# Patient Record
Sex: MALE | Race: BLACK OR AFRICAN AMERICAN | NOT HISPANIC OR LATINO
[De-identification: names, ages, dates, MRNs, and addresses within clinical notes are randomized per-mention and may not be internally consistent; named-entity substitution may affect disease eponyms.]

---

## 2017-06-09 ENCOUNTER — MEDICATION RENEWAL (OUTPATIENT)
Age: 52
End: 2017-06-09

## 2017-06-13 ENCOUNTER — APPOINTMENT (OUTPATIENT)
Dept: CARDIOLOGY | Facility: CLINIC | Age: 52
End: 2017-06-13

## 2017-06-13 ENCOUNTER — NON-APPOINTMENT (OUTPATIENT)
Age: 52
End: 2017-06-13

## 2017-06-13 VITALS
BODY MASS INDEX: 33.67 KG/M2 | OXYGEN SATURATION: 96 % | WEIGHT: 228 LBS | DIASTOLIC BLOOD PRESSURE: 98 MMHG | SYSTOLIC BLOOD PRESSURE: 182 MMHG | HEART RATE: 77 BPM

## 2017-09-11 ENCOUNTER — MEDICATION RENEWAL (OUTPATIENT)
Age: 52
End: 2017-09-11

## 2017-09-29 ENCOUNTER — MEDICATION RENEWAL (OUTPATIENT)
Age: 52
End: 2017-09-29

## 2017-10-26 ENCOUNTER — RX RENEWAL (OUTPATIENT)
Age: 52
End: 2017-10-26

## 2017-10-27 ENCOUNTER — RX RENEWAL (OUTPATIENT)
Age: 52
End: 2017-10-27

## 2017-10-30 ENCOUNTER — RX RENEWAL (OUTPATIENT)
Age: 52
End: 2017-10-30

## 2017-11-13 ENCOUNTER — RX RENEWAL (OUTPATIENT)
Age: 52
End: 2017-11-13

## 2018-01-16 ENCOUNTER — APPOINTMENT (OUTPATIENT)
Dept: CARDIOLOGY | Facility: CLINIC | Age: 53
End: 2018-01-16
Payer: MEDICAID

## 2018-01-16 ENCOUNTER — NON-APPOINTMENT (OUTPATIENT)
Age: 53
End: 2018-01-16

## 2018-01-16 VITALS
DIASTOLIC BLOOD PRESSURE: 88 MMHG | OXYGEN SATURATION: 95 % | BODY MASS INDEX: 32.44 KG/M2 | SYSTOLIC BLOOD PRESSURE: 167 MMHG | HEIGHT: 69 IN | WEIGHT: 219 LBS | HEART RATE: 65 BPM

## 2018-01-16 DIAGNOSIS — I63.9 CEREBRAL INFARCTION, UNSPECIFIED: ICD-10-CM

## 2018-01-16 DIAGNOSIS — E11.8 TYPE 2 DIABETES MELLITUS WITH UNSPECIFIED COMPLICATIONS: ICD-10-CM

## 2018-01-16 PROCEDURE — 93000 ELECTROCARDIOGRAM COMPLETE: CPT

## 2018-01-16 PROCEDURE — 99215 OFFICE O/P EST HI 40 MIN: CPT

## 2018-04-26 ENCOUNTER — RX RENEWAL (OUTPATIENT)
Age: 53
End: 2018-04-26

## 2018-04-26 RX ORDER — METFORMIN HYDROCHLORIDE 1000 MG/1
1000 TABLET, COATED ORAL TWICE DAILY
Qty: 180 | Refills: 1 | Status: ACTIVE | COMMUNITY
Start: 2017-09-22 | End: 1900-01-01

## 2019-10-11 ENCOUNTER — EMERGENCY (EMERGENCY)
Facility: HOSPITAL | Age: 54
LOS: 1 days | Discharge: HOME | End: 2019-10-11
Attending: STUDENT IN AN ORGANIZED HEALTH CARE EDUCATION/TRAINING PROGRAM
Payer: MEDICARE

## 2019-10-11 VITALS
RESPIRATION RATE: 20 BRPM | HEIGHT: 70 IN | SYSTOLIC BLOOD PRESSURE: 158 MMHG | DIASTOLIC BLOOD PRESSURE: 88 MMHG | TEMPERATURE: 101 F | WEIGHT: 205.91 LBS | HEART RATE: 86 BPM | OXYGEN SATURATION: 95 %

## 2019-10-11 VITALS
SYSTOLIC BLOOD PRESSURE: 151 MMHG | DIASTOLIC BLOOD PRESSURE: 92 MMHG | HEART RATE: 85 BPM | TEMPERATURE: 98 F | OXYGEN SATURATION: 96 % | RESPIRATION RATE: 17 BRPM

## 2019-10-11 LAB
ALBUMIN SERPL ELPH-MCNC: 3.2 G/DL — LOW (ref 3.3–5)
ALP SERPL-CCNC: 137 U/L — HIGH (ref 40–120)
ALT FLD-CCNC: 31 U/L — SIGNIFICANT CHANGE UP (ref 10–45)
ANION GAP SERPL CALC-SCNC: 12 MMOL/L — SIGNIFICANT CHANGE UP (ref 5–17)
APPEARANCE UR: CLEAR — SIGNIFICANT CHANGE UP
APTT BLD: 32.4 SEC — SIGNIFICANT CHANGE UP (ref 27.5–36.3)
AST SERPL-CCNC: 23 U/L — SIGNIFICANT CHANGE UP (ref 10–40)
BACTERIA # UR AUTO: NEGATIVE — SIGNIFICANT CHANGE UP
BASOPHILS # BLD AUTO: 0 K/UL — SIGNIFICANT CHANGE UP (ref 0–0.2)
BASOPHILS NFR BLD AUTO: 0 % — SIGNIFICANT CHANGE UP (ref 0–2)
BILIRUB SERPL-MCNC: 0.3 MG/DL — SIGNIFICANT CHANGE UP (ref 0.2–1.2)
BILIRUB UR-MCNC: NEGATIVE — SIGNIFICANT CHANGE UP
BUN SERPL-MCNC: 10 MG/DL — SIGNIFICANT CHANGE UP (ref 7–23)
CALCIUM SERPL-MCNC: 8.5 MG/DL — SIGNIFICANT CHANGE UP (ref 8.4–10.5)
CHLORIDE SERPL-SCNC: 99 MMOL/L — SIGNIFICANT CHANGE UP (ref 96–108)
CO2 SERPL-SCNC: 28 MMOL/L — SIGNIFICANT CHANGE UP (ref 22–31)
COLOR SPEC: SIGNIFICANT CHANGE UP
CREAT SERPL-MCNC: 0.76 MG/DL — SIGNIFICANT CHANGE UP (ref 0.5–1.3)
DIFF PNL FLD: NEGATIVE — SIGNIFICANT CHANGE UP
EOSINOPHIL # BLD AUTO: 0.12 K/UL — SIGNIFICANT CHANGE UP (ref 0–0.5)
EOSINOPHIL NFR BLD AUTO: 0.9 % — SIGNIFICANT CHANGE UP (ref 0–6)
EPI CELLS # UR: 0 /HPF — SIGNIFICANT CHANGE UP
GAS PNL BLDV: SIGNIFICANT CHANGE UP
GLUCOSE SERPL-MCNC: 158 MG/DL — HIGH (ref 70–99)
GLUCOSE UR QL: ABNORMAL
HCT VFR BLD CALC: 32.8 % — LOW (ref 39–50)
HGB BLD-MCNC: 10.8 G/DL — LOW (ref 13–17)
HYALINE CASTS # UR AUTO: 0 /LPF — SIGNIFICANT CHANGE UP (ref 0–2)
INR BLD: 1.39 RATIO — HIGH (ref 0.88–1.16)
KETONES UR-MCNC: NEGATIVE — SIGNIFICANT CHANGE UP
LEUKOCYTE ESTERASE UR-ACNC: NEGATIVE — SIGNIFICANT CHANGE UP
LYMPHOCYTES # BLD AUTO: 0.69 K/UL — LOW (ref 1–3.3)
LYMPHOCYTES # BLD AUTO: 5.2 % — LOW (ref 13–44)
MCHC RBC-ENTMCNC: 25.9 PG — LOW (ref 27–34)
MCHC RBC-ENTMCNC: 32.9 GM/DL — SIGNIFICANT CHANGE UP (ref 32–36)
MCV RBC AUTO: 78.7 FL — LOW (ref 80–100)
MONOCYTES # BLD AUTO: 0.81 K/UL — SIGNIFICANT CHANGE UP (ref 0–0.9)
MONOCYTES NFR BLD AUTO: 6.1 % — SIGNIFICANT CHANGE UP (ref 2–14)
NEUTROPHILS # BLD AUTO: 10.9 K/UL — HIGH (ref 1.8–7.4)
NEUTROPHILS NFR BLD AUTO: 80.8 % — HIGH (ref 43–77)
NITRITE UR-MCNC: NEGATIVE — SIGNIFICANT CHANGE UP
PH UR: 6.5 — SIGNIFICANT CHANGE UP (ref 5–8)
PLATELET # BLD AUTO: 383 K/UL — SIGNIFICANT CHANGE UP (ref 150–400)
POTASSIUM SERPL-MCNC: 4 MMOL/L — SIGNIFICANT CHANGE UP (ref 3.5–5.3)
POTASSIUM SERPL-SCNC: 4 MMOL/L — SIGNIFICANT CHANGE UP (ref 3.5–5.3)
PROT SERPL-MCNC: 7.8 G/DL — SIGNIFICANT CHANGE UP (ref 6–8.3)
PROT UR-MCNC: ABNORMAL
PROTHROM AB SERPL-ACNC: 16 SEC — HIGH (ref 10–12.9)
RBC # BLD: 4.17 M/UL — LOW (ref 4.2–5.8)
RBC # FLD: 13.7 % — SIGNIFICANT CHANGE UP (ref 10.3–14.5)
RBC CASTS # UR COMP ASSIST: 2 /HPF — SIGNIFICANT CHANGE UP (ref 0–4)
SODIUM SERPL-SCNC: 139 MMOL/L — SIGNIFICANT CHANGE UP (ref 135–145)
SP GR SPEC: 1.02 — SIGNIFICANT CHANGE UP (ref 1.01–1.02)
TROPONIN T, HIGH SENSITIVITY RESULT: 27 NG/L — SIGNIFICANT CHANGE UP (ref 0–51)
TROPONIN T, HIGH SENSITIVITY RESULT: 28 NG/L — SIGNIFICANT CHANGE UP (ref 0–51)
UROBILINOGEN FLD QL: NEGATIVE — SIGNIFICANT CHANGE UP
WBC # BLD: 13.34 K/UL — HIGH (ref 3.8–10.5)
WBC # FLD AUTO: 13.34 K/UL — HIGH (ref 3.8–10.5)
WBC UR QL: 1 /HPF — SIGNIFICANT CHANGE UP (ref 0–5)

## 2019-10-11 PROCEDURE — 87040 BLOOD CULTURE FOR BACTERIA: CPT

## 2019-10-11 PROCEDURE — 93005 ELECTROCARDIOGRAM TRACING: CPT

## 2019-10-11 PROCEDURE — 85610 PROTHROMBIN TIME: CPT

## 2019-10-11 PROCEDURE — 93010 ELECTROCARDIOGRAM REPORT: CPT

## 2019-10-11 PROCEDURE — 83605 ASSAY OF LACTIC ACID: CPT

## 2019-10-11 PROCEDURE — 82803 BLOOD GASES ANY COMBINATION: CPT

## 2019-10-11 PROCEDURE — 85730 THROMBOPLASTIN TIME PARTIAL: CPT

## 2019-10-11 PROCEDURE — 82947 ASSAY GLUCOSE BLOOD QUANT: CPT

## 2019-10-11 PROCEDURE — 84484 ASSAY OF TROPONIN QUANT: CPT

## 2019-10-11 PROCEDURE — 85014 HEMATOCRIT: CPT

## 2019-10-11 PROCEDURE — 74177 CT ABD & PELVIS W/CONTRAST: CPT | Mod: 26

## 2019-10-11 PROCEDURE — 99284 EMERGENCY DEPT VISIT MOD MDM: CPT

## 2019-10-11 PROCEDURE — 74177 CT ABD & PELVIS W/CONTRAST: CPT

## 2019-10-11 PROCEDURE — 80053 COMPREHEN METABOLIC PANEL: CPT

## 2019-10-11 PROCEDURE — 82330 ASSAY OF CALCIUM: CPT

## 2019-10-11 PROCEDURE — 81001 URINALYSIS AUTO W/SCOPE: CPT

## 2019-10-11 PROCEDURE — 71260 CT THORAX DX C+: CPT | Mod: 26

## 2019-10-11 PROCEDURE — 82435 ASSAY OF BLOOD CHLORIDE: CPT

## 2019-10-11 PROCEDURE — 99284 EMERGENCY DEPT VISIT MOD MDM: CPT | Mod: 25

## 2019-10-11 PROCEDURE — 71260 CT THORAX DX C+: CPT

## 2019-10-11 PROCEDURE — 87086 URINE CULTURE/COLONY COUNT: CPT

## 2019-10-11 PROCEDURE — 84295 ASSAY OF SERUM SODIUM: CPT

## 2019-10-11 PROCEDURE — 85027 COMPLETE CBC AUTOMATED: CPT

## 2019-10-11 PROCEDURE — 84132 ASSAY OF SERUM POTASSIUM: CPT

## 2019-10-11 RX ORDER — ACETAMINOPHEN 500 MG
975 TABLET ORAL ONCE
Refills: 0 | Status: COMPLETED | OUTPATIENT
Start: 2019-10-11 | End: 2019-10-11

## 2019-10-11 RX ADMIN — Medication 975 MILLIGRAM(S): at 17:55

## 2019-10-11 NOTE — ED PROVIDER NOTE - NSFOLLOWUPCLINICS_GEN_ALL_ED_FT
[facility]  Hematology/Oncology  [address] [place] [address]  Phone: [phone]  Fax:   Follow Up Time: 1-3 Days    [facility]  Gastroenterology  [address] [place] [address]  Phone: [phone]  Fax:   Follow Up Time: 1-3 Days

## 2019-10-11 NOTE — ED PROVIDER NOTE - PHYSICAL EXAMINATION
A&Ox3, NAD. NCAT. PERRL, EOMI. Neck supple, no LAD. Lungs CTAB. +S1S2, RRR, No m/r/g. Abd soft, NT/ND, +BS, no rebound or guarding. Extremities: cap refill <2, pulses in distal extremities 4+, no edema. Skin without rash. CN II-XII intact. Strength 5/5 UE/LE. Sensations intact throughout. Gait steady.

## 2019-10-11 NOTE — ED PROVIDER NOTE - ATTENDING CONTRIBUTION TO CARE
VINNIE KellyD: 54M hx htn, hld, dm, p/w 3 days of intermittent hiccups, with associated sob and fevers. notes he cant catch his breath when the hiccups get frequent. notes hiccups have been ongoing for 3 weeks acutely wrosened 3 days ago went to [facility] had some kind o workup there that was negative and was d/c'd with meds fro hiccups and antibiotics for ?UTI. discharged this AM and noted symptoms returned and so he came here for second eval. denies cp, notes sob only when hiccups occur, notes some abd pain intermittent for the last 3 weeks as well mainly right sdied. no n/v no diarrhea. on exam pt noted to be well appearing, intermiitently hypoxic to 90% on RA while talking, intermittently tachypnic to 20s while talking. lungs ctab, heart rrr, abd soft ntnd no ashford sign. no cvat. febrile rectally to 102. pt meeting SIRS criteria at this time and as such will pan-culture. however without any clear infectious source does not meet sepsis criteria and thus will not empirically antibiose. unclear source of sob and hiccups. already had acs ro at outside hospital but will repeat here. EKG without ischemic changes. will check labs including troponin. alternatively symptoms may be due to diaphragmatic irritation whether above with na or pleural effusion or below with ascites, or other intraabd pathology (mert, gerd, enteritis etc). plan for ct cehst, abd, pelvis, and likely admission for further care.

## 2019-10-11 NOTE — ED PROVIDER NOTE - NSFOLLOWUPINSTRUCTIONS_ED_ALL_ED_FT
- stay hydrated.  - Take home medications as prescribed  - follow up with your pcp in 1-2 days.  - follow up with oncology in 1-2 days, call number attached to make appointment.   - return if symptoms worsen, have worsening fevers, chills, nausea, vomiting, weight loss and all other concerns. - stay hydrated.  - Take home medications as prescribed, continue with antibiotics as prescribed.  - follow up with your pcp in 1-2 days.  - follow up with oncology/GI in 1-2 days, call number attached to make appointment.   - return if symptoms worsen, have worsening fevers, chills, nausea, vomiting, weight loss and all other concerns.

## 2019-10-11 NOTE — ED PROVIDER NOTE - PROGRESS NOTE DETAILS
Discussed results with pt concerning for metastatic liver cancer and recommending admission for oncology/GI consult and biopsy. Discussed concern and would prefer not delaying care and that he needs to follow up with oncology on monday. he notes understanding. -Carmen Garvin PA-C

## 2019-10-11 NOTE — ED ADULT NURSE NOTE - OBJECTIVE STATEMENT
Patient   is  alert  and  oriented x3.  Color is  good and  skin warm to touch.  He  is  c/o  fever, Patient   is  alert  and  oriented x3.  Color is  good and  skin warm to touch.  He  is  c/o  fever, SOB  and  hiccups.  Patient  was  being  treated  for  UTI  at  an  other  hospital.  Patient  is  febrile  in the  ER.

## 2019-10-11 NOTE — ED ADULT NURSE NOTE - NSIMPLEMENTINTERV_GEN_ALL_ED
Implemented All Universal Safety Interventions:  [place] to call system. Call bell, personal items and telephone within reach. Instruct patient to call for assistance. Room bathroom lighting operational. Non-slip footwear when patient is off stretcher. Physically safe environment: no spills, clutter or unnecessary equipment. Stretcher in lowest position, wheels locked, appropriate side rails in place.

## 2019-10-11 NOTE — ED PROVIDER NOTE - OBJECTIVE STATEMENT
55 yo male with pmh of HTN, DM, HLD, CVA 4 years ago presenting for concern of hiccups, fevers and shortness of breath x 3 days. Pt states he has been having hiccups intermittently for the past "few weeks" states that 3 days ago hiccups became volatile enough that he started to have associated shortness of breath, has had subjective fevers/chills as well. Has not taken medications for this. He states episodes last 20-30 seconds each time, can occur multiple times. day. 55 yo male with pmh of HTN, DM, HLD, CVA 4 years ago presenting for concern of hiccups, fevers and shortness of breath x 3 days. Pt states he has been having hiccups intermittently for the past "few weeks" states that 3 days ago hiccups became volatile enough that he started to have associated shortness of breath, has had subjective fevers/chills as well. Has not taken medications for this. He states episodes last 20-30 seconds each time, can occur multiple times. day. Pt was recently hospitalized at [facility] for ACS rule out and d/cd today, however concerned as symptoms have not resolved, pt was d/cd on Augmentin for fevers and ? UTI/balanitis, given PPI for GERD. Pt states he still has RUQ abd pain, denies chest pain, cough, congestion, diarrhea, constipation, back pain, previous similar symptoms.

## 2019-10-11 NOTE — ED PROVIDER NOTE - PATIENT PORTAL LINK FT
You can access the FollowMyHealth Patient Portal offered by [facility] by registering at the following website: [email or web address]. By joining "[facility]"’s FollowMyHealth portal, you will also be able to view your health information using other applications (apps) compatible with our system.

## 2019-10-11 NOTE — ED PROVIDER NOTE - CARE PLAN
Principal Discharge DX:	Liver lesion Principal Discharge DX:	Liver lesion  Secondary Diagnosis:	Hiccups

## 2019-10-12 LAB
CULTURE RESULTS: NO GROWTH — SIGNIFICANT CHANGE UP
SPECIMEN SOURCE: SIGNIFICANT CHANGE UP

## 2019-10-16 ENCOUNTER — INPATIENT (INPATIENT)
Facility: HOSPITAL | Age: 54
LOS: 12 days | Discharge: HOME | DRG: 435 | End: 2019-10-29
Attending: INTERNAL MEDICINE | Admitting: INTERNAL MEDICINE
Payer: MEDICARE

## 2019-10-16 VITALS
HEART RATE: 87 BPM | DIASTOLIC BLOOD PRESSURE: 92 MMHG | SYSTOLIC BLOOD PRESSURE: 155 MMHG | TEMPERATURE: 100 F | RESPIRATION RATE: 18 BRPM | WEIGHT: 203.93 LBS | OXYGEN SATURATION: 96 % | HEIGHT: 66 IN

## 2019-10-16 DIAGNOSIS — E11.9 TYPE 2 DIABETES MELLITUS WITHOUT COMPLICATIONS: ICD-10-CM

## 2019-10-16 DIAGNOSIS — C25.9 MALIGNANT NEOPLASM OF PANCREAS, UNSPECIFIED: ICD-10-CM

## 2019-10-16 DIAGNOSIS — I10 ESSENTIAL (PRIMARY) HYPERTENSION: ICD-10-CM

## 2019-10-16 DIAGNOSIS — R10.9 UNSPECIFIED ABDOMINAL PAIN: ICD-10-CM

## 2019-10-16 DIAGNOSIS — Z29.9 ENCOUNTER FOR PROPHYLACTIC MEASURES, UNSPECIFIED: ICD-10-CM

## 2019-10-16 DIAGNOSIS — R06.02 SHORTNESS OF BREATH: ICD-10-CM

## 2019-10-16 DIAGNOSIS — I63.9 CEREBRAL INFARCTION, UNSPECIFIED: ICD-10-CM

## 2019-10-16 LAB
ALBUMIN SERPL ELPH-MCNC: 2.8 G/DL — LOW (ref 3.3–5)
ALP SERPL-CCNC: 169 U/L — HIGH (ref 40–120)
ALT FLD-CCNC: 39 U/L — SIGNIFICANT CHANGE UP (ref 10–45)
ANION GAP SERPL CALC-SCNC: 13 MMOL/L — SIGNIFICANT CHANGE UP (ref 5–17)
APTT BLD: 30.6 SEC — SIGNIFICANT CHANGE UP (ref 27.5–36.3)
AST SERPL-CCNC: 34 U/L — SIGNIFICANT CHANGE UP (ref 10–40)
BASOPHILS # BLD AUTO: 0 K/UL — SIGNIFICANT CHANGE UP (ref 0–0.2)
BASOPHILS NFR BLD AUTO: 0 % — SIGNIFICANT CHANGE UP (ref 0–2)
BILIRUB SERPL-MCNC: 0.5 MG/DL — SIGNIFICANT CHANGE UP (ref 0.2–1.2)
BUN SERPL-MCNC: 14 MG/DL — SIGNIFICANT CHANGE UP (ref 7–23)
CALCIUM SERPL-MCNC: 9.3 MG/DL — SIGNIFICANT CHANGE UP (ref 8.4–10.5)
CHLORIDE SERPL-SCNC: 94 MMOL/L — LOW (ref 96–108)
CO2 SERPL-SCNC: 26 MMOL/L — SIGNIFICANT CHANGE UP (ref 22–31)
CREAT SERPL-MCNC: 0.79 MG/DL — SIGNIFICANT CHANGE UP (ref 0.5–1.3)
EOSINOPHIL # BLD AUTO: 0.31 K/UL — SIGNIFICANT CHANGE UP (ref 0–0.5)
EOSINOPHIL NFR BLD AUTO: 1.7 % — SIGNIFICANT CHANGE UP (ref 0–6)
GLUCOSE SERPL-MCNC: 48 MG/DL — LOW (ref 70–99)
HCT VFR BLD CALC: 31.6 % — LOW (ref 39–50)
HGB BLD-MCNC: 10.2 G/DL — LOW (ref 13–17)
INR BLD: 1.44 RATIO — HIGH (ref 0.88–1.16)
LYMPHOCYTES # BLD AUTO: 0.63 K/UL — LOW (ref 1–3.3)
LYMPHOCYTES # BLD AUTO: 3.5 % — LOW (ref 13–44)
MCHC RBC-ENTMCNC: 25.2 PG — LOW (ref 27–34)
MCHC RBC-ENTMCNC: 32.3 GM/DL — SIGNIFICANT CHANGE UP (ref 32–36)
MCV RBC AUTO: 78.2 FL — LOW (ref 80–100)
MONOCYTES # BLD AUTO: 2.36 K/UL — HIGH (ref 0–0.9)
MONOCYTES NFR BLD AUTO: 13.1 % — SIGNIFICANT CHANGE UP (ref 2–14)
NEUTROPHILS # BLD AUTO: 14.75 K/UL — HIGH (ref 1.8–7.4)
NEUTROPHILS NFR BLD AUTO: 81.7 % — HIGH (ref 43–77)
PLATELET # BLD AUTO: 386 K/UL — SIGNIFICANT CHANGE UP (ref 150–400)
POTASSIUM SERPL-MCNC: 4 MMOL/L — SIGNIFICANT CHANGE UP (ref 3.5–5.3)
POTASSIUM SERPL-SCNC: 4 MMOL/L — SIGNIFICANT CHANGE UP (ref 3.5–5.3)
PROT SERPL-MCNC: 7.6 G/DL — SIGNIFICANT CHANGE UP (ref 6–8.3)
PROTHROM AB SERPL-ACNC: 16.8 SEC — HIGH (ref 10–12.9)
RBC # BLD: 4.04 M/UL — LOW (ref 4.2–5.8)
RBC # FLD: 13.7 % — SIGNIFICANT CHANGE UP (ref 10.3–14.5)
SODIUM SERPL-SCNC: 133 MMOL/L — LOW (ref 135–145)
WBC # BLD: 18.05 K/UL — HIGH (ref 3.8–10.5)
WBC # FLD AUTO: 18.05 K/UL — HIGH (ref 3.8–10.5)

## 2019-10-16 PROCEDURE — 99285 EMERGENCY DEPT VISIT HI MDM: CPT

## 2019-10-16 RX ORDER — DEXTROSE 50 % IN WATER 50 %
25 SYRINGE (ML) INTRAVENOUS ONCE
Refills: 0 | Status: DISCONTINUED | OUTPATIENT
Start: 2019-10-16 | End: 2019-10-29

## 2019-10-16 RX ORDER — DEXTROSE 50 % IN WATER 50 %
15 SYRINGE (ML) INTRAVENOUS ONCE
Refills: 0 | Status: DISCONTINUED | OUTPATIENT
Start: 2019-10-16 | End: 2019-10-29

## 2019-10-16 RX ORDER — CHLORPROMAZINE HCL 10 MG
25 TABLET ORAL ONCE
Refills: 0 | Status: COMPLETED | OUTPATIENT
Start: 2019-10-16 | End: 2019-10-16

## 2019-10-16 RX ORDER — AMLODIPINE BESYLATE 2.5 MG/1
5 TABLET ORAL DAILY
Refills: 0 | Status: DISCONTINUED | OUTPATIENT
Start: 2019-10-16 | End: 2019-10-18

## 2019-10-16 RX ORDER — KETOROLAC TROMETHAMINE 30 MG/ML
30 SYRINGE (ML) INJECTION ONCE
Refills: 0 | Status: DISCONTINUED | OUTPATIENT
Start: 2019-10-16 | End: 2019-10-16

## 2019-10-16 RX ORDER — DEXTROSE 50 % IN WATER 50 %
12.5 SYRINGE (ML) INTRAVENOUS ONCE
Refills: 0 | Status: DISCONTINUED | OUTPATIENT
Start: 2019-10-16 | End: 2019-10-29

## 2019-10-16 RX ORDER — HEPARIN SODIUM 5000 [USP'U]/ML
5000 INJECTION INTRAVENOUS; SUBCUTANEOUS EVERY 8 HOURS
Refills: 0 | Status: DISCONTINUED | OUTPATIENT
Start: 2019-10-16 | End: 2019-10-18

## 2019-10-16 RX ORDER — SODIUM CHLORIDE 9 MG/ML
1000 INJECTION, SOLUTION INTRAVENOUS
Refills: 0 | Status: DISCONTINUED | OUTPATIENT
Start: 2019-10-16 | End: 2019-10-29

## 2019-10-16 RX ORDER — PANTOPRAZOLE SODIUM 20 MG/1
40 TABLET, DELAYED RELEASE ORAL
Refills: 0 | Status: DISCONTINUED | OUTPATIENT
Start: 2019-10-16 | End: 2019-10-29

## 2019-10-16 RX ORDER — GLUCAGON INJECTION, SOLUTION 0.5 MG/.1ML
1 INJECTION, SOLUTION SUBCUTANEOUS ONCE
Refills: 0 | Status: DISCONTINUED | OUTPATIENT
Start: 2019-10-16 | End: 2019-10-29

## 2019-10-16 RX ORDER — INSULIN LISPRO 100/ML
VIAL (ML) SUBCUTANEOUS
Refills: 0 | Status: DISCONTINUED | OUTPATIENT
Start: 2019-10-16 | End: 2019-10-29

## 2019-10-16 RX ORDER — DOCUSATE SODIUM 100 MG
100 CAPSULE ORAL
Refills: 0 | Status: DISCONTINUED | OUTPATIENT
Start: 2019-10-16 | End: 2019-10-29

## 2019-10-16 RX ADMIN — Medication 30 MILLIGRAM(S): at 13:00

## 2019-10-16 RX ADMIN — HEPARIN SODIUM 5000 UNIT(S): 5000 INJECTION INTRAVENOUS; SUBCUTANEOUS at 21:21

## 2019-10-16 RX ADMIN — Medication 30 MILLIGRAM(S): at 13:30

## 2019-10-16 RX ADMIN — Medication 25 MILLIGRAM(S): at 23:24

## 2019-10-16 NOTE — H&P ADULT - NSHPREVIEWOFSYSTEMS_GEN_ALL_CORE
REVIEW OF SYSTEMS:    CONSTITUTIONAL: + weakness   EYES/ENT: No visual changes;  No vertigo or throat pain   NECK: No pain or stiffness  RESPIRATORY: + SOB   CARDIOVASCULAR: No chest pain or palpitations  GASTROINTESTINAL: hiccups, abdominal discomfort    GENITOURINARY: No dysuria, frequency or hematuria  NEUROLOGICAL: No numbness or weakness  SKIN: No itching, burning, rashes, or lesions   All other review of systems is negative unless indicated above.

## 2019-10-16 NOTE — ED ADULT NURSE NOTE - OBJECTIVE STATEMENT
55 y/o M pt, present to ED for abd pain, hiccup and difficult breathing, pt report he has had hiccup, epigastric burning times 2 weeks, hiccups gets so bad that it affects his breathing, seen in two different, this ED 5 days ago, CT scan was done, was found to have Liver lesion and pulmonary nodules, symptoms worsen at home, was up all night due to hiccup and difficult breathing, on exam abd soft, mild tenderness to RUQ, obese, + hypoactive BS in all 4 quadrant, neg CVA tenderness, denies N/V/D, fever, chills, dysuria, hematuria, melena, seen and eval by justin CHOWDHURY placed, labs drawn and sent

## 2019-10-16 NOTE — H&P ADULT - NSHPPHYSICALEXAM_GEN_ALL_CORE
Vital Signs Last 24 Hrs  T(C): 37.5 (16 Oct 2019 11:40), Max: 37.6 (16 Oct 2019 10:32)  T(F): 99.5 (16 Oct 2019 11:40), Max: 99.6 (16 Oct 2019 10:32)  HR: 89 (16 Oct 2019 12:51) (87 - 90)  BP: 148/87 (16 Oct 2019 12:51) (148/87 - 159/80)  BP(mean): --  RR: 16 (16 Oct 2019 12:51) (16 - 18)  SpO2: 95% (16 Oct 2019 12:51) (95% - 99%) Vital Signs Last 24 Hrs  T(C): 37.5 (16 Oct 2019 11:40), Max: 37.6 (16 Oct 2019 10:32)  T(F): 99.5 (16 Oct 2019 11:40), Max: 99.6 (16 Oct 2019 10:32)  HR: 89 (16 Oct 2019 12:51) (87 - 90)  BP: 148/87 (16 Oct 2019 12:51) (148/87 - 159/80)  BP(mean): --  RR: 16 (16 Oct 2019 12:51) (16 - 18)  SpO2: 95% (16 Oct 2019 12:51) (95% - 99%)    Appearance: awake, following all commands, mild confused 	  HEENT:   Normal oral mucosa, PERRL, EOMI	  Lymphatic: No lymphadenopathy , no edema  Cardiovascular: Normal S1 S2  Respiratory: Lungs clear to auscultation, normal effort 	  Gastrointestinal:  Soft, Non-tender, + BS, distended 	  Skin: No rashes, No ecchymoses, No cyanosis, warm to touch  Musculoskeletal: Normal range of motion, normal strength  Psychiatry:  Mood & affect appropriate  Ext: No edema

## 2019-10-16 NOTE — H&P ADULT - ASSESSMENT
Pt is a 55 y/o male with pmh of HTN, DM, HLD, CVA 4 years ago presenting for concern of hiccups, abdominal pain and distention and shortness of breath, worsening since Friday. Patient reports that 2 weeks ago he started to have episodes of intractable hiccups. found to have pancreatic tail lesion with mets.

## 2019-10-16 NOTE — ED PROVIDER NOTE - NS ED ROS FT
Constitutional: No fever or chills  Eyes: No visual changes, eye pain or redness  HEENT: No throat pain, ear pain, nasal pain. No nose bleeding.  CV: No chest pain or lower extremity edema  Resp: +intermittent shortness of breath.  no cough  GI: +abdominal pain and distention. No nausea or vomiting. No diarrhea. No constipation.   : No dysuria, hematuria.   MSK: No musculoskeletal pain  Skin: No rash  Neuro: No headache. No numbness or tingling. No weakness.

## 2019-10-16 NOTE — H&P ADULT - HISTORY OF PRESENT ILLNESS
Pt is a 53 y/o male with pmh of HTN, DM, HLD, CVA 4 years ago presenting for concern of hiccups, abdominal pain and distention and shortness of breath, worsening since Friday. Patient reports that 2 weeks ago he started to have episodes of intractable hiccups. Last week on Tuesday went to [facility] and was admitted for an ACS workup after hiccups were intractable. States that they discharged him with thorazine for the hiccups and discharged him home. Friday he started to experience shortness of breath and abdominal pain so came to the ER here for evaluation, was found to have pancreatic tail mass and questionable liver mets and discharged with oncology follow-up recommended MRI. Patient states he has no PMD to follow-up with and has not made an appt with oncology, reports did not clearly understand results. Abdominal pain and hiccups have been worsening, abdomen distended. States that when hiccups are very bad he feels very short of breath. Denies chest pain. No fevers or chills. Patient took thorazine prior to arrival and hiccups are controlled at this time.

## 2019-10-16 NOTE — H&P ADULT - NSHPLABSRESULTS_GEN_ALL_CORE
10.2   18.05 )-----------( 386      ( 16 Oct 2019 13:24 )             31.6               10-16    133<L>  |  94<L>  |  14  ----------------------------<  48<L>  4.0   |  26  |  0.79    Ca    9.3      16 Oct 2019 13:24    TPro  7.6  /  Alb  2.8<L>  /  TBili  0.5  /  DBili  x   /  AST  34  /  ALT  39  /  AlkPhos  169<H>  10-16    PT/INR - ( 16 Oct 2019 13:24 )   PT: 16.8 sec;   INR: 1.44 ratio         PTT - ( 16 Oct 2019 13:24 )  PTT:30.6 sec                 < from: CT Abdomen and Pelvis w/ IV Cont (10.11.19 @ 19:40) >    IMPRESSION:     Multiple focal liver lesions suggestive of metastases.  Questionof pancreatic tail mass with distal atrophy pancreatic tail.   Recommend follow-up MRI of the liver and pancreas.  Several small pulmonary nodules

## 2019-10-16 NOTE — ED PROVIDER NOTE - ATTENDING CONTRIBUTION TO CARE
55 yo male p/w hiccups and abdominal pain.  has had same on and off for at least 1-2 weeks, seen here for similar 4d ago, found to have multiple liver lesions and a ?pancreatic lesion, discharged, but has no PCP and states no one to follow-up with.  hiccups temporarily relieved with thorazine, took his last dose today.  will check labs, medicate, consider admission for hiccup and pain control, MRI abdomen, GI consult.

## 2019-10-16 NOTE — ED PROVIDER NOTE - OBJECTIVE STATEMENT
53 yo male with pmh of HTN, DM, HLD, CVA 4 years ago presenting for concern of hiccups, abdominal pain and distention and shortness of breath, worsening since Friday. Patient reports that 2 weeks ago he started to have episodes of intractable hiccups. Last week on Tuesday went to [facility] and was admitted for an ACS workup after hiccups were intractable. States that they discharged him with thorazine for the hiccups and discharged him home. Friday he started to experience shortness of breath and abdominal pain so came to the ER here for evaluation, was found to have pancreatic tail mass and questionable liver mets and discharged with oncology follow-up recommended MRI. Patient states he has no PMD to follow-up with and has not made an appt with oncology, reports did not clearly understand results. Abdominal pain and hiccups have been worsening, abdomen distended. States that when hiccups are very bad he feels very short of breath. Denies chest pain. No fevers or chills. Patient took thorazine prior to arrival and hiccups are controlled at this time.

## 2019-10-16 NOTE — ED PROVIDER NOTE - PHYSICAL EXAMINATION
GEN: Well Appearing, Nontoxic, NAD  HEENT: NC/AT, Symm Facies. PERRL, EOMI, MMM, posterior pharynx clear  CV: No JVD/Bruits or stridor;  +S1S2, RRR w/o m/g/r  RESP: CTAB w/o w/r/r  ABD: Soft, nontender, mild distention. +BS. No guarding/rebound. No RUQ tender, no CVAT  EXT/MSK: No lower extremity edema or calf tenderness. WWP, palpable pulses. FROMx4  SKIN: No erythema, lesions or rash  Neuro: Grossly intact, AOX3 with normal speech, CN II-XII intact; Sensation intact, motor 5/5 throughout. Gait normal

## 2019-10-16 NOTE — ED ADULT TRIAGE NOTE - CHIEF COMPLAINT QUOTE
pt c/o hiccups associated with RLQ pain and constipation that has worsened over the past 2 weeks with the worst yesterday.  per pt, he completed the tx for the hiccups with no resolution.

## 2019-10-16 NOTE — H&P ADULT - PROBLEM SELECTOR PLAN 1
with leukocytosis   no fever, monitor vitals   CT A/P noted  GI eval Called, Dr Rangel for further recs   cx negative from previous hospitalization last week

## 2019-10-17 ENCOUNTER — TRANSCRIPTION ENCOUNTER (OUTPATIENT)
Age: 54
End: 2019-10-17

## 2019-10-17 LAB
ALBUMIN SERPL ELPH-MCNC: 2.9 G/DL — LOW (ref 3.3–5)
ALP SERPL-CCNC: 204 U/L — HIGH (ref 40–120)
ALT FLD-CCNC: 40 U/L — SIGNIFICANT CHANGE UP (ref 10–45)
ANION GAP SERPL CALC-SCNC: 15 MMOL/L — SIGNIFICANT CHANGE UP (ref 5–17)
AST SERPL-CCNC: 35 U/L — SIGNIFICANT CHANGE UP (ref 10–40)
BILIRUB SERPL-MCNC: 0.4 MG/DL — SIGNIFICANT CHANGE UP (ref 0.2–1.2)
BUN SERPL-MCNC: 21 MG/DL — SIGNIFICANT CHANGE UP (ref 7–23)
CALCIUM SERPL-MCNC: 9.1 MG/DL — SIGNIFICANT CHANGE UP (ref 8.4–10.5)
CANCER AG19-9 SERPL-ACNC: 12 U/ML — SIGNIFICANT CHANGE UP
CANCER AG19-9 SERPL-ACNC: 13 U/ML — SIGNIFICANT CHANGE UP
CEA SERPL-MCNC: 20.5 NG/ML — HIGH (ref 0–3.8)
CEA SERPL-MCNC: 20.6 NG/ML — HIGH (ref 0–3.8)
CHLORIDE SERPL-SCNC: 92 MMOL/L — LOW (ref 96–108)
CHOLEST SERPL-MCNC: 64 MG/DL — SIGNIFICANT CHANGE UP (ref 10–199)
CO2 SERPL-SCNC: 27 MMOL/L — SIGNIFICANT CHANGE UP (ref 22–31)
CREAT SERPL-MCNC: 0.94 MG/DL — SIGNIFICANT CHANGE UP (ref 0.5–1.3)
CULTURE RESULTS: SIGNIFICANT CHANGE UP
CULTURE RESULTS: SIGNIFICANT CHANGE UP
GLUCOSE SERPL-MCNC: 93 MG/DL — SIGNIFICANT CHANGE UP (ref 70–99)
HBA1C BLD-MCNC: 7.2 % — HIGH (ref 4–5.6)
HCT VFR BLD CALC: 31.5 % — LOW (ref 39–50)
HCV AB S/CO SERPL IA: 0.12 S/CO — SIGNIFICANT CHANGE UP (ref 0–0.99)
HCV AB SERPL-IMP: SIGNIFICANT CHANGE UP
HDLC SERPL-MCNC: 23 MG/DL — LOW
HGB BLD-MCNC: 10.3 G/DL — LOW (ref 13–17)
LIPID PNL WITH DIRECT LDL SERPL: 28 MG/DL — SIGNIFICANT CHANGE UP
MCHC RBC-ENTMCNC: 25.7 PG — LOW (ref 27–34)
MCHC RBC-ENTMCNC: 32.7 GM/DL — SIGNIFICANT CHANGE UP (ref 32–36)
MCV RBC AUTO: 78.6 FL — LOW (ref 80–100)
PLATELET # BLD AUTO: 369 K/UL — SIGNIFICANT CHANGE UP (ref 150–400)
POTASSIUM SERPL-MCNC: 3.9 MMOL/L — SIGNIFICANT CHANGE UP (ref 3.5–5.3)
POTASSIUM SERPL-SCNC: 3.9 MMOL/L — SIGNIFICANT CHANGE UP (ref 3.5–5.3)
PROT SERPL-MCNC: 7.4 G/DL — SIGNIFICANT CHANGE UP (ref 6–8.3)
RBC # BLD: 4.01 M/UL — LOW (ref 4.2–5.8)
RBC # FLD: 13.7 % — SIGNIFICANT CHANGE UP (ref 10.3–14.5)
SODIUM SERPL-SCNC: 134 MMOL/L — LOW (ref 135–145)
SPECIMEN SOURCE: SIGNIFICANT CHANGE UP
SPECIMEN SOURCE: SIGNIFICANT CHANGE UP
TOTAL CHOLESTEROL/HDL RATIO MEASUREMENT: 2.8 RATIO — LOW (ref 3.4–9.6)
TRIGL SERPL-MCNC: 66 MG/DL — SIGNIFICANT CHANGE UP (ref 10–149)
TSH SERPL-MCNC: 0.94 UIU/ML — SIGNIFICANT CHANGE UP (ref 0.27–4.2)
WBC # BLD: 16.39 K/UL — HIGH (ref 3.8–10.5)
WBC # FLD AUTO: 16.39 K/UL — HIGH (ref 3.8–10.5)

## 2019-10-17 PROCEDURE — 74183 MRI ABD W/O CNTR FLWD CNTR: CPT | Mod: 26

## 2019-10-17 PROCEDURE — 93306 TTE W/DOPPLER COMPLETE: CPT | Mod: 26

## 2019-10-17 RX ORDER — LOSARTAN POTASSIUM 100 MG/1
100 TABLET, FILM COATED ORAL DAILY
Refills: 0 | Status: DISCONTINUED | OUTPATIENT
Start: 2019-10-17 | End: 2019-10-29

## 2019-10-17 RX ORDER — METOPROLOL TARTRATE 50 MG
1 TABLET ORAL
Qty: 0 | Refills: 0 | DISCHARGE

## 2019-10-17 RX ORDER — CHLORPROMAZINE HCL 10 MG
25 TABLET ORAL THREE TIMES A DAY
Refills: 0 | Status: DISCONTINUED | OUTPATIENT
Start: 2019-10-17 | End: 2019-10-22

## 2019-10-17 RX ORDER — SODIUM CHLORIDE 9 MG/ML
500 INJECTION INTRAMUSCULAR; INTRAVENOUS; SUBCUTANEOUS ONCE
Refills: 0 | Status: COMPLETED | OUTPATIENT
Start: 2019-10-17 | End: 2019-10-17

## 2019-10-17 RX ORDER — ACETAMINOPHEN 500 MG
650 TABLET ORAL EVERY 6 HOURS
Refills: 0 | Status: COMPLETED | OUTPATIENT
Start: 2019-10-17 | End: 2019-10-20

## 2019-10-17 RX ORDER — CHLORPROMAZINE HCL 10 MG
25 TABLET ORAL ONCE
Refills: 0 | Status: COMPLETED | OUTPATIENT
Start: 2019-10-17 | End: 2019-10-17

## 2019-10-17 RX ORDER — METOPROLOL TARTRATE 50 MG
200 TABLET ORAL DAILY
Refills: 0 | Status: DISCONTINUED | OUTPATIENT
Start: 2019-10-17 | End: 2019-10-29

## 2019-10-17 RX ORDER — CEFTRIAXONE 500 MG/1
1000 INJECTION, POWDER, FOR SOLUTION INTRAMUSCULAR; INTRAVENOUS EVERY 24 HOURS
Refills: 0 | Status: COMPLETED | OUTPATIENT
Start: 2019-10-17 | End: 2019-10-17

## 2019-10-17 RX ORDER — KETOROLAC TROMETHAMINE 30 MG/ML
15 SYRINGE (ML) INJECTION ONCE
Refills: 0 | Status: DISCONTINUED | OUTPATIENT
Start: 2019-10-17 | End: 2019-10-17

## 2019-10-17 RX ORDER — KETOROLAC TROMETHAMINE 30 MG/ML
15 SYRINGE (ML) INJECTION EVERY 6 HOURS
Refills: 0 | Status: DISCONTINUED | OUTPATIENT
Start: 2019-10-17 | End: 2019-10-19

## 2019-10-17 RX ADMIN — Medication 650 MILLIGRAM(S): at 20:09

## 2019-10-17 RX ADMIN — PANTOPRAZOLE SODIUM 40 MILLIGRAM(S): 20 TABLET, DELAYED RELEASE ORAL at 06:21

## 2019-10-17 RX ADMIN — Medication 650 MILLIGRAM(S): at 22:00

## 2019-10-17 RX ADMIN — AMLODIPINE BESYLATE 5 MILLIGRAM(S): 2.5 TABLET ORAL at 05:41

## 2019-10-17 RX ADMIN — Medication 100 MILLIGRAM(S): at 05:41

## 2019-10-17 RX ADMIN — Medication 15 MILLIGRAM(S): at 20:10

## 2019-10-17 RX ADMIN — Medication 100 MILLIGRAM(S): at 00:02

## 2019-10-17 RX ADMIN — Medication 25 MILLIGRAM(S): at 13:46

## 2019-10-17 RX ADMIN — LOSARTAN POTASSIUM 100 MILLIGRAM(S): 100 TABLET, FILM COATED ORAL at 16:17

## 2019-10-17 RX ADMIN — HEPARIN SODIUM 5000 UNIT(S): 5000 INJECTION INTRAVENOUS; SUBCUTANEOUS at 05:41

## 2019-10-17 RX ADMIN — CEFTRIAXONE 100 MILLIGRAM(S): 500 INJECTION, POWDER, FOR SOLUTION INTRAMUSCULAR; INTRAVENOUS at 23:52

## 2019-10-17 RX ADMIN — Medication 200 MILLIGRAM(S): at 16:17

## 2019-10-17 RX ADMIN — HEPARIN SODIUM 5000 UNIT(S): 5000 INJECTION INTRAVENOUS; SUBCUTANEOUS at 22:56

## 2019-10-17 RX ADMIN — Medication 15 MILLIGRAM(S): at 03:58

## 2019-10-17 RX ADMIN — SODIUM CHLORIDE 1000 MILLILITER(S): 9 INJECTION INTRAMUSCULAR; INTRAVENOUS; SUBCUTANEOUS at 22:56

## 2019-10-17 RX ADMIN — Medication 15 MILLIGRAM(S): at 20:35

## 2019-10-17 RX ADMIN — Medication 100 MILLIGRAM(S): at 18:01

## 2019-10-17 RX ADMIN — HEPARIN SODIUM 5000 UNIT(S): 5000 INJECTION INTRAVENOUS; SUBCUTANEOUS at 16:05

## 2019-10-17 NOTE — DISCHARGE NOTE NURSING/CASE MANAGEMENT/SOCIAL WORK - PATIENT PORTAL LINK FT
You can access the FollowMyHealth Patient Portal offered by [facility] by registering at the following website: [email or web address]. By joining [facility]’s FollowMyHealth portal, you will also be able to view your health information using other applications (apps) compatible with our system.

## 2019-10-17 NOTE — PHARMACOTHERAPY INTERVENTION NOTE - COMMENTS
Medication reconciliation completed per patient and pharmacy. Please refer to outpatient medication review for the updated list. Recommendation made to resume home metoprolol  succinate 200mg by mouth daily and add losartan 100 mg by mouth daily (equivalent to home telmisartan 80mg).     Yobani Anderson, PharmD  [phone] Medication reconciliation completed per patient and pharmacy. Please refer to outpatient medication review for the updated list. Recommendation made to resume home metoprolol  succinate 200mg by mouth daily and add losartan 100 mg by mouth daily (equivalent to home telmisartan 80mg).     Eran Lo, PharmD Candidate  Yobani Anderson, PharmD  [phone]

## 2019-10-17 NOTE — DISCHARGE NOTE NURSING/CASE MANAGEMENT/SOCIAL WORK - NSDCPEWEB_GEN_ALL_CORE
[facility] for Tobacco Control website --- [email or web address] website --- [email or web address]

## 2019-10-17 NOTE — PROVIDER CONTACT NOTE (CHANGE IN STATUS NOTIFICATION) - SITUATION
Pt /109, , temp 102.9 F, O2 Sat 94% on room air, RR 18, NP Damon aware, Tylenol given per order. Pt complains of abdominal pain, NP Damon aware, tramadol 15 mg IVP given per order. Pt /109, , temp 102.9 F, O2 Sat 94% on room air, RR 18, NP Marisol aware, Tylenol given per order. Pt complains of abdominal pain, NP Marisol aware,

## 2019-10-17 NOTE — PROGRESS NOTE ADULT - ASSESSMENT
concern for metatistic disease. pt had aspirin yesterdy, will need liver  bx, can be done poss monday, hold ac.  will do tumor markes. await call from pa/ np to discuss plan

## 2019-10-17 NOTE — PROVIDER CONTACT NOTE (CHANGE IN STATUS NOTIFICATION) - ACTION/TREATMENT ORDERED:
Tylenol given per order, Tramadol IVP given per order. Tylenol given per order, BC x2, UA, bolus NS 500cc

## 2019-10-17 NOTE — PROGRESS NOTE ADULT - PROBLEM SELECTOR PLAN 1
with leukocytosis   no fever, monitor vitals   CT A/P noted  GI eval appreciated  plan for US guided biopsy on monday   cx negative from previous hospitalization last week

## 2019-10-17 NOTE — PROVIDER CONTACT NOTE (CHANGE IN STATUS NOTIFICATION) - RECOMMENDATIONS
Tylenol given per order, Tramadol IVP given per order, continue to monitor. Tylenol given per order, BC x2, UA, bolus NS 500cc

## 2019-10-17 NOTE — DISCHARGE NOTE NURSING/CASE MANAGEMENT/SOCIAL WORK - NSDCPEPTSTRK_GEN_ALL_CORE
Stroke warning signs and symptoms/Call 911 for stroke/Stroke support groups for patients, families, and friends/Stroke education booklet/Signs and symptoms of stroke/Need for follow up after discharge/Prescribed medications/Risk factors for stroke

## 2019-10-17 NOTE — CONSULT NOTE ADULT - ASSESSMENT
Assessment and Plan:   Assessment:  · Assessment		  Pt is a 55 y/o male with pmh of HTN, DM, HLD, CVA 4 years ago presenting for concern of hiccups, abdominal pain and distention and shortness of breath, worsening since Friday. Patient reports that 2 weeks ago he started to have episodes of intractable hiccups. On imaging he has been found to have pancreatic tail lesion with mets.       1) Pancreatic tail mass w/ multiple liver lesions  -will need tissue bx, scheduled for monday as per GI notes (would confirm with IR)  -check MRI abd with IV contrast to further eval liver and pancreatic lesions  -Ca19-9 normal, possible neuroendocrine etiology? Definitely need tissue bx      2) Hiccups  -Persistent   -F/u cards/GI work up  -Consider baclofen    3) CVA  -on asa, will need to hold for bx    Thank you for the courtesy of this consultation and we will continue to follow.    Brigido Finnegan MD  [facility]  Cell: [phone]

## 2019-10-17 NOTE — PROGRESS NOTE ADULT - ASSESSMENT
Pt is a 53 y/o male with pmh of HTN, DM, HLD, CVA 4 years ago presenting for concern of hiccups, abdominal pain and distention and shortness of breath, worsening since Friday. Patient reports that 2 weeks ago he started to have episodes of intractable hiccups. found to have pancreatic tail lesion with mets.

## 2019-10-18 LAB
ANION GAP SERPL CALC-SCNC: 13 MMOL/L — SIGNIFICANT CHANGE UP (ref 5–17)
APPEARANCE UR: CLEAR — SIGNIFICANT CHANGE UP
BILIRUB UR-MCNC: NEGATIVE — SIGNIFICANT CHANGE UP
BUN SERPL-MCNC: 20 MG/DL — SIGNIFICANT CHANGE UP (ref 7–23)
CALCIUM SERPL-MCNC: 9.1 MG/DL — SIGNIFICANT CHANGE UP (ref 8.4–10.5)
CGA FLD-MCNC: 98 NG/ML — HIGH
CHLORIDE SERPL-SCNC: 96 MMOL/L — SIGNIFICANT CHANGE UP (ref 96–108)
CO2 SERPL-SCNC: 30 MMOL/L — SIGNIFICANT CHANGE UP (ref 22–31)
COLOR SPEC: YELLOW — SIGNIFICANT CHANGE UP
CREAT SERPL-MCNC: 0.95 MG/DL — SIGNIFICANT CHANGE UP (ref 0.5–1.3)
DIFF PNL FLD: NEGATIVE — SIGNIFICANT CHANGE UP
GLUCOSE SERPL-MCNC: 132 MG/DL — HIGH (ref 70–99)
GLUCOSE UR QL: SIGNIFICANT CHANGE UP
HCT VFR BLD CALC: 33 % — LOW (ref 39–50)
HGB BLD-MCNC: 10.6 G/DL — LOW (ref 13–17)
KETONES UR-MCNC: NEGATIVE — SIGNIFICANT CHANGE UP
LEUKOCYTE ESTERASE UR-ACNC: NEGATIVE — SIGNIFICANT CHANGE UP
MCHC RBC-ENTMCNC: 25.5 PG — LOW (ref 27–34)
MCHC RBC-ENTMCNC: 32.1 GM/DL — SIGNIFICANT CHANGE UP (ref 32–36)
MCV RBC AUTO: 79.3 FL — LOW (ref 80–100)
NITRITE UR-MCNC: NEGATIVE — SIGNIFICANT CHANGE UP
PH UR: 6 — SIGNIFICANT CHANGE UP (ref 5–8)
PLATELET # BLD AUTO: 355 K/UL — SIGNIFICANT CHANGE UP (ref 150–400)
POTASSIUM SERPL-MCNC: 4.1 MMOL/L — SIGNIFICANT CHANGE UP (ref 3.5–5.3)
POTASSIUM SERPL-SCNC: 4.1 MMOL/L — SIGNIFICANT CHANGE UP (ref 3.5–5.3)
PROT UR-MCNC: ABNORMAL
RAPID RVP RESULT: SIGNIFICANT CHANGE UP
RBC # BLD: 4.16 M/UL — LOW (ref 4.2–5.8)
RBC # FLD: 14.1 % — SIGNIFICANT CHANGE UP (ref 10.3–14.5)
SODIUM SERPL-SCNC: 139 MMOL/L — SIGNIFICANT CHANGE UP (ref 135–145)
SP GR SPEC: 1.02 — SIGNIFICANT CHANGE UP (ref 1.01–1.02)
UROBILINOGEN FLD QL: SIGNIFICANT CHANGE UP
WBC # BLD: 17.56 K/UL — HIGH (ref 3.8–10.5)
WBC # FLD AUTO: 17.56 K/UL — HIGH (ref 3.8–10.5)

## 2019-10-18 PROCEDURE — 71045 X-RAY EXAM CHEST 1 VIEW: CPT | Mod: 26

## 2019-10-18 RX ORDER — PIPERACILLIN AND TAZOBACTAM 4; .5 G/20ML; G/20ML
3.38 INJECTION, POWDER, LYOPHILIZED, FOR SOLUTION INTRAVENOUS EVERY 8 HOURS
Refills: 0 | Status: DISCONTINUED | OUTPATIENT
Start: 2019-10-18 | End: 2019-10-21

## 2019-10-18 RX ORDER — HEPARIN SODIUM 5000 [USP'U]/ML
3500 INJECTION INTRAVENOUS; SUBCUTANEOUS EVERY 6 HOURS
Refills: 0 | Status: DISCONTINUED | OUTPATIENT
Start: 2019-10-18 | End: 2019-10-22

## 2019-10-18 RX ORDER — POLYETHYLENE GLYCOL 3350 17 G/17G
17 POWDER, FOR SOLUTION ORAL
Refills: 0 | Status: DISCONTINUED | OUTPATIENT
Start: 2019-10-18 | End: 2019-10-29

## 2019-10-18 RX ORDER — AMLODIPINE BESYLATE 2.5 MG/1
10 TABLET ORAL AT BEDTIME
Refills: 0 | Status: DISCONTINUED | OUTPATIENT
Start: 2019-10-18 | End: 2019-10-29

## 2019-10-18 RX ORDER — PIPERACILLIN AND TAZOBACTAM 4; .5 G/20ML; G/20ML
3.38 INJECTION, POWDER, LYOPHILIZED, FOR SOLUTION INTRAVENOUS ONCE
Refills: 0 | Status: COMPLETED | OUTPATIENT
Start: 2019-10-18 | End: 2019-10-18

## 2019-10-18 RX ORDER — HEPARIN SODIUM 5000 [USP'U]/ML
INJECTION INTRAVENOUS; SUBCUTANEOUS
Qty: 25000 | Refills: 0 | Status: DISCONTINUED | OUTPATIENT
Start: 2019-10-18 | End: 2019-10-21

## 2019-10-18 RX ORDER — HEPARIN SODIUM 5000 [USP'U]/ML
7500 INJECTION INTRAVENOUS; SUBCUTANEOUS EVERY 6 HOURS
Refills: 0 | Status: DISCONTINUED | OUTPATIENT
Start: 2019-10-18 | End: 2019-10-22

## 2019-10-18 RX ADMIN — HEPARIN SODIUM 5000 UNIT(S): 5000 INJECTION INTRAVENOUS; SUBCUTANEOUS at 13:30

## 2019-10-18 RX ADMIN — Medication 100 MILLIGRAM(S): at 17:48

## 2019-10-18 RX ADMIN — Medication 10 MILLIGRAM(S): at 20:24

## 2019-10-18 RX ADMIN — Medication 650 MILLIGRAM(S): at 20:10

## 2019-10-18 RX ADMIN — Medication 100 MILLIGRAM(S): at 05:52

## 2019-10-18 RX ADMIN — Medication 25 MILLIGRAM(S): at 13:29

## 2019-10-18 RX ADMIN — Medication 0: at 13:29

## 2019-10-18 RX ADMIN — LOSARTAN POTASSIUM 100 MILLIGRAM(S): 100 TABLET, FILM COATED ORAL at 05:53

## 2019-10-18 RX ADMIN — PANTOPRAZOLE SODIUM 40 MILLIGRAM(S): 20 TABLET, DELAYED RELEASE ORAL at 05:54

## 2019-10-18 RX ADMIN — Medication 25 MILLIGRAM(S): at 19:31

## 2019-10-18 RX ADMIN — HEPARIN SODIUM 1700 UNIT(S)/HR: 5000 INJECTION INTRAVENOUS; SUBCUTANEOUS at 18:47

## 2019-10-18 RX ADMIN — Medication 15 MILLIGRAM(S): at 04:20

## 2019-10-18 RX ADMIN — Medication 0: at 17:46

## 2019-10-18 RX ADMIN — PIPERACILLIN AND TAZOBACTAM 200 GRAM(S): 4; .5 INJECTION, POWDER, LYOPHILIZED, FOR SOLUTION INTRAVENOUS at 20:19

## 2019-10-18 RX ADMIN — HEPARIN SODIUM 5000 UNIT(S): 5000 INJECTION INTRAVENOUS; SUBCUTANEOUS at 05:53

## 2019-10-18 RX ADMIN — Medication 15 MILLIGRAM(S): at 03:57

## 2019-10-18 RX ADMIN — POLYETHYLENE GLYCOL 3350 17 GRAM(S): 17 POWDER, FOR SOLUTION ORAL at 08:54

## 2019-10-18 RX ADMIN — Medication 0: at 08:53

## 2019-10-18 RX ADMIN — POLYETHYLENE GLYCOL 3350 17 GRAM(S): 17 POWDER, FOR SOLUTION ORAL at 17:48

## 2019-10-18 RX ADMIN — Medication 200 MILLIGRAM(S): at 05:52

## 2019-10-18 RX ADMIN — AMLODIPINE BESYLATE 5 MILLIGRAM(S): 2.5 TABLET ORAL at 05:53

## 2019-10-18 RX ADMIN — AMLODIPINE BESYLATE 10 MILLIGRAM(S): 2.5 TABLET ORAL at 22:23

## 2019-10-18 NOTE — PROGRESS NOTE ADULT - PROBLEM SELECTOR PLAN 1
with leukocytosis   febrile overnight  sepsis W/U  CT A/P noted  GI eval appreciated  plan for US guided biopsy on monday   cx negative from previous hospitalization last week  MRI noted  ID eval called with leukocytosis   febrile overnight  sepsis W/U  CT A/P noted  GI eval appreciated  plan for US guided biopsy on monday   cx negative from previous hospitalization last week  MRI noted  POrtal vein thrombosis  start heparin drip   vascular eval   ID eval called

## 2019-10-18 NOTE — PROGRESS NOTE ADULT - ASSESSMENT
Assessment and Plan:   Assessment:  · Assessment		  Pt is a 53 y/o male with pmh of HTN, DM, HLD, CVA 4 years ago presenting for concern of hiccups, abdominal pain and distention and shortness of breath, worsening since Friday. Patient reports that 2 weeks ago he started to have episodes of intractable hiccups. On imaging he has been found to have pancreatic tail lesion with mets.     1) Pancreatic tail mass w/ multiple liver lesions  -will need tissue bx, scheduled for bx next week  -MRI a/p as above  -Ca19-9 normal, CEA slightly elevated, chromogranin A slightly elevated      2) Hiccups  -Persistent but improving  -F/u cards and GI  -cont thorazine prn    3) CVA  -on asa, will need to hold for bx    4) partial R portal vein thrombus -- likely exacerbated by GI mass  -- with abd discomfort  -- start heparin gtt, will need to hold for bx and resume after bx per IR  -- once stable and no further intervention indicated, would transition to lovenox 1mg/kg BID. If financial / coverage issue, can consider NOAC    Plan and impression d/w pt and primary team, will follow, [phone]

## 2019-10-18 NOTE — CHART NOTE - NSCHARTNOTEFT_GEN_A_CORE
MEDICINE NP    Notified by RN patient with temperature 102.9. Seen and examined at bedside. Patient is alert, NAD. Denies HA, CP, SOB, cough.    VITAL SIGNS:  T(C): 36.9 (10-18-19 @ 04:02), Max: 39.4 (10-17-19 @ 20:01)  HR: 70 (10-18-19 @ 04:02) (70 - 102)  BP: 145/90 (10-18-19 @ 04:23) (145/90 - 202/105)  RR: 17 (10-18-19 @ 04:02) (17 - 18)  SpO2: 96% (10-18-19 @ 04:02) (94% - 96%)  Wt(kg): --      LABORATORY:                          10.3   16.39 )-----------( 369      ( 17 Oct 2019 09:09 )             31.5       10-18    139  |  96  |  20  ----------------------------<  132<H>  4.1   |  30  |  0.95    Ca    9.1      18 Oct 2019 05:19    TPro  7.4  /  Alb  2.9<L>  /  TBili  0.4  /  DBili  x   /  AST  35  /  ALT  40  /  AlkPhos  204<H>  10-17          MICROBIOLOGY:     MEDICATIONS  (STANDING):  amLODIPine   Tablet 5 milliGRAM(s) Oral daily  dextrose 5%. 1000 milliLiter(s) (50 mL/Hr) IV Continuous <Continuous>  dextrose 50% Injectable 12.5 Gram(s) IV Push once  dextrose 50% Injectable 25 Gram(s) IV Push once  dextrose 50% Injectable 25 Gram(s) IV Push once  docusate sodium 100 milliGRAM(s) Oral two times a day  heparin  Injectable 5000 Unit(s) SubCutaneous every 8 hours  insulin lispro (HumaLOG) corrective regimen sliding scale   SubCutaneous three times a day before meals  losartan 100 milliGRAM(s) Oral daily  metoprolol succinate  milliGRAM(s) Oral daily  pantoprazole    Tablet 40 milliGRAM(s) Oral before breakfast        RADIOLOGY:          PHYSICAL EXAM:    Constitutional: AOx3. NAD.    Respiratory: clear lungs bilaterally. No wheezing, rhonchi, or crackles.    Cardiovascular: S1 S2+    Gastrointestinal: BS X4 active. soft. nontender.        ASSESSMENT/PLAN:   HPI:  Pt is a 53 y/o male with pmh of HTN, DM, HLD, CVA 4 years ago presenting for concern of hiccups, abdominal pain and distention and shortness of breath, worsening since Friday. Patient reports that 2 weeks ago he started to have episodes of intractable hiccups. Last week on Tuesday went to [facility] and was admitted for an ACS workup after hiccups were intractable. States that they discharged him with thorazine for the hiccups and discharged him home. Friday he started to experience shortness of breath and abdominal pain so came to the ER here for evaluation, was found to have pancreatic tail mass and questionable liver mets and discharged with oncology follow-up recommended MRI. Patient states he has no PMD to follow-up with and has not made an appt with oncology, reports did not clearly understand results. Abdominal pain and hiccups have been worsening, abdomen distended. States that when hiccups are very bad he feels very short of breath. Denies chest pain. No fevers or chills. Patient took thorazine prior to arrival and hiccups are controlled at this time. (16 Oct 2019 14:40)        1) Fever of unknown etiology:  -tylenol and cooling measures prn for pyrexia  -BC x2, UA/UC  -CXR  -Rocephine iv x1   -NS 500cc iv x1 for hydration  -ID consult  -cont assess and monitor  -F/U primary team in AM

## 2019-10-18 NOTE — PROGRESS NOTE ADULT - ASSESSMENT
Pt febrile, c/o constipation  states had MRI done last night but listed as cancelled  await MRI results and bx next week  fever w/u per medical team  miralax for constipation

## 2019-10-19 LAB
ANION GAP SERPL CALC-SCNC: 12 MMOL/L — SIGNIFICANT CHANGE UP (ref 5–17)
APTT BLD: 52.9 SEC — HIGH (ref 27.5–36.3)
APTT BLD: 60.4 SEC — HIGH (ref 27.5–36.3)
APTT BLD: 70.3 SEC — HIGH (ref 27.5–36.3)
BUN SERPL-MCNC: 14 MG/DL — SIGNIFICANT CHANGE UP (ref 7–23)
CALCIUM SERPL-MCNC: 8.4 MG/DL — SIGNIFICANT CHANGE UP (ref 8.4–10.5)
CHLORIDE SERPL-SCNC: 98 MMOL/L — SIGNIFICANT CHANGE UP (ref 96–108)
CO2 SERPL-SCNC: 29 MMOL/L — SIGNIFICANT CHANGE UP (ref 22–31)
CREAT SERPL-MCNC: 0.81 MG/DL — SIGNIFICANT CHANGE UP (ref 0.5–1.3)
GLUCOSE SERPL-MCNC: 153 MG/DL — HIGH (ref 70–99)
HCT VFR BLD CALC: 29.7 % — LOW (ref 39–50)
HCT VFR BLD CALC: 30.3 % — LOW (ref 39–50)
HGB BLD-MCNC: 9.7 G/DL — LOW (ref 13–17)
HGB BLD-MCNC: 9.8 G/DL — LOW (ref 13–17)
INR BLD: 1.53 RATIO — HIGH (ref 0.88–1.16)
MCHC RBC-ENTMCNC: 25.3 PG — LOW (ref 27–34)
MCHC RBC-ENTMCNC: 25.4 PG — LOW (ref 27–34)
MCHC RBC-ENTMCNC: 32 GM/DL — SIGNIFICANT CHANGE UP (ref 32–36)
MCHC RBC-ENTMCNC: 33 GM/DL — SIGNIFICANT CHANGE UP (ref 32–36)
MCV RBC AUTO: 76.7 FL — LOW (ref 80–100)
MCV RBC AUTO: 79.3 FL — LOW (ref 80–100)
NRBC # BLD: 0 /100 WBCS — SIGNIFICANT CHANGE UP (ref 0–0)
PLATELET # BLD AUTO: 367 K/UL — SIGNIFICANT CHANGE UP (ref 150–400)
PLATELET # BLD AUTO: 376 K/UL — SIGNIFICANT CHANGE UP (ref 150–400)
POTASSIUM SERPL-MCNC: 3.9 MMOL/L — SIGNIFICANT CHANGE UP (ref 3.5–5.3)
POTASSIUM SERPL-SCNC: 3.9 MMOL/L — SIGNIFICANT CHANGE UP (ref 3.5–5.3)
PROTHROM AB SERPL-ACNC: 17.7 SEC — HIGH (ref 10–13.1)
RBC # BLD: 3.82 M/UL — LOW (ref 4.2–5.8)
RBC # BLD: 3.87 M/UL — LOW (ref 4.2–5.8)
RBC # FLD: 13.8 % — SIGNIFICANT CHANGE UP (ref 10.3–14.5)
RBC # FLD: 14 % — SIGNIFICANT CHANGE UP (ref 10.3–14.5)
SODIUM SERPL-SCNC: 139 MMOL/L — SIGNIFICANT CHANGE UP (ref 135–145)
WBC # BLD: 17.95 K/UL — HIGH (ref 3.8–10.5)
WBC # BLD: 18.41 K/UL — HIGH (ref 3.8–10.5)
WBC # FLD AUTO: 17.95 K/UL — HIGH (ref 3.8–10.5)
WBC # FLD AUTO: 18.41 K/UL — HIGH (ref 3.8–10.5)

## 2019-10-19 RX ADMIN — PIPERACILLIN AND TAZOBACTAM 25 GRAM(S): 4; .5 INJECTION, POWDER, LYOPHILIZED, FOR SOLUTION INTRAVENOUS at 22:22

## 2019-10-19 RX ADMIN — Medication 200 MILLIGRAM(S): at 05:11

## 2019-10-19 RX ADMIN — HEPARIN SODIUM 1700 UNIT(S)/HR: 5000 INJECTION INTRAVENOUS; SUBCUTANEOUS at 08:19

## 2019-10-19 RX ADMIN — Medication 15 MILLIGRAM(S): at 22:22

## 2019-10-19 RX ADMIN — PIPERACILLIN AND TAZOBACTAM 25 GRAM(S): 4; .5 INJECTION, POWDER, LYOPHILIZED, FOR SOLUTION INTRAVENOUS at 14:58

## 2019-10-19 RX ADMIN — LOSARTAN POTASSIUM 100 MILLIGRAM(S): 100 TABLET, FILM COATED ORAL at 05:11

## 2019-10-19 RX ADMIN — Medication 25 MILLIGRAM(S): at 13:22

## 2019-10-19 RX ADMIN — Medication 650 MILLIGRAM(S): at 05:10

## 2019-10-19 RX ADMIN — HEPARIN SODIUM 1700 UNIT(S)/HR: 5000 INJECTION INTRAVENOUS; SUBCUTANEOUS at 01:21

## 2019-10-19 RX ADMIN — AMLODIPINE BESYLATE 10 MILLIGRAM(S): 2.5 TABLET ORAL at 22:22

## 2019-10-19 RX ADMIN — Medication 15 MILLIGRAM(S): at 22:37

## 2019-10-19 RX ADMIN — Medication 100 MILLIGRAM(S): at 05:10

## 2019-10-19 RX ADMIN — PANTOPRAZOLE SODIUM 40 MILLIGRAM(S): 20 TABLET, DELAYED RELEASE ORAL at 05:11

## 2019-10-19 RX ADMIN — Medication 2: at 17:34

## 2019-10-19 RX ADMIN — Medication 100 MILLIGRAM(S): at 17:35

## 2019-10-19 RX ADMIN — POLYETHYLENE GLYCOL 3350 17 GRAM(S): 17 POWDER, FOR SOLUTION ORAL at 17:37

## 2019-10-19 RX ADMIN — PIPERACILLIN AND TAZOBACTAM 25 GRAM(S): 4; .5 INJECTION, POWDER, LYOPHILIZED, FOR SOLUTION INTRAVENOUS at 05:10

## 2019-10-19 RX ADMIN — Medication 25 MILLIGRAM(S): at 22:22

## 2019-10-19 RX ADMIN — Medication 650 MILLIGRAM(S): at 14:29

## 2019-10-19 NOTE — CHART NOTE - NSCHARTNOTEFT_GEN_A_CORE
MEDICINE NP    Notified by RN patient with temperature 103 last night and 101.4 this morning. Seen and examined at bedside. Patient is alert, NAD. Denies HA, CP, SOB, cough, N/V, or abd pain.    VITAL SIGNS:  T(C): 38.6 (10-19-19 @ 04:50), Max: 39.4 (10-18-19 @ 20:10)  HR: 93 (10-19-19 @ 04:50) (78 - 105)  BP: 178/84 (10-19-19 @ 04:50) (134/88 - 179/109)  RR: 17 (10-19-19 @ 04:50) (17 - 18)  SpO2: 95% (10-19-19 @ 04:50) (93% - 98%)  Wt(kg): --      LABORATORY:                          9.8    18.41 )-----------( 367      ( 19 Oct 2019 00:55 )             29.7       10-18    139  |  96  |  20  ----------------------------<  132<H>  4.1   |  30  |  0.95    Ca    9.1      18 Oct 2019 05:19    TPro  7.4  /  Alb  2.9<L>  /  TBili  0.4  /  DBili  x   /  AST  35  /  ALT  40  /  AlkPhos  204<H>  10-17          MICROBIOLOGY:     MEDICATIONS  (STANDING):  amLODIPine   Tablet 10 milliGRAM(s) Oral at bedtime  dextrose 5%. 1000 milliLiter(s) (50 mL/Hr) IV Continuous <Continuous>  dextrose 50% Injectable 12.5 Gram(s) IV Push once  dextrose 50% Injectable 25 Gram(s) IV Push once  dextrose 50% Injectable 25 Gram(s) IV Push once  docusate sodium 100 milliGRAM(s) Oral two times a day  heparin  Infusion.  Unit(s)/Hr (17 mL/Hr) IV Continuous <Continuous>  insulin lispro (HumaLOG) corrective regimen sliding scale   SubCutaneous three times a day before meals  losartan 100 milliGRAM(s) Oral daily  metoprolol succinate  milliGRAM(s) Oral daily  pantoprazole    Tablet 40 milliGRAM(s) Oral before breakfast  piperacillin/tazobactam IVPB.. 3.375 Gram(s) IV Intermittent every 8 hours  polyethylene glycol 3350 17 Gram(s) Oral two times a day        RADIOLOGY:          PHYSICAL EXAM:    Constitutional: AOx3. NAD.    Respiratory: clear lungs bilaterally. No wheezing, rhonchi, or crackles.    Cardiovascular: S1 S2. No murmurs.    Gastrointestinal: BS X4 active. soft. nontender.    Extremities/Vascular: +2 pulses bilaterally. No BLE edema.      ASSESSMENT/PLAN:   HPI:  Pt is a 53 y/o male with pmh of HTN, DM, HLD, CVA 4 years ago presenting for concern of hiccups, abdominal pain and distention and shortness of breath, worsening since Friday. Patient reports that 2 weeks ago he started to have episodes of intractable hiccups. Last week on Tuesday went to [facility] and was admitted for an ACS workup after hiccups were intractable. States that they discharged him with thorazine for the hiccups and discharged him home. Friday he started to experience shortness of breath and abdominal pain so came to the ER here for evaluation, was found to have pancreatic tail mass and questionable liver mets and discharged with oncology follow-up recommended MRI. Patient states he has no PMD to follow-up with and has not made an appt with oncology, reports did not clearly understand results. Abdominal pain and hiccups have been worsening, abdomen distended. States that when hiccups are very bad he feels very short of breath. Denies chest pain. No fevers or chills. Patient took thorazine prior to arrival and hiccups are controlled at this time. (16 Oct 2019 14:40)        1) Fever of unknown etiology:  -tylenol and cooling measures prn for pyrexia  -BC x2, UA/UC pending results  -c/w current IV Zosyn  -f/u with ID  -F/U primary team in AM

## 2019-10-19 NOTE — PROGRESS NOTE ADULT - ASSESSMENT
MR findings suggest metastatic pancreatic cancer with portal vein thrombosis, tumor markers mildly elevated  agree with current treatment, await biopsy of liver lesion Monday via IR

## 2019-10-19 NOTE — PROGRESS NOTE ADULT - PROBLEM SELECTOR PLAN 1
with leukocytosis   febrile overnight  sepsis W/U  CT A/P noted  GI eval appreciated  plan for US guided biopsy on monday   cx negative from previous hospitalization last week  MRI noted  POrtal vein thrombosis  COnt heparin drip, F/U PTT level   ID eval appreciated  ZOsyn for now till better sensitivity

## 2019-10-19 NOTE — PROGRESS NOTE ADULT - ASSESSMENT
53 yo M, with recent onset hiccups and abdo pain, found to have pancreas mass, liver lesions and PV thrombosis  Suspected met pancreatic ca  Fever and leukocytosis likely related to liver mets and thrombosis directly  Alert, no distress or findings on exam, Cxs negative, CXR clear  Still febrile on Day 2 empiric Zosyn    Plan:  Continue Zosyn for now, but if fever continues, anticipate limited course  Follow temps and CBC/diff   Await Bx of liver lesion

## 2019-10-19 NOTE — PROGRESS NOTE ADULT - ASSESSMENT
Assessment and Plan:   Assessment:  · Assessment		  Pt is a 55 y/o male with pmh of HTN, DM, HLD, CVA 4 years ago presenting for concern of hiccups, abdominal pain and distention and shortness of breath, worsening since Friday. Patient reports that 2 weeks ago he started to have episodes of intractable hiccups. On imaging he has been found to have pancreatic tail lesion with mets.     1) Pancreatic tail mass w/ multiple liver lesions  -will need tissue bx, scheduled for bx next week  -MRI a/p as above  -Ca19-9 normal, CEA slightly elevated, chromogranin A slightly elevated      2) Hiccups  -Persistent but improving  -F/u cards and GI  -cont thorazine prn    3) CVA  -on asa, hold for bx    4) partial R portal vein thrombus -- likely exacerbated by GI mass  -- with abd discomfort, improved though  -- cont heparin gtt, will need to hold for bx and resume after bx per IR  -- once stable and no further intervention indicated, would transition to lovenox 1mg/kg BID. If financial / coverage issue, can consider NOAC    5) anemia -- MCV low, check Fe, B12, folate, hapto  -- adequate, monitor    Plan and impression d/w pt and family, total time spent 35min, >50% spent in discussion, will follow, [phone]

## 2019-10-20 LAB
APTT BLD: 51.3 SEC — HIGH (ref 27.5–36.3)
APTT BLD: 55.3 SEC — HIGH (ref 27.5–36.3)
APTT BLD: 65.1 SEC — HIGH (ref 27.5–36.3)
FERRITIN SERPL-MCNC: 1471 NG/ML — HIGH (ref 30–400)
FOLATE SERPL-MCNC: 6.2 NG/ML — SIGNIFICANT CHANGE UP
HAPTOGLOB SERPL-MCNC: 623 MG/DL — HIGH (ref 34–200)
HCT VFR BLD CALC: 33.9 % — LOW (ref 39–50)
HGB BLD-MCNC: 10.6 G/DL — LOW (ref 13–17)
MCHC RBC-ENTMCNC: 25.1 PG — LOW (ref 27–34)
MCHC RBC-ENTMCNC: 31.3 GM/DL — LOW (ref 32–36)
MCV RBC AUTO: 80.1 FL — SIGNIFICANT CHANGE UP (ref 80–100)
PLATELET # BLD AUTO: 379 K/UL — SIGNIFICANT CHANGE UP (ref 150–400)
RBC # BLD: 4.23 M/UL — SIGNIFICANT CHANGE UP (ref 4.2–5.8)
RBC # FLD: 14 % — SIGNIFICANT CHANGE UP (ref 10.3–14.5)
VIT B12 SERPL-MCNC: 588 PG/ML — SIGNIFICANT CHANGE UP (ref 232–1245)
WBC # BLD: 19.16 K/UL — HIGH (ref 3.8–10.5)
WBC # FLD AUTO: 19.16 K/UL — HIGH (ref 3.8–10.5)

## 2019-10-20 RX ORDER — VANCOMYCIN HCL 1 G
1000 VIAL (EA) INTRAVENOUS EVERY 24 HOURS
Refills: 0 | Status: DISCONTINUED | OUTPATIENT
Start: 2019-10-21 | End: 2019-10-21

## 2019-10-20 RX ORDER — VANCOMYCIN HCL 1 G
1000 VIAL (EA) INTRAVENOUS ONCE
Refills: 0 | Status: COMPLETED | OUTPATIENT
Start: 2019-10-20 | End: 2019-10-20

## 2019-10-20 RX ORDER — VANCOMYCIN HCL 1 G
VIAL (EA) INTRAVENOUS
Refills: 0 | Status: DISCONTINUED | OUTPATIENT
Start: 2019-10-20 | End: 2019-10-21

## 2019-10-20 RX ADMIN — HEPARIN SODIUM 1900 UNIT(S)/HR: 5000 INJECTION INTRAVENOUS; SUBCUTANEOUS at 09:26

## 2019-10-20 RX ADMIN — Medication 2: at 12:48

## 2019-10-20 RX ADMIN — LOSARTAN POTASSIUM 100 MILLIGRAM(S): 100 TABLET, FILM COATED ORAL at 06:24

## 2019-10-20 RX ADMIN — POLYETHYLENE GLYCOL 3350 17 GRAM(S): 17 POWDER, FOR SOLUTION ORAL at 17:39

## 2019-10-20 RX ADMIN — PIPERACILLIN AND TAZOBACTAM 25 GRAM(S): 4; .5 INJECTION, POWDER, LYOPHILIZED, FOR SOLUTION INTRAVENOUS at 14:28

## 2019-10-20 RX ADMIN — HEPARIN SODIUM 3500 UNIT(S): 5000 INJECTION INTRAVENOUS; SUBCUTANEOUS at 09:26

## 2019-10-20 RX ADMIN — Medication 650 MILLIGRAM(S): at 18:24

## 2019-10-20 RX ADMIN — HEPARIN SODIUM 3500 UNIT(S): 5000 INJECTION INTRAVENOUS; SUBCUTANEOUS at 23:17

## 2019-10-20 RX ADMIN — Medication 200 MILLIGRAM(S): at 06:24

## 2019-10-20 RX ADMIN — Medication 650 MILLIGRAM(S): at 14:17

## 2019-10-20 RX ADMIN — HEPARIN SODIUM 2100 UNIT(S)/HR: 5000 INJECTION INTRAVENOUS; SUBCUTANEOUS at 23:16

## 2019-10-20 RX ADMIN — AMLODIPINE BESYLATE 10 MILLIGRAM(S): 2.5 TABLET ORAL at 21:49

## 2019-10-20 RX ADMIN — PIPERACILLIN AND TAZOBACTAM 25 GRAM(S): 4; .5 INJECTION, POWDER, LYOPHILIZED, FOR SOLUTION INTRAVENOUS at 21:47

## 2019-10-20 RX ADMIN — Medication 25 MILLIGRAM(S): at 19:58

## 2019-10-20 RX ADMIN — PIPERACILLIN AND TAZOBACTAM 25 GRAM(S): 4; .5 INJECTION, POWDER, LYOPHILIZED, FOR SOLUTION INTRAVENOUS at 06:23

## 2019-10-20 RX ADMIN — Medication 100 MILLIGRAM(S): at 06:24

## 2019-10-20 RX ADMIN — Medication 250 MILLIGRAM(S): at 12:48

## 2019-10-20 RX ADMIN — HEPARIN SODIUM 1900 UNIT(S)/HR: 5000 INJECTION INTRAVENOUS; SUBCUTANEOUS at 16:41

## 2019-10-20 RX ADMIN — PANTOPRAZOLE SODIUM 40 MILLIGRAM(S): 20 TABLET, DELAYED RELEASE ORAL at 06:23

## 2019-10-20 RX ADMIN — Medication 100 MILLIGRAM(S): at 17:39

## 2019-10-20 RX ADMIN — Medication 2: at 09:03

## 2019-10-20 NOTE — PROGRESS NOTE ADULT - PROBLEM SELECTOR PLAN 1
with leukocytosis   febrile overnight  sepsis W/U  CT A/P noted  GI eval appreciated  plan for US guided biopsy on monday   cx negative from previous hospitalization last week  MRI noted  POrtal vein thrombosis  COnt heparin drip, F/U PTT level   ID eval appreciated  add vanco for now, doubt any improvement, malignancy fever  ZOsyn for now till better sensitivity

## 2019-10-20 NOTE — CHART NOTE - NSCHARTNOTEFT_GEN_A_CORE
LATE NOTE ENTRY: initial  encounter  around HR 20:45   Notified by RN patient with temperature of 102 F. Patient seen and examined  by me at bedside.   Patient is alert, NAD.  Patient denied headache, dizziness, chest pain, shortness of breath, cough, rhinorrhea, N/V/D, urinary symptoms, or abdominal pain.      ROS:  CONSTITUTIONAL:  No fever, chills, rigors  CARDIOVASCULAR:  No chest pain or palpitations  RESPIRATORY:   No SOB, cough, dyspnea on exertion.  No wheezing  GASTROINTESTINAL:  No abd pain, N/V, diarrhea/constipation  EXTREMITIES:  No swelling or joint pain  GENITOURINARY:  No burning on urination, increased frequency or urgency.  No flank pain  NEUROLOGIC:  No HA, visual disturbances  SKIN: No rashes          VITAL SIGNS:  T(C): 39.2 (10-20-19 @ 20:37), Max: 39.3 (10-20-19 @ 14:03)  HR: 81 (10-20-19 @ 20:37) (81 - 88)  BP: 180/90 (10-20-19 @ 20:37) (140/78 - 191/63)  RR: 19 (10-20-19 @ 20:37) (18 - 19)  SpO2: 95% (10-20-19 @ 20:37) (94% - 95%)  Wt(kg): --      Physical Exam:  General: WN/WD NAD, AOx3, nontoxic appearing  Head:  NC/AT  CV: RRR, S1S2   Respiratory: CTA B/L, nonlabored. No rales/rhonchi/wheezes.  Abdominal: (+) bowel sounds x4. Soft, NT, ND, no guarding, or rebound tenderness. + Ascites   Genitourinary: ? [place]   MSK: No BLLE edema, + peripheral pulses, FROM all 4 extremity  Skin: (+) warm, dry   Psych: Appropriate affect       LABORATORY:                        10.6   19.16 )-----------( 379      ( 20 Oct 2019 08:33 )             33.9       10-19    139  |  98  |  14  ----------------------------<  153<H>  3.9   |  29  |  0.81    Ca    8.4      19 Oct 2019 07:10        ASSESSMENT/PLAN:   Pt is a 55 y/o male with pmh of HTN, DM, HLD, CVA 4 years ago presenting for concern of hiccups, abdominal pain and distention and shortness of breath, worsening since Friday. Patient reports that 2 weeks ago he started to have episodes of intractable hiccups. found to have pancreatic tail lesion with mets.     Patient now with temperature of 102. Last BC on 10/18, BC drawn, U/A ordered. Chard review: last fever during the day pt on zosyn. As per notes pt scheduled for liver BX tomorrow lois will obtain form tylenol/ espinoza over night, will monitor fevers with cooling compress, cooling blanket if needed.      -c/w zosyn   - cooling measures as pt scheduled for liver BX tomorrow.   -Will continue to closely monitor patient/vitals   -Will endorse to primary team in AM LATE NOTE ENTRY: initial  encounter  around HR 20:45   Notified by RN patient with temperature of 102 F. Patient seen and examined  by me at bedside.   Patient is alert, NAD.  Patient denied headache, dizziness, chest pain, shortness of breath, cough, rhinorrhea, N/V/D, urinary symptoms, or abdominal pain.      ROS:  CONSTITUTIONAL:  No fever, chills, rigors  CARDIOVASCULAR:  No chest pain or palpitations  RESPIRATORY:   No SOB, cough, dyspnea on exertion.  No wheezing  GASTROINTESTINAL:  No abd pain, N/V, diarrhea/constipation  EXTREMITIES:  No swelling or joint pain  GENITOURINARY:  No burning on urination, increased frequency or urgency.  No flank pain  NEUROLOGIC:  No HA, visual disturbances  SKIN: No rashes          VITAL SIGNS:  T(C): 39.2 (10-20-19 @ 20:37), Max: 39.3 (10-20-19 @ 14:03)  HR: 81 (10-20-19 @ 20:37) (81 - 88)  BP: 180/90 (10-20-19 @ 20:37) (140/78 - 191/63)  RR: 19 (10-20-19 @ 20:37) (18 - 19)  SpO2: 95% (10-20-19 @ 20:37) (94% - 95%)  Wt(kg): --      Physical Exam:  General: WN/WD NAD, AOx3, nontoxic appearing  Head:  NC/AT  CV: RRR, S1S2   Respiratory: CTA B/L, nonlabored. No rales/rhonchi/wheezes.  Abdominal: (+) bowel sounds x4. Soft, NT, ND, no guarding, or rebound tenderness. + Ascites   MSK: No BLLE edema, + peripheral pulses, FROM all 4 extremity  Skin: (+) warm, dry   Psych: Appropriate affect       LABORATORY:                        10.6   19.16 )-----------( 379      ( 20 Oct 2019 08:33 )             33.9       10-19    139  |  98  |  14  ----------------------------<  153<H>  3.9   |  29  |  0.81    Ca    8.4      19 Oct 2019 07:10        ASSESSMENT/PLAN:   Pt is a 53 y/o male with pmh of HTN, DM, HLD, CVA 4 years ago presenting for concern of hiccups, abdominal pain and distention and shortness of breath, worsening since Friday. Patient reports that 2 weeks ago he started to have episodes of intractable hiccups. found to have pancreatic tail lesion with mets.     Patient now with temperature of 102. Last BC on 10/18, BC drawn, U/A ordered. Chard review: last fever during the day pt on zosyn. As per notes pt scheduled for liver BX tomorrow lois will obtain form tylenol/ espinoza over night, will monitor fevers with cooling compress, cooling blanket if needed.      -c/w zosyn   - cooling measures as pt scheduled for liver BX tomorrow.   -Will continue to closely monitor patient/vitals   -Will endorse to primary team in AM

## 2019-10-20 NOTE — CHART NOTE - NSCHARTNOTEFT_GEN_A_CORE
Notified by RN of fever of 102.F; Pt with no specific complaints currently on empiric Vanco and Zosyn. Pt with newly diagnosed pancreatic tail lesion and portal martha thrombosis. Discussed with ID. No need for repeat blood clx. will cont currentAbx and monitor fever curves.

## 2019-10-20 NOTE — PROGRESS NOTE ADULT - ASSESSMENT
Assessment and Plan:   Assessment:  · Assessment		  Pt is a 53 y/o male with pmh of HTN, DM, HLD, CVA 4 years ago presenting for concern of hiccups, abdominal pain and distention and shortness of breath, worsening since Friday. Patient reports that 2 weeks ago he started to have episodes of intractable hiccups. On imaging he has been found to have pancreatic tail lesion with mets.     1) Pancreatic tail mass w/ multiple liver lesions  -will need tissue bx, scheduled for bx this week  -Ca19-9 normal, CEA slightly elevated, chromogranin A slightly elevated    2) Hiccups  -resolved  -F/u cards and GI  -cont thorazine prn    3) CVA  -on asa, hold for bx    4) partial R portal vein thrombus -- likely exacerbated by GI mass  -- abd pain resolved  -- cont heparin gtt, will need to hold for bx and resume after bx per IR  -- once stable and no further intervention indicated, would transition to lovenox 1mg/kg BID. If financial / coverage issue, can consider NOAC    5) anemia -- MCV low, neg Fe, B12, folate, hapto  -- adequate, monitor    Plan and impression d/w pt, will follow, [phone]

## 2019-10-20 NOTE — PROGRESS NOTE ADULT - ASSESSMENT
53 yo M, with recent onset hiccups and abdo pain, found to have pancreas mass, liver lesions and PV thrombosis  Suspected met pancreatic ca  Fever and leukocytosis likely related to liver mets and thrombosis directly  Alert, no distress or findings on exam, Cxs negative, CXR clear  Still febrile, WBC higher, on Day 3 empiric Zosyn; doubt Vanco will add much- likely to remain febrile despite empiric abx use    Plan:  Follow temps and CBC/diff   Await Bx of liver lesion  D/w Dr Zapata  D/w NP

## 2019-10-21 ENCOUNTER — RESULT REVIEW (OUTPATIENT)
Age: 54
End: 2019-10-21

## 2019-10-21 LAB
ANION GAP SERPL CALC-SCNC: 10 MMOL/L — SIGNIFICANT CHANGE UP (ref 5–17)
APTT BLD: 87.9 SEC — HIGH (ref 27.5–36.3)
BUN SERPL-MCNC: 11 MG/DL — SIGNIFICANT CHANGE UP (ref 7–23)
CALCIUM SERPL-MCNC: 8.5 MG/DL — SIGNIFICANT CHANGE UP (ref 8.4–10.5)
CHLORIDE SERPL-SCNC: 97 MMOL/L — SIGNIFICANT CHANGE UP (ref 96–108)
CO2 SERPL-SCNC: 30 MMOL/L — SIGNIFICANT CHANGE UP (ref 22–31)
CREAT SERPL-MCNC: 0.83 MG/DL — SIGNIFICANT CHANGE UP (ref 0.5–1.3)
GLUCOSE SERPL-MCNC: 188 MG/DL — HIGH (ref 70–99)
HCT VFR BLD CALC: 31.1 % — LOW (ref 39–50)
HGB BLD-MCNC: 9.9 G/DL — LOW (ref 13–17)
LACTATE SERPL-SCNC: 1.4 MMOL/L — SIGNIFICANT CHANGE UP (ref 0.7–2)
MCHC RBC-ENTMCNC: 25 PG — LOW (ref 27–34)
MCHC RBC-ENTMCNC: 31.8 GM/DL — LOW (ref 32–36)
MCV RBC AUTO: 78.5 FL — LOW (ref 80–100)
PLATELET # BLD AUTO: 378 K/UL — SIGNIFICANT CHANGE UP (ref 150–400)
POTASSIUM SERPL-MCNC: 3.9 MMOL/L — SIGNIFICANT CHANGE UP (ref 3.5–5.3)
POTASSIUM SERPL-SCNC: 3.9 MMOL/L — SIGNIFICANT CHANGE UP (ref 3.5–5.3)
RBC # BLD: 3.96 M/UL — LOW (ref 4.2–5.8)
RBC # FLD: 13.8 % — SIGNIFICANT CHANGE UP (ref 10.3–14.5)
SODIUM SERPL-SCNC: 137 MMOL/L — SIGNIFICANT CHANGE UP (ref 135–145)
WBC # BLD: 17.97 K/UL — HIGH (ref 3.8–10.5)
WBC # FLD AUTO: 17.97 K/UL — HIGH (ref 3.8–10.5)

## 2019-10-21 PROCEDURE — 88173 CYTOPATH EVAL FNA REPORT: CPT | Mod: 26

## 2019-10-21 PROCEDURE — 88305 TISSUE EXAM BY PATHOLOGIST: CPT | Mod: 26

## 2019-10-21 PROCEDURE — 76942 ECHO GUIDE FOR BIOPSY: CPT | Mod: 26

## 2019-10-21 PROCEDURE — 47000 NEEDLE BIOPSY OF LIVER PERQ: CPT

## 2019-10-21 PROCEDURE — 88342 IMHCHEM/IMCYTCHM 1ST ANTB: CPT | Mod: 26

## 2019-10-21 PROCEDURE — 88341 IMHCHEM/IMCYTCHM EA ADD ANTB: CPT | Mod: 26

## 2019-10-21 PROCEDURE — 88307 TISSUE EXAM BY PATHOLOGIST: CPT | Mod: 26

## 2019-10-21 RX ORDER — ACETAMINOPHEN 500 MG
750 TABLET ORAL ONCE
Refills: 0 | Status: COMPLETED | OUTPATIENT
Start: 2019-10-21 | End: 2019-10-21

## 2019-10-21 RX ORDER — VANCOMYCIN HCL 1 G
1000 VIAL (EA) INTRAVENOUS EVERY 24 HOURS
Refills: 0 | Status: DISCONTINUED | OUTPATIENT
Start: 2019-10-21 | End: 2019-10-22

## 2019-10-21 RX ORDER — PIPERACILLIN AND TAZOBACTAM 4; .5 G/20ML; G/20ML
3.38 INJECTION, POWDER, LYOPHILIZED, FOR SOLUTION INTRAVENOUS EVERY 8 HOURS
Refills: 0 | Status: DISCONTINUED | OUTPATIENT
Start: 2019-10-21 | End: 2019-10-22

## 2019-10-21 RX ADMIN — AMLODIPINE BESYLATE 10 MILLIGRAM(S): 2.5 TABLET ORAL at 21:11

## 2019-10-21 RX ADMIN — Medication 2: at 12:13

## 2019-10-21 RX ADMIN — HEPARIN SODIUM 2100 UNIT(S)/HR: 5000 INJECTION INTRAVENOUS; SUBCUTANEOUS at 06:58

## 2019-10-21 RX ADMIN — PANTOPRAZOLE SODIUM 40 MILLIGRAM(S): 20 TABLET, DELAYED RELEASE ORAL at 06:07

## 2019-10-21 RX ADMIN — Medication 2: at 08:48

## 2019-10-21 RX ADMIN — LOSARTAN POTASSIUM 100 MILLIGRAM(S): 100 TABLET, FILM COATED ORAL at 06:07

## 2019-10-21 RX ADMIN — Medication 25 MILLIGRAM(S): at 16:07

## 2019-10-21 RX ADMIN — Medication 200 MILLIGRAM(S): at 06:07

## 2019-10-21 RX ADMIN — Medication 750 MILLIGRAM(S): at 21:30

## 2019-10-21 RX ADMIN — PIPERACILLIN AND TAZOBACTAM 25 GRAM(S): 4; .5 INJECTION, POWDER, LYOPHILIZED, FOR SOLUTION INTRAVENOUS at 06:06

## 2019-10-21 RX ADMIN — Medication 250 MILLIGRAM(S): at 11:30

## 2019-10-21 RX ADMIN — Medication 300 MILLIGRAM(S): at 21:12

## 2019-10-21 RX ADMIN — PIPERACILLIN AND TAZOBACTAM 25 GRAM(S): 4; .5 INJECTION, POWDER, LYOPHILIZED, FOR SOLUTION INTRAVENOUS at 21:12

## 2019-10-21 RX ADMIN — Medication 100 MILLIGRAM(S): at 18:26

## 2019-10-21 RX ADMIN — POLYETHYLENE GLYCOL 3350 17 GRAM(S): 17 POWDER, FOR SOLUTION ORAL at 06:06

## 2019-10-21 RX ADMIN — POLYETHYLENE GLYCOL 3350 17 GRAM(S): 17 POWDER, FOR SOLUTION ORAL at 18:26

## 2019-10-21 RX ADMIN — Medication 100 MILLIGRAM(S): at 06:07

## 2019-10-21 NOTE — PROGRESS NOTE ADULT - PROBLEM SELECTOR PLAN 1
with leukocytosis   febrile overnight  sepsis W/U  CT A/P noted  GI eval appreciated  plan for US guided biopsy today   cx negative from previous hospitalization last week  MRI noted  POrtal vein thrombosis  COnt heparin drip, F/U PTT level   ID eval appreciated  doubt any improvement in fever, malignancy fever  vanc and ZOsyn for now

## 2019-10-21 NOTE — PROGRESS NOTE ADULT - ASSESSMENT
Assessment and Plan:   Assessment:  · Assessment		  Pt is a 53 y/o male with pmh of HTN, DM, HLD, CVA 4 years ago presenting for concern of hiccups, abdominal pain and distention and shortness of breath, worsening since Friday. Patient reports that 2 weeks ago he started to have episodes of intractable hiccups. On imaging he has been found to have pancreatic tail lesion with mets.     1) Pancreatic tail mass w/ multiple liver lesions  -will need tissue bx, scheduled for bx today  -Ca19-9 normal, CEA slightly elevated, chromogranin A slightly elevated    2) Hiccups  -resolved  -F/u cards and GI  -cont thorazine prn    3) CVA  -on asa, hold for bx    4) partial R portal vein thrombus -- likely exacerbated by GI mass  -- abd pain resolved  -- cont heparin gtt, will need to hold for bx and resume after bx per IR  -- once stable and no further intervention indicated, would transition to lovenox 1mg/kg BID. If financial / coverage issue, can consider NOAC    5) anemia -- MCV low, neg Fe, B12, folate, hapto  -- adequate, monitor    Plan and impression d/w pt, will follow, [phone] Assessment and Plan:   Assessment:  · Assessment		  Pt is a 53 y/o male with pmh of HTN, DM, HLD, CVA 4 years ago presenting for concern of hiccups, abdominal pain and distention and shortness of breath, worsening since Friday. Patient reports that 2 weeks ago he started to have episodes of intractable hiccups. On imaging he has been found to have pancreatic tail lesion with mets.     1) Pancreatic tail mass w/ multiple liver lesions  -will need tissue bx, scheduled for bx today  -Ca19-9 normal, CEA slightly elevated, chromogranin A slightly elevated    2) Hiccups  -resolved  -F/u cards and GI  -cont thorazine prn    3) CVA  -on asa, hold for bx    4) partial R portal vein thrombus -- likely exacerbated by GI mass  -- abd pain resolved  -- cont heparin gtt, will need to hold for bx and resume after bx per IR  -- once stable and no further intervention indicated, would transition to lovenox 1mg/kg BID. If financial / coverage issue, can consider NOAC    5) anemia -- MCV low, neg Fe, B12, folate, hapto  -- adequate, monitor    6) fever -- ? tumor fever  -- f/u ID    Plan and impression d/w pt, will follow, [phone]

## 2019-10-22 LAB
ALBUMIN SERPL ELPH-MCNC: 2.9 G/DL — LOW (ref 3.3–5)
ALP SERPL-CCNC: 333 U/L — HIGH (ref 40–120)
ALT FLD-CCNC: 44 U/L — SIGNIFICANT CHANGE UP (ref 10–45)
ANION GAP SERPL CALC-SCNC: 16 MMOL/L — SIGNIFICANT CHANGE UP (ref 5–17)
APTT BLD: 31.1 SEC — SIGNIFICANT CHANGE UP (ref 27.5–36.3)
AST SERPL-CCNC: 36 U/L — SIGNIFICANT CHANGE UP (ref 10–40)
BILIRUB SERPL-MCNC: 0.5 MG/DL — SIGNIFICANT CHANGE UP (ref 0.2–1.2)
BUN SERPL-MCNC: 10 MG/DL — SIGNIFICANT CHANGE UP (ref 7–23)
CALCIUM SERPL-MCNC: 8.3 MG/DL — LOW (ref 8.4–10.5)
CHLORIDE SERPL-SCNC: 92 MMOL/L — LOW (ref 96–108)
CO2 SERPL-SCNC: 29 MMOL/L — SIGNIFICANT CHANGE UP (ref 22–31)
CREAT SERPL-MCNC: 0.71 MG/DL — SIGNIFICANT CHANGE UP (ref 0.5–1.3)
GLUCOSE SERPL-MCNC: 194 MG/DL — HIGH (ref 70–99)
HCT VFR BLD CALC: 33.6 % — LOW (ref 39–50)
HGB BLD-MCNC: 10.5 G/DL — LOW (ref 13–17)
INR BLD: 1.49 RATIO — HIGH (ref 0.88–1.16)
MCHC RBC-ENTMCNC: 24.4 PG — LOW (ref 27–34)
MCHC RBC-ENTMCNC: 31.3 GM/DL — LOW (ref 32–36)
MCV RBC AUTO: 78 FL — LOW (ref 80–100)
PLATELET # BLD AUTO: 371 K/UL — SIGNIFICANT CHANGE UP (ref 150–400)
POTASSIUM SERPL-MCNC: 3.8 MMOL/L — SIGNIFICANT CHANGE UP (ref 3.5–5.3)
POTASSIUM SERPL-SCNC: 3.8 MMOL/L — SIGNIFICANT CHANGE UP (ref 3.5–5.3)
PROT SERPL-MCNC: 7.6 G/DL — SIGNIFICANT CHANGE UP (ref 6–8.3)
PROTHROM AB SERPL-ACNC: 17.4 SEC — HIGH (ref 10–13.1)
RBC # BLD: 4.31 M/UL — SIGNIFICANT CHANGE UP (ref 4.2–5.8)
RBC # FLD: 13.9 % — SIGNIFICANT CHANGE UP (ref 10.3–14.5)
SODIUM SERPL-SCNC: 137 MMOL/L — SIGNIFICANT CHANGE UP (ref 135–145)
WBC # BLD: 20.29 K/UL — HIGH (ref 3.8–10.5)
WBC # FLD AUTO: 20.29 K/UL — HIGH (ref 3.8–10.5)

## 2019-10-22 RX ORDER — CHLORPROMAZINE HCL 10 MG
25 TABLET ORAL ONCE
Refills: 0 | Status: COMPLETED | OUTPATIENT
Start: 2019-10-22 | End: 2019-10-23

## 2019-10-22 RX ORDER — ACETAMINOPHEN 500 MG
1000 TABLET ORAL ONCE
Refills: 0 | Status: COMPLETED | OUTPATIENT
Start: 2019-10-22 | End: 2019-10-23

## 2019-10-22 RX ORDER — ENOXAPARIN SODIUM 100 MG/ML
90 INJECTION SUBCUTANEOUS DAILY
Refills: 0 | Status: DISCONTINUED | OUTPATIENT
Start: 2019-10-22 | End: 2019-10-22

## 2019-10-22 RX ORDER — ENOXAPARIN SODIUM 100 MG/ML
90 INJECTION SUBCUTANEOUS EVERY 12 HOURS
Refills: 0 | Status: DISCONTINUED | OUTPATIENT
Start: 2019-10-22 | End: 2019-10-23

## 2019-10-22 RX ORDER — APIXABAN 2.5 MG/1
2 TABLET, FILM COATED ORAL
Qty: 120 | Refills: 0
Start: 2019-10-22 | End: 2019-11-20

## 2019-10-22 RX ORDER — METOCLOPRAMIDE HCL 10 MG
10 TABLET ORAL EVERY 8 HOURS
Refills: 0 | Status: DISCONTINUED | OUTPATIENT
Start: 2019-10-22 | End: 2019-10-26

## 2019-10-22 RX ORDER — HYDRALAZINE HCL 50 MG
25 TABLET ORAL
Refills: 0 | Status: DISCONTINUED | OUTPATIENT
Start: 2019-10-22 | End: 2019-10-25

## 2019-10-22 RX ORDER — METOCLOPRAMIDE HCL 10 MG
5 TABLET ORAL EVERY 6 HOURS
Refills: 0 | Status: DISCONTINUED | OUTPATIENT
Start: 2019-10-22 | End: 2019-10-22

## 2019-10-22 RX ADMIN — PIPERACILLIN AND TAZOBACTAM 25 GRAM(S): 4; .5 INJECTION, POWDER, LYOPHILIZED, FOR SOLUTION INTRAVENOUS at 05:07

## 2019-10-22 RX ADMIN — LOSARTAN POTASSIUM 100 MILLIGRAM(S): 100 TABLET, FILM COATED ORAL at 05:07

## 2019-10-22 RX ADMIN — PANTOPRAZOLE SODIUM 40 MILLIGRAM(S): 20 TABLET, DELAYED RELEASE ORAL at 05:07

## 2019-10-22 RX ADMIN — Medication 2: at 17:39

## 2019-10-22 RX ADMIN — Medication 10 MILLIGRAM(S): at 13:32

## 2019-10-22 RX ADMIN — ENOXAPARIN SODIUM 90 MILLIGRAM(S): 100 INJECTION SUBCUTANEOUS at 22:03

## 2019-10-22 RX ADMIN — AMLODIPINE BESYLATE 10 MILLIGRAM(S): 2.5 TABLET ORAL at 22:03

## 2019-10-22 RX ADMIN — Medication 10 MILLIGRAM(S): at 22:04

## 2019-10-22 RX ADMIN — Medication 25 MILLIGRAM(S): at 17:39

## 2019-10-22 RX ADMIN — Medication 2: at 08:40

## 2019-10-22 RX ADMIN — Medication 4: at 13:01

## 2019-10-22 RX ADMIN — Medication 200 MILLIGRAM(S): at 05:07

## 2019-10-22 NOTE — PROGRESS NOTE ADULT - PROBLEM SELECTOR PLAN 1
with leukocytosis   sepsis W/U negative to date   CT A/P noted  GI eval appreciated  S/P liver biopsy  awaiting cytology   cx negative from previous hospitalization last week  MRI noted  POrtal vein thrombosis  AC with lovenox   ID eval appreciated  hold all ABx and monitor

## 2019-10-22 NOTE — PROGRESS NOTE ADULT - ASSESSMENT
Assessment and Plan:   Assessment:  · Assessment		  Pt is a 55 y/o male with pmh of HTN, DM, HLD, CVA 4 years ago presenting for concern of hiccups, abdominal pain and distention and shortness of breath, worsening since Friday. Patient reports that 2 weeks ago he started to have episodes of intractable hiccups. On imaging he has been found to have pancreatic tail lesion with mets.     1) Pancreatic tail mass w/ multiple liver lesions  -s/p liver lesion bx, f/u path  -Ca19-9 normal, CEA slightly elevated, chromogranin A slightly elevated    2) Hiccups  -improved but intermittent  -F/u cards and GI  -cont thorazine or reglan prn    3) CVA  -on asa, hold for bx, restart per primary team    4) partial R portal vein thrombus -- likely exacerbated by GI mass  -- abd pain resolved  -- on lovenox BID    5) anemia -- MCV low, neg Fe, B12, folate, hapto  -- adequate, monitor    6) fever -- ? tumor fever  -- f/u ID    Plan and impression d/w pt, d/c planning from heme/onc standpoint if ok with other specialists, though suspect will need to at least r/o liver abscess from path, will follow, [phone]

## 2019-10-23 DIAGNOSIS — R26.9 UNSPECIFIED ABNORMALITIES OF GAIT AND MOBILITY: ICD-10-CM

## 2019-10-23 LAB
ALBUMIN SERPL ELPH-MCNC: 2.5 G/DL — LOW (ref 3.3–5)
ALP SERPL-CCNC: 282 U/L — HIGH (ref 40–120)
ALT FLD-CCNC: 40 U/L — SIGNIFICANT CHANGE UP (ref 10–45)
ANION GAP SERPL CALC-SCNC: 19 MMOL/L — HIGH (ref 5–17)
APTT BLD: 30.6 SEC — SIGNIFICANT CHANGE UP (ref 27.5–36.3)
APTT BLD: 34.2 SEC — SIGNIFICANT CHANGE UP (ref 27.5–36.3)
AST SERPL-CCNC: 26 U/L — SIGNIFICANT CHANGE UP (ref 10–40)
BILIRUB SERPL-MCNC: 0.4 MG/DL — SIGNIFICANT CHANGE UP (ref 0.2–1.2)
BLD GP AB SCN SERPL QL: NEGATIVE — SIGNIFICANT CHANGE UP
BUN SERPL-MCNC: 13 MG/DL — SIGNIFICANT CHANGE UP (ref 7–23)
CALCIUM SERPL-MCNC: 8.7 MG/DL — SIGNIFICANT CHANGE UP (ref 8.4–10.5)
CHLORIDE SERPL-SCNC: 93 MMOL/L — LOW (ref 96–108)
CO2 SERPL-SCNC: 30 MMOL/L — SIGNIFICANT CHANGE UP (ref 22–31)
CREAT SERPL-MCNC: 0.76 MG/DL — SIGNIFICANT CHANGE UP (ref 0.5–1.3)
CULTURE RESULTS: SIGNIFICANT CHANGE UP
CULTURE RESULTS: SIGNIFICANT CHANGE UP
GLUCOSE SERPL-MCNC: 174 MG/DL — HIGH (ref 70–99)
HCT VFR BLD CALC: 27.5 % — LOW (ref 39–50)
HCT VFR BLD CALC: 29.1 % — LOW (ref 39–50)
HCT VFR BLD CALC: 29.5 % — LOW (ref 39–50)
HCT VFR BLD CALC: 30.7 % — LOW (ref 39–50)
HGB BLD-MCNC: 9.1 G/DL — LOW (ref 13–17)
HGB BLD-MCNC: 9.3 G/DL — LOW (ref 13–17)
HGB BLD-MCNC: 9.8 G/DL — LOW (ref 13–17)
HGB BLD-MCNC: 9.9 G/DL — LOW (ref 13–17)
INR BLD: 1.57 RATIO — HIGH (ref 0.88–1.16)
MCHC RBC-ENTMCNC: 24.4 PG — LOW (ref 27–34)
MCHC RBC-ENTMCNC: 25.1 PG — LOW (ref 27–34)
MCHC RBC-ENTMCNC: 25.1 PG — LOW (ref 27–34)
MCHC RBC-ENTMCNC: 25.4 PG — LOW (ref 27–34)
MCHC RBC-ENTMCNC: 31.9 GM/DL — LOW (ref 32–36)
MCHC RBC-ENTMCNC: 32 GM/DL — SIGNIFICANT CHANGE UP (ref 32–36)
MCHC RBC-ENTMCNC: 33.1 GM/DL — SIGNIFICANT CHANGE UP (ref 32–36)
MCHC RBC-ENTMCNC: 33.6 GM/DL — SIGNIFICANT CHANGE UP (ref 32–36)
MCV RBC AUTO: 75.8 FL — LOW (ref 80–100)
MCV RBC AUTO: 76 FL — LOW (ref 80–100)
MCV RBC AUTO: 76.4 FL — LOW (ref 80–100)
MCV RBC AUTO: 78.4 FL — LOW (ref 80–100)
NRBC # BLD: 0 /100 WBCS — SIGNIFICANT CHANGE UP (ref 0–0)
PLATELET # BLD AUTO: 343 K/UL — SIGNIFICANT CHANGE UP (ref 150–400)
PLATELET # BLD AUTO: 361 K/UL — SIGNIFICANT CHANGE UP (ref 150–400)
PLATELET # BLD AUTO: 364 K/UL — SIGNIFICANT CHANGE UP (ref 150–400)
PLATELET # BLD AUTO: 375 K/UL — SIGNIFICANT CHANGE UP (ref 150–400)
POTASSIUM SERPL-MCNC: 3.6 MMOL/L — SIGNIFICANT CHANGE UP (ref 3.5–5.3)
POTASSIUM SERPL-SCNC: 3.6 MMOL/L — SIGNIFICANT CHANGE UP (ref 3.5–5.3)
PROT SERPL-MCNC: 6.9 G/DL — SIGNIFICANT CHANGE UP (ref 6–8.3)
PROTHROM AB SERPL-ACNC: 18.1 SEC — HIGH (ref 10–13.1)
RBC # BLD: 3.62 M/UL — LOW (ref 4.2–5.8)
RBC # BLD: 3.71 M/UL — LOW (ref 4.2–5.8)
RBC # BLD: 3.89 M/UL — LOW (ref 4.2–5.8)
RBC # BLD: 4.02 M/UL — LOW (ref 4.2–5.8)
RBC # FLD: 13.9 % — SIGNIFICANT CHANGE UP (ref 10.3–14.5)
RBC # FLD: 13.9 % — SIGNIFICANT CHANGE UP (ref 10.3–14.5)
RBC # FLD: 14.1 % — SIGNIFICANT CHANGE UP (ref 10.3–14.5)
RBC # FLD: 14.1 % — SIGNIFICANT CHANGE UP (ref 10.3–14.5)
RH IG SCN BLD-IMP: POSITIVE — SIGNIFICANT CHANGE UP
SODIUM SERPL-SCNC: 142 MMOL/L — SIGNIFICANT CHANGE UP (ref 135–145)
SPECIMEN SOURCE: SIGNIFICANT CHANGE UP
SPECIMEN SOURCE: SIGNIFICANT CHANGE UP
WBC # BLD: 21.1 K/UL — HIGH (ref 3.8–10.5)
WBC # BLD: 21.84 K/UL — HIGH (ref 3.8–10.5)
WBC # BLD: 22.77 K/UL — HIGH (ref 3.8–10.5)
WBC # BLD: 24.74 K/UL — HIGH (ref 3.8–10.5)
WBC # FLD AUTO: 21.1 K/UL — HIGH (ref 3.8–10.5)
WBC # FLD AUTO: 21.84 K/UL — HIGH (ref 3.8–10.5)
WBC # FLD AUTO: 22.77 K/UL — HIGH (ref 3.8–10.5)
WBC # FLD AUTO: 24.74 K/UL — HIGH (ref 3.8–10.5)

## 2019-10-23 PROCEDURE — 70498 CT ANGIOGRAPHY NECK: CPT | Mod: 26

## 2019-10-23 PROCEDURE — 72148 MRI LUMBAR SPINE W/O DYE: CPT | Mod: 26

## 2019-10-23 PROCEDURE — 72146 MRI CHEST SPINE W/O DYE: CPT | Mod: 26

## 2019-10-23 PROCEDURE — 70496 CT ANGIOGRAPHY HEAD: CPT | Mod: 26

## 2019-10-23 PROCEDURE — 74176 CT ABD & PELVIS W/O CONTRAST: CPT | Mod: 26

## 2019-10-23 RX ORDER — HEPARIN SODIUM 5000 [USP'U]/ML
3500 INJECTION INTRAVENOUS; SUBCUTANEOUS EVERY 6 HOURS
Refills: 0 | Status: DISCONTINUED | OUTPATIENT
Start: 2019-10-23 | End: 2019-10-28

## 2019-10-23 RX ORDER — ASPIRIN/CALCIUM CARB/MAGNESIUM 324 MG
81 TABLET ORAL DAILY
Refills: 0 | Status: DISCONTINUED | OUTPATIENT
Start: 2019-10-23 | End: 2019-10-29

## 2019-10-23 RX ORDER — HEPARIN SODIUM 5000 [USP'U]/ML
INJECTION INTRAVENOUS; SUBCUTANEOUS
Qty: 25000 | Refills: 0 | Status: DISCONTINUED | OUTPATIENT
Start: 2019-10-23 | End: 2019-10-28

## 2019-10-23 RX ORDER — HEPARIN SODIUM 5000 [USP'U]/ML
7500 INJECTION INTRAVENOUS; SUBCUTANEOUS EVERY 6 HOURS
Refills: 0 | Status: DISCONTINUED | OUTPATIENT
Start: 2019-10-23 | End: 2019-10-28

## 2019-10-23 RX ORDER — ACETAMINOPHEN 500 MG
650 TABLET ORAL ONCE
Refills: 0 | Status: COMPLETED | OUTPATIENT
Start: 2019-10-23 | End: 2019-10-23

## 2019-10-23 RX ADMIN — Medication 650 MILLIGRAM(S): at 16:54

## 2019-10-23 RX ADMIN — HEPARIN SODIUM 7500 UNIT(S): 5000 INJECTION INTRAVENOUS; SUBCUTANEOUS at 21:31

## 2019-10-23 RX ADMIN — Medication 10 MILLIGRAM(S): at 21:51

## 2019-10-23 RX ADMIN — Medication 1000 MILLIGRAM(S): at 00:45

## 2019-10-23 RX ADMIN — Medication 25 MILLIGRAM(S): at 05:43

## 2019-10-23 RX ADMIN — Medication 2: at 08:53

## 2019-10-23 RX ADMIN — Medication 25 MILLIGRAM(S): at 17:25

## 2019-10-23 RX ADMIN — LOSARTAN POTASSIUM 100 MILLIGRAM(S): 100 TABLET, FILM COATED ORAL at 05:43

## 2019-10-23 RX ADMIN — AMLODIPINE BESYLATE 10 MILLIGRAM(S): 2.5 TABLET ORAL at 21:49

## 2019-10-23 RX ADMIN — Medication 25 MILLIGRAM(S): at 00:05

## 2019-10-23 RX ADMIN — HEPARIN SODIUM 1700 UNIT(S)/HR: 5000 INJECTION INTRAVENOUS; SUBCUTANEOUS at 14:35

## 2019-10-23 RX ADMIN — HEPARIN SODIUM 2100 UNIT(S)/HR: 5000 INJECTION INTRAVENOUS; SUBCUTANEOUS at 21:30

## 2019-10-23 RX ADMIN — Medication 4: at 12:27

## 2019-10-23 RX ADMIN — Medication 10 MILLIGRAM(S): at 13:05

## 2019-10-23 RX ADMIN — Medication 650 MILLIGRAM(S): at 18:30

## 2019-10-23 RX ADMIN — Medication 10 MILLIGRAM(S): at 05:43

## 2019-10-23 RX ADMIN — PANTOPRAZOLE SODIUM 40 MILLIGRAM(S): 20 TABLET, DELAYED RELEASE ORAL at 05:43

## 2019-10-23 RX ADMIN — Medication 100 MILLIGRAM(S): at 05:43

## 2019-10-23 RX ADMIN — Medication 400 MILLIGRAM(S): at 00:01

## 2019-10-23 RX ADMIN — Medication 200 MILLIGRAM(S): at 05:43

## 2019-10-23 RX ADMIN — Medication 4: at 17:40

## 2019-10-23 NOTE — PROGRESS NOTE ADULT - PROBLEM SELECTOR PLAN 4
CT abd/pelv noted   Onc eval appreciated  MRI abd   Tumor markers noted  Liver biopsy, awaiting results

## 2019-10-23 NOTE — PROGRESS NOTE ADULT - PROBLEM SELECTOR PLAN 1
R side weakness worsening since admission slowly   R side LE weakness   Stat CT and CTA head and neck noted, no acute stroke  Neuro eval appreciated  CT abd noted to R/O bleeding   MRI L and T spine ordered  resume AC with heparin in view of acute thrombosis   serial CBC Q12hrs   active type and screen R side weakness worsening since admission slowly   R side LE weakness   Stat CT and CTA head and neck noted, no acute stroke  Neuro eval appreciated  CT abd noted to R/O bleeding   MRI L and T spine ordered  resume AC with heparin in view of acute thrombosis   serial CBC Q6hrs for next 24hrs  active type and screen

## 2019-10-23 NOTE — PROGRESS NOTE ADULT - ASSESSMENT
Assessment and Plan:   Assessment:  · Assessment		  Pt is a 53 y/o male with pmh of HTN, DM, HLD, CVA 4 years ago presenting for concern of hiccups, abdominal pain and distention and shortness of breath, worsening since Friday. Patient reports that 2 weeks ago he started to have episodes of intractable hiccups. On imaging he has been found to have pancreatic tail lesion with mets.     1) Pancreatic tail mass w/ multiple liver lesions  -s/p liver lesion bx, f/u path  -Ca19-9 normal, CEA slightly elevated, chromogranin A slightly elevated    2) Hiccups  -improved but intermittent  -F/u cards and GI  -cont thorazine or reglan prn    3) CVA  -on asa, hold for bx, restart per primary team    4) partial R portal vein thrombus -- likely exacerbated by GI mass  -- abd pain resolved  -- on lovenox BID    5) anemia -- MCV low, neg Fe, B12, folate, hapto  -- adequate, monitor    6) fever -- ? tumor fever  -- f/u ID  -- would not try NSAIDS in this case given that pt is on lovenox, avoid risk of bleeding    Plan and impression d/w pt, d/c planning from heme/onc standpoint if ok with other specialists, though suspect will need to at least r/o liver abscess from path, will follow, [phone] Assessment and Plan:   Assessment:  · Assessment		  Pt is a 53 y/o male with pmh of HTN, DM, HLD, CVA 4 years ago presenting for concern of hiccups, abdominal pain and distention and shortness of breath, worsening since Friday. Patient reports that 2 weeks ago he started to have episodes of intractable hiccups. On imaging he has been found to have pancreatic tail lesion with mets.     1) Pancreatic tail mass w/ multiple liver lesions  -s/p liver lesion bx, f/u path  -Ca19-9 normal, CEA slightly elevated, chromogranin A slightly elevated    2) Hiccups  -improved but intermittent  -F/u cards and GI  -cont thorazine or reglan prn    3) CVA  -on asa, hold for bx, restart per primary team    4) partial R portal vein thrombus -- likely exacerbated by GI mass  -- abd pain resolved  -- on hep now    5) anemia -- MCV low, neg Fe, B12, folate, hapto  -- adequate, slightly lower today, monitor for bleeding    6) fever -- ? tumor fever  -- f/u ID  -- would not try NSAIDS in this case given that pt is on lovenox, avoid risk of bleeding    Plan and impression d/w pt, d/c planning from heme/onc standpoint if ok with other specialists, though suspect will need to at least r/o liver abscess from path, will follow, [phone]

## 2019-10-23 NOTE — DIETITIAN INITIAL EVALUATION ADULT. - REASON INDICATOR FOR ASSESSMENT
Pt seen for length of stay initial assessment.   Information obtained from: medical record and pt; family present at bedside including sister-in-law, brother, and mother. Pt seen for length of stay initial assessment.   Information obtained from: medical record and pt; multiple family members present at bedside including sister-in-law, brother, and mother.

## 2019-10-23 NOTE — DIETITIAN INITIAL EVALUATION ADULT. - PHYSICAL APPEARANCE
overweight/other (specify)/Performed nutrition focused physical exam with pt's consent and noted no signs of muscle/fat loss in any area. Ht: 70 inches Wt: 200 pounds BMI: 28.6 kg/m2 IBW: 166 (+/-10%) 120.5 %IBW  No noted edema as per flow sheets.   Skin: no noted pressure injuries as per documentation.

## 2019-10-23 NOTE — CHART NOTE - NSCHARTNOTEFT_GEN_A_CORE
HPI , Pt is a 53 y/o male with pmh of HTN, DM, HLD, CVA 4 years ago presenting for concern of hiccups, abdominal pain and distention and shortness of breath, worsening since Friday. Patient reports that 2 weeks ago he started to have episodes of intractable hiccups. Last week on Tuesday went to [facility] and was admitted for an ACS workup after hiccups were intractable. States that they discharged him with thorazine for the hiccups and discharged him home. Friday he started to experience shortness of breath and abdominal pain so came to the ER here for evaluation, was found to have pancreatic tail mass and questionable liver mets and discharged with oncology follow-up recommended MRI. Patient states he has no PMD to follow-up with and has not made an appt with oncology, reports did not clearly understand results. Abdominal pain and hiccups have been worsening, abdomen distended. States that when hiccups are very bad he feels very short of breath. Denies chest pain. No fevers or chills. Patient took thorazine prior to arrival and hiccups are controlled at this time.       Dr. Benny caceres Rn at bedside Joined team for bedside assessment  Patient report right sided weakness currently on Lovenox for PVT     Patient examined                 Pt woke up this am with worsening weakness of the Right side.   was noted to be not moving the right side as well as the left.  Pt denies any pain or discomfort of his back or groin. HPI  Pt is a 53 y/o male with pmh of HTN, DM, HLD, CVA 4 years ago presenting for concern of hiccups, abdominal pain and distention and shortness of breath, worsening since Friday. Patient reports that 2 weeks ago he started to have episodes of intractable hiccups. Last week on Tuesday went to [facility] and was admitted for an ACS workup after hiccups were intractable. States that they discharged him with thorazine for the hiccups and discharged him home. Friday he started to experience shortness of breath and abdominal pain so came to the ER here for evaluation, was found to have pancreatic tail mass and questionable liver mets and discharged with oncology follow-up recommended MRI. Patient states he has no PMD to follow-up with and has not made an appt with oncology, reports did not clearly understand results. Abdominal pain and hiccups have been worsening, abdomen distended. States that when hiccups are very bad he feels very short of breath. Denies chest pain. No fevers or chills. Patient took thorazine prior to arrival and hiccups are controlled at this time.       Dr. Zapata  and Rn at bedside Joined team for bedside assessment  Patient report right sided weakness currently on Lovenox for PVT     Patient examined A+O x 3 No nystagmus No Facial asymmetry No arm drop  Weakness noted on Right sided hand  and right leg  Pt denies any pain or discomfort of his back or groin.  No cardiac or respiratory distress noted.       Plan  CT Head and Neck r/o bleed/stroke 2/2 AC   CT Angio Head and Neck r/o bleed/stroke 2/2 AC   CT abdomen r/o peritoneal bleed   ARMINDA Throasic and Lumbar  r/o clot   Lovenox held     < from: CT Abdomen and Pelvis No Cont (10.23.19 @ 14:24) >    IMPRESSION:     No retroperitoneal hematoma.    Multiple liver lesions consistent with hepatic metastases. Comparison   with prior CT dated 10/11/2019 is difficult due to lack of intravenous   contrast on the current study. However, the lesions appear to have   increased in size. In addition, there is new moderate perihepatic   infiltration.  Differential diagnosis would include bleeding, metastatic   disease, and infection.    Ill-defined pancreatic tail mass with distal pancreatic tail atrophy   better evaluated on prior MR dated 10/17/2019.    New small volume ascites.        < end of copied text >    < from: CT Angio Neck w/ IV Cont (10.23.19 @ 10:47) >    IMPRESSION:     CT BRAIN: No CT evidence of acute intracranial hemorrhage, brain edema,   mass effect or acute territorial infarct. 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    CTA BRAIN and neck: Limited by injection.    Patent intracranial circulation. No flow-limiting stenosis or occlusion.      Nonvisualization of the proximal left vertebral artery.    < end of copied text >    Reviewed results with Dr. Zapata  CBC q 6 hours   awaiting MR  Restarted Heparin GTT 2/2 PVT     ICU Vital Signs Last 24 Hrs  T(C): 37.2 (23 Oct 2019 18:30), Max: 39.1 (23 Oct 2019 14:40)  T(F): 99 (23 Oct 2019 18:30), Max: 102.3 (23 Oct 2019 14:40)  HR: 82 (23 Oct 2019 18:30) (62 - 108)  BP: 148/83 (23 Oct 2019 18:30) (146/91 - 170/99)  BP(mean): --  ABP: --  ABP(mean): --  RR: 18 (23 Oct 2019 18:30) (18 - 18)  SpO2: 94% (23 Oct 2019 18:30) (93% - 95%)      Complete Blood Count (10.23.19 @ 18:35)    Nucleated RBC: 0 /100 WBCs    WBC Count: 22.77 K/uL    RBC Count: 3.89 M/uL    Hemoglobin: 9.9 g/dL    Hematocrit: 29.5 %    Mean Cell Volume: 75.8 fl    Mean Cell Hemoglobin: 25.4 pg    Mean Cell Hemoglobin Conc: 33.6 gm/dL    Red Cell Distrib Width: 14.1 %    Platelet Count - Automated: 361 K/uL      will continue to monitor  Endorsed to Night team    Korin Vazquez NP  Internal Medicine

## 2019-10-23 NOTE — DIETITIAN INITIAL EVALUATION ADULT. - CONTINUE CURRENT NUTRITION CARE PLAN
1. Recommend continue Consistent Carbohydrate with snack + low salt diet. Will continue to monitor and adjust as needed. 2. Recommend Glucerna Shake 240mls 1x daily (220kcals, 10g protein) to optimize kcal and protein intake. Spoke to NP./yes

## 2019-10-23 NOTE — DIETITIAN INITIAL EVALUATION ADULT. - ADD RECOMMEND
1. Will continue to monitor PO intake, weight, labs, skin, GI status, diet. 2. Encourage PO intake and obtain food preferences (obtained at this time). 3. Discussed balanced meals and adequate kcal and protein intake. 1. Will continue to monitor PO intake, weight, labs, skin, GI status, diet. 2. Encourage PO intake and obtain food preferences (obtained at this time). 3. Discussed balanced meals and adequate kcal and protein intake -made aware RD remains available for further recommendations as needed.

## 2019-10-23 NOTE — DIETITIAN INITIAL EVALUATION ADULT. - ENERGY NEEDS
Pertinent information as per chart: Pt 55 y/o M with PMH: HTN, DM, HLD, CVA 4 years ago presenting for concern of hiccups, abdominal pain and distention and shortness of breath, found to have pancreatic tail lesion with mets, possibly liver - S/P biopsy awaiting path results, fever.

## 2019-10-23 NOTE — DIETITIAN INITIAL EVALUATION ADULT. - NS FNS WEIGHT CHANGE REASON
Pt reports 4 pounds weight loss x 5 days PTA, unable to recall reason, from 204 to 200 pounds. Weight as per flow sheets (10/16) 200 pounds -> (10/17) 199.9 pounds - will continue to monitor.

## 2019-10-23 NOTE — DIETITIAN INITIAL EVALUATION ADULT. - OTHER INFO
Pt reports good appetite and PO intake PTA. Confirms NKFA. Reports taking minerals and a shake with Vegan protein PTA. Pt reports not following any type of diet or restriction at home. Reports monitoring BG 2xday with ranges between 88 - 97 mg/dl and states taking Metformin at home; HbA1c as per chart (10/17) 7.2% - indicates good BG control.     Pt reports good appetite and PO intake. Offered nutritional supplementation - pt agreed to try Glucerna, spoke to NP. Denies difficulty chewing/swallowing. Pt denies nausea, vomiting, diarrhea, or constipation, reports loose BM today (10/13) due to laxatives.     Recommended balanced meals, discussed groups of foods and portion sizes, recommended to start with protein and sips of supplement throughout the day, encouraged vegetables consumption. Pt and family made aware RD remains available for further recommendations as needed. Pt reports good appetite and PO intake PTA. Confirms NKFA. Reports taking minerals and a shake with Vegan protein PTA. Pt reports not following any type of diet or restriction at home. Reports monitoring BG 2xday with ranges between 88 - 97 mg/dl and states taking Metformin at home; HbA1c as per chart (10/17) 7.2% - indicates good BG control.     Pt reports good appetite and PO intake. Offered nutritional supplementation - pt agreed to try Glucerna, spoke to NP. Denies difficulty chewing/swallowing. Pt denies nausea, vomiting, diarrhea, or constipation, reports loose BM today (10/13) due to laxatives.     Discussed balanced meals and adequate kcal and protein intake, reviewed groups of foods and portion sizes, recommended to start with protein and sips of supplement throughout the day, encouraged vegetables consumption; reviewed menu order procedures in hospital. Pt and family made aware RD remains available for further recommendations as needed. Pt reports good appetite and PO intake PTA. Confirms NKFA. Reports taking minerals and a shake with Vegan protein PTA. Pt reports not following any type of diet or restriction at home. Reports monitoring BG 2xday with ranges between 88 - 97 mg/dl and states taking Metformin at home; HbA1c as per chart (10/17) 7.2% - indicates good BG control.     Pt reports good appetite and PO intake. Offered nutritional supplementation - pt agreed to try Glucerna, spoke to NP. Denies difficulty chewing/swallowing. Pt denies nausea, vomiting, diarrhea, or constipation, reports loose BM today (10/13) due to laxatives - RN aware.     Discussed balanced meals and adequate kcal and protein intake, reviewed groups of foods and portion sizes, recommended to start with protein and sips of supplement throughout the day, encouraged vegetables consumption; reviewed menu order procedures in hospital. Pt and family made aware RD remains available for further recommendations as needed.

## 2019-10-24 LAB
ALBUMIN SERPL ELPH-MCNC: 2.5 G/DL — LOW (ref 3.3–5)
ALP SERPL-CCNC: 326 U/L — HIGH (ref 40–120)
ALT FLD-CCNC: 45 U/L — SIGNIFICANT CHANGE UP (ref 10–45)
ANION GAP SERPL CALC-SCNC: 10 MMOL/L — SIGNIFICANT CHANGE UP (ref 5–17)
APTT BLD: 41.6 SEC — HIGH (ref 27.5–36.3)
APTT BLD: 54.6 SEC — HIGH (ref 27.5–36.3)
APTT BLD: 77.5 SEC — HIGH (ref 27.5–36.3)
AST SERPL-CCNC: 39 U/L — SIGNIFICANT CHANGE UP (ref 10–40)
BILIRUB SERPL-MCNC: 0.5 MG/DL — SIGNIFICANT CHANGE UP (ref 0.2–1.2)
BUN SERPL-MCNC: 12 MG/DL — SIGNIFICANT CHANGE UP (ref 7–23)
CALCIUM SERPL-MCNC: 8.6 MG/DL — SIGNIFICANT CHANGE UP (ref 8.4–10.5)
CHLORIDE SERPL-SCNC: 96 MMOL/L — SIGNIFICANT CHANGE UP (ref 96–108)
CO2 SERPL-SCNC: 31 MMOL/L — SIGNIFICANT CHANGE UP (ref 22–31)
CREAT SERPL-MCNC: 0.81 MG/DL — SIGNIFICANT CHANGE UP (ref 0.5–1.3)
GLUCOSE SERPL-MCNC: 181 MG/DL — HIGH (ref 70–99)
HCT VFR BLD CALC: 28.1 % — LOW (ref 39–50)
HCT VFR BLD CALC: 29 % — LOW (ref 39–50)
HCT VFR BLD CALC: 31.6 % — LOW (ref 39–50)
HGB BLD-MCNC: 10.4 G/DL — LOW (ref 13–17)
HGB BLD-MCNC: 9.4 G/DL — LOW (ref 13–17)
HGB BLD-MCNC: 9.7 G/DL — LOW (ref 13–17)
MCHC RBC-ENTMCNC: 24.9 PG — LOW (ref 27–34)
MCHC RBC-ENTMCNC: 25.3 PG — LOW (ref 27–34)
MCHC RBC-ENTMCNC: 25.3 PG — LOW (ref 27–34)
MCHC RBC-ENTMCNC: 32.9 GM/DL — SIGNIFICANT CHANGE UP (ref 32–36)
MCHC RBC-ENTMCNC: 33.4 GM/DL — SIGNIFICANT CHANGE UP (ref 32–36)
MCHC RBC-ENTMCNC: 33.5 GM/DL — SIGNIFICANT CHANGE UP (ref 32–36)
MCV RBC AUTO: 75.5 FL — LOW (ref 80–100)
MCV RBC AUTO: 75.7 FL — LOW (ref 80–100)
MCV RBC AUTO: 75.8 FL — LOW (ref 80–100)
NRBC # BLD: 0 /100 WBCS — SIGNIFICANT CHANGE UP (ref 0–0)
PLATELET # BLD AUTO: 354 K/UL — SIGNIFICANT CHANGE UP (ref 150–400)
PLATELET # BLD AUTO: 380 K/UL — SIGNIFICANT CHANGE UP (ref 150–400)
PLATELET # BLD AUTO: 409 K/UL — HIGH (ref 150–400)
POTASSIUM SERPL-MCNC: 3.6 MMOL/L — SIGNIFICANT CHANGE UP (ref 3.5–5.3)
POTASSIUM SERPL-SCNC: 3.6 MMOL/L — SIGNIFICANT CHANGE UP (ref 3.5–5.3)
PROT SERPL-MCNC: 6.7 G/DL — SIGNIFICANT CHANGE UP (ref 6–8.3)
RBC # BLD: 3.71 M/UL — LOW (ref 4.2–5.8)
RBC # BLD: 3.84 M/UL — LOW (ref 4.2–5.8)
RBC # BLD: 4.17 M/UL — LOW (ref 4.2–5.8)
RBC # FLD: 14.1 % — SIGNIFICANT CHANGE UP (ref 10.3–14.5)
RBC # FLD: 14.2 % — SIGNIFICANT CHANGE UP (ref 10.3–14.5)
RBC # FLD: 14.3 % — SIGNIFICANT CHANGE UP (ref 10.3–14.5)
SODIUM SERPL-SCNC: 137 MMOL/L — SIGNIFICANT CHANGE UP (ref 135–145)
WBC # BLD: 23.09 K/UL — HIGH (ref 3.8–10.5)
WBC # BLD: 23.26 K/UL — HIGH (ref 3.8–10.5)
WBC # BLD: 24.89 K/UL — HIGH (ref 3.8–10.5)
WBC # FLD AUTO: 23.09 K/UL — HIGH (ref 3.8–10.5)
WBC # FLD AUTO: 23.26 K/UL — HIGH (ref 3.8–10.5)
WBC # FLD AUTO: 24.89 K/UL — HIGH (ref 3.8–10.5)

## 2019-10-24 PROCEDURE — 99222 1ST HOSP IP/OBS MODERATE 55: CPT

## 2019-10-24 RX ORDER — ACETAMINOPHEN 500 MG
650 TABLET ORAL ONCE
Refills: 0 | Status: COMPLETED | OUTPATIENT
Start: 2019-10-24 | End: 2019-10-24

## 2019-10-24 RX ORDER — CHLORPROMAZINE HCL 10 MG
25 TABLET ORAL ONCE
Refills: 0 | Status: COMPLETED | OUTPATIENT
Start: 2019-10-24 | End: 2019-10-24

## 2019-10-24 RX ADMIN — POLYETHYLENE GLYCOL 3350 17 GRAM(S): 17 POWDER, FOR SOLUTION ORAL at 17:27

## 2019-10-24 RX ADMIN — Medication 25 MILLIGRAM(S): at 05:02

## 2019-10-24 RX ADMIN — Medication 650 MILLIGRAM(S): at 02:15

## 2019-10-24 RX ADMIN — POLYETHYLENE GLYCOL 3350 17 GRAM(S): 17 POWDER, FOR SOLUTION ORAL at 05:02

## 2019-10-24 RX ADMIN — AMLODIPINE BESYLATE 10 MILLIGRAM(S): 2.5 TABLET ORAL at 21:41

## 2019-10-24 RX ADMIN — Medication 6: at 13:30

## 2019-10-24 RX ADMIN — Medication 100 MILLIGRAM(S): at 17:27

## 2019-10-24 RX ADMIN — Medication 25 MILLIGRAM(S): at 17:27

## 2019-10-24 RX ADMIN — LOSARTAN POTASSIUM 100 MILLIGRAM(S): 100 TABLET, FILM COATED ORAL at 05:02

## 2019-10-24 RX ADMIN — Medication 100 MILLIGRAM(S): at 05:02

## 2019-10-24 RX ADMIN — HEPARIN SODIUM 2500 UNIT(S)/HR: 5000 INJECTION INTRAVENOUS; SUBCUTANEOUS at 18:27

## 2019-10-24 RX ADMIN — Medication 81 MILLIGRAM(S): at 12:19

## 2019-10-24 RX ADMIN — HEPARIN SODIUM 3500 UNIT(S): 5000 INJECTION INTRAVENOUS; SUBCUTANEOUS at 11:15

## 2019-10-24 RX ADMIN — Medication 25 MILLIGRAM(S): at 15:50

## 2019-10-24 RX ADMIN — PANTOPRAZOLE SODIUM 40 MILLIGRAM(S): 20 TABLET, DELAYED RELEASE ORAL at 05:02

## 2019-10-24 RX ADMIN — HEPARIN SODIUM 2500 UNIT(S)/HR: 5000 INJECTION INTRAVENOUS; SUBCUTANEOUS at 11:14

## 2019-10-24 RX ADMIN — Medication 2: at 17:57

## 2019-10-24 RX ADMIN — Medication 10 MILLIGRAM(S): at 05:03

## 2019-10-24 RX ADMIN — Medication 10 MILLIGRAM(S): at 13:31

## 2019-10-24 RX ADMIN — HEPARIN SODIUM 2300 UNIT(S)/HR: 5000 INJECTION INTRAVENOUS; SUBCUTANEOUS at 03:56

## 2019-10-24 RX ADMIN — HEPARIN SODIUM 3500 UNIT(S): 5000 INJECTION INTRAVENOUS; SUBCUTANEOUS at 03:59

## 2019-10-24 RX ADMIN — Medication 10 MILLIGRAM(S): at 21:41

## 2019-10-24 RX ADMIN — Medication 200 MILLIGRAM(S): at 05:02

## 2019-10-24 RX ADMIN — Medication 650 MILLIGRAM(S): at 01:11

## 2019-10-24 NOTE — PROGRESS NOTE ADULT - ASSESSMENT
pt had leukocytosis on admission. hgb stable. bx with adeno ca.  clot burden.  fever can be clot vs tumor.  poor prognosis.   called surgical oncolgy to evaluate fims and see if anything needs to be drained, however, conservative mangment likely most appropriate.  no abdominal pain.

## 2019-10-24 NOTE — CONSULT NOTE ADULT - ASSESSMENT
ASSESSMENT: Patient is a 54M who presented with intractable hiccups and abdominal discomfort, found to have a pancreatic tail mass and liver mets, most concerning for pancreatic adenocarcinoma.    PLAN:   - Given encasement of splenic vessels and bilobar liver mets, patient is not a candidate for surgical resection at this time  - No indication for drainage of any kind  - Differ to heme/onc for management of malignancy at this time    Discussed with Dr. Roxana Zhong, PGY-3  [facility] [phone]

## 2019-10-24 NOTE — PROGRESS NOTE ADULT - ASSESSMENT
Pt is a 53 y/o male with pmh of HTN, DM, HLD, CVA 4 years ago presenting for concern of hiccups, abdominal pain and distention and shortness of breath, worsening since Friday. Patient reports that 2 weeks ago he started to have episodes of intractable hiccups. On imaging he has been found to have pancreatic tail lesion with mets to liver    1) Pancreatic tail mass w/ multiple liver lesions  -s/p liver lesion bx, path showing adenocarcinoma, immunohistochemistry pending.  Likely pancreatic adenocarcinoma  -Ca19-9 normal, CEA slightly elevated, chromogranin A slightly elevated  -explained to pt in presence of sons, brother situation.  Status is treatable but not curable potentially w systemic chemo (gemcitabine/abraxane or FOLFIRINOX), however he would need o improve his performance status with physical rehab.    -c/w PT  -recommend dispo to Rehab with outpatient f/u in our office      3) CVA  -back on asa, previously held for bx    4) partial R portal vein thrombus -- likely exacerbated by GI mass and hypercoag state from malignancy  -- abd pain resolved  -- on hep now;  can switch to Xarelto at d/c    5) anemia -- MCV low, neg Fe, B12, folate, hapto  -- adequate, slightly lower today, monitor for bleeding    6) fever/leukocytosis-- ? tumor fever  -- off abx  -- would not try NSAIDS in this case given that pt is on AC, avoid risk of bleeding    Fer Morelos MD  Hematology/Oncology  Cell:  [phone]  Office Phone: [phone]  Office Fax:  [phone] [address] [id number]

## 2019-10-24 NOTE — PROGRESS NOTE ADULT - PROBLEM SELECTOR PLAN 1
R side weakness worsening since admission slowly   R side LE weakness   Stat CT and CTA head and neck noted, no acute stroke  Neuro eval appreciated  CT abd noted to R/O bleeding   MRI L and T spine ordered  resume AC with heparin in view of acute thrombosis   serial CBC Q6hrs for next 24hrs  active type and screen  MRI L/T noted  Surg eval appreciated

## 2019-10-25 DIAGNOSIS — Z71.89 OTHER SPECIFIED COUNSELING: ICD-10-CM

## 2019-10-25 DIAGNOSIS — Z51.5 ENCOUNTER FOR PALLIATIVE CARE: ICD-10-CM

## 2019-10-25 DIAGNOSIS — R06.6 HICCOUGH: ICD-10-CM

## 2019-10-25 DIAGNOSIS — R53.2 FUNCTIONAL QUADRIPLEGIA: ICD-10-CM

## 2019-10-25 LAB
ALBUMIN SERPL ELPH-MCNC: 2.4 G/DL — LOW (ref 3.3–5)
ALP SERPL-CCNC: 393 U/L — HIGH (ref 40–120)
ALT FLD-CCNC: 47 U/L — HIGH (ref 10–45)
ANION GAP SERPL CALC-SCNC: 18 MMOL/L — HIGH (ref 5–17)
APTT BLD: 72.2 SEC — HIGH (ref 27.5–36.3)
APTT BLD: 74.6 SEC — HIGH (ref 27.5–36.3)
APTT BLD: 77.1 SEC — HIGH (ref 27.5–36.3)
AST SERPL-CCNC: 29 U/L — SIGNIFICANT CHANGE UP (ref 10–40)
BILIRUB SERPL-MCNC: 0.4 MG/DL — SIGNIFICANT CHANGE UP (ref 0.2–1.2)
BUN SERPL-MCNC: 16 MG/DL — SIGNIFICANT CHANGE UP (ref 7–23)
CALCIUM SERPL-MCNC: 7.9 MG/DL — LOW (ref 8.4–10.5)
CHLORIDE SERPL-SCNC: 93 MMOL/L — LOW (ref 96–108)
CO2 SERPL-SCNC: 27 MMOL/L — SIGNIFICANT CHANGE UP (ref 22–31)
CREAT SERPL-MCNC: 0.74 MG/DL — SIGNIFICANT CHANGE UP (ref 0.5–1.3)
GLUCOSE SERPL-MCNC: 186 MG/DL — HIGH (ref 70–99)
HCT VFR BLD CALC: 28 % — LOW (ref 39–50)
HGB BLD-MCNC: 9.2 G/DL — LOW (ref 13–17)
INR BLD: 1.51 RATIO — HIGH (ref 0.88–1.16)
MCHC RBC-ENTMCNC: 24.7 PG — LOW (ref 27–34)
MCHC RBC-ENTMCNC: 32.9 GM/DL — SIGNIFICANT CHANGE UP (ref 32–36)
MCV RBC AUTO: 75.3 FL — LOW (ref 80–100)
NRBC # BLD: 0 /100 WBCS — SIGNIFICANT CHANGE UP (ref 0–0)
PLATELET # BLD AUTO: 384 K/UL — SIGNIFICANT CHANGE UP (ref 150–400)
POTASSIUM SERPL-MCNC: 3.5 MMOL/L — SIGNIFICANT CHANGE UP (ref 3.5–5.3)
POTASSIUM SERPL-SCNC: 3.5 MMOL/L — SIGNIFICANT CHANGE UP (ref 3.5–5.3)
PROT SERPL-MCNC: 6.8 G/DL — SIGNIFICANT CHANGE UP (ref 6–8.3)
PROTHROM AB SERPL-ACNC: 17.6 SEC — HIGH (ref 10–12.9)
RBC # BLD: 3.72 M/UL — LOW (ref 4.2–5.8)
RBC # FLD: 14.2 % — SIGNIFICANT CHANGE UP (ref 10.3–14.5)
SODIUM SERPL-SCNC: 138 MMOL/L — SIGNIFICANT CHANGE UP (ref 135–145)
WBC # BLD: 22.22 K/UL — HIGH (ref 3.8–10.5)
WBC # FLD AUTO: 22.22 K/UL — HIGH (ref 3.8–10.5)

## 2019-10-25 PROCEDURE — 99497 ADVNCD CARE PLAN 30 MIN: CPT | Mod: 25

## 2019-10-25 PROCEDURE — 99223 1ST HOSP IP/OBS HIGH 75: CPT

## 2019-10-25 RX ORDER — CHLORPROMAZINE HCL 10 MG
25 TABLET ORAL ONCE
Refills: 0 | Status: COMPLETED | OUTPATIENT
Start: 2019-10-25 | End: 2019-10-25

## 2019-10-25 RX ORDER — ACETAMINOPHEN 500 MG
650 TABLET ORAL ONCE
Refills: 0 | Status: COMPLETED | OUTPATIENT
Start: 2019-10-25 | End: 2019-10-25

## 2019-10-25 RX ORDER — HYDRALAZINE HCL 50 MG
50 TABLET ORAL
Refills: 0 | Status: DISCONTINUED | OUTPATIENT
Start: 2019-10-25 | End: 2019-10-29

## 2019-10-25 RX ORDER — BACLOFEN 100 %
10 POWDER (GRAM) MISCELLANEOUS ONCE
Refills: 0 | Status: COMPLETED | OUTPATIENT
Start: 2019-10-25 | End: 2019-10-25

## 2019-10-25 RX ADMIN — Medication 10 MILLIGRAM(S): at 22:14

## 2019-10-25 RX ADMIN — POLYETHYLENE GLYCOL 3350 17 GRAM(S): 17 POWDER, FOR SOLUTION ORAL at 05:01

## 2019-10-25 RX ADMIN — Medication 10 MILLIGRAM(S): at 14:30

## 2019-10-25 RX ADMIN — Medication 6: at 17:46

## 2019-10-25 RX ADMIN — HEPARIN SODIUM 2700 UNIT(S)/HR: 5000 INJECTION INTRAVENOUS; SUBCUTANEOUS at 23:48

## 2019-10-25 RX ADMIN — Medication 10 MILLIGRAM(S): at 04:58

## 2019-10-25 RX ADMIN — PANTOPRAZOLE SODIUM 40 MILLIGRAM(S): 20 TABLET, DELAYED RELEASE ORAL at 05:01

## 2019-10-25 RX ADMIN — Medication 200 MILLIGRAM(S): at 05:01

## 2019-10-25 RX ADMIN — LOSARTAN POTASSIUM 100 MILLIGRAM(S): 100 TABLET, FILM COATED ORAL at 05:01

## 2019-10-25 RX ADMIN — Medication 100 MILLIGRAM(S): at 17:46

## 2019-10-25 RX ADMIN — Medication 650 MILLIGRAM(S): at 05:00

## 2019-10-25 RX ADMIN — POLYETHYLENE GLYCOL 3350 17 GRAM(S): 17 POWDER, FOR SOLUTION ORAL at 17:45

## 2019-10-25 RX ADMIN — Medication 25 MILLIGRAM(S): at 16:47

## 2019-10-25 RX ADMIN — HEPARIN SODIUM 3500 UNIT(S): 5000 INJECTION INTRAVENOUS; SUBCUTANEOUS at 09:41

## 2019-10-25 RX ADMIN — HEPARIN SODIUM 2700 UNIT(S)/HR: 5000 INJECTION INTRAVENOUS; SUBCUTANEOUS at 16:24

## 2019-10-25 RX ADMIN — Medication 25 MILLIGRAM(S): at 05:01

## 2019-10-25 RX ADMIN — AMLODIPINE BESYLATE 10 MILLIGRAM(S): 2.5 TABLET ORAL at 22:14

## 2019-10-25 RX ADMIN — Medication 2: at 08:40

## 2019-10-25 RX ADMIN — Medication 100 MILLIGRAM(S): at 05:01

## 2019-10-25 RX ADMIN — Medication 2: at 11:38

## 2019-10-25 RX ADMIN — HEPARIN SODIUM 2500 UNIT(S)/HR: 5000 INJECTION INTRAVENOUS; SUBCUTANEOUS at 00:50

## 2019-10-25 RX ADMIN — HEPARIN SODIUM 2700 UNIT(S)/HR: 5000 INJECTION INTRAVENOUS; SUBCUTANEOUS at 09:40

## 2019-10-25 RX ADMIN — Medication 50 MILLIGRAM(S): at 17:46

## 2019-10-25 RX ADMIN — Medication 81 MILLIGRAM(S): at 11:38

## 2019-10-25 RX ADMIN — Medication 10 MILLIGRAM(S): at 05:01

## 2019-10-25 RX ADMIN — Medication 650 MILLIGRAM(S): at 04:34

## 2019-10-25 NOTE — CONSULT NOTE ADULT - SUBJECTIVE AND OBJECTIVE BOX
CHIEF COMPLAINT:Patient is a 54y old  Male who presents with a chief complaint of SOB, pancreatic lesion (16 Oct 2019 17:46)      HISTORY OF PRESENT ILLNESS:HPI:  Pt is a 55 y/o male with pmh of HTN, DM, HLD, CVA 4 years ago presenting for concern of hiccups, abdominal pain and distention and shortness of breath, worsening since Friday. Patient reports that 2 weeks ago he started to have episodes of intractable hiccups. Last week on Tuesday went to [facility] and was admitted for an ACS workup after hiccups were intractable. States that they discharged him with thorazine for the hiccups and discharged him home. Friday he started to experience shortness of breath and abdominal pain so came to the ER here for evaluation, was found to have pancreatic tail mass and questionable liver mets and discharged with oncology follow-up recommended MRI. Patient states he has no PMD to follow-up with and has not made an appt with oncology, reports did not clearly understand results. Abdominal pain and hiccups have been worsening, abdomen distended. States that when hiccups are very bad he feels very short of breath. Denies chest pain. No fevers or chills. Patient took thorazine prior to arrival and hiccups are controlled at this time. (16 Oct 2019 14:40)      PAST MEDICAL & SURGICAL HISTORY:          MEDICATIONS:  heparin  Injectable 5000 Unit(s) SubCutaneous every 8 hours        chlorproMAZINE    Tablet 25 milliGRAM(s) Oral once    pantoprazole    Tablet 40 milliGRAM(s) Oral before breakfast    dextrose 40% Gel 15 Gram(s) Oral once PRN  dextrose 50% Injectable 12.5 Gram(s) IV Push once  dextrose 50% Injectable 25 Gram(s) IV Push once  dextrose 50% Injectable 25 Gram(s) IV Push once  glucagon  Injectable 1 milliGRAM(s) IntraMuscular once PRN  insulin lispro (HumaLOG) corrective regimen sliding scale   SubCutaneous three times a day before meals    dextrose 5%. 1000 milliLiter(s) IV Continuous <Continuous>      FAMILY HISTORY:      Non-contributory    SOCIAL HISTORY:    not a smoker  Allergies    No Known Allergies    Intolerances    	    REVIEW OF SYSTEMS:  CONSTITUTIONAL: No fever  EYES: No eye pain, visual disturbances, or discharge  ENMT:  No difficulty hearing, tinnitus  NECK: No pain or stiffness  RESPIRATORY: No cough, wheezing, +SOB  CARDIOVASCULAR: No chest pain, palpitations, passing out, dizziness, or leg swelling  GASTROINTESTINAL:  No nausea, vomiting, diarrhea or constipation. No melena.  GENITOURINARY: No dysuria, hematuria  NEUROLOGICAL: No stroke like symptoms  SKIN: No burning or lesions   ENDOCRINE: No heat or cold intolerance  MUSCULOSKELETAL: No joint pain or swelling  PSYCHIATRIC: No  anxiety, mood swings  HEME/LYMPH: No bleeding gums  ALLERY AND IMMUNOLOGIC: No hives or eczema	    All other ROS negative    PHYSICAL EXAM:  T(C): 36.7 (10-16-19 @ 21:22), Max: 37.6 (10-16-19 @ 10:32)  HR: 76 (10-16-19 @ 21:22) (71 - 90)  BP: 170/89 (10-16-19 @ 21:22) (123/69 - 170/89)  RR: 18 (10-16-19 @ 21:22) (16 - 18)  SpO2: 97% (10-16-19 @ 21:22) (95% - 99%)  Wt(kg): --  I&O's Summary      Appearance: Normal	  HEENT:   Normal oral mucosa, EOMI	  Cardiovascular: Normal S1 S2, No JVD, No murmurs  Respiratory: Lungs clear to auscultation	  Psychiatry: Alert  Gastrointestinal:  Soft, Non-tender, + BS	  Skin: No rashes   Neurologic: Non-focal  Extremities:  No edema  Vascular: Peripheral pulses palpable    	    	  	  CARDIAC MARKERS:  Labs personally reviewed by me                                  10.2   18.05 )-----------( 386      ( 16 Oct 2019 13:24 )             31.6     10-16    133<L>  |  94<L>  |  14  ----------------------------<  48<L>  4.0   |  26  |  0.79    Ca    9.3      16 Oct 2019 13:24    TPro  7.6  /  Alb  2.8<L>  /  TBili  0.5  /  DBili  x   /  AST  34  /  ALT  39  /  AlkPhos  169<H>  10-16          EKG: Personally reviewed by me - nsr  Radiology: Personally reviewed by me -   < from: CT Chest w/ IV Cont (10.11.19 @ 19:38) >  IMPRESSION:     Multiple focal liver lesions suggestive of metastases.  Questionof pancreatic tail mass with distal atrophy pancreatic tail.   Recommend follow-up MRI of the liver and pancreas.  Several small pulmonary nodules    < end of copied text >      Assessment and Plan:   Assessment:  · Assessment		  Pt is a 55 y/o male with pmh of HTN, DM, HLD, CVA 4 years ago presenting for concern of hiccups, abdominal pain and distention and shortness of breath, worsening since Friday. Patient reports that 2 weeks ago he started to have episodes of intractable hiccups. found to have pancreatic tail lesion with mets.     Problem/Plan - 1:  ·  Problem: SOB (shortness of breath).  Plan: Echocardiogram  monitor vitals   SOB ? 2/2 pancreatic mass with lung mets    Problem/Plan - 3:  ·  Problem: Pancreatic cancer.  Plan: CT abd/pelv noted   Onc eval appreciated     Problem/Plan - 4:  ·  Problem: HTN (hypertension).  Plan: start Norvasc 5mg for now  - pt has been on multiple meds in past per EMR records    Problem/Plan - 5:  ·  Problem: Diabetes.  Plan: sliding scale   diab diet  check A1C.     Problem/Plan - 6:  Problem: CVA (cerebral vascular accident). Plan: chronic with LUE weakness.    Problem/Plan - 7:  ·  Problem: Prophylactic measure.  Plan: DVT and GI PPX.       Tony Yeung DO [facility]  Cardiovascular Medicine  [phone]
Brief Heme/onc note    Chart reviewed. Full consult to follow.    Pt with possible liver met, pancreatic tail lesion, and pulm lesions. Concerning for met pancreatic ca  -- pls check MRI abd with IV contrast to further eval liver and pancreatic lesions  -- will need tissue bx, likely bx met lesion in liver if feasible. Pls d/w IR in am  -- check Ca19-9  -- f/u GI eval  -- hiccup control per primary team    D/w NP, will d/w primary team, pls call with questions, [phone]
HPI:   Patient is a 54y male with dm, cva, htn well until 3 weeks ago when he developed intractable hiccups and abdomen pain. [facility] and then came here to ER, had ct showing pancreas and liver lesions and told to see oncologist. He never did and symptoms persist so he came here for further work up. He had a fever to 102 last night and we are called. He started anticoagulation for portal vein clot. He felt perfectly well up to 3 weeks ago. He has sob with the hiccups. He has no n,v,d,cp,ha,body ache. No major change of appetite. He has been Having intermittent night sweats at home and fevers. His urine has been dark but no dysuria.   No travel, on disability from [employer], retired . From [place], no travel  REVIEW OF SYSTEMS:  All other review of systems negative (Comprehensive ROS)    PAST MEDICAL & SURGICAL HISTORY:  cva  dm  htn    Allergies    No Known Allergies    Intolerances        Antimicrobials Day #  :1  piperacillin/tazobactam IVPB. 3.375 Gram(s) IV Intermittent once  piperacillin/tazobactam IVPB.. 3.375 Gram(s) IV Intermittent every 8 hours    Other Medications:  acetaminophen   Tablet .. 650 milliGRAM(s) Oral every 6 hours PRN  amLODIPine   Tablet 10 milliGRAM(s) Oral at bedtime  chlorproMAZINE    Tablet 25 milliGRAM(s) Oral three times a day PRN  dextrose 40% Gel 15 Gram(s) Oral once PRN  dextrose 5%. 1000 milliLiter(s) IV Continuous <Continuous>  dextrose 50% Injectable 12.5 Gram(s) IV Push once  dextrose 50% Injectable 25 Gram(s) IV Push once  dextrose 50% Injectable 25 Gram(s) IV Push once  docusate sodium 100 milliGRAM(s) Oral two times a day  glucagon  Injectable 1 milliGRAM(s) IntraMuscular once PRN  heparin  Infusion.  Unit(s)/Hr IV Continuous <Continuous>  heparin  Injectable 7500 Unit(s) IV Push every 6 hours PRN  heparin  Injectable 3500 Unit(s) IV Push every 6 hours PRN  insulin lispro (HumaLOG) corrective regimen sliding scale   SubCutaneous three times a day before meals  ketorolac   Injectable 15 milliGRAM(s) IV Push every 6 hours PRN  losartan 100 milliGRAM(s) Oral daily  metoprolol succinate  milliGRAM(s) Oral daily  pantoprazole    Tablet 40 milliGRAM(s) Oral before breakfast  polyethylene glycol 3350 17 Gram(s) Oral two times a day      FAMILY HISTORY:      SOCIAL HISTORY:  Smoking: [ ]Yes [x ]No  ETOH: [ ]Yes [ ]xNo  Drug Use: [ ]Yes x[ ]No   [x ] Single[ ]    T(F): 98.8 (10-18-19 @ 14:38), Max: 102.9 (10-17-19 @ 20:01)  HR: 78 (10-18-19 @ 14:38)  BP: 173/89 (10-18-19 @ 14:38)  RR: 17 (10-18-19 @ 14:38)  SpO2: 98% (10-18-19 @ 14:38)  Wt(kg): --    PHYSICAL EXAM:  General: alert, no acute distress  Eyes:  anicteric, no conjunctival injection, no discharge  Oropharynx: no lesions or injection 	  Neck: supple, without adenopathy  Lungs: clear to auscultation  Heart: regular rate and rhythm; no murmur, rubs or gallops  Abdomen: soft, nondistended, nontender, without mass or organomegaly  Skin: no lesions  Extremities: no clubbing, cyanosis, or edema  Neurologic: alert, oriented, moves all extremities    LAB RESULTS:                        10.6   17.56 )-----------( 355      ( 18 Oct 2019 07:55 )             33.0     10-18    139  |  96  |  20  ----------------------------<  132<H>  4.1   |  30  |  0.95    Ca    9.1      18 Oct 2019 05:19    TPro  7.4  /  Alb  2.9<L>  /  TBili  0.4  /  DBili  x   /  AST  35  /  ALT  40  /  AlkPhos  204<H>  10-17    LIVER FUNCTIONS - ( 17 Oct 2019 06:59 )  Alb: 2.9 g/dL / Pro: 7.4 g/dL / ALK PHOS: 204 U/L / ALT: 40 U/L / AST: 35 U/L / GGT: x           Urinalysis Basic - ( 18 Oct 2019 09:23 )    Color: Yellow / Appearance: Clear / S.025 / pH: x  Gluc: x / Ketone: Negative  / Bili: Negative / Urobili: <2 mg/dL   Blood: x / Protein: 30 mg/dL / Nitrite: Negative   Leuk Esterase: Negative / RBC: 2 /HPF / WBC 1 /HPF   Sq Epi: x / Non Sq Epi: 0 /HPF / Bacteria: Negative        MICROBIOLOGY:  RECENT CULTURES:        RADIOLOGY REVIEWED:    < from: MR Abdomen w/wo IV Cont (10.17.19 @ 21:26) >  EXAM:  MR ABDOMEN WAW IC                            PROCEDURE DATE:  10/17/2019            INTERPRETATION:  CLINICAL INFORMATION: Liver lesions concerning for   pancreatic cancer. Assess for mass.    COMPARISON: CT chest, abdomen and pelvis 10/11/2019.    PROCEDURE:   MRI of the abdomen was performed with and without intravenous contrast.  IV Contrast: Gadavist. 10 cc administered, 0 cc discarded.  MRCP was performed.    FINDINGS:    Motion limited study.    LOWER CHEST: Within normal limits.    LIVER: Normal morphology. No significant steatosis. As on CT, multiple   bilobar targetoid liver lesions. Lesions are T2 hyperintense with   peripheral edema, rim enhancement and several demonstrate delayed central   enhancement. A reference in segment IVb measures 3.9 cm (series 14, image   147), unchanged given differences in technique since recent CT. Several   lesions are subcapsular. Additionally, at least 3 lesions in the lateral   left lobe directly abut the stomach with a reference measuring 2.6 cm   (series 14, image 139). A 2.6 cm caudate lobe lesion abuts the   intrahepatic IVC (series 16, image 19).  BILE DUCTS: Normal caliber.  GALLBLADDER:  Contracted.  SPLEEN: Within normal limits.  PANCREAS: A 3.4 x 2.5 cm centrally hypoenhancing mass with peripheral   enhancement in the distal body and tail with abrupt cut off of the duct   and resultant upstream ductal dilatation with tail atrophy. Greater than   180 degree encasement of the adjacent splenic artery and the splenic vein   with narrowing of the splenic vein at its midportion where it contacts   the mass, possibly partial thrombosis or invasion (series 14, image 146).   Celiac axis, SMA and SMV are patent and uninvolved.  ADRENALS: Right adrenal gland nodular thickening with a more discrete 1.2   cm lateral limb nodule (series 5, image 21), with loss of signal on out   of phase imaging, compatible with adenoma. Normal left adrenal gland.  KIDNEYS/URETERS: Bilateral renal cysts measuring up to 5.9 cm at the   right interpole.    VISUALIZED PORTIONS:    BOWEL: Within normal limits.   PERITONEUM: Trace perihepatic ascites.  VESSELS: Greater than 180 degrees encasement of the splenic artery and   splenic vein with narrowing of the splenic vein at its midportion where   it abuts the mass, as above. Celiac axis, SMA and SMV are patent and   uninvolved. Partial nonocclusive thrombus of the anterior branch of the   right portal vein with occlusive thrombus of its distal branches (series   14, image 74 and 137-133).Patent main and intrahepatic left portal vein   and posterior branches of the right portal vein.  RETROPERITONEUM/LYMPH NODES: A prominent portacaval node measures 2.9 x   1.6 cm (series 16, image 32).  ABDOMINAL WALL: Within normal limits.  BONES: Within normal limits.    IMPRESSION:    Motion limited study.    A 3.4 cm distal body/tail pancreatic mass with targetoid bilobar liver   lesions, highly suspicious for metastatic pancreatic cancer. Greater than   180 degrees encasement of the splenic artery and splenic vein with   narrowing of the splenic vein. Several liver lesions abut the stomach.   Recommend tissue sampling.    Portal vein thrombus involving the anterior branch of the right portal   vein. Patent main, intrahepatic left and posterior right portal vein   branches.    < from: CT Abdomen and Pelvis w/ IV Cont (10.11.19 @ 19:40) >  EXAM:  CT ABDOMEN AND PELVIS IC                          EXAM:  CT CHEST IC                            PROCEDURE DATE:  10/11/2019            INTERPRETATION:  CLINICAL INFORMATION: 54-year-old man with hiccups,   fever, and abdominal pain    COMPARISON: None.    PROCEDURE:   CT of the Chest, Abdomen and Pelvis was performed with intravenous   contrast.   Intravenous contrast: 90 ml Omnipaque 350. 10 ml discarded.  Oral contrast: Positive contrast was administered.  Sagittal and coronal reformats were performed.    FINDINGS:    CHEST:     LUNGS AND LARGE AIRWAYS: Patent central airways. Several small pulmonary   nodules measuring 2-3 mm on image 25 in the left upper lobe, image 34 in   the right middle lobe, and image 39 in the right middle lobe of series 2.   PLEURA: No pleural effusion.  VESSELS: Within normal limits.  HEART: Heart size is normal. No pericardial effusion.  MEDIASTINUM AND FILI: No lymphadenopathy.  CHEST WALL AND LOWER NECK: Within normal limits.    ABDOMEN AND PELVIS:    LIVER: Multiple liver lesions suspicious for metastases, largest in   segment IVb measuring 3.7 cm.  BILE DUCTS: Normal caliber.  GALLBLADDER: Within normal limits.  SPLEEN: Within normal limits.  PANCREAS: Atrophy distal pancreatic tail with suggestion of pancreatic   mass proximal tail.  ADRENALS: Within normal limits.  KIDNEYS/URETERS: Within normal limits.    BLADDER: Within normal limits.  REPRODUCTIVE ORGANS: Normal size prostate    BOWEL: No bowel obstruction. Appendix normal.  PERITONEUM: No ascites.  VESSELS: Within normal limits.  RETROPERITONEUM/LYMPH NODES: No lymphadenopathy.    ABDOMINAL WALL: Within normal limits.  BONES: Degenerative change.    IMPRESSION:     Multiple focal liver lesions suggestive of metastases.  Questionof pancreatic tail mass with distal atrophy pancreatic tail.   Recommend follow-up MRI of the liver and pancreas.  Several small pulmonary nodules      < end of copied text >        < end of copied text >        Impression: Patient with recent onset hiccups and abdo pain, found to have pancreas mass with liver lesions and pv thrombosis, had fever last night, has ongoing leukocytosis. Suspect fever related to liver mets and pv thrombosis but could have some abscess in liver as well and septic phlebitis.   Recommendations:  follow up cultures  anticoagulation per medicine  await pancreas bx but favor liver lesion bx too since he could have abscess and cancer  start zosyn pending further data
HPI:  Pt is a 55 y/o male with pmh of HTN, DM, HLD, CVA 4 years ago presenting for concern of hiccups, abdominal pain and distention and shortness of breath, worsening since Friday. Patient reports that 2 weeks ago he started to have episodes of intractable hiccups. Last week on Tuesday went to [facility] and was admitted for an ACS workup after hiccups were intractable. States that they discharged him with thorazine for the hiccups and discharged him home. Friday he started to experience shortness of breath and abdominal pain so came to the ER here for evaluation, was found to have pancreatic tail mass and questionable liver mets and discharged with oncology follow-up recommended MRI. Patient states he has no PMD to follow-up with and has not made an appt with oncology, reports did not clearly understand results. Abdominal pain and hiccups have been worsening, abdomen distended. States that when hiccups are very bad he feels very short of breath. Denies chest pain. No fevers or chills. Patient took thorazine prior to arrival and hiccups are controlled at this time. (16 Oct 2019 14:40)    PERTINENT PM/SXH:       FAMILY HISTORY:    ITEMS NOT CHECKED ARE NOT PRESENT    SOCIAL HISTORY:   Significant other/partner:  [ x]  Children:  [x ]  Bahai/Spirituality:none  Substance hx:  [ ]   Tobacco hx:  [ ]   Alcohol hx: [ ]   Home Opioid hx:  [ ] I-Stop Reference No:  Living Situation: [ x]Home  [ ]Long term care  [ ]Rehab [ ]Other    ADVANCE DIRECTIVES:    DNR  MOLST  [ ]  Living Will  [ ]   DECISION MAKER(s):  [x ] Health Care Proxy(s)  [ ] Surrogate(s)  [ ] Guardian           Name(s): Phone Number(s):  brother , see HCP   BASELINE (I)ADL(s) (prior to admission):  [place]: [ x]Total  [ ] Moderate [ ]Dependent    Allergies    No Known Allergies    Intolerances    MEDICATIONS  (STANDING):  amLODIPine   Tablet 10 milliGRAM(s) Oral at bedtime  aspirin enteric coated 81 milliGRAM(s) Oral daily  dextrose 5%. 1000 milliLiter(s) (50 mL/Hr) IV Continuous <Continuous>  dextrose 50% Injectable 12.5 Gram(s) IV Push once  dextrose 50% Injectable 25 Gram(s) IV Push once  dextrose 50% Injectable 25 Gram(s) IV Push once  docusate sodium 100 milliGRAM(s) Oral two times a day  heparin  Infusion.  Unit(s)/Hr (17 mL/Hr) IV Continuous <Continuous>  hydrALAZINE 25 milliGRAM(s) Oral two times a day  insulin lispro (HumaLOG) corrective regimen sliding scale   SubCutaneous three times a day before meals  losartan 100 milliGRAM(s) Oral daily  metoclopramide Injectable 10 milliGRAM(s) IV Push every 8 hours  metoprolol succinate  milliGRAM(s) Oral daily  pantoprazole    Tablet 40 milliGRAM(s) Oral before breakfast  polyethylene glycol 3350 17 Gram(s) Oral two times a day    MEDICATIONS  (PRN):  bisacodyl Suppository 10 milliGRAM(s) Rectal daily PRN Constipation  dextrose 40% Gel 15 Gram(s) Oral once PRN Blood Glucose LESS THAN 70 milliGRAM(s)/deciliter  glucagon  Injectable 1 milliGRAM(s) IntraMuscular once PRN Glucose LESS THAN 70 milligrams/deciliter  heparin  Injectable 7500 Unit(s) IV Push every 6 hours PRN For aPTT less than 40  heparin  Injectable 3500 Unit(s) IV Push every 6 hours PRN For aPTT between 40 - 57    PRESENT SYMPTOMS: [ ]Unable to obtain due to poor mentation   Source if other than patient:  [ ]Family   [ ]Team     Pain: [ ] yes [x ] no  QOL impact -   Location -                    Aggravating factors -  Quality -  Radiation -  Timing-  Severity (0-10 scale):  Minimal acceptable level (0-10 scale):     PAIN AD Score:     [email or web address].[email or web address] (press ctrl +  left click to view)    Dyspnea:                           [ ]Mild [ ]Moderate [ ]Severe  Anxiety:                             [ x]Mild [ ]Moderate [ ]Severe  Fatigue:                             [ ]Mild [ ]Moderate [ ]Severe  Nausea:                             [ ]Mild [ ]Moderate [ ]Severe  Loss of appetite:              [ ]Mild [ ]Moderate [ ]Severe  Constipation:                    [ ]Mild [ ]Moderate [ ]Severe    Other Symptoms:  [ ]All other review of systems negative     Karnofsky Performance Score/Palliative Performance Status Version 2:         %    http://npcrc.org/files/news/palliative_performance_scale_ppsv2.pdf  PHYSICAL EXAM:  Vital Signs Last 24 Hrs  T(C): 37.6 (25 Oct 2019 14:24), Max: 38.5 (25 Oct 2019 04:05)  T(F): 99.7 (25 Oct 2019 14:24), Max: 101.3 (25 Oct 2019 04:05)  HR: 98 (25 Oct 2019 14:24) (81 - 98)  BP: 160/88 (25 Oct 2019 14:24) (134/87 - 161/94)  BP(mean): --  RR: 18 (25 Oct 2019 14:24) (18 - 18)  SpO2: 93% (25 Oct 2019 14:24) (89% - 93%) I&O's Summary    24 Oct 2019 07:01  -  25 Oct 2019 07:00  --------------------------------------------------------  IN: 1780 mL / OUT: 200 mL / NET: 1580 mL    25 Oct 2019 07:01  -  25 Oct 2019 14:54  --------------------------------------------------------  IN: 480 mL / OUT: 0 mL / NET: 480 mL    GENERAL:  [x ]Alert  [x]Oriented x 4  [ ]Lethargic  [ ]Cachexia  [ ]Unarousable  [x ]Verbal  [ ]Non-Verbal  Behavioral:   [x ] Anxiety  [ ] Delirium [ ] Agitation [ ] Other  HEENT:  [ ]Normal   [x ]Dry mouth   [ ]ET Tube/Trach  [ ]Oral lesions  PULMONARY:   [x ]Clear [ ]Tachypnea  [ ]Audible excessive secretions   [ ]Rhonchi        [ ]Right [ ]Left [ ]Bilateral  [ ]Crackles        [ ]Right [ ]Left [ ]Bilateral  [ ]Wheezing     [ ]Right [ ]Left [ ]Bilateral  CARDIOVASCULAR:    [x ]Regular [ ]Irregular [ ]Tachy  [ ]Deon [ ]Murmur [ ]Other  GASTROINTESTINAL:  [ ]Soft  [x ]Distended   [x ]+BS  [ ]Non tender [x]Tender  [ ]PEG [ ]OGT/ NGT  Last BM:   GENITOURINARY:  [ x]Normal [ ] Incontinent   [ ]Oliguria/Anuria   [ ][place]  MUSCULOSKELETAL:   [ ]Normal   [ x]Weakness  [ ]Bed/Wheelchair bound [ ]Edema  NEUROLOGIC:   [x ]No focal deficits  [ ] Cognitive impairment  [ ] Dysphagia [ ]Dysarthria [ ] Paresis [ ]Other   SKIN:   [x ]Normal   [ ]Pressure ulcer(s)  [ ]Rash    CRITICAL CARE:  [ ] Shock Present  [ ]Septic [ ]Cardiogenic [ ]Neurologic [ ]Hypovolemic  [ ]  Vasopressors [ ]  Inotropes   [ ] Respiratory failure present [ ] mechanical ventilation [ ] non-invasive ventilatory support [ ] High flow  [ ] Acute  [ ] Chronic [ ] Hypoxic  [ ] Hypercarbic [ ] Other  [ ] Other organ failure     LABS:                        9.2    22.22 )-----------( 384      ( 25 Oct 2019 07:15 )             28.0   10-25    138  |  93<L>  |  16  ----------------------------<  186<H>  3.5   |  27  |  0.74    Ca    7.9<L>      25 Oct 2019 07:15    TPro  6.8  /  Alb  2.4<L>  /  TBili  0.4  /  DBili  x   /  AST  29  /  ALT  47<H>  /  AlkPhos  393<H>  10-25  PT/INR - ( 25 Oct 2019 07:15 )   PT: 17.6 sec;   INR: 1.51 ratio         PTT - ( 25 Oct 2019 07:15 )  PTT:54.3 sec      RADIOLOGY & ADDITIONAL STUDIES:      < from: CT Abdomen and Pelvis No Cont (10.23.19 @ 14:24) >    INTERPRETATION:  CLINICAL INFORMATION: Patient with pancreatic mass   status post liver biopsy now with weakness. Evaluate for retroperitoneal   hematoma.    COMPARISON: CT abdomen and pelvis 10/11/2019 and MR abdomen 10/17/2019    PROCEDURE:   CT of the Abdomen and Pelvis was performed without intravenous contrast.   Intravenous contrast: None.  Oral contrast: None.  Sagittal and coronal reformats were performed.    Evaluation of the solid visceral organs is limited without intravenous   contrast.    FINDINGS:    LOWER CHEST: Bibasilar subsegmental linear atelectasis. Atherosclerotic   calcifications of coronary arteries.    LIVER: Hepatomegaly. Multiple hypodense lesions consistent with   metastatic disease. Comparison with prior CT dated 10/11/2019 is   difficult due to lack of IV contrast on the current study. However, there   is suspicion of increased size of liver lesions, with diffuse involvement   of the medial segment of the left lobe..  BILE DUCTS: Normal caliber.  GALLBLADDER: Contracted.  SPLEEN: Within normal limits.  PANCREAS: Ill-defined pancreatic tail mass with distal pancreatic duct   dilatation and pancreatic tail atrophy, better seen on prior MR   10/17/2019.   ADRENALS: Nodular thickening of the adrenal glands bilaterally.   KIDNEYS/URETERS: Right renal cysts, the largest a right interpolar cyst   measuring 5.8 cm. No hydronephrosis.  Excreted contrast in the collecting   systems and ureters.    BLADDER: Small left posterior bladder diverticulum. Trabeculated bladder   wall.  Excreted contrast layering in the bladder lumen.  REPRODUCTIVE ORGANS: Prostate gland is mildly enlarged.    BOWEL: No bowel obstruction.Appendix is within normal limits.  PERITONEUM: Small volume ascites, new. Moderate perihepatic infiltration   adjacent to the liver.  < from: CT Abdomen and Pelvis No Cont (10.23.19 @ 14:24) >  VESSELS: Atherosclerotic changes. Previously noted right portal vein   thrombosis as seen on MRI dated 10/17/2019, is not evaluated without   intravenous contrast.  RETROPERITONEUM/LYMPH NODES: Previously noted portocaval lymph node is   not well seen on this non-contrast study.    ABDOMINAL WALL: Within normal limits.  BONES: Degenerative changes.    IMPRESSION:     No retroperitoneal hematoma.    Multiple liver lesions consistent with hepatic metastases. Comparison   with prior CT dated 10/11/2019 is difficult due to lack of intravenous   contrast on the current study. However, the lesions appear to have   increased in size. In addition, there is new moderate perihepatic   infiltration.  Differential diagnosis would include bleeding, metastatic   disease, and infection.    Ill-defined pancreatic tail mass with distal pancreatic tail atrophy   better evaluated on prior MR dated 10/17/2019.    New small volume ascites.        < end of copied text >    PROTEIN CALORIE MALNUTRITION PRESENT: [ ] Yes [ ] No  [ ] PPSV2 < or = to 30% [ ] significant weight loss  [ x] poor nutritional intake [ ] catabolic state [ ] anasarca     Albumin, Serum: 2.4 g/dL (10-25-19 @ 07:15)  Artificial Nutrition [ ]     REFERRALS:   [ ]Chaplaincy  [ ] Hospice  [ ]Child Life  [ x]Social Work  [ ]Case management [ ]Holistic Therapy     Goals of Care Document:   Care Coordination Assessment [C. Provider] (10-17-19 @ 09:37)   Progress Notes - Care Coordination [C. Provider] (10-21-19 @ 10:10)
Reason for consult: Panc mass    HPI:  Pt is a 53 y/o male with pmh of HTN, DM, HLD, CVA 4 years ago who presented with severe hiccups, abdominal pain, and SOB which worsened last Friday. He states symptoms have been persistent for approximately 2-3 weeks, and he was hospitalized in [place] for this previously. He came to the ER due to worsening symptoms and on imaging was found to have a pancreatic tail mass with questionable liver mets. He has yet to follow up with an oncologist and re-presented with worsening hiccups.  Today on history he is unable to provide full hx due to worsening hiccups. Denies chest pain. No fevers or chills. Patient took thorazine prior to arrival and hiccups are controlled at this time.       PAST MEDICAL & SURGICAL HISTORY:  CVA    FAMILY HISTORY:  no family hx of cancer    Alochol: Denied  Smoking: Nonsmoker  Drug Use: Denied  Marital Status:         Allergies    No Known Allergies    Intolerances        MEDICATIONS  (STANDING):  amLODIPine   Tablet 5 milliGRAM(s) Oral daily  dextrose 5%. 1000 milliLiter(s) (50 mL/Hr) IV Continuous <Continuous>  dextrose 50% Injectable 12.5 Gram(s) IV Push once  dextrose 50% Injectable 25 Gram(s) IV Push once  dextrose 50% Injectable 25 Gram(s) IV Push once  docusate sodium 100 milliGRAM(s) Oral two times a day  heparin  Injectable 5000 Unit(s) SubCutaneous every 8 hours  insulin lispro (HumaLOG) corrective regimen sliding scale   SubCutaneous three times a day before meals  losartan 100 milliGRAM(s) Oral daily  metoprolol succinate  milliGRAM(s) Oral daily  pantoprazole    Tablet 40 milliGRAM(s) Oral before breakfast    MEDICATIONS  (PRN):  chlorproMAZINE    Tablet 25 milliGRAM(s) Oral three times a day PRN Hiccups  dextrose 40% Gel 15 Gram(s) Oral once PRN Blood Glucose LESS THAN 70 milliGRAM(s)/deciliter  glucagon  Injectable 1 milliGRAM(s) IntraMuscular once PRN Glucose LESS THAN 70 milligrams/deciliter      ROS  No fever, sweats, chills  No epistaxis, HA, sore throat  No CP, SOB, cough, sputum  No n/v/d, abd pain, melena, hematochezia  No edema  No rash  No anxiety  No back pain, joint pain  No bleeding, bruising  No dysuria, hematuria    T(C): 36.7 (10-17-19 @ 11:34), Max: 37.1 (10-17-19 @ 04:34)  HR: 73 (10-17-19 @ 11:34) (73 - 88)  BP: 168/89 (10-17-19 @ 11:34) (168/89 - 171/92)  RR: 17 (10-17-19 @ 11:34) (17 - 18)  SpO2: 95% (10-17-19 @ 11:34) (95% - 97%)  Wt(kg): --    PE  NAD  Awake, alert, uncomfortable due ot hiccups  Anicteric, MMM  RRR  CTAB  Abd soft, NT, ND  No c/c/e  No rash grossly  FROM                          10.3   16.39 )-----------( 369      ( 17 Oct 2019 09:09 )             31.5       10-17    134<L>  |  92<L>  |  21  ----------------------------<  93  3.9   |  27  |  0.94    Ca    9.1      17 Oct 2019 06:59    TPro  7.4  /  Alb  2.9<L>  /  TBili  0.4  /  DBili  x   /  AST  35  /  ALT  40  /  AlkPhos  204<H>  10-17
SURGICAL ONCOLOGY CONSULT NOTE  --------------------------------------------------------------------------------------------    HPI:  Pt is a 53 y/o male with pmh of HTN, DM, HLD, CVA 4 years ago presenting for concern of hiccups, abdominal pain and distention and shortness of breath, worsening since Friday. Patient reports that 2 weeks ago he started to have episodes of intractable hiccups. Last week on Tuesday went to [facility] and was admitted for an ACS workup after hiccups were intractable. States that they discharged him with thorazine for the hiccups and discharged him home. Friday he started to experience shortness of breath and abdominal pain so came to the ER here for evaluation, was found to have pancreatic tail mass and questionable liver mets and discharged with oncology follow-up recommended MRI. Patient states he has no PMD to follow-up with and has not made an appt with oncology, reports did not clearly understand results. Abdominal pain and hiccups have been worsening, abdomen distended. States that when hiccups are very bad he feels very short of breath. Denies chest pain. No fevers or chills. Patient took thorazine prior to arrival and hiccups are controlled at this time.    Since admission the patient has been diagnosed with a pancreatic tail mass and liver mets in both lobes, as well as a portal vein thrombus. He has been treated with a heparin gtt for the thrombus. CEA and chromogranin A were slightly elevated, with normal . Liver nodule biopsy revealed adenocarcinoma, with staining pending.      PAST MEDICAL & SURGICAL HISTORY:      FAMILY HISTORY:  Family history not pertinent as reviewed with the patient and family      ALLERGIES: No Known Allergies      CURRENT MEDICATIONS  MEDICATIONS (STANDING): amLODIPine   Tablet 10 milliGRAM(s) Oral at bedtime  aspirin enteric coated 81 milliGRAM(s) Oral daily  dextrose 5%. 1000 milliLiter(s) IV Continuous <Continuous>  dextrose 50% Injectable 12.5 Gram(s) IV Push once  dextrose 50% Injectable 25 Gram(s) IV Push once  dextrose 50% Injectable 25 Gram(s) IV Push once  docusate sodium 100 milliGRAM(s) Oral two times a day  heparin  Infusion.  Unit(s)/Hr IV Continuous <Continuous>  hydrALAZINE 25 milliGRAM(s) Oral two times a day  insulin lispro (HumaLOG) corrective regimen sliding scale   SubCutaneous three times a day before meals  losartan 100 milliGRAM(s) Oral daily  metoclopramide Injectable 10 milliGRAM(s) IV Push every 8 hours  metoprolol succinate  milliGRAM(s) Oral daily  pantoprazole    Tablet 40 milliGRAM(s) Oral before breakfast  polyethylene glycol 3350 17 Gram(s) Oral two times a day    MEDICATIONS (PRN):bisacodyl Suppository 10 milliGRAM(s) Rectal daily PRN Constipation  dextrose 40% Gel 15 Gram(s) Oral once PRN Blood Glucose LESS THAN 70 milliGRAM(s)/deciliter  glucagon  Injectable 1 milliGRAM(s) IntraMuscular once PRN Glucose LESS THAN 70 milligrams/deciliter  heparin  Injectable 7500 Unit(s) IV Push every 6 hours PRN For aPTT less than 40  heparin  Injectable 3500 Unit(s) IV Push every 6 hours PRN For aPTT between 40 - 57    --------------------------------------------------------------------------------------------    Vitals:   T(C): 37.1 (10-24-19 @ 08:11), Max: 38.3 (10-23-19 @ 16:15)  HR: 84 (10-24-19 @ 08:11) (81 - 99)  BP: 165/89 (10-24-19 @ 08:11) (148/83 - 171/98)  RR: 18 (10-24-19 @ 08:11) (18 - 19)  SpO2: 98% (10-24-19 @ 08:11) (93% - 98%)  CAPILLARY BLOOD GLUCOSE    POCT Blood Glucose.: 285 mg/dL (24 Oct 2019 13:28)  POCT Blood Glucose.: 269 mg/dL (24 Oct 2019 12:14)  POCT Blood Glucose.: 136 mg/dL (24 Oct 2019 08:21)  POCT Blood Glucose.: 154 mg/dL (24 Oct 2019 00:52)  POCT Blood Glucose.: 206 mg/dL (23 Oct 2019 17:34)      10-23 @ 07:01  -  10-24 @ 07:00  --------------------------------------------------------  IN:    heparin  Infusion.: 321 mL    Oral Fluid: 840 mL  Total IN: 1161 mL    OUT:    Voided: 750 mL  Total OUT: 750 mL    Total NET: 411 mL    Height (cm): 177.8 (10-17 @ 16:37)  Weight (kg): 90.7 (10-17 @ 16:37)  BMI (kg/m2): 28.7 (10-17 @ 16:37)  BSA (m2): 2.09 (10-17 @ 16:37)      PHYSICAL EXAM:   General: NAD, Lying in bed comfortably  Neuro: A+Ox3  HEENT: NC/AT, EOMI  Neck: Soft, supple  Cardio: RRR, nml S1/S2  Resp: Good effort, CTA b/l  Thorax: No chest wall tenderness  GI/Abd: Soft, mildly distended, non-tender, no rebound/guarding, no masses palpated  Vascular: All 4 extremities warm.  Skin: Intact, no breakdown  Lymphatic/Nodes: No palpable lymphadenopathy  Musculoskeletal: All 4 extremities moving spontaneously, no limitations  --------------------------------------------------------------------------------------------    LABS  CBC (10-24 @ 12:42)                              10.4<L>                         24.89<H>  )----------------(  409<H>     --    % Neutrophils, --    % Lymphocytes, ANC: --                                  31.6<L>  CBC (10-24 @ 03:28)                              9.7<L>                         23.26<H>  )----------------(  354        --    % Neutrophils, --    % Lymphocytes, ANC: --                                  29.0<L>    BMP (10-24 @ 06:53)             137     |  96      |  12    		Ca++ --      Ca 8.6                ---------------------------------( 181<H>		Mg --                 3.6     |  31      |  0.81  			Ph --      BMP (10-23 @ 06:54)             142     |  93<L>   |  13    		Ca++ --      Ca 8.7                ---------------------------------( 174<H>		Mg --                 3.6     |  30      |  0.76  			Ph --        LFTs (10-24 @ 06:53)      TPro 6.7 / Alb 2.5<L> / TBili 0.5 / DBili -- / AST 39 / ALT 45 / AlkPhos 326<H>  LFTs (10-23 @ 06:54)      TPro 6.9 / Alb 2.5<L> / TBili 0.4 / DBili -- / AST 26 / ALT 40 / AlkPhos 282<H>    Coags (10-24 @ 10:39)  aPTT 41.6<H> / INR -- / PT --  Coags (10-24 @ 03:28)  aPTT 54.6<H> / INR -- / PT --          --------------------------------------------------------------------------------------------    MICROBIOLOGY    -> .Blood Culture (10-21 @ 01:12)     NG    NG    No growth to date.    -> .Blood Culture (10-21 @ 01:10)     NG    NG    No growth to date.    -> .Blood Culture (10-18 @ 01:51)     NG    NG    No growth at 5 days.      --------------------------------------------------------------------------------------------    IMAGING  < from: MR Abdomen w/wo IV Cont (10.17.19 @ 21:26) >  IMPRESSION:    Motion limited study.    A 3.4 cm distal body/tail pancreatic mass with targetoid bilobar liver   lesions, highly suspicious for metastatic pancreatic cancer. Greater than   180 degrees encasement of the splenic artery and splenic vein with   narrowing of the splenic vein. Several liver lesions abut the stomach.   Recommend tissue sampling.    Portal vein thrombus involving the anterior branch of the right portal   vein. Patent main, intrahepatic left and posterior right portal vein   branches.    < end of copied text >
[facility]        Patient is a 54y old  Male who presents with a chief complaint of SOB, pancreatic lesion (23 Oct 2019 09:00)    Excerpt from H&P, Pt is a 55 y/o male with pmh of HTN, DM, HLD, CVA 4 years ago presenting for concern of hiccups, abdominal pain and distention and shortness of breath, worsening since Friday. Patient reports that 2 weeks ago he started to have episodes of intractable hiccups. Last week on Tuesday went to [facility] and was admitted for an ACS workup after hiccups were intractable. States that they discharged him with thorazine for the hiccups and discharged him home. Friday he started to experience shortness of breath and abdominal pain so came to the ER here for evaluation, was found to have pancreatic tail mass and questionable liver mets and discharged with oncology follow-up recommended MRI. Patient states he has no PMD to follow-up with and has not made an appt with oncology, reports did not clearly understand results. Abdominal pain and hiccups have been worsening, abdomen distended. States that when hiccups are very bad he feels very short of breath. Denies chest pain. No fevers or chills. Patient took thorazine prior to arrival and hiccups are controlled at this time.   Pt woke up this am with worsening weakness of the Right side.   was noted to be not moving the right side as well as the left.  Pt denies any pain or discomfort of his back or groin.             *****PAST MEDICAL / Surgical  HISTORY:  PAST MEDICAL & SURGICAL HISTORY:           *****FAMILY HISTORY:  FAMILY HISTORY:           *****SOCIAL HISTORY:  Alcohol: None  Smoking: None         *****ALLERGIES:   Allergies    No Known Allergies    Intolerances             *****MEDICATIONS: current medication reviewed and documented.   MEDICATIONS  (STANDING):  amLODIPine   Tablet 10 milliGRAM(s) Oral at bedtime  dextrose 5%. 1000 milliLiter(s) (50 mL/Hr) IV Continuous <Continuous>  dextrose 50% Injectable 12.5 Gram(s) IV Push once  dextrose 50% Injectable 25 Gram(s) IV Push once  dextrose 50% Injectable 25 Gram(s) IV Push once  docusate sodium 100 milliGRAM(s) Oral two times a day  enoxaparin Injectable 90 milliGRAM(s) SubCutaneous every 12 hours  hydrALAZINE 25 milliGRAM(s) Oral two times a day  insulin lispro (HumaLOG) corrective regimen sliding scale   SubCutaneous three times a day before meals  losartan 100 milliGRAM(s) Oral daily  metoclopramide Injectable 10 milliGRAM(s) IV Push every 8 hours  metoprolol succinate  milliGRAM(s) Oral daily  pantoprazole    Tablet 40 milliGRAM(s) Oral before breakfast  polyethylene glycol 3350 17 Gram(s) Oral two times a day    MEDICATIONS  (PRN):  bisacodyl Suppository 10 milliGRAM(s) Rectal daily PRN Constipation  dextrose 40% Gel 15 Gram(s) Oral once PRN Blood Glucose LESS THAN 70 milliGRAM(s)/deciliter  glucagon  Injectable 1 milliGRAM(s) IntraMuscular once PRN Glucose LESS THAN 70 milligrams/deciliter           *****REVIEW OF SYSTEM:  GEN: no fever, no chills, no pain  RESP: no SOB, no cough, no sputum  CVS: no chest pain, no palpitations, no edema  GI: no abdominal pain, no nausea, no vomiting, no constipation, no diarrhea  : no dysurea, no frequency, no hematurea  Neuro: no headache, no dizziness  PSYCH: no anxiety, no depression  Derm : no itching, no rash         *****VITAL SIGNS:  T(C): 36.8 (10-23-19 @ 05:36), Max: 38.4 (10-22-19 @ 23:43)  HR: 103 (10-23-19 @ 05:36) (62 - 108)  BP: 148/89 (10-23-19 @ 05:36) (146/91 - 170/99)  RR: 18 (10-23-19 @ 05:36) (18 - 18)  SpO2: 93% (10-23-19 @ 05:36) (93% - 97%)  Wt(kg): --    10-22 @ 07:01  -  10-23 @ 07:00  --------------------------------------------------------  IN: 1160 mL / OUT: 200 mL / NET: 960 mL             *****PHYSICAL EXAM:   alerts to repeated verbal stimuli oriented x to place and person    somewhat sluggish  poor attention, unable to spell world backwards.  comprehension are fair . Able to name, repeat, read without any difficulty.   Able to follow 1-2 step commands.     EOMI fundi not visualized blinks to threat.   No facial asymmetry   Tongue is midline   Palate elevates symmetrically   Moving all 4 ext symmetrically   with distraction the right sided drift goes away.   during the exam pt also started to "shake" his left upper extremity which stopped when asked.   rapid alternating mvts are symmetric  Has less control of the RLE.   able to give good strength momentarily ( giveway weakness)   Reflexes are 1+ throughout   sensation is grossly symmetric  Gait : not assessed.  B/L down going toes               *****LAB AND IMAGIN.3    21.84 )-----------( 343      ( 23 Oct 2019 09:13 )             29.1               10-    142  |  93<L>  |  13  ----------------------------<  174<H>  3.6   |  30  |  0.76    Ca    8.7      23 Oct 2019 06:54    TPro  6.9  /  Alb  2.5<L>  /  TBili  0.4  /  DBili  x   /  AST  26  /  ALT  40  /  AlkPhos  282<H>  10-23    PT/INR - ( 23 Oct 2019 09:00 )   PT: 18.1 sec;   INR: 1.57 ratio         PTT - ( 23 Oct 2019 09:00 )  PTT:34.2 sec                            [All pertinent recent Imaging reports reviewed]         *****A S S E S S M E N T   A N D   P L A N :     Excerpt from H&P, Pt is a 55 y/o male with pmh of HTN, DM, HLD, CVA 4 years ago presenting for concern of hiccups, abdominal pain and distention and shortness of breath, worsening since Friday. Patient reports that 2 weeks ago he started to have episodes of intractable hiccups. Last week on Tuesday went to [facility] and was admitted for an ACS workup after hiccups were intractable. States that they discharged him with thorazine for the hiccups and discharged him home. Friday he started to experience shortness of breath and abdominal pain so came to the ER here for evaluation, was found to have pancreatic tail mass and questionable liver mets and discharged with oncology follow-up recommended MRI. Patient states he has no PMD to follow-up with and has not made an appt with oncology, reports did not clearly understand results. Abdominal pain and hiccups have been worsening, abdomen distended. States that when hiccups are very bad he feels very short of breath. Denies chest pain. No fevers or chills. Patient took thorazine prior to arrival and hiccups are controlled at this time.   Pt woke up this am with worsening weakness of the Right side.   was noted to be not moving the right side as well as the left.  Pt denies any pain or discomfort of his back or groin.        somewhat sluggish on exam, does follow commands   moving all 4 ext   RLE without full control.   does give some effort, with giveway weakness.      Problem/Recommendations 1: Transient Right sided weakness,   given heparin stat head ct was arranged, no acute hemorrhage.  Cta without any large vessel occlusion ( confirmed with rads)   not a candidate for any acute therapies ( TPA/interventional)  as pt woke up with symptoms and there is no large vessel occlusion.   differential diagnosis also includes seizures will get eeg routine seizures.  also given lovenox would get ct abdomen to r/o any retroperitoneal hematoma  H/h repeat   monitor closely neuro checks.   prior hx of cva with mild residual left sided weakness with some embellishments of tremor on exam.            ___________________________  Will follow with you.  Thank you,  Korin Hendrickson MD  Diplomate of the American Board of Neurology and Psychiatry.  Diplomate of the American Board of Vascular Neurology.   [facility]   Ph: [phone]    Differential diagnosis and plan of care discussed with patient after the evaluation.   Advanced care planning options discussed.   Pain assessed and judicious use of narcotics when appropriate was discussed.  Importance of Fall prevention discussed.  Counseling on Smoking and Alcohol cessation was offered when appropriate.  Counseling on Diet, exercise, and medication compliance was done.     123 minutes spent on the total encounter;  more than 50 % of the visit was spent on counseling  and or coordinating care by the attending physician.    Thank you for allowing me to participate in the care of this luci patient. Please do not hesitate to call me if you have any questions.     This and subsequent notes were partially created using voice recognition software and will  inherently be subject to errors including those of syntax and sound alike substitutions which may escape proofreading. In such instances original meaning may be extrapolated by contextual derivation.

## 2019-10-25 NOTE — PROGRESS NOTE ADULT - ASSESSMENT
No new complaints, still having daily fevers  no source of infection found  path positive for adenoca  plan per medical team/Oncology

## 2019-10-25 NOTE — CONSULT NOTE ADULT - PROBLEM SELECTOR RECOMMENDATION 3
KPS 50% >  Needs assistance with ambulation but able to feed self.  LE weakness, abdominal distension

## 2019-10-25 NOTE — CONSULT NOTE ADULT - PROBLEM SELECTOR RECOMMENDATION 4
Spent 30 minutes with patient, patients brother , and patients two sons.  HCP completed naming his brother as primary decision maker, alternate is son.  Patient wishes to pursue DMT at this time.  Advance directives briefly discussed but does not wish to pursue this conversation at this time.    No further role for palliative care, please re consult with any acute needs.

## 2019-10-25 NOTE — CONSULT NOTE ADULT - ASSESSMENT
49 y/o newly diagnosed adenocarcinoma liver with mets, not a surgical candidate for resection c/b protal vein thrombosis, called for goals of care

## 2019-10-25 NOTE — CONSULT NOTE ADULT - CONSULT REQUESTED DATE/TIME
16-Oct-2019 15:47
17-Oct-2019 16:39
18-Oct-2019 17:47
23-Oct-2019 11:05
24-Oct-2019 14:45
25-Oct-2019 14:00
16-Oct-2019 00:00

## 2019-10-25 NOTE — CONSULT NOTE ADULT - PROBLEM SELECTOR RECOMMENDATION 9
Pt verbalizes understanding of recent diagnosis.  He wishes to get stronger via subacute rehab and pursue disease modifying therapies.

## 2019-10-25 NOTE — PROGRESS NOTE ADULT - PROBLEM SELECTOR PLAN 1
R side weakness worsening since admission slowly   R side LE weakness   Stat CT and CTA head and neck noted, no acute stroke  Neuro eval appreciated  CT abd noted to R/O bleeding   MRI L and T spine noted  AC with heparin in view of acute thrombosis, will switch to noacs  serial CBC Q6hrs for next 24hrs  active type and screen  Surg eval appreciated

## 2019-10-25 NOTE — PROGRESS NOTE ADULT - PROBLEM SELECTOR PLAN 4
CT abd/pelv noted   Onc eval appreciated  MRI abd   Tumor markers noted  Liver biopsy, noted  palliative eval appreciated   POrt placement by IR on monday

## 2019-10-25 NOTE — CONSULT NOTE ADULT - REASON FOR ADMISSION
SOB, pancreatic lesion

## 2019-10-26 LAB
ALBUMIN SERPL ELPH-MCNC: 2.5 G/DL — LOW (ref 3.3–5)
ALP SERPL-CCNC: 454 U/L — HIGH (ref 40–120)
ALT FLD-CCNC: 42 U/L — SIGNIFICANT CHANGE UP (ref 10–45)
ANION GAP SERPL CALC-SCNC: 12 MMOL/L — SIGNIFICANT CHANGE UP (ref 5–17)
AST SERPL-CCNC: 28 U/L — SIGNIFICANT CHANGE UP (ref 10–40)
BILIRUB SERPL-MCNC: 0.5 MG/DL — SIGNIFICANT CHANGE UP (ref 0.2–1.2)
BUN SERPL-MCNC: 19 MG/DL — SIGNIFICANT CHANGE UP (ref 7–23)
CALCIUM SERPL-MCNC: 8.7 MG/DL — SIGNIFICANT CHANGE UP (ref 8.4–10.5)
CHLORIDE SERPL-SCNC: 95 MMOL/L — LOW (ref 96–108)
CO2 SERPL-SCNC: 31 MMOL/L — SIGNIFICANT CHANGE UP (ref 22–31)
CREAT SERPL-MCNC: 0.86 MG/DL — SIGNIFICANT CHANGE UP (ref 0.5–1.3)
CULTURE RESULTS: SIGNIFICANT CHANGE UP
CULTURE RESULTS: SIGNIFICANT CHANGE UP
GLUCOSE SERPL-MCNC: 164 MG/DL — HIGH (ref 70–99)
HCT VFR BLD CALC: 29.8 % — LOW (ref 39–50)
HGB BLD-MCNC: 9.8 G/DL — LOW (ref 13–17)
INR BLD: 1.47 RATIO — HIGH (ref 0.88–1.16)
MCHC RBC-ENTMCNC: 24.9 PG — LOW (ref 27–34)
MCHC RBC-ENTMCNC: 32.9 GM/DL — SIGNIFICANT CHANGE UP (ref 32–36)
MCV RBC AUTO: 75.6 FL — LOW (ref 80–100)
NRBC # BLD: 0 /100 WBCS — SIGNIFICANT CHANGE UP (ref 0–0)
PLATELET # BLD AUTO: 441 K/UL — HIGH (ref 150–400)
POTASSIUM SERPL-MCNC: 4.2 MMOL/L — SIGNIFICANT CHANGE UP (ref 3.5–5.3)
POTASSIUM SERPL-SCNC: 4.2 MMOL/L — SIGNIFICANT CHANGE UP (ref 3.5–5.3)
PROT SERPL-MCNC: 7.2 G/DL — SIGNIFICANT CHANGE UP (ref 6–8.3)
PROTHROM AB SERPL-ACNC: 16.9 SEC — HIGH (ref 10–12.9)
RBC # BLD: 3.94 M/UL — LOW (ref 4.2–5.8)
RBC # FLD: 14.2 % — SIGNIFICANT CHANGE UP (ref 10.3–14.5)
SODIUM SERPL-SCNC: 138 MMOL/L — SIGNIFICANT CHANGE UP (ref 135–145)
SPECIMEN SOURCE: SIGNIFICANT CHANGE UP
SPECIMEN SOURCE: SIGNIFICANT CHANGE UP
WBC # BLD: 25.09 K/UL — HIGH (ref 3.8–10.5)
WBC # FLD AUTO: 25.09 K/UL — HIGH (ref 3.8–10.5)

## 2019-10-26 RX ORDER — METOCLOPRAMIDE HCL 10 MG
10 TABLET ORAL EVERY 8 HOURS
Refills: 0 | Status: DISCONTINUED | OUTPATIENT
Start: 2019-10-26 | End: 2019-10-29

## 2019-10-26 RX ORDER — ACETAMINOPHEN 500 MG
650 TABLET ORAL ONCE
Refills: 0 | Status: COMPLETED | OUTPATIENT
Start: 2019-10-26 | End: 2019-10-26

## 2019-10-26 RX ORDER — CHLORPROMAZINE HCL 10 MG
25 TABLET ORAL ONCE
Refills: 0 | Status: COMPLETED | OUTPATIENT
Start: 2019-10-26 | End: 2019-10-26

## 2019-10-26 RX ORDER — ACETAMINOPHEN 500 MG
325 TABLET ORAL ONCE
Refills: 0 | Status: DISCONTINUED | OUTPATIENT
Start: 2019-10-26 | End: 2019-10-26

## 2019-10-26 RX ADMIN — Medication 100 MILLIGRAM(S): at 17:06

## 2019-10-26 RX ADMIN — Medication 200 MILLIGRAM(S): at 05:32

## 2019-10-26 RX ADMIN — AMLODIPINE BESYLATE 10 MILLIGRAM(S): 2.5 TABLET ORAL at 21:32

## 2019-10-26 RX ADMIN — PANTOPRAZOLE SODIUM 40 MILLIGRAM(S): 20 TABLET, DELAYED RELEASE ORAL at 05:32

## 2019-10-26 RX ADMIN — Medication 650 MILLIGRAM(S): at 21:05

## 2019-10-26 RX ADMIN — Medication 25 MILLIGRAM(S): at 21:55

## 2019-10-26 RX ADMIN — Medication 100 MILLIGRAM(S): at 05:32

## 2019-10-26 RX ADMIN — LOSARTAN POTASSIUM 100 MILLIGRAM(S): 100 TABLET, FILM COATED ORAL at 05:32

## 2019-10-26 RX ADMIN — Medication 81 MILLIGRAM(S): at 11:25

## 2019-10-26 RX ADMIN — Medication 2: at 17:34

## 2019-10-26 RX ADMIN — HEPARIN SODIUM 2700 UNIT(S)/HR: 5000 INJECTION INTRAVENOUS; SUBCUTANEOUS at 07:55

## 2019-10-26 RX ADMIN — Medication 6: at 11:59

## 2019-10-26 RX ADMIN — Medication 650 MILLIGRAM(S): at 20:38

## 2019-10-26 RX ADMIN — Medication 50 MILLIGRAM(S): at 05:34

## 2019-10-26 RX ADMIN — POLYETHYLENE GLYCOL 3350 17 GRAM(S): 17 POWDER, FOR SOLUTION ORAL at 05:32

## 2019-10-26 RX ADMIN — Medication 10 MILLIGRAM(S): at 05:32

## 2019-10-26 RX ADMIN — Medication 50 MILLIGRAM(S): at 17:06

## 2019-10-26 NOTE — PROGRESS NOTE ADULT - ASSESSMENT
Assessment and Plan:   · Assessment		  Pt is a 55 y/o male with pmh of HTN, DM, HLD, CVA 4 years ago presenting for concern of hiccups, abdominal pain and distention and shortness of breath, worsening since Friday. Patient reports that 2 weeks ago he started to have episodes of intractable hiccups. On imaging he has been found to have pancreatic tail lesion with mets to liver    1) Pancreatic tail mass w/ multiple liver lesions  -s/p liver lesion bx, path showing adenocarcinoma, immunohistochemistry pending.  Likely pancreatic adenocarcinomaa and discussed with patient and family today  -Ca19-9 normal, CEA slightly elevated, chromogranin A slightly elevated  -explained to pt in presence of sons, brother situation.  Status is treatable but not curable potentially w systemic chemo (gemcitabine/abraxane or FOLFIRINOX), however he would need to improve his performance status with physical rehab.    -c/w PT  -recommend dispo to Rehab with outpatient f/u in our office- would have chemoport placed prior to discharge      3) CVA  -back on asa, previously held for bx    4) partial R portal vein thrombus -- likely exacerbated by GI mass and hypercoag state from malignancy  -- abd pain resolved  -- on hep now;  can switch to Xarelto at d/c after port is placed    5) anemia -- MCV low, neg Fe, B12, folate, hapto  -- adequate  --send thalassemia/sickle work up outpatient    6) fever/leukocytosis-- ? tumor fever  -- off abx  -- would not try NSAIDS in this case given that pt is on AC, avoid risk of bleeding    Thank you for the courtesy of this consultation and we will continue to follow.    Brigido Finnegan MD  [facility]  Cell: [phone]

## 2019-10-26 NOTE — PROGRESS NOTE ADULT - PROBLEM SELECTOR PLAN 1
R side weakness worsening since admission slowly   R side LE weakness   Stat CT and CTA head and neck noted, no acute stroke  Neuro eval appreciated  CT abd noted to R/O bleeding   MRI L and T spine noted  AC with heparin in view of acute thrombosis, will switch to noacs  CBC monitoring   active type and screen  Surg eval appreciated

## 2019-10-27 LAB
ALBUMIN SERPL ELPH-MCNC: 2.4 G/DL — LOW (ref 3.3–5)
ALP SERPL-CCNC: 485 U/L — HIGH (ref 40–120)
ALT FLD-CCNC: 42 U/L — SIGNIFICANT CHANGE UP (ref 10–45)
ANION GAP SERPL CALC-SCNC: 23 MMOL/L — HIGH (ref 5–17)
APTT BLD: 78.7 SEC — HIGH (ref 27.5–36.3)
AST SERPL-CCNC: 26 U/L — SIGNIFICANT CHANGE UP (ref 10–40)
BILIRUB SERPL-MCNC: 0.5 MG/DL — SIGNIFICANT CHANGE UP (ref 0.2–1.2)
BUN SERPL-MCNC: 20 MG/DL — SIGNIFICANT CHANGE UP (ref 7–23)
CALCIUM SERPL-MCNC: 7.9 MG/DL — LOW (ref 8.4–10.5)
CHLORIDE SERPL-SCNC: 92 MMOL/L — LOW (ref 96–108)
CO2 SERPL-SCNC: 28 MMOL/L — SIGNIFICANT CHANGE UP (ref 22–31)
CREAT SERPL-MCNC: 0.79 MG/DL — SIGNIFICANT CHANGE UP (ref 0.5–1.3)
GLUCOSE SERPL-MCNC: 198 MG/DL — HIGH (ref 70–99)
HCT VFR BLD CALC: 27.2 % — LOW (ref 39–50)
HGB BLD-MCNC: 8.8 G/DL — LOW (ref 13–17)
INR BLD: 1.54 RATIO — HIGH (ref 0.88–1.16)
MCHC RBC-ENTMCNC: 24.7 PG — LOW (ref 27–34)
MCHC RBC-ENTMCNC: 32.4 GM/DL — SIGNIFICANT CHANGE UP (ref 32–36)
MCV RBC AUTO: 76.4 FL — LOW (ref 80–100)
PLATELET # BLD AUTO: 422 K/UL — HIGH (ref 150–400)
POTASSIUM SERPL-MCNC: 4 MMOL/L — SIGNIFICANT CHANGE UP (ref 3.5–5.3)
POTASSIUM SERPL-SCNC: 4 MMOL/L — SIGNIFICANT CHANGE UP (ref 3.5–5.3)
PROT SERPL-MCNC: 6.6 G/DL — SIGNIFICANT CHANGE UP (ref 6–8.3)
PROTHROM AB SERPL-ACNC: 17.9 SEC — HIGH (ref 10–13.1)
RBC # BLD: 3.56 M/UL — LOW (ref 4.2–5.8)
RBC # FLD: 14.2 % — SIGNIFICANT CHANGE UP (ref 10.3–14.5)
SODIUM SERPL-SCNC: 143 MMOL/L — SIGNIFICANT CHANGE UP (ref 135–145)
WBC # BLD: 23.37 K/UL — HIGH (ref 3.8–10.5)
WBC # FLD AUTO: 23.37 K/UL — HIGH (ref 3.8–10.5)

## 2019-10-27 PROCEDURE — 72158 MRI LUMBAR SPINE W/O & W/DYE: CPT | Mod: 26

## 2019-10-27 PROCEDURE — 72157 MRI CHEST SPINE W/O & W/DYE: CPT | Mod: 26

## 2019-10-27 RX ORDER — ACETAMINOPHEN 500 MG
650 TABLET ORAL ONCE
Refills: 0 | Status: COMPLETED | OUTPATIENT
Start: 2019-10-27 | End: 2019-10-27

## 2019-10-27 RX ADMIN — Medication 81 MILLIGRAM(S): at 14:52

## 2019-10-27 RX ADMIN — LOSARTAN POTASSIUM 100 MILLIGRAM(S): 100 TABLET, FILM COATED ORAL at 14:52

## 2019-10-27 RX ADMIN — Medication 2: at 18:07

## 2019-10-27 RX ADMIN — Medication 50 MILLIGRAM(S): at 08:52

## 2019-10-27 RX ADMIN — Medication 100 MILLIGRAM(S): at 18:07

## 2019-10-27 RX ADMIN — Medication 4: at 14:55

## 2019-10-27 RX ADMIN — Medication 200 MILLIGRAM(S): at 05:54

## 2019-10-27 RX ADMIN — PANTOPRAZOLE SODIUM 40 MILLIGRAM(S): 20 TABLET, DELAYED RELEASE ORAL at 05:54

## 2019-10-27 RX ADMIN — AMLODIPINE BESYLATE 10 MILLIGRAM(S): 2.5 TABLET ORAL at 22:28

## 2019-10-27 RX ADMIN — Medication 2: at 08:52

## 2019-10-27 RX ADMIN — Medication 650 MILLIGRAM(S): at 14:55

## 2019-10-27 RX ADMIN — Medication 650 MILLIGRAM(S): at 14:59

## 2019-10-27 RX ADMIN — HEPARIN SODIUM 2700 UNIT(S)/HR: 5000 INJECTION INTRAVENOUS; SUBCUTANEOUS at 10:17

## 2019-10-27 RX ADMIN — Medication 50 MILLIGRAM(S): at 18:07

## 2019-10-27 NOTE — PHYSICAL THERAPY INITIAL EVALUATION ADULT - CRITERIA FOR SKILLED THERAPEUTIC INTERVENTIONS
impairments found/risk reduction/prevention/therapy frequency/anticipated discharge recommendation/functional limitations in following categories/anticipated equipment needs at discharge

## 2019-10-27 NOTE — PHYSICAL THERAPY INITIAL EVALUATION ADULT - PLANNED THERAPY INTERVENTIONS, PT EVAL
bed mobility training/balance training/transfer training/GOAL: Pt will be able to negotiate 4 stairs without handrail independently in 2 weeks./gait training

## 2019-10-27 NOTE — PHYSICAL THERAPY INITIAL EVALUATION ADULT - PERTINENT HX OF CURRENT PROBLEM, REHAB EVAL
Pt is a 53 y/o male admitted to [facility] on 10/16/19 with pmh of HTN, DM, HLD, CVA 4 years ago presenting for concern of hiccups, abdominal pain and distention and shortness of breath, worsening since Friday. Pt reports that 2 weeks ago he started to have episodes of intractable hiccups. Pt is a 55 y/o male admitted to [facility] on 10/16/19 with PMHx of HTN, DM, HLD, CVA 4 years ago presenting for concern of hiccups, abdominal pain and distention and shortness of breath, worsening since Friday. Pt reports that 2 weeks ago he started to have episodes of intractable hiccups.

## 2019-10-27 NOTE — PHYSICAL THERAPY INITIAL EVALUATION ADULT - ADDITIONAL COMMENTS
Pt lives with his sister-in-law in a home with 4 steps to enter (- HR).  Pt stated he will have assistance with the stairs upon return home. Once inside, living space on 1 floor. PTA pt was (I) with all ADLs and functional mobility. Pt stated he sometimes has to reach for furniture to maintain balance.

## 2019-10-27 NOTE — PHYSICAL THERAPY INITIAL EVALUATION ADULT - PRECAUTIONS/LIMITATIONS, REHAB EVAL
Last week on Tuesday went to [facility] and was admitted for an ACS workup after hiccups were intractable. States that they discharged him with thorazine for the hiccups and discharged him home. Friday he started to experience shortness of breath and abdominal pain so came to the ER here for evaluation, was found to have pancreatic tail mass and questionable liver mets and discharged with oncology follow-up recommended MRI. Patient states he has no PMD to follow-up with and has not made an appt with oncology, reports did not clearly understand results. Abdominal pain and hiccups have been worsening, abdomen distended. +pancreatic tail lesion with mets. fall precautions/Last week on Tuesday went to [facility] and was admitted for an ACS workup after hiccups were intractable. States that they discharged him with thorazine for the hiccups and discharged him home. Friday he started to experience shortness of breath and abdominal pain so came to the ER here for evaluation, was found to have pancreatic tail mass and questionable liver mets and discharged with oncology follow-up recommended MRI. Patient states he has no PMD to follow-up with and has not made an appt with oncology, reports did not clearly understand results. Abdominal pain and hiccups have been worsening, abdomen distended. +pancreatic tail lesion with mets.

## 2019-10-27 NOTE — PHYSICAL THERAPY INITIAL EVALUATION ADULT - MANUAL MUSCLE TESTING RESULTS, REHAB EVAL
grossly assessed due to/LUE 5/5, LLE 5/5, RUE 3+/5, R hip flexion 3/5, R knee extension 3+/5, R ankle DF 1/5, R ankle PF 3+/5

## 2019-10-27 NOTE — PROGRESS NOTE ADULT - PROBLEM SELECTOR PLAN 1
R side weakness worsening since admission slowly   R side LE weakness   Stat CT and CTA head and neck noted, no acute stroke  Neuro eval appreciated  CT abd noted to R/O bleeding   MRI L and T spine noted  AC with heparin in view of acute thrombosis, will switch to noacs  CBC monitoring   active type and screen  Surg eval appreciated  MRI L and T with contrast done, pending read

## 2019-10-27 NOTE — PHYSICAL THERAPY INITIAL EVALUATION ADULT - GAIT DEVIATIONS NOTED, PT EVAL
decreased stride length/decreased weight-shifting ability/inc R knee flexion, VCs to advance RLE as pt tends to let R foot lag behind/decreased brain/decreased step length/increased time in double stance/footdrop

## 2019-10-27 NOTE — PHYSICAL THERAPY INITIAL EVALUATION ADULT - ACTIVE RANGE OF MOTION EXAMINATION, REHAB EVAL
Left UE Active ROM was WFL (within functional limits)/R shoulder AROM to ~70 degrees, RLE hip flexion dec AROM, R ankle DF unable to perform/Left LE Active ROM was WFL (within functional limits)

## 2019-10-27 NOTE — PROGRESS NOTE ADULT - ASSESSMENT
Pt is a 55 y/o male with pmh of HTN, DM, HLD, CVA 4 years ago presenting for concern of hiccups, abdominal pain and distention and shortness of breath, worsening since Friday. Patient reports that 2 weeks ago he started to have episodes of intractable hiccups. On imaging he has been found to have pancreatic tail lesion with mets to liver    1) Pancreatic tail mass w/ multiple liver lesions  -s/p liver lesion bx, path showing adenocarcinoma, immunohistochemistry pending.  Likely pancreatic adenocarcinoma  -Ca19-9 normal, CEA slightly elevated, chromogranin A slightly elevated  -explained to pt in presence of sons, brother situation.  Status is treatable but not curable potentially w systemic chemo (gemcitabine/abraxane or FOLFIRINOX), however he would need to improve his performance status with physical rehab.    -c/w PT  -recommend dispo to Rehab with outpatient f/u in our office- would have chemoport placed prior to discharge      2) CVA  -back on asa, previously held for bx    3) partial R portal vein thrombus -- likely exacerbated by GI mass and hypercoag state from malignancy  -- abd pain resolved  -- on hep now;  can switch to Xarelto at d/c after port is placed    4) anemia -- MCV low, neg Fe, B12, folate, hapto  -- adequate  --send thalassemia/sickle work up outpatient    5) fever/leukocytosis-- ? tumor fever  -- off abx  -- would not try NSAIDS in this case given that pt is on AC, avoid risk of bleeding    Fer Morelos MD  Hematology/Oncology  Cell:  [phone]  Office Phone: [phone]  Office Fax:  [phone] [address] [id number]

## 2019-10-28 LAB
ALBUMIN SERPL ELPH-MCNC: 2.4 G/DL — LOW (ref 3.3–5)
ALP SERPL-CCNC: 476 U/L — HIGH (ref 40–120)
ALT FLD-CCNC: 36 U/L — SIGNIFICANT CHANGE UP (ref 10–45)
ANION GAP SERPL CALC-SCNC: 11 MMOL/L — SIGNIFICANT CHANGE UP (ref 5–17)
APTT BLD: 63.4 SEC — HIGH (ref 27.5–36.3)
AST SERPL-CCNC: 31 U/L — SIGNIFICANT CHANGE UP (ref 10–40)
BASOPHILS # BLD AUTO: 0.05 K/UL — SIGNIFICANT CHANGE UP (ref 0–0.2)
BASOPHILS NFR BLD AUTO: 0.2 % — SIGNIFICANT CHANGE UP (ref 0–2)
BILIRUB SERPL-MCNC: 0.5 MG/DL — SIGNIFICANT CHANGE UP (ref 0.2–1.2)
BUN SERPL-MCNC: 14 MG/DL — SIGNIFICANT CHANGE UP (ref 7–23)
CALCIUM SERPL-MCNC: 8.5 MG/DL — SIGNIFICANT CHANGE UP (ref 8.4–10.5)
CHLORIDE SERPL-SCNC: 94 MMOL/L — LOW (ref 96–108)
CO2 SERPL-SCNC: 33 MMOL/L — HIGH (ref 22–31)
CREAT SERPL-MCNC: 0.83 MG/DL — SIGNIFICANT CHANGE UP (ref 0.5–1.3)
EOSINOPHIL # BLD AUTO: 0.38 K/UL — SIGNIFICANT CHANGE UP (ref 0–0.5)
EOSINOPHIL NFR BLD AUTO: 1.4 % — SIGNIFICANT CHANGE UP (ref 0–6)
GLUCOSE SERPL-MCNC: 142 MG/DL — HIGH (ref 70–99)
HCT VFR BLD CALC: 30.4 % — LOW (ref 39–50)
HGB BLD-MCNC: 9.5 G/DL — LOW (ref 13–17)
IMM GRANULOCYTES NFR BLD AUTO: 1.7 % — HIGH (ref 0–1.5)
INR BLD: 1.44 RATIO — HIGH (ref 0.88–1.16)
LYMPHOCYTES # BLD AUTO: 1.19 K/UL — SIGNIFICANT CHANGE UP (ref 1–3.3)
LYMPHOCYTES # BLD AUTO: 4.5 % — LOW (ref 13–44)
MCHC RBC-ENTMCNC: 24.5 PG — LOW (ref 27–34)
MCHC RBC-ENTMCNC: 31.3 GM/DL — LOW (ref 32–36)
MCV RBC AUTO: 78.4 FL — LOW (ref 80–100)
MONOCYTES # BLD AUTO: 2.15 K/UL — HIGH (ref 0–0.9)
MONOCYTES NFR BLD AUTO: 8.1 % — SIGNIFICANT CHANGE UP (ref 2–14)
NEUTROPHILS # BLD AUTO: 22.44 K/UL — HIGH (ref 1.8–7.4)
NEUTROPHILS NFR BLD AUTO: 84.1 % — HIGH (ref 43–77)
PLATELET # BLD AUTO: 449 K/UL — HIGH (ref 150–400)
POTASSIUM SERPL-MCNC: 3.8 MMOL/L — SIGNIFICANT CHANGE UP (ref 3.5–5.3)
POTASSIUM SERPL-SCNC: 3.8 MMOL/L — SIGNIFICANT CHANGE UP (ref 3.5–5.3)
PROT SERPL-MCNC: 6.7 G/DL — SIGNIFICANT CHANGE UP (ref 6–8.3)
PROTHROM AB SERPL-ACNC: 16.6 SEC — HIGH (ref 10–13.1)
RBC # BLD: 3.88 M/UL — LOW (ref 4.2–5.8)
RBC # FLD: 14.6 % — HIGH (ref 10.3–14.5)
SODIUM SERPL-SCNC: 138 MMOL/L — SIGNIFICANT CHANGE UP (ref 135–145)
WBC # BLD: 26.65 K/UL — HIGH (ref 3.8–10.5)
WBC # FLD AUTO: 26.65 K/UL — HIGH (ref 3.8–10.5)

## 2019-10-28 PROCEDURE — 76937 US GUIDE VASCULAR ACCESS: CPT | Mod: 26

## 2019-10-28 PROCEDURE — 77001 FLUOROGUIDE FOR VEIN DEVICE: CPT | Mod: 26

## 2019-10-28 PROCEDURE — 36561 INSERT TUNNELED CV CATH: CPT

## 2019-10-28 RX ORDER — ENOXAPARIN SODIUM 100 MG/ML
90 INJECTION SUBCUTANEOUS EVERY 12 HOURS
Refills: 0 | Status: DISCONTINUED | OUTPATIENT
Start: 2019-10-28 | End: 2019-10-29

## 2019-10-28 RX ADMIN — PANTOPRAZOLE SODIUM 40 MILLIGRAM(S): 20 TABLET, DELAYED RELEASE ORAL at 05:16

## 2019-10-28 RX ADMIN — Medication 8: at 17:50

## 2019-10-28 RX ADMIN — AMLODIPINE BESYLATE 10 MILLIGRAM(S): 2.5 TABLET ORAL at 22:18

## 2019-10-28 RX ADMIN — Medication 200 MILLIGRAM(S): at 05:16

## 2019-10-28 RX ADMIN — ENOXAPARIN SODIUM 90 MILLIGRAM(S): 100 INJECTION SUBCUTANEOUS at 22:19

## 2019-10-28 RX ADMIN — Medication 50 MILLIGRAM(S): at 05:16

## 2019-10-28 RX ADMIN — Medication 2: at 08:56

## 2019-10-28 RX ADMIN — Medication 100 MILLIGRAM(S): at 17:09

## 2019-10-28 RX ADMIN — LOSARTAN POTASSIUM 100 MILLIGRAM(S): 100 TABLET, FILM COATED ORAL at 05:16

## 2019-10-28 RX ADMIN — Medication 100 MILLIGRAM(S): at 05:16

## 2019-10-28 RX ADMIN — POLYETHYLENE GLYCOL 3350 17 GRAM(S): 17 POWDER, FOR SOLUTION ORAL at 17:10

## 2019-10-28 RX ADMIN — Medication 50 MILLIGRAM(S): at 17:10

## 2019-10-28 RX ADMIN — Medication 81 MILLIGRAM(S): at 17:10

## 2019-10-28 NOTE — PROGRESS NOTE ADULT - PROBLEM SELECTOR PROBLEM 1
Abdominal pain
Abdominal pain
Gait abnormality
Abdominal pain
Gait abnormality
Abdominal pain

## 2019-10-28 NOTE — PROGRESS NOTE ADULT - PROBLEM SELECTOR PROBLEM 2
SOB (shortness of breath)
Abdominal pain
SOB (shortness of breath)
SOB (shortness of breath)
Abdominal pain
SOB (shortness of breath)
Abdominal pain

## 2019-10-28 NOTE — PROGRESS NOTE ADULT - ASSESSMENT
Pt is a 55 y/o male with pmh of HTN, DM, HLD, CVA 4 years ago presenting for concern of hiccups, abdominal pain and distention and shortness of breath, worsening since Friday. Patient reports that 2 weeks ago he started to have episodes of intractable hiccups. On imaging he has been found to have pancreatic tail lesion with mets to liver    1) Pancreatic tail mass w/ multiple liver lesions  -s/p liver lesion bx, path showing adenocarcinoma, immunohistochemistry pending.  Likely pancreatic adenocarcinoma but further chemo plan will need to wait for final report  -Ca19-9 normal, CEA slightly elevated, chromogranin A slightly elevated  -explained to pt in presence of sons, brother situation.  Status is treatable but not curable potentially w systemic chemo (gemcitabine/abraxane or FOLFIRINOX), however he would need to improve his performance status with physical rehab.    -c/w PT  -for port placement today in preparation for chemo  -outpt f/u after d/c  -prn meds for hiccups    2) CVA  -back on asa, previously held for bx    3) partial R portal vein thrombus -- likely exacerbated by GI mass and hypercoag state from malignancy  -- abd pain resolved  -- on hep now;  can switch to lovenox 1mg/kg BID since pt will be d/c to rehab. Can consider changing to NOAC once able to d/c home from rehab    4) anemia -- MCV low, neg Fe, B12, folate, hapto  -- adequate  --send thalassemia/sickle work up outpatient    5) fever/leukocytosis-- ? tumor fever  -- off abx  -- would not try NSAIDS in this case given that pt is on AC, avoid risk of bleeding    6) weakness -- due to deconditioning? MRI T/L spine unremarkable for acute issue  -- PT    D/w pt and family, d/c planning from heme/onc standpoint, d/w NP and primary team, will follow, [phone]. Pt to f/u with us after d/c [phone]

## 2019-10-28 NOTE — PROGRESS NOTE ADULT - NSHPATTENDINGPLANDISCUSS_GEN_ALL_CORE
House staff, cardiology, onc and GI team. patient and his family at the bedside
House staff, cardiology, onc and GI team. patient at the bedside
House staff, cardiology, onc and GI team. patient and his family at the bedside
House staff, cardiology, onc and GI team. patient at the bedside
House staff, cardiology, onc and GI team. patient and his family at the bedside

## 2019-10-28 NOTE — PROGRESS NOTE ADULT - PROBLEM SELECTOR PROBLEM 7
CVA (cerebral vascular accident)
Prophylactic measure
CVA (cerebral vascular accident)
Prophylactic measure

## 2019-10-28 NOTE — PROGRESS NOTE ADULT - PROBLEM SELECTOR PLAN 3
CT abd/pelv noted   Onc eval appreciated  MRI abd   Tumor markers noted
CT abd/pelv noted   Onc eval appreciated  MRI abd   Tumor markers noted  Liver biopsy, awaiting results
Echocardiogram  monitor vitals   CXR  card eval appreciated

## 2019-10-28 NOTE — PROGRESS NOTE ADULT - PROBLEM SELECTOR PROBLEM 6
Diabetes
CVA (cerebral vascular accident)
Diabetes

## 2019-10-28 NOTE — PROGRESS NOTE ADULT - PROBLEM SELECTOR PLAN 1
R side weakness worsening since admission slowly   R side LE weakness   Stat CT and CTA head and neck noted, no acute stroke  Neuro eval appreciated  CT abd noted to R/O bleeding   MRI L and T spine noted  AC with heparin in view of acute thrombosis, will switch to noacs  CBC monitoring   active type and screen  Surg eval appreciated  MRI L and T with contrast noted, no acute pathology  most likely deconditioning

## 2019-10-28 NOTE — PROGRESS NOTE ADULT - PROBLEM SELECTOR PLAN 7
chronic with LUE weakness
DVT and GI PPX
chronic with LUE weakness

## 2019-10-28 NOTE — PROGRESS NOTE ADULT - PROBLEM SELECTOR PLAN 6
sliding scale   diab diet  A1C
chronic with LUE weakness
sliding scale   diab diet  A1C

## 2019-10-28 NOTE — PROGRESS NOTE ADULT - PROBLEM SELECTOR PROBLEM 5
HTN (hypertension)
Diabetes
HTN (hypertension)

## 2019-10-28 NOTE — PROGRESS NOTE ADULT - PROBLEM SELECTOR PLAN 2
Echocardiogram  monitor vitals   CXR  card eval appreciated
Echocardiogram  monitor vitals   CXR  card eval
Echocardiogram  monitor vitals   CXR  card eval appreciated
with leukocytosis   sepsis W/U negative to date   CT A/P noted  GI eval appreciated  S/P liver biopsy  awaiting cytology   cx negative from previous hospitalization last week  MRI noted  POrtal vein thrombosis  AC with heparin for now  ID eval appreciated  hold all ABx and monitor  Tylenol for fever
Echocardiogram  monitor vitals   CXR  card eval appreciated
with leukocytosis   sepsis W/U negative to date   CT A/P noted  GI eval appreciated  S/P liver biopsy  Cytology noted   cx negative from previous hospitalization last week  MRI noted  POrtal vein thrombosis  AC with heparin for now  ID eval appreciated  hold all ABx and monitor  Tylenol for fever
with leukocytosis   sepsis W/U negative to date   CT A/P noted  GI eval appreciated  S/P liver biopsy  Cytology noted   cx negative from previous hospitalization last week  MRI noted  POrtal vein thrombosis  AC with lovenox and can be switched to oral after discharge from rehab   ID eval appreciated  hold all ABx and monitor  Tylenol for fever

## 2019-10-28 NOTE — PROGRESS NOTE ADULT - ATTENDING COMMENTS
D/C planing after port
Advanced care planning was discussed with patient.  Advanced care planning forms were reviewed and discussed.
Advanced care planning was discussed with patient and family.  Advanced care planning forms were reviewed and discussed.
Advanced care planning was discussed with patient and family.  Advanced care planning forms were reviewed and discussed.
Advanced care planning was discussed with patient.  Advanced care planning forms were reviewed and discussed.
Advanced care planning was discussed with patient.  Advanced care planning forms were reviewed and discussed.  patient denies any episodes of depression and refused any psych eval   has family and friend support
Advanced care planning was discussed with patient.  Advanced care planning forms were reviewed and discussed.  patient denies any episodes of depression and refused any psych eval   has family and friend support
family at the bedside  all questions were answered.
plan for port tomorrow and discharge to rehab followed with chemo

## 2019-10-28 NOTE — PROGRESS NOTE ADULT - ASSESSMENT
55 yo male with fever in setting of pancreatic mass with hepatic mets  PV thrombosis.  Intermittent leg weakness with negative spine imaging.  Path c/w adenocarcinoma.  Blood cultures negative  Neoplastic fever with a "reactive" leukocytosis, monitor off antibiotics.

## 2019-10-28 NOTE — PROGRESS NOTE ADULT - PROBLEM SELECTOR PROBLEM 4
HTN (hypertension)
Pancreatic cancer

## 2019-10-28 NOTE — PROGRESS NOTE ADULT - PROBLEM SELECTOR PLAN 5
Home BP medications  BP control  adjust meds
sliding scale   diab diet  check A1C
Home BP medications  BP control  adjust meds
sliding scale   diab diet  A1C
sliding scale   diab diet  check A1C

## 2019-10-28 NOTE — PROGRESS NOTE ADULT - PROBLEM SELECTOR PLAN 4
CT abd/pelv noted   Onc eval appreciated  MRI abd   Tumor markers noted  Liver biopsy, noted  palliative eval appreciated   POrt placement by IR today  SENG and chemo

## 2019-10-28 NOTE — PROGRESS NOTE ADULT - PROBLEM SELECTOR PROBLEM 3
Pancreatic cancer
SOB (shortness of breath)
Pancreatic cancer
SOB (shortness of breath)

## 2019-10-29 ENCOUNTER — TRANSCRIPTION ENCOUNTER (OUTPATIENT)
Age: 54
End: 2019-10-29

## 2019-10-29 VITALS
SYSTOLIC BLOOD PRESSURE: 147 MMHG | OXYGEN SATURATION: 95 % | HEART RATE: 84 BPM | TEMPERATURE: 99 F | DIASTOLIC BLOOD PRESSURE: 91 MMHG | RESPIRATION RATE: 18 BRPM

## 2019-10-29 LAB
ALBUMIN SERPL ELPH-MCNC: 2.1 G/DL — LOW (ref 3.3–5)
ALP SERPL-CCNC: 430 U/L — HIGH (ref 40–120)
ALT FLD-CCNC: 29 U/L — SIGNIFICANT CHANGE UP (ref 10–45)
ANION GAP SERPL CALC-SCNC: 19 MMOL/L — HIGH (ref 5–17)
AST SERPL-CCNC: 25 U/L — SIGNIFICANT CHANGE UP (ref 10–40)
BASOPHILS # BLD AUTO: 0.04 K/UL — SIGNIFICANT CHANGE UP (ref 0–0.2)
BASOPHILS NFR BLD AUTO: 0.1 % — SIGNIFICANT CHANGE UP (ref 0–2)
BILIRUB SERPL-MCNC: 0.5 MG/DL — SIGNIFICANT CHANGE UP (ref 0.2–1.2)
BUN SERPL-MCNC: 15 MG/DL — SIGNIFICANT CHANGE UP (ref 7–23)
CALCIUM SERPL-MCNC: 7.8 MG/DL — LOW (ref 8.4–10.5)
CHLORIDE SERPL-SCNC: 92 MMOL/L — LOW (ref 96–108)
CO2 SERPL-SCNC: 26 MMOL/L — SIGNIFICANT CHANGE UP (ref 22–31)
CREAT SERPL-MCNC: 0.73 MG/DL — SIGNIFICANT CHANGE UP (ref 0.5–1.3)
EOSINOPHIL # BLD AUTO: 0.08 K/UL — SIGNIFICANT CHANGE UP (ref 0–0.5)
EOSINOPHIL NFR BLD AUTO: 0.3 % — SIGNIFICANT CHANGE UP (ref 0–6)
GLUCOSE SERPL-MCNC: 157 MG/DL — HIGH (ref 70–99)
HCT VFR BLD CALC: 26.7 % — LOW (ref 39–50)
HGB BLD-MCNC: 8.8 G/DL — LOW (ref 13–17)
IMM GRANULOCYTES NFR BLD AUTO: 1.9 % — HIGH (ref 0–1.5)
LYMPHOCYTES # BLD AUTO: 1.26 K/UL — SIGNIFICANT CHANGE UP (ref 1–3.3)
LYMPHOCYTES # BLD AUTO: 4.6 % — LOW (ref 13–44)
MCHC RBC-ENTMCNC: 24.9 PG — LOW (ref 27–34)
MCHC RBC-ENTMCNC: 33 GM/DL — SIGNIFICANT CHANGE UP (ref 32–36)
MCV RBC AUTO: 75.6 FL — LOW (ref 80–100)
MONOCYTES # BLD AUTO: 2.89 K/UL — HIGH (ref 0–0.9)
MONOCYTES NFR BLD AUTO: 10.6 % — SIGNIFICANT CHANGE UP (ref 2–14)
NEUTROPHILS # BLD AUTO: 22.49 K/UL — HIGH (ref 1.8–7.4)
NEUTROPHILS NFR BLD AUTO: 82.5 % — HIGH (ref 43–77)
NRBC # BLD: 0 /100 WBCS — SIGNIFICANT CHANGE UP (ref 0–0)
PLATELET # BLD AUTO: 436 K/UL — HIGH (ref 150–400)
POTASSIUM SERPL-MCNC: 3.8 MMOL/L — SIGNIFICANT CHANGE UP (ref 3.5–5.3)
POTASSIUM SERPL-SCNC: 3.8 MMOL/L — SIGNIFICANT CHANGE UP (ref 3.5–5.3)
PROT SERPL-MCNC: 6.5 G/DL — SIGNIFICANT CHANGE UP (ref 6–8.3)
RBC # BLD: 3.53 M/UL — LOW (ref 4.2–5.8)
RBC # FLD: 14.5 % — SIGNIFICANT CHANGE UP (ref 10.3–14.5)
SODIUM SERPL-SCNC: 137 MMOL/L — SIGNIFICANT CHANGE UP (ref 135–145)
WBC # BLD: 27.28 K/UL — HIGH (ref 3.8–10.5)
WBC # FLD AUTO: 27.28 K/UL — HIGH (ref 3.8–10.5)

## 2019-10-29 RX ORDER — AMLODIPINE BESYLATE 2.5 MG/1
1 TABLET ORAL
Qty: 30 | Refills: 0
Start: 2019-10-29 | End: 2019-11-27

## 2019-10-29 RX ORDER — DOCUSATE SODIUM 100 MG
1 CAPSULE ORAL
Qty: 60 | Refills: 0
Start: 2019-10-29 | End: 2019-11-27

## 2019-10-29 RX ORDER — HYDRALAZINE HCL 50 MG
1 TABLET ORAL
Qty: 0 | Refills: 0 | DISCHARGE
Start: 2019-10-29

## 2019-10-29 RX ORDER — ASPIRIN/CALCIUM CARB/MAGNESIUM 324 MG
1 TABLET ORAL
Qty: 0 | Refills: 0 | DISCHARGE
Start: 2019-10-29

## 2019-10-29 RX ORDER — METOPROLOL TARTRATE 50 MG
1 TABLET ORAL
Qty: 0 | Refills: 0 | DISCHARGE

## 2019-10-29 RX ORDER — ATORVASTATIN CALCIUM 80 MG/1
1 TABLET, FILM COATED ORAL
Qty: 0 | Refills: 0 | DISCHARGE

## 2019-10-29 RX ORDER — LOSARTAN POTASSIUM 100 MG/1
1 TABLET, FILM COATED ORAL
Qty: 0 | Refills: 0 | DISCHARGE
Start: 2019-10-29

## 2019-10-29 RX ORDER — CHLORHEXIDINE GLUCONATE 213 G/1000ML
1 SOLUTION TOPICAL DAILY
Refills: 0 | Status: DISCONTINUED | OUTPATIENT
Start: 2019-10-29 | End: 2019-10-29

## 2019-10-29 RX ORDER — ISOSORBIDE MONONITRATE 60 MG/1
1 TABLET, EXTENDED RELEASE ORAL
Qty: 0 | Refills: 0 | DISCHARGE

## 2019-10-29 RX ORDER — POLYETHYLENE GLYCOL 3350 17 G/17G
17 POWDER, FOR SOLUTION ORAL
Qty: 0 | Refills: 0 | DISCHARGE
Start: 2019-10-29

## 2019-10-29 RX ORDER — TELMISARTAN 20 MG/1
1 TABLET ORAL
Qty: 0 | Refills: 0 | DISCHARGE

## 2019-10-29 RX ORDER — DOCUSATE SODIUM 100 MG
1 CAPSULE ORAL
Qty: 0 | Refills: 0 | DISCHARGE
Start: 2019-10-29

## 2019-10-29 RX ORDER — ASPIRIN/CALCIUM CARB/MAGNESIUM 324 MG
1 TABLET ORAL
Qty: 30 | Refills: 0
Start: 2019-10-29 | End: 2019-11-27

## 2019-10-29 RX ORDER — METOPROLOL TARTRATE 50 MG
1 TABLET ORAL
Qty: 0 | Refills: 0 | DISCHARGE
Start: 2019-10-29

## 2019-10-29 RX ORDER — ATORVASTATIN CALCIUM 80 MG/1
1 TABLET, FILM COATED ORAL
Qty: 30 | Refills: 0
Start: 2019-10-29 | End: 2019-11-27

## 2019-10-29 RX ORDER — LOSARTAN POTASSIUM 100 MG/1
1 TABLET, FILM COATED ORAL
Qty: 30 | Refills: 0
Start: 2019-10-29 | End: 2019-11-27

## 2019-10-29 RX ORDER — PANTOPRAZOLE SODIUM 20 MG/1
1 TABLET, DELAYED RELEASE ORAL
Qty: 30 | Refills: 0
Start: 2019-10-29 | End: 2019-11-27

## 2019-10-29 RX ORDER — METOPROLOL TARTRATE 50 MG
1 TABLET ORAL
Qty: 30 | Refills: 0
Start: 2019-10-29 | End: 2019-11-27

## 2019-10-29 RX ORDER — SITAGLIPTIN AND METFORMIN HYDROCHLORIDE 500; 50 MG/1; MG/1
1 TABLET, FILM COATED ORAL
Qty: 0 | Refills: 0 | DISCHARGE

## 2019-10-29 RX ORDER — EMPAGLIFLOZIN 10 MG/1
1 TABLET, FILM COATED ORAL
Qty: 0 | Refills: 0 | DISCHARGE

## 2019-10-29 RX ORDER — APIXABAN 2.5 MG/1
2 TABLET, FILM COATED ORAL
Qty: 120 | Refills: 0
Start: 2019-10-29 | End: 2019-11-27

## 2019-10-29 RX ORDER — HYDRALAZINE HCL 50 MG
1 TABLET ORAL
Qty: 60 | Refills: 0
Start: 2019-10-29 | End: 2019-11-27

## 2019-10-29 RX ORDER — PANTOPRAZOLE SODIUM 20 MG/1
1 TABLET, DELAYED RELEASE ORAL
Qty: 0 | Refills: 0 | DISCHARGE
Start: 2019-10-29

## 2019-10-29 RX ORDER — HYDRALAZINE HCL 50 MG
1 TABLET ORAL
Qty: 0 | Refills: 0 | DISCHARGE

## 2019-10-29 RX ORDER — ASPIRIN/CALCIUM CARB/MAGNESIUM 324 MG
1 TABLET ORAL
Qty: 0 | Refills: 0 | DISCHARGE

## 2019-10-29 RX ORDER — PANTOPRAZOLE SODIUM 20 MG/1
1 TABLET, DELAYED RELEASE ORAL
Qty: 0 | Refills: 0 | DISCHARGE

## 2019-10-29 RX ORDER — POLYETHYLENE GLYCOL 3350 17 G/17G
17 POWDER, FOR SOLUTION ORAL
Qty: 1 | Refills: 0
Start: 2019-10-29 | End: 2019-11-27

## 2019-10-29 RX ORDER — SITAGLIPTIN AND METFORMIN HYDROCHLORIDE 500; 50 MG/1; MG/1
1 TABLET, FILM COATED ORAL
Qty: 60 | Refills: 0
Start: 2019-10-29 | End: 2019-11-27

## 2019-10-29 RX ADMIN — Medication 50 MILLIGRAM(S): at 06:06

## 2019-10-29 RX ADMIN — Medication 8: at 12:39

## 2019-10-29 RX ADMIN — LOSARTAN POTASSIUM 100 MILLIGRAM(S): 100 TABLET, FILM COATED ORAL at 06:06

## 2019-10-29 RX ADMIN — Medication 200 MILLIGRAM(S): at 06:06

## 2019-10-29 RX ADMIN — Medication 50 MILLIGRAM(S): at 18:12

## 2019-10-29 RX ADMIN — CHLORHEXIDINE GLUCONATE 1 APPLICATION(S): 213 SOLUTION TOPICAL at 13:31

## 2019-10-29 RX ADMIN — Medication 100 MILLIGRAM(S): at 06:06

## 2019-10-29 RX ADMIN — ENOXAPARIN SODIUM 90 MILLIGRAM(S): 100 INJECTION SUBCUTANEOUS at 09:02

## 2019-10-29 RX ADMIN — PANTOPRAZOLE SODIUM 40 MILLIGRAM(S): 20 TABLET, DELAYED RELEASE ORAL at 06:06

## 2019-10-29 RX ADMIN — Medication 81 MILLIGRAM(S): at 11:38

## 2019-10-29 RX ADMIN — Medication 2: at 09:00

## 2019-10-29 NOTE — DISCHARGE NOTE PROVIDER - HOSPITAL COURSE
Pt is a 55 y/o male with pmh of HTN, DM, HLD, CVA 4 years ago presenting for concern of hiccups, abdominal pain and distention and shortness of breath, worsening since Friday. Patient reports that 2 weeks ago he started to have episodes of intractable hiccups. found to have pancreatic tail lesion with mets.         Gait abnormality.  Plan: R side weakness worsening since admission slowly     R side LE weakness  Stat CT and CTA head and neck noted, no acute stroke    MRI L and T with contrast noted, no acute pathology        Abdominal pain.  Plan: with leukocytosis sepsis W/U negative to date         POrtal vein thrombosis    AC with lovenox and can be switched to oral after discharge from rehab         SOB (shortness of breath).  Resolved         Pancreatic cancer.  Plan: CT abd/pelv noted     Mediport placement f/u outpatient         HTN (hypertension).  Plan: stable         Diabetes. Plan: sliding scale stable         CVA (cerebral vascular accident).  Plan: chronic with LUE weakness. Pt is a 55 y/o male with pmh of HTN, DM, HLD, CVA 4 years ago presenting for concern of hiccups, abdominal pain and distention and shortness of breath, worsening since Friday. Patient reports that 2 weeks ago he started to have episodes of intractable hiccups. found to have pancreatic tail lesion with mets.         Gait abnormality.  Plan: R side weakness worsening since admission slowly     R side LE weakness  Stat CT and CTA head and neck noted, no acute stroke    MRI L and T with contrast noted, no acute pathology        Abdominal pain.  Plan: with leukocytosis sepsis W/U negative to date         POrtal vein thrombosis    AC with lovenox and can be switched to oral after discharge from rehab         SOB (shortness of breath).  Resolved         Pancreatic cancer.  Plan: CT abd/pelv noted     Mediport placement f/u outpatient         HTN (hypertension).  Plan: stable         Diabetes. Plan: sliding scale stable         CVA (cerebral vascular accident).  Plan: chronic with LUE weakness.                 Attending:     patient requesting to F/U with Dr Parr for Onc follow up. has appointment on the 6th.     oral eliquis for AC     answered all questions     at the bedside    can follow up with me in 2 weeks for medical management

## 2019-10-29 NOTE — DISCHARGE NOTE PROVIDER - NSDCCPCAREPLAN_GEN_ALL_CORE_FT
PRINCIPAL DISCHARGE DIAGNOSIS  Diagnosis: Pancreatic cancer  Assessment and Plan of Treatment: Pancreatic cancer      SECONDARY DISCHARGE DIAGNOSES  Diagnosis: Diabetes  Assessment and Plan of Treatment: Diabetes    Diagnosis: HTN (hypertension)  Assessment and Plan of Treatment: HTN (hypertension)    Diagnosis: CVA (cerebral vascular accident)  Assessment and Plan of Treatment: CVA (cerebral vascular accident)

## 2019-10-29 NOTE — PROGRESS NOTE ADULT - SUBJECTIVE AND OBJECTIVE BOX
[facility]    Subjective: Patient seen and examined. No new events except as noted.   family at the bedside  R UE and LE weakness  gait abnormality     REVIEW OF SYSTEMS:    CONSTITUTIONAL: No weakness, fevers or chills  EYES/ENT: No visual changes;  No vertigo or throat pain   NECK: No pain or stiffness  RESPIRATORY: No cough, wheezing, hemoptysis; No shortness of breath  CARDIOVASCULAR: No chest pain or palpitations  GASTROINTESTINAL: No abdominal or epigastric pain. No nausea, vomiting, or hematemesis; No diarrhea or constipation. No melena or hematochezia.  GENITOURINARY: No dysuria, frequency or hematuria  NEUROLOGICAL: ext weakness   SKIN: No itching, burning, rashes, or lesions   All other review of systems is negative unless indicated above.    MEDICATIONS:  MEDICATIONS  (STANDING):  amLODIPine   Tablet 10 milliGRAM(s) Oral at bedtime  aspirin enteric coated 81 milliGRAM(s) Oral daily  dextrose 5%. 1000 milliLiter(s) (50 mL/Hr) IV Continuous <Continuous>  dextrose 50% Injectable 12.5 Gram(s) IV Push once  dextrose 50% Injectable 25 Gram(s) IV Push once  dextrose 50% Injectable 25 Gram(s) IV Push once  docusate sodium 100 milliGRAM(s) Oral two times a day  heparin  Infusion.  Unit(s)/Hr (17 mL/Hr) IV Continuous <Continuous>  hydrALAZINE 25 milliGRAM(s) Oral two times a day  insulin lispro (HumaLOG) corrective regimen sliding scale   SubCutaneous three times a day before meals  losartan 100 milliGRAM(s) Oral daily  metoclopramide Injectable 10 milliGRAM(s) IV Push every 8 hours  metoprolol succinate  milliGRAM(s) Oral daily  pantoprazole    Tablet 40 milliGRAM(s) Oral before breakfast  polyethylene glycol 3350 17 Gram(s) Oral two times a day      PHYSICAL EXAM:  T(C): 38.3 (10-23-19 @ 16:15), Max: 39.1 (10-23-19 @ 14:40)  HR: 92 (10-23-19 @ 14:40) (62 - 108)  BP: 165/86 (10-23-19 @ 14:40) (146/91 - 170/99)  RR: 18 (10-23-19 @ 14:40) (18 - 18)  SpO2: 93% (10-23-19 @ 14:40) (93% - 95%)  Wt(kg): --  I&O's Summary    22 Oct 2019 07:01  -  23 Oct 2019 07:00  --------------------------------------------------------  IN: 1160 mL / OUT: 200 mL / NET: 960 mL    23 Oct 2019 07:01  -  23 Oct 2019 17:22  --------------------------------------------------------  IN: 480 mL / OUT: 0 mL / NET: 480 mL          Appearance: Normal	  HEENT:   Normal oral mucosa, PERRL, EOMI	  Lymphatic: No lymphadenopathy , no edema  Cardiovascular: Normal S1 S2  Respiratory: Lungs clear to auscultation, normal effort 	  Gastrointestinal:  Soft, Non-tender, + BS	  Skin: No rashes, No ecchymoses, No cyanosis, warm to touch  Musculoskeletal: R lower ext weakness and motor weakness, intact sensations RUE slow movement  Psychiatry:  Mood & affect appropriate  Ext: R foot swelling       All labs, Imaging and EKGs personally reviewed                           9.8    21.10 )-----------( 375      ( 23 Oct 2019 12:27 )             30.7               10-23    142  |  93<L>  |  13  ----------------------------<  174<H>  3.6   |  30  |  0.76    Ca    8.7      23 Oct 2019 06:54    TPro  6.9  /  Alb  2.5<L>  /  TBili  0.4  /  DBili  x   /  AST  26  /  ALT  40  /  AlkPhos  282<H>  10-23    PT/INR - ( 23 Oct 2019 09:00 )   PT: 18.1 sec;   INR: 1.57 ratio         PTT - ( 23 Oct 2019 09:00 )  PTT:34.2 sec         < from: CT Angio Head w/ IV Cont (10.23.19 @ 10:47) >  IMPRESSION:     CT BRAIN: No CT evidence of acute intracranial hemorrhage, brain edema,   mass effect or acute territorial infarct. 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    CTA BRAIN and neck: Limited by injection.    Patent intracranial circulation. No flow-limiting stenosis or occlusion.      Nonvisualization of the proximal left vertebral artery.    All measurements are performed using standard NASCET criteria.    < from: CT Abdomen and Pelvis No Cont (10.23.19 @ 14:24) >  IMPRESSION:     No retroperitoneal hematoma.    Multiple liver lesions consistent with hepatic metastases. Comparison   with prior CT dated 10/11/2019 is difficult due to lack of intravenous   contrast on the current study. However, the lesions appear to have   increased in size. In addition, there is new moderate perihepatic   infiltration.  Differential diagnosis would include bleeding, metastatic   disease, and infection.    Ill-defined pancreatic tail mass with distal pancreatic tail atrophy   better evaluated on prior MR dated 10/17/2019.    New small volume ascites.
CC: f/u for fever    Patient reports he cannot walk due to rle weakness    REVIEW OF SYSTEMS:  All other review of systems negative (Comprehensive ROS)    Antimicrobials Day #  :    Other Medications Reviewed    T(F): 99.5 (10-24-19 @ 17:22), Max: 100.9 (10-24-19 @ 00:44)  HR: 81 (10-24-19 @ 17:22)  BP: 134/87 (10-24-19 @ 17:22)  RR: 18 (10-24-19 @ 17:22)  SpO2: 93% (10-24-19 @ 17:22)  Wt(kg): --    PHYSICAL EXAM:  General: alert, no acute distress  Eyes:  anicteric, no conjunctival injection, no discharge  Oropharynx: no lesions or injection 	  Neck: supple, without adenopathy  Lungs: clear to auscultation  Heart: regular rate and rhythm; no murmur, rubs or gallops  Abdomen: soft, nondistended, nontender, without mass or organomegaly  Skin: no lesions  Extremities: no clubbing, cyanosis, or edema  Neurologic: alert, oriented, moves all extremities but right with foot drop    LAB RESULTS:                        9.4    23.09 )-----------( 380      ( 24 Oct 2019 17:47 )             28.1     10-24    137  |  96  |  12  ----------------------------<  181<H>  3.6   |  31  |  0.81    Ca    8.6      24 Oct 2019 06:53    TPro  6.7  /  Alb  2.5<L>  /  TBili  0.5  /  DBili  x   /  AST  39  /  ALT  45  /  AlkPhos  326<H>  10-24    LIVER FUNCTIONS - ( 24 Oct 2019 06:53 )  Alb: 2.5 g/dL / Pro: 6.7 g/dL / ALK PHOS: 326 U/L / ALT: 45 U/L / AST: 39 U/L / GGT: x             MICROBIOLOGY:  RECENT CULTURES:  10-21 @ 01:12 .Blood     No growth to date.      10-21 @ 01:10 .Blood     No growth to date.          RADIOLOGY REVIEWED:    < from: MR Lumbar Spine No Cont (10.23.19 @ 23:35) >  EXAM:  MR SPINE LUMBAR                          EXAM:  MR SPINE THORACIC                            PROCEDURE DATE:  10/23/2019            INTERPRETATION:  CLINICAL INFORMATION: Bilateral lower extremity weakness   x 1 day.    TECHNIQUE: Multiplanar, multisequence MR imaging was obtained of the   thoracic and lumbosacral spine.  COMPARISON: CT abdomen and pelvis 10/23/2019 and CT chest 10/11/2019    THORACIC SPINE MRI:  No abnormal spinal cord signal to suggest infarct. No epidural fluid   collection or mass. The intervertebral disc heights are preserved. Normal   marrow signal. No evidence of acute spinal fracture. No disc herniation   or central canal stenosis. Anatomic thoracic kyphotic curvature is   preserved.    LUMBOSACRAL SPINE MRI:  SPINAL SEGMENTATION: The study assumes 5 non-rib bearing lumbar type   vertebral bodies.  SPINAL ALIGNMENT: Anatomic lumbar lordotic curvature is preserved.  MARROW: Multiple vertebral body hemangiomata. No evidence of acute   fracture.  DISTAL CORDAND CONUS: The conus terminates at L1. Normal cord and cauda   equina signal.  DISC SPACES: Preserved intervertebral disc space heights.  PARASPINAL MUSCLE AND SOFT TISSUES: Unremarkable.    DISC LEVEL EVALUATION:    T12/L1: No disc bulge, spinal canal stenosis or neural foraminal   narrowing.  L1/L2: No disc bulge, spinal canal stenosis or neural foraminal narrowing.  L2/L3: No disc bulge, spinal canal stenosis or neural foraminal narrowing.  L3/L4: No disc bulge, spinal canal stenosis or neuralforaminal narrowing.  L4/L5: No disc bulge, spinal canal stenosis or neural foraminal narrowing.  L5/S1: Small disc bulge with mild spinal canal stenosis. Bilateral facet   hypertrophy resulting in moderate to severe right and severe left neural   foraminal narrowing.    IMPRESSION:   No evidence of spinal cord infarct.    Bilateral facet hypertrophy at L5-S1 resulting in moderate to severe   right and severe left neural foraminal narrowing.        < end of copied text >      Assessment:  Patient with pancreas mass, extensive liver mets with adenoca on bx, pv clot, hiccups, ongoing fever from malignancy and clot. NO infection apparent. Right leg weakness w/u in progress, no infection on mri. Could this be paraneoplastic process   Plan:  No indication for antimicrobics at present.   neuro w/u per dr gardner
Subjective: Patient seen and examined. No new events except as noted.   doing well  cont to have hiccups and mild abdominal discomfort     REVIEW OF SYSTEMS:    CONSTITUTIONAL: No weakness, fevers or chills  EYES/ENT: No visual changes;  No vertigo or throat pain   NECK: No pain or stiffness  RESPIRATORY: No cough, wheezing, hemoptysis; No shortness of breath  CARDIOVASCULAR: No chest pain or palpitations  GASTROINTESTINAL: No abdominal or epigastric pain. No nausea, vomiting, or hematemesis; No diarrhea or constipation. No melena or hematochezia.  GENITOURINARY: No dysuria, frequency or hematuria  NEUROLOGICAL: No numbness or weakness  SKIN: No itching, burning, rashes, or lesions   All other review of systems is negative unless indicated above.    MEDICATIONS:  MEDICATIONS  (STANDING):  amLODIPine   Tablet 5 milliGRAM(s) Oral daily  dextrose 5%. 1000 milliLiter(s) (50 mL/Hr) IV Continuous <Continuous>  dextrose 50% Injectable 12.5 Gram(s) IV Push once  dextrose 50% Injectable 25 Gram(s) IV Push once  dextrose 50% Injectable 25 Gram(s) IV Push once  docusate sodium 100 milliGRAM(s) Oral two times a day  heparin  Injectable 5000 Unit(s) SubCutaneous every 8 hours  insulin lispro (HumaLOG) corrective regimen sliding scale   SubCutaneous three times a day before meals  losartan 100 milliGRAM(s) Oral daily  metoprolol succinate  milliGRAM(s) Oral daily  pantoprazole    Tablet 40 milliGRAM(s) Oral before breakfast      PHYSICAL EXAM:  T(C): 36.7 (10-17-19 @ 11:34), Max: 37.1 (10-17-19 @ 04:34)  HR: 73 (10-17-19 @ 11:34) (73 - 88)  BP: 168/89 (10-17-19 @ 11:34) (168/89 - 171/92)  RR: 17 (10-17-19 @ 11:34) (17 - 18)  SpO2: 95% (10-17-19 @ 11:34) (95% - 97%)  Wt(kg): --  I&O's Summary    17 Oct 2019 07:01  -  17 Oct 2019 16:24  --------------------------------------------------------  IN: 240 mL / OUT: 0 mL / NET: 240 mL          Appearance: Normal	  HEENT:   Normal oral mucosa, PERRL, EOMI	  Lymphatic: No lymphadenopathy , no edema  Cardiovascular: Normal S1 S2  Respiratory: Lungs clear to auscultation, normal effort 	  Gastrointestinal:  Soft, distended  Skin: No rashes, No ecchymoses, No cyanosis, warm to touch  Musculoskeletal: Normal range of motion, normal strength  Psychiatry:  Mood & affect appropriate  Ext: No edema      All labs, Imaging and EKGs personally reviewed                           10.3   16.39 )-----------( 369      ( 17 Oct 2019 09:09 )             31.5               10-17    134<L>  |  92<L>  |  21  ----------------------------<  93  3.9   |  27  |  0.94    Ca    9.1      17 Oct 2019 06:59    TPro  7.4  /  Alb  2.9<L>  /  TBili  0.4  /  DBili  x   /  AST  35  /  ALT  40  /  AlkPhos  204<H>  10-17    PT/INR - ( 16 Oct 2019 13:24 )   PT: 16.8 sec;   INR: 1.44 ratio         PTT - ( 16 Oct 2019 13:24 )  PTT:30.6 sec
Subjective: Patient seen and examined. No new events except as noted.   febrile overnight  sepsis W/U     REVIEW OF SYSTEMS:    CONSTITUTIONAL: No weakness, fevers or chills  EYES/ENT: No visual changes;  No vertigo or throat pain   NECK: No pain or stiffness  RESPIRATORY: No cough, wheezing, hemoptysis; No shortness of breath  CARDIOVASCULAR: No chest pain or palpitations  GASTROINTESTINAL: No abdominal or epigastric pain.   GENITOURINARY: No dysuria, frequency or hematuria  NEUROLOGICAL: No numbness or weakness  SKIN: No itching, burning, rashes, or lesions   All other review of systems is negative unless indicated above.    MEDICATIONS:  MEDICATIONS  (STANDING):  amLODIPine   Tablet 5 milliGRAM(s) Oral daily  dextrose 5%. 1000 milliLiter(s) (50 mL/Hr) IV Continuous <Continuous>  dextrose 50% Injectable 12.5 Gram(s) IV Push once  dextrose 50% Injectable 25 Gram(s) IV Push once  dextrose 50% Injectable 25 Gram(s) IV Push once  docusate sodium 100 milliGRAM(s) Oral two times a day  heparin  Injectable 5000 Unit(s) SubCutaneous every 8 hours  insulin lispro (HumaLOG) corrective regimen sliding scale   SubCutaneous three times a day before meals  losartan 100 milliGRAM(s) Oral daily  metoprolol succinate  milliGRAM(s) Oral daily  pantoprazole    Tablet 40 milliGRAM(s) Oral before breakfast  polyethylene glycol 3350 17 Gram(s) Oral two times a day      PHYSICAL EXAM:  T(C): 37.1 (10-18-19 @ 14:38), Max: 39.4 (10-17-19 @ 20:01)  HR: 78 (10-18-19 @ 14:38) (70 - 102)  BP: 173/89 (10-18-19 @ 14:38) (145/90 - 192/98)  RR: 17 (10-18-19 @ 14:38) (17 - 18)  SpO2: 98% (10-18-19 @ 14:38) (94% - 98%)  Wt(kg): --  I&O's Summary    17 Oct 2019 07:01  -  18 Oct 2019 07:00  --------------------------------------------------------  IN: 1240 mL / OUT: 0 mL / NET: 1240 mL    18 Oct 2019 07:01  -  18 Oct 2019 16:13  --------------------------------------------------------  IN: 240 mL / OUT: 0 mL / NET: 240 mL      Height (cm): 177.8 (10-17 @ 16:37)  Weight (kg): 90.7 (10-17 @ 16:37)  BMI (kg/m2): 28.7 (10-17 @ 16:37)  BSA (m2): 2.09 (10-17 @ 16:37)    Appearance: Normal	  HEENT:   Normal oral mucosa, PERRL, EOMI	  Lymphatic: No lymphadenopathy , no edema  Cardiovascular: Normal S1 S2, No JVD  Respiratory: Lungs clear to auscultation, normal effort 	  Gastrointestinal:  Soft, Non-tender, + BS	  Skin: No rashes, No ecchymoses, No cyanosis, warm to touch  Musculoskeletal: Normal range of motion, normal strength  Psychiatry:  Mood & affect appropriate  Ext: No edema      All labs, Imaging and EKGs personally reviewed                           10.6   17.56 )-----------( 355      ( 18 Oct 2019 07:55 )             33.0               10-18    139  |  96  |  20  ----------------------------<  132<H>  4.1   |  30  |  0.95    Ca    9.1      18 Oct 2019 05:19    TPro  7.4  /  Alb  2.9<L>  /  TBili  0.4  /  DBili  x   /  AST  35  /  ALT  40  /  AlkPhos  204<H>  10-17                       Urinalysis Basic - ( 18 Oct 2019 09:23 )    Color: Yellow / Appearance: Clear / S.025 / pH: x  Gluc: x / Ketone: Negative  / Bili: Negative / Urobili: <2 mg/dL   Blood: x / Protein: 30 mg/dL / Nitrite: Negative   Leuk Esterase: Negative / RBC: 2 /HPF / WBC 1 /HPF   Sq Epi: x / Non Sq Epi: 0 /HPF / Bacteria: Negative    < from: MR Abdomen w/wo IV Cont (10.17.19 @ 21:26) >  IMPRESSION:    Motion limited study.    A 3.4 cm distal body/tail pancreatic mass with targetoid bilobar liver   lesions, highly suspicious for metastatic pancreatic cancer. Greater than   180 degrees encasement of the splenic artery and splenic vein with   narrowing of the splenic vein. Several liver lesions abut the stomach.   Recommend tissue sampling.    Portal vein thrombus involving the anterior branch of the right portal   vein. Patent main, intrahepatic left and posterior right portal vein   branches.
ASHLEY AVILEZ:[id number],   54yMale followed for:  No Known Allergies    PAST MEDICAL & SURGICAL HISTORY:    FAMILY HISTORY:    MEDICATIONS  (STANDING):  amLODIPine   Tablet 10 milliGRAM(s) Oral at bedtime  aspirin enteric coated 81 milliGRAM(s) Oral daily  dextrose 5%. 1000 milliLiter(s) (50 mL/Hr) IV Continuous <Continuous>  dextrose 50% Injectable 12.5 Gram(s) IV Push once  dextrose 50% Injectable 25 Gram(s) IV Push once  dextrose 50% Injectable 25 Gram(s) IV Push once  docusate sodium 100 milliGRAM(s) Oral two times a day  heparin  Infusion.  Unit(s)/Hr (17 mL/Hr) IV Continuous <Continuous>  hydrALAZINE 25 milliGRAM(s) Oral two times a day  insulin lispro (HumaLOG) corrective regimen sliding scale   SubCutaneous three times a day before meals  losartan 100 milliGRAM(s) Oral daily  metoclopramide Injectable 10 milliGRAM(s) IV Push every 8 hours  metoprolol succinate  milliGRAM(s) Oral daily  pantoprazole    Tablet 40 milliGRAM(s) Oral before breakfast  polyethylene glycol 3350 17 Gram(s) Oral two times a day    MEDICATIONS  (PRN):  bisacodyl Suppository 10 milliGRAM(s) Rectal daily PRN Constipation  dextrose 40% Gel 15 Gram(s) Oral once PRN Blood Glucose LESS THAN 70 milliGRAM(s)/deciliter  glucagon  Injectable 1 milliGRAM(s) IntraMuscular once PRN Glucose LESS THAN 70 milligrams/deciliter  heparin  Injectable 7500 Unit(s) IV Push every 6 hours PRN For aPTT less than 40  heparin  Injectable 3500 Unit(s) IV Push every 6 hours PRN For aPTT between 40 - 57      Vital Signs Last 24 Hrs  T(C): 37.1 (24 Oct 2019 08:11), Max: 39.1 (23 Oct 2019 14:40)  T(F): 98.8 (24 Oct 2019 08:11), Max: 102.3 (23 Oct 2019 14:40)  HR: 84 (24 Oct 2019 08:11) (81 - 99)  BP: 165/89 (24 Oct 2019 08:11) (148/83 - 171/98)  BP(mean): --  RR: 18 (24 Oct 2019 08:11) (18 - 19)  SpO2: 98% (24 Oct 2019 08:11) (93% - 98%)  nc/at  s1s2  cta  soft, nt, nd no guarding or rebound  no c/c/e    CBC Full  -  ( 24 Oct 2019 03:28 )  WBC Count : 23.26 K/uL  RBC Count : 3.84 M/uL  Hemoglobin : 9.7 g/dL  Hematocrit : 29.0 %  Platelet Count - Automated : 354 K/uL  Mean Cell Volume : 75.5 fl  Mean Cell Hemoglobin : 25.3 pg  Mean Cell Hemoglobin Concentration : 33.4 gm/dL  Auto Neutrophil # : x  Auto Lymphocyte # : x  Auto Monocyte # : x  Auto Eosinophil # : x  Auto Basophil # : x  Auto Neutrophil % : x  Auto Lymphocyte % : x  Auto Monocyte % : x  Auto Eosinophil % : x  Auto Basophil % : x    10-24    137  |  96  |  12  ----------------------------<  181<H>  3.6   |  31  |  0.81    Ca    8.6      24 Oct 2019 06:53    TPro  6.7  /  Alb  2.5<L>  /  TBili  0.5  /  DBili  x   /  AST  39  /  ALT  45  /  AlkPhos  326<H>  10-24    PT/INR - ( 23 Oct 2019 09:00 )   PT: 18.1 sec;   INR: 1.57 ratio         PTT - ( 24 Oct 2019 03:28 )  PTT:54.6 sec
ASHLEY AVILEZ:[id number],   54yMale followed for:  No Known Allergies    PAST MEDICAL & SURGICAL HISTORY:    FAMILY HISTORY:    MEDICATIONS  (STANDING):  amLODIPine   Tablet 10 milliGRAM(s) Oral at bedtime  aspirin enteric coated 81 milliGRAM(s) Oral daily  dextrose 5%. 1000 milliLiter(s) (50 mL/Hr) IV Continuous <Continuous>  dextrose 50% Injectable 12.5 Gram(s) IV Push once  dextrose 50% Injectable 25 Gram(s) IV Push once  dextrose 50% Injectable 25 Gram(s) IV Push once  docusate sodium 100 milliGRAM(s) Oral two times a day  heparin  Infusion.  Unit(s)/Hr (17 mL/Hr) IV Continuous <Continuous>  hydrALAZINE 50 milliGRAM(s) Oral two times a day  insulin lispro (HumaLOG) corrective regimen sliding scale   SubCutaneous three times a day before meals  losartan 100 milliGRAM(s) Oral daily  metoclopramide Injectable 10 milliGRAM(s) IV Push every 8 hours  metoprolol succinate  milliGRAM(s) Oral daily  pantoprazole    Tablet 40 milliGRAM(s) Oral before breakfast  polyethylene glycol 3350 17 Gram(s) Oral two times a day    MEDICATIONS  (PRN):  bisacodyl Suppository 10 milliGRAM(s) Rectal daily PRN Constipation  dextrose 40% Gel 15 Gram(s) Oral once PRN Blood Glucose LESS THAN 70 milliGRAM(s)/deciliter  glucagon  Injectable 1 milliGRAM(s) IntraMuscular once PRN Glucose LESS THAN 70 milligrams/deciliter  heparin  Injectable 7500 Unit(s) IV Push every 6 hours PRN For aPTT less than 40  heparin  Injectable 3500 Unit(s) IV Push every 6 hours PRN For aPTT between 40 - 57      Vital Signs Last 24 Hrs  T(C): 37 (26 Oct 2019 05:14), Max: 38.1 (25 Oct 2019 19:53)  T(F): 98.6 (26 Oct 2019 05:14), Max: 100.5 (25 Oct 2019 19:53)  HR: 94 (26 Oct 2019 05:14) (94 - 98)  BP: 150/96 (26 Oct 2019 05:14) (124/82 - 160/88)  BP(mean): --  RR: 18 (26 Oct 2019 05:14) (17 - 18)  SpO2: 94% (26 Oct 2019 05:14) (93% - 94%)  nc/at  s1s2  cta  soft, nt, nd no guarding or rebound  no c/c/e    CBC Full  -  ( 26 Oct 2019 06:35 )  WBC Count : 25.09 K/uL  RBC Count : 3.94 M/uL  Hemoglobin : 9.8 g/dL  Hematocrit : 29.8 %  Platelet Count - Automated : 441 K/uL  Mean Cell Volume : 75.6 fl  Mean Cell Hemoglobin : 24.9 pg  Mean Cell Hemoglobin Concentration : 32.9 gm/dL  Auto Neutrophil # : x  Auto Lymphocyte # : x  Auto Monocyte # : x  Auto Eosinophil # : x  Auto Basophil # : x  Auto Neutrophil % : x  Auto Lymphocyte % : x  Auto Monocyte % : x  Auto Eosinophil % : x  Auto Basophil % : x    10    138  |  95<L>  |  19  ----------------------------<  164<H>  4.2   |  31  |  0.86    Ca    8.7      26 Oct 2019 06:35    TPro  7.2  /  Alb  2.5<L>  /  TBili  0.5  /  DBili  x   /  AST  28  /  ALT  42  /  AlkPhos  454<H>  10-    PT/INR - ( 26 Oct 2019 06:35 )   PT: 16.9 sec;   INR: 1.47 ratio         PTT - ( 26 Oct 2019 06:35 )  PTT:82.2 sec
ASHLEY AVILEZ:[id number],   54yMale followed for:  No Known Allergies    PAST MEDICAL & SURGICAL HISTORY:    FAMILY HISTORY:    MEDICATIONS  (STANDING):  amLODIPine   Tablet 10 milliGRAM(s) Oral at bedtime  aspirin enteric coated 81 milliGRAM(s) Oral daily  dextrose 5%. 1000 milliLiter(s) (50 mL/Hr) IV Continuous <Continuous>  dextrose 50% Injectable 12.5 Gram(s) IV Push once  dextrose 50% Injectable 25 Gram(s) IV Push once  dextrose 50% Injectable 25 Gram(s) IV Push once  docusate sodium 100 milliGRAM(s) Oral two times a day  heparin  Infusion.  Unit(s)/Hr (17 mL/Hr) IV Continuous <Continuous>  hydrALAZINE 50 milliGRAM(s) Oral two times a day  insulin lispro (HumaLOG) corrective regimen sliding scale   SubCutaneous three times a day before meals  losartan 100 milliGRAM(s) Oral daily  metoprolol succinate  milliGRAM(s) Oral daily  pantoprazole    Tablet 40 milliGRAM(s) Oral before breakfast  polyethylene glycol 3350 17 Gram(s) Oral two times a day    MEDICATIONS  (PRN):  bisacodyl Suppository 10 milliGRAM(s) Rectal daily PRN Constipation  dextrose 40% Gel 15 Gram(s) Oral once PRN Blood Glucose LESS THAN 70 milliGRAM(s)/deciliter  glucagon  Injectable 1 milliGRAM(s) IntraMuscular once PRN Glucose LESS THAN 70 milligrams/deciliter  heparin  Injectable 7500 Unit(s) IV Push every 6 hours PRN For aPTT less than 40  heparin  Injectable 3500 Unit(s) IV Push every 6 hours PRN For aPTT between 40 - 57  metoclopramide Injectable 10 milliGRAM(s) IV Push every 8 hours PRN hiccoughs  / nausea      Vital Signs Last 24 Hrs  T(C): 36.6 (27 Oct 2019 04:43), Max: 39.4 (26 Oct 2019 20:12)  T(F): 97.9 (27 Oct 2019 04:43), Max: 102.9 (26 Oct 2019 20:12)  HR: 80 (27 Oct 2019 08:52) (80 - 105)  BP: 147/87 (27 Oct 2019 08:52) (121/82 - 161/99)  BP(mean): --  RR: 18 (27 Oct 2019 04:43) (18 - 18)  SpO2: 94% (27 Oct 2019 04:43) (93% - 95%)  nc/at  s1s2  cta  soft, nt, nd no guarding or rebound  no c/c/e    CBC Full  -  ( 27 Oct 2019 08:40 )  WBC Count : 23.37 K/uL  RBC Count : 3.56 M/uL  Hemoglobin : 8.8 g/dL  Hematocrit : 27.2 %  Platelet Count - Automated : 422 K/uL  Mean Cell Volume : 76.4 fl  Mean Cell Hemoglobin : 24.7 pg  Mean Cell Hemoglobin Concentration : 32.4 gm/dL  Auto Neutrophil # : x  Auto Lymphocyte # : x  Auto Monocyte # : x  Auto Eosinophil # : x  Auto Basophil # : x  Auto Neutrophil % : x  Auto Lymphocyte % : x  Auto Monocyte % : x  Auto Eosinophil % : x  Auto Basophil % : x    10    143  |  92<L>  |  20  ----------------------------<  198<H>  4.0   |  28  |  0.79    Ca    7.9<L>      27 Oct 2019 05:54    TPro  6.6  /  Alb  2.4<L>  /  TBili  0.5  /  DBili  x   /  AST  26  /  ALT  42  /  AlkPhos  485<H>  10-27    PT/INR - ( 26 Oct 2019 06:35 )   PT: 16.9 sec;   INR: 1.47 ratio         PTT - ( 26 Oct 2019 06:35 )  PTT:82.2 sec
ASHLEY AVILEZ:[id number],   54yMale followed for:  No Known Allergies    PAST MEDICAL & SURGICAL HISTORY:    FAMILY HISTORY:    MEDICATIONS  (STANDING):  amLODIPine   Tablet 10 milliGRAM(s) Oral at bedtime  aspirin enteric coated 81 milliGRAM(s) Oral daily  dextrose 5%. 1000 milliLiter(s) (50 mL/Hr) IV Continuous <Continuous>  dextrose 50% Injectable 12.5 Gram(s) IV Push once  dextrose 50% Injectable 25 Gram(s) IV Push once  dextrose 50% Injectable 25 Gram(s) IV Push once  docusate sodium 100 milliGRAM(s) Oral two times a day  heparin  Infusion.  Unit(s)/Hr (17 mL/Hr) IV Continuous <Continuous>  hydrALAZINE 50 milliGRAM(s) Oral two times a day  insulin lispro (HumaLOG) corrective regimen sliding scale   SubCutaneous three times a day before meals  losartan 100 milliGRAM(s) Oral daily  metoprolol succinate  milliGRAM(s) Oral daily  pantoprazole    Tablet 40 milliGRAM(s) Oral before breakfast  polyethylene glycol 3350 17 Gram(s) Oral two times a day    MEDICATIONS  (PRN):  bisacodyl Suppository 10 milliGRAM(s) Rectal daily PRN Constipation  dextrose 40% Gel 15 Gram(s) Oral once PRN Blood Glucose LESS THAN 70 milliGRAM(s)/deciliter  glucagon  Injectable 1 milliGRAM(s) IntraMuscular once PRN Glucose LESS THAN 70 milligrams/deciliter  heparin  Injectable 7500 Unit(s) IV Push every 6 hours PRN For aPTT less than 40  heparin  Injectable 3500 Unit(s) IV Push every 6 hours PRN For aPTT between 40 - 57  metoclopramide Injectable 10 milliGRAM(s) IV Push every 8 hours PRN hiccoughs  / nausea      Vital Signs Last 24 Hrs  T(C): 37.5 (28 Oct 2019 04:49), Max: 38.6 (27 Oct 2019 14:42)  T(F): 99.5 (28 Oct 2019 04:49), Max: 101.4 (27 Oct 2019 14:42)  HR: 99 (28 Oct 2019 04:49) (76 - 100)  BP: 161/87 (28 Oct 2019 04:49) (126/81 - 164/95)  BP(mean): --  RR: 18 (28 Oct 2019 04:49) (18 - 18)  SpO2: 92% (28 Oct 2019 04:49) (92% - 94%)  nc/at  s1s2  cta  soft, nt, nd no guarding or rebound  no c/c/e    CBC Full  -  ( 27 Oct 2019 08:40 )  WBC Count : 23.37 K/uL  RBC Count : 3.56 M/uL  Hemoglobin : 8.8 g/dL  Hematocrit : 27.2 %  Platelet Count - Automated : 422 K/uL  Mean Cell Volume : 76.4 fl  Mean Cell Hemoglobin : 24.7 pg  Mean Cell Hemoglobin Concentration : 32.4 gm/dL  Auto Neutrophil # : x  Auto Lymphocyte # : x  Auto Monocyte # : x  Auto Eosinophil # : x  Auto Basophil # : x  Auto Neutrophil % : x  Auto Lymphocyte % : x  Auto Monocyte % : x  Auto Eosinophil % : x  Auto Basophil % : x    10    138  |  94<L>  |  14  ----------------------------<  142<H>  3.8   |  33<H>  |  0.83    Ca    8.5      28 Oct 2019 07:28    TPro  6.7  /  Alb  2.4<L>  /  TBili  0.5  /  DBili  x   /  AST  31  /  ALT  36  /  AlkPhos  476<H>  10    PT/INR - ( 27 Oct 2019 08:37 )   PT: 17.9 sec;   INR: 1.54 ratio         PTT - ( 27 Oct 2019 08:37 )  PTT:78.7 sec
ASHLEY AVILEZ:[id number],   54yMale followed for:  No Known Allergies    PAST MEDICAL & SURGICAL HISTORY:    FAMILY HISTORY:    MEDICATIONS  (STANDING):  amLODIPine   Tablet 10 milliGRAM(s) Oral at bedtime  dextrose 5%. 1000 milliLiter(s) (50 mL/Hr) IV Continuous <Continuous>  dextrose 50% Injectable 12.5 Gram(s) IV Push once  dextrose 50% Injectable 25 Gram(s) IV Push once  dextrose 50% Injectable 25 Gram(s) IV Push once  docusate sodium 100 milliGRAM(s) Oral two times a day  insulin lispro (HumaLOG) corrective regimen sliding scale   SubCutaneous three times a day before meals  losartan 100 milliGRAM(s) Oral daily  metoprolol succinate  milliGRAM(s) Oral daily  pantoprazole    Tablet 40 milliGRAM(s) Oral before breakfast  piperacillin/tazobactam IVPB.. 3.375 Gram(s) IV Intermittent every 8 hours  polyethylene glycol 3350 17 Gram(s) Oral two times a day  vancomycin  IVPB      vancomycin  IVPB 1000 milliGRAM(s) IV Intermittent every 24 hours    MEDICATIONS  (PRN):  bisacodyl Suppository 10 milliGRAM(s) Rectal daily PRN Constipation  chlorproMAZINE    Tablet 25 milliGRAM(s) Oral three times a day PRN Hiccups  dextrose 40% Gel 15 Gram(s) Oral once PRN Blood Glucose LESS THAN 70 milliGRAM(s)/deciliter  glucagon  Injectable 1 milliGRAM(s) IntraMuscular once PRN Glucose LESS THAN 70 milligrams/deciliter  heparin  Injectable 7500 Unit(s) IV Push every 6 hours PRN For aPTT less than 40  heparin  Injectable 3500 Unit(s) IV Push every 6 hours PRN For aPTT between 40 - 57      Vital Signs Last 24 Hrs  T(C): 37.7 (21 Oct 2019 04:10), Max: 39.4 (20 Oct 2019 23:51)  T(F): 99.9 (21 Oct 2019 04:10), Max: 102.9 (20 Oct 2019 23:51)  HR: 86 (21 Oct 2019 04:10) (81 - 96)  BP: 164/81 (21 Oct 2019 04:10) (140/78 - 191/63)  BP(mean): --  RR: 18 (21 Oct 2019 04:10) (18 - 19)  SpO2: 94% (21 Oct 2019 04:10) (94% - 95%)  nc/at  s1s2  cta  soft, nt, nd no guarding or rebound  no c/c/e    CBC Full  -  ( 21 Oct 2019 08:22 )  WBC Count : 17.97 K/uL  RBC Count : 3.96 M/uL  Hemoglobin : 9.9 g/dL  Hematocrit : 31.1 %  Platelet Count - Automated : 378 K/uL  Mean Cell Volume : 78.5 fl  Mean Cell Hemoglobin : 25.0 pg  Mean Cell Hemoglobin Concentration : 31.8 gm/dL  Auto Neutrophil # : x  Auto Lymphocyte # : x  Auto Monocyte # : x  Auto Eosinophil # : x  Auto Basophil # : x  Auto Neutrophil % : x  Auto Lymphocyte % : x  Auto Monocyte % : x  Auto Eosinophil % : x  Auto Basophil % : x    10-21    137  |  97  |  11  ----------------------------<  188<H>  3.9   |  30  |  0.83    Ca    8.5      21 Oct 2019 06:08      PTT - ( 21 Oct 2019 06:08 )  PTT:87.9 sec
ASHLEY AVILEZ:[id number],   54yMale followed for:  No Known Allergies    PAST MEDICAL & SURGICAL HISTORY:    FAMILY HISTORY:    MEDICATIONS  (STANDING):  amLODIPine   Tablet 5 milliGRAM(s) Oral daily  dextrose 5%. 1000 milliLiter(s) (50 mL/Hr) IV Continuous <Continuous>  dextrose 50% Injectable 12.5 Gram(s) IV Push once  dextrose 50% Injectable 25 Gram(s) IV Push once  dextrose 50% Injectable 25 Gram(s) IV Push once  docusate sodium 100 milliGRAM(s) Oral two times a day  heparin  Injectable 5000 Unit(s) SubCutaneous every 8 hours  insulin lispro (HumaLOG) corrective regimen sliding scale   SubCutaneous three times a day before meals  pantoprazole    Tablet 40 milliGRAM(s) Oral before breakfast    MEDICATIONS  (PRN):  dextrose 40% Gel 15 Gram(s) Oral once PRN Blood Glucose LESS THAN 70 milliGRAM(s)/deciliter  glucagon  Injectable 1 milliGRAM(s) IntraMuscular once PRN Glucose LESS THAN 70 milligrams/deciliter      Vital Signs Last 24 Hrs  T(C): 37.1 (17 Oct 2019 04:34), Max: 37.6 (16 Oct 2019 10:32)  T(F): 98.8 (17 Oct 2019 04:34), Max: 99.6 (16 Oct 2019 10:32)  HR: 88 (17 Oct 2019 04:34) (71 - 90)  BP: 171/92 (17 Oct 2019 04:34) (123/69 - 171/92)  BP(mean): --  RR: 17 (17 Oct 2019 04:34) (16 - 18)  SpO2: 95% (17 Oct 2019 04:34) (95% - 99%)  nc/at  s1s2  cta  soft, nt, nd no guarding or rebound  no c/c/e    CBC Full  -  ( 16 Oct 2019 13:24 )  WBC Count : 18.05 K/uL  RBC Count : 4.04 M/uL  Hemoglobin : 10.2 g/dL  Hematocrit : 31.6 %  Platelet Count - Automated : 386 K/uL  Mean Cell Volume : 78.2 fl  Mean Cell Hemoglobin : 25.2 pg  Mean Cell Hemoglobin Concentration : 32.3 gm/dL  Auto Neutrophil # : 14.75 K/uL  Auto Lymphocyte # : 0.63 K/uL  Auto Monocyte # : 2.36 K/uL  Auto Eosinophil # : 0.31 K/uL  Auto Basophil # : 0.00 K/uL  Auto Neutrophil % : 81.7 %  Auto Lymphocyte % : 3.5 %  Auto Monocyte % : 13.1 %  Auto Eosinophil % : 1.7 %  Auto Basophil % : 0.0 %    10-17    134<L>  |  92<L>  |  21  ----------------------------<  93  3.9   |  27  |  0.94    Ca    9.1      17 Oct 2019 06:59    TPro  7.4  /  Alb  2.9<L>  /  TBili  0.4  /  DBili  x   /  AST  35  /  ALT  40  /  AlkPhos  204<H>  10-17    PT/INR - ( 16 Oct 2019 13:24 )   PT: 16.8 sec;   INR: 1.44 ratio         PTT - ( 16 Oct 2019 13:24 )  PTT:30.6 sec
[facility]    Subjective: Patient seen and examined. No new events except as noted.   seen and examined earlier today  doing okay  cont to have weakness on RLE     REVIEW OF SYSTEMS:    CONSTITUTIONAL: No weakness, fevers or chills  EYES/ENT: No visual changes;  No vertigo or throat pain   NECK: No pain or stiffness  RESPIRATORY: No cough, wheezing, hemoptysis; No shortness of breath  CARDIOVASCULAR: No chest pain or palpitations  GASTROINTESTINAL: No abdominal or epigastric pain. No nausea, vomiting, or hematemesis; No diarrhea or constipation. No melena or hematochezia.  GENITOURINARY: No dysuria, frequency or hematuria  NEUROLOGICAL: weakness RLE  SKIN: No itching, burning, rashes, or lesions   All other review of systems is negative unless indicated above.    MEDICATIONS:  MEDICATIONS  (STANDING):  amLODIPine   Tablet 10 milliGRAM(s) Oral at bedtime  aspirin enteric coated 81 milliGRAM(s) Oral daily  dextrose 5%. 1000 milliLiter(s) (50 mL/Hr) IV Continuous <Continuous>  dextrose 50% Injectable 12.5 Gram(s) IV Push once  dextrose 50% Injectable 25 Gram(s) IV Push once  dextrose 50% Injectable 25 Gram(s) IV Push once  docusate sodium 100 milliGRAM(s) Oral two times a day  heparin  Infusion.  Unit(s)/Hr (17 mL/Hr) IV Continuous <Continuous>  hydrALAZINE 50 milliGRAM(s) Oral two times a day  insulin lispro (HumaLOG) corrective regimen sliding scale   SubCutaneous three times a day before meals  losartan 100 milliGRAM(s) Oral daily  metoprolol succinate  milliGRAM(s) Oral daily  pantoprazole    Tablet 40 milliGRAM(s) Oral before breakfast  polyethylene glycol 3350 17 Gram(s) Oral two times a day      PHYSICAL EXAM:  T(C): 37.7 (10-27-19 @ 21:44), Max: 38.6 (10-27-19 @ 14:42)  HR: 100 (10-27-19 @ 21:44) (76 - 100)  BP: 164/95 (10-27-19 @ 21:44) (121/82 - 164/95)  RR: 18 (10-27-19 @ 21:44) (18 - 18)  SpO2: 94% (10-27-19 @ 21:44) (94% - 94%)  Wt(kg): --  I&O's Summary    26 Oct 2019 07:01  -  27 Oct 2019 07:00  --------------------------------------------------------  IN: 854 mL / OUT: 503 mL / NET: 351 mL    27 Oct 2019 07:01  -  27 Oct 2019 22:23  --------------------------------------------------------  IN: 440 mL / OUT: 400 mL / NET: 40 mL          Appearance: Normal	  HEENT:   Normal oral mucosa, PERRL, EOMI	  Lymphatic: No lymphadenopathy , no edema  Cardiovascular: Normal S1 S2, No JVD, No murmurs , Peripheral pulses palpable 2+ bilaterally  Respiratory: Lungs clear to auscultation, normal effort 	  Gastrointestinal:  Soft, Non-tender, + BS	  Skin: No rashes, No ecchymoses, No cyanosis, warm to touch  Musculoskeletal: RLE weakness, muscle weakness, intact sensation   Psychiatry:  Mood & affect appropriate  Ext: No edema      All labs, Imaging and EKGs personally reviewed                           8.8    23.37 )-----------( 422      ( 27 Oct 2019 08:40 )             27.2               10-27    143  |  92<L>  |  20  ----------------------------<  198<H>  4.0   |  28  |  0.79    Ca    7.9<L>      27 Oct 2019 05:54    TPro  6.6  /  Alb  2.4<L>  /  TBili  0.5  /  DBili  x   /  AST  26  /  ALT  42  /  AlkPhos  485<H>  10-27    PT/INR - ( 27 Oct 2019 08:37 )   PT: 17.9 sec;   INR: 1.54 ratio         PTT - ( 27 Oct 2019 08:37 )  PTT:78.7 sec
[facility]    Subjective: Patient seen and examined. No new events except as noted.   S/P liver biopsy   afebrile today   doing better  family at the bedside     REVIEW OF SYSTEMS:    CONSTITUTIONAL: +weakness  EYES/ENT: No visual changes;  No vertigo or throat pain   NECK: No pain or stiffness  RESPIRATORY: No cough, wheezing, hemoptysis; No shortness of breath  CARDIOVASCULAR: No chest pain or palpitations  GASTROINTESTINAL: No abdominal or epigastric pain.  GENITOURINARY: No dysuria, frequency or hematuria  NEUROLOGICAL: No numbness or weakness  SKIN: No itching, burning, rashes, or lesions   All other review of systems is negative unless indicated above.    MEDICATIONS:  MEDICATIONS  (STANDING):  amLODIPine   Tablet 10 milliGRAM(s) Oral at bedtime  dextrose 5%. 1000 milliLiter(s) (50 mL/Hr) IV Continuous <Continuous>  dextrose 50% Injectable 12.5 Gram(s) IV Push once  dextrose 50% Injectable 25 Gram(s) IV Push once  dextrose 50% Injectable 25 Gram(s) IV Push once  docusate sodium 100 milliGRAM(s) Oral two times a day  enoxaparin Injectable 90 milliGRAM(s) SubCutaneous every 12 hours  hydrALAZINE 25 milliGRAM(s) Oral two times a day  insulin lispro (HumaLOG) corrective regimen sliding scale   SubCutaneous three times a day before meals  losartan 100 milliGRAM(s) Oral daily  metoclopramide Injectable 10 milliGRAM(s) IV Push every 8 hours  metoprolol succinate  milliGRAM(s) Oral daily  pantoprazole    Tablet 40 milliGRAM(s) Oral before breakfast  polyethylene glycol 3350 17 Gram(s) Oral two times a day      PHYSICAL EXAM:  T(C): 36.9 (10-22-19 @ 12:00), Max: 39.3 (10-21-19 @ 21:12)  HR: 87 (10-22-19 @ 12:00) (87 - 104)  BP: 148/83 (10-22-19 @ 12:00) (148/83 - 162/90)  RR: 18 (10-22-19 @ 12:00) (17 - 18)  SpO2: 97% (10-22-19 @ 12:00) (92% - 97%)  Wt(kg): --  I&O's Summary    21 Oct 2019 07:01  -  22 Oct 2019 07:00  --------------------------------------------------------  IN: 770 mL / OUT: 0 mL / NET: 770 mL    22 Oct 2019 07:01  -  22 Oct 2019 20:56  --------------------------------------------------------  IN: 1160 mL / OUT: 200 mL / NET: 960 mL          Appearance: Normal	  HEENT:   Normal oral mucosa, PERRL, EOMI	  Lymphatic: No lymphadenopathy , no edema  Cardiovascular: Normal S1 S2, No JVD,   Respiratory: Lungs clear to auscultation, normal effort 	  Gastrointestinal:  Soft, Non-tender, + BS	  Skin: No rashes, No ecchymoses, No cyanosis, warm to touch  Musculoskeletal: Normal range of motion, normal strength  Psychiatry:  Mood & affect appropriate  Ext: No edema      All labs, Imaging and EKGs personally reviewed                             10.5   20.29 )-----------( 371      ( 22 Oct 2019 09:37 )             33.6               10-22    137  |  92<L>  |  10  ----------------------------<  194<H>  3.8   |  29  |  0.71    Ca    8.3<L>      22 Oct 2019 06:50    TPro  7.6  /  Alb  2.9<L>  /  TBili  0.5  /  DBili  x   /  AST  36  /  ALT  44  /  AlkPhos  333<H>  10-22    PT/INR - ( 22 Oct 2019 09:19 )   PT: 17.4 sec;   INR: 1.49 ratio         PTT - ( 22 Oct 2019 09:19 )  PTT:31.1 sec
CC: f/u for  fever  Patient reports  mild abdomen pain  REVIEW OF SYSTEMS:  All other review of systems negative (Comprehensive ROS)    Antimicrobials Day #  :    Other Medications Reviewed    T(F): 98.5 (10-22-19 @ 12:00), Max: 102.8 (10-21-19 @ 21:12)  HR: 87 (10-22-19 @ 12:00)  BP: 148/83 (10-22-19 @ 12:00)  RR: 18 (10-22-19 @ 12:00)  SpO2: 97% (10-22-19 @ 12:00)  Wt(kg): --    PHYSICAL EXAM:  General: alert, no acute distress  Eyes:  anicteric, no conjunctival injection, no discharge  Oropharynx: no lesions or injection 	  Neck: supple, without adenopathy  Lungs: clear to auscultation  Heart: regular rate and rhythm; no murmur, rubs or gallops  Abdomen: soft, nondistended, nontender, without mass or organomegaly  Skin: no lesions  Extremities: no clubbing, cyanosis, or edema  Neurologic: alert, oriented, moves all extremities    LAB RESULTS:                        10.5   20.29 )-----------( 371      ( 22 Oct 2019 09:37 )             33.6     10-22    137  |  92<L>  |  10  ----------------------------<  194<H>  3.8   |  29  |  0.71    Ca    8.3<L>      22 Oct 2019 06:50    TPro  7.6  /  Alb  2.9<L>  /  TBili  0.5  /  DBili  x   /  AST  36  /  ALT  44  /  AlkPhos  333<H>  10-22    LIVER FUNCTIONS - ( 22 Oct 2019 06:50 )  Alb: 2.9 g/dL / Pro: 7.6 g/dL / ALK PHOS: 333 U/L / ALT: 44 U/L / AST: 36 U/L / GGT: x             MICROBIOLOGY:  RECENT CULTURES:  10-21 @ 01:12 .Blood     No growth to date.      10-21 @ 01:10 .Blood     No growth to date.      10-18 @ 01:51 .Blood     No growth to date.          RADIOLOGY REVIEWED:  < from: MR Abdomen w/wo IV Cont (10.17.19 @ 21:26) >  EXAM:  MR ABDOMEN WAW IC                            PROCEDURE DATE:  10/17/2019            INTERPRETATION:  CLINICAL INFORMATION: Liver lesions concerning for   pancreatic cancer. Assess for mass.    COMPARISON: CT chest, abdomen and pelvis 10/11/2019.    PROCEDURE:   MRI of the abdomen was performed with and without intravenous contrast.  IV Contrast: Gadavist. 10 cc administered, 0 cc discarded.  MRCP was performed.    FINDINGS:    Motion limited study.    LOWER CHEST: Within normal limits.    LIVER: Normal morphology. No significant steatosis. As on CT, multiple   bilobar targetoid liver lesions. Lesions are T2 hyperintense with   peripheral edema, rim enhancement and several demonstrate delayed central   enhancement. A reference in segment IVb measures 3.9 cm (series 14, image   147), unchanged given differences in technique since recent CT. Several   lesions are subcapsular. Additionally, at least 3 lesions in the lateral   left lobe directly abut the stomach with a reference measuring 2.6 cm   (series 14, image 139). A 2.6 cm caudate lobe lesion abuts the   intrahepatic IVC (series 16, image 19).  BILE DUCTS: Normal caliber.  GALLBLADDER:  Contracted.  SPLEEN: Within normal limits.  PANCREAS: A 3.4 x 2.5 cm centrally hypoenhancing mass with peripheral   enhancement in the distal body and tail with abrupt cut off of the duct   and resultant upstream ductal dilatation with tail atrophy. Greater than   180 degree encasement of the adjacent splenic artery and the splenic vein   with narrowing of the splenic vein at its midportion where it contacts   the mass, possibly partial thrombosis or invasion (series 14, image 146).   Celiac axis, SMA and SMV are patent and uninvolved.  ADRENALS: Right adrenal gland nodular thickening with a more discrete 1.2   cm lateral limb nodule (series 5, image 21), with loss of signal on out   of phase imaging, compatible with adenoma. Normal left adrenal gland.  KIDNEYS/URETERS: Bilateral renal cysts measuring up to 5.9 cm at the   right interpole.    VISUALIZED PORTIONS:    BOWEL: Within normal limits.   PERITONEUM: Trace perihepatic ascites.  VESSELS: Greater than 180 degrees encasement of the splenic artery and   splenic vein with narrowing of the splenic vein at its midportion where   it abuts the mass, as above. Celiac axis, SMA and SMV are patent and   uninvolved. Partial nonocclusive thrombus of the anterior branch of the   right portal vein with occlusive thrombus of its distal branches (series   14, image 74 and 137-133).Patent main and intrahepatic left portal vein   and posterior branches of the right portal vein.  RETROPERITONEUM/LYMPH NODES: A prominent portacaval node measures 2.9 x   1.6 cm (series 16, image 32).  ABDOMINAL WALL: Within normal limits.  BONES: Within normal limits.    IMPRESSION:    Motion limited study.    A 3.4 cm distal body/tail pancreatic mass with targetoid bilobar liver   lesions, highly suspicious for metastatic pancreatic cancer. Greater than   180 degrees encasement of the splenic artery and splenic vein with   narrowing of the splenic vein. Several liver lesions abut the stomach.   Recommend tissue sampling.    Portal vein thrombus involving the anterior branch of the right portal   vein. Patent main, intrahepatic left and posterior right portal vein   branches.          < end of copied text >    Assessment:  Patient with pancreas mass, liver lesions, portal vein clot with ongoing fever, negative cultures. He had a liver lesion bx yesterday and no report of pus. Suspect fever from pv clot and tumor fever  Plan:  await pathology from liver lesion  monitor off further antibiotics
CC: f/u for  fever, leukocytosis  Patient reports feels ok    REVIEW OF SYSTEMS:  All other review of systems negative (Comprehensive ROS)    Antimicrobials Day #  :    Other Medications Reviewed    T(F): 99.2 (10-23-19 @ 20:08), Max: 102.3 (10-23-19 @ 14:40)  HR: 81 (10-23-19 @ 20:08)  BP: 171/98 (10-23-19 @ 20:08)  RR: 19 (10-23-19 @ 20:08)  SpO2: 95% (10-23-19 @ 20:08)  Wt(kg): --    PHYSICAL EXAM:  General: alert, no acute distress  Eyes:  anicteric, no conjunctival injection, no discharge  Oropharynx: no lesions or injection 	  Neck: supple, without adenopathy  Lungs: clear to auscultation  Heart: regular rate and rhythm; no murmur, rubs or gallops  Abdomen: soft, nondistended, nontender, without mass or organomegaly  Skin: no lesions  Extremities: no clubbing, cyanosis, or edema  Neurologic: alert, oriented, moves all extremities    LAB RESULTS:                        9.1    24.74 )-----------( 364      ( 23 Oct 2019 20:51 )             27.5     10-23    142  |  93<L>  |  13  ----------------------------<  174<H>  3.6   |  30  |  0.76    Ca    8.7      23 Oct 2019 06:54    TPro  6.9  /  Alb  2.5<L>  /  TBili  0.4  /  DBili  x   /  AST  26  /  ALT  40  /  AlkPhos  282<H>  10-23    LIVER FUNCTIONS - ( 23 Oct 2019 06:54 )  Alb: 2.5 g/dL / Pro: 6.9 g/dL / ALK PHOS: 282 U/L / ALT: 40 U/L / AST: 26 U/L / GGT: x             MICROBIOLOGY:  RECENT CULTURES:  10-21 @ 01:12 .Blood     No growth to date.      10-21 @ 01:10 .Blood     No growth to date.          RADIOLOGY REVIEWED:    < from: CT Abdomen and Pelvis No Cont (10.23.19 @ 14:24) >  EXAM:  CT ABDOMEN AND PELVIS                            PROCEDURE DATE:  10/23/2019            INTERPRETATION:  CLINICAL INFORMATION: Patient with pancreatic mass   status post liver biopsy now with weakness. Evaluate for retroperitoneal   hematoma.    COMPARISON: CT abdomen and pelvis 10/11/2019 and MR abdomen 10/17/2019    PROCEDURE:   CT of the Abdomen and Pelvis was performed without intravenous contrast.   Intravenous contrast: None.  Oral contrast: None.  Sagittal and coronal reformats were performed.    Evaluation of the solid visceral organs is limited without intravenous   contrast.    FINDINGS:    LOWER CHEST: Bibasilar subsegmental linear atelectasis. Atherosclerotic   calcifications of coronary arteries.    LIVER: Hepatomegaly. Multiple hypodense lesions consistent with   metastatic disease. Comparison with prior CT dated 10/11/2019 is   difficult due to lack of IV contrast on the current study. However, there   is suspicion of increased size of liver lesions, with diffuse involvement   of the medial segment of the left lobe..  BILE DUCTS: Normal caliber.  GALLBLADDER: Contracted.  SPLEEN: Within normal limits.  PANCREAS: Ill-defined pancreatic tail mass with distal pancreatic duct   dilatation and pancreatic tail atrophy, better seen on prior MR   10/17/2019.   ADRENALS: Nodular thickening of the adrenal glands bilaterally.   KIDNEYS/URETERS: Right renal cysts, the largest a right interpolar cyst   measuring 5.8 cm. No hydronephrosis.  Excreted contrast in the collecting   systems and ureters.    BLADDER: Small left posterior bladder diverticulum. Trabeculated bladder   wall.  Excreted contrast layering in the bladder lumen.  REPRODUCTIVE ORGANS: Prostate gland is mildly enlarged.    BOWEL: No bowel obstruction.Appendix is within normal limits.  PERITONEUM: Small volume ascites, new. Moderate perihepatic infiltration   adjacent to the liver.  VESSELS: Atherosclerotic changes. Previously noted right portal vein   thrombosis as seen on MRI dated 10/17/2019, is not evaluated without   intravenous contrast.  RETROPERITONEUM/LYMPH NODES: Previously noted portocaval lymph node is   not well seen on this non-contrast study.    ABDOMINAL WALL: Within normal limits.  BONES: Degenerative changes.    IMPRESSION:     No retroperitoneal hematoma.    Multiple liver lesions consistent with hepatic metastases. Comparison   with prior CT dated 10/11/2019 is difficult due to lack of intravenous   contrast on the current study. However, the lesions appear to have   increased in size. In addition, there is new moderate perihepatic   infiltration.  Differential diagnosis would include bleeding, metastatic   disease, and infection.    Ill-defined pancreatic tail mass with distal pancreatic tail atrophy   better evaluated on prior MR dated 10/17/2019.    New small volume ascites.        < end of copied text >      < from: CT Angio Neck w/ IV Cont (10.23.19 @ 10:47) >    EXAM:  CT ANGIO BRAIN (W)AW IC                          EXAM:  CT ANGIO NECK (W)AW IC                          EXAM:  CT BRAIN                            PROCEDURE DATE:  10/23/2019            INTERPRETATION:  HISTORY: Right-sided weakness. Historyof CVA 4 years   ago. Currently on Lovenox.    COMPARISON: None    TECHNIQUE: Noncontrast head CT with sagittal and coronal reformats was   performed. CT angiogram of the neck and brain was performed after   administration of intravenous contrast. MIP and 3D reconstructions were   performed. Total of 70 cc Omnipaque 300 intravenous contrast were   administered without complication. Limited evaluation of the cerebral   arteries due to poor opacification.    FINDINGS:     CT BRAIN:  There is no CT evidence of acute intracranial hemorrhage, extra-axial   collection, vasogenic edema, mass effect, midline shift, central   herniation, or hydrocephalus.     Chronic right parietal and occipital and right cerebellar infarcts are   identified. There isage-appropriate cerebral volume loss. There is   periventricular patchy white matter hypoattenuation which is nonspecific   in etiology but likely related to chronic microvascular ischemic disease.    Retention cyst or polyp right maxillary sinus. The remaining visualized   paranasal sinuses are clear. The mastoid air cells and middle ear   cavities are clear.    The soft tissues of the scalp are unremarkable. The calvarium is intact.    CTA BRAIN:   INTERNAL CAROTID ARTERIES:  The intracranial segments of the ICA are   patent without hemodynamically significant stenosis, occlusion, or   aneurysm. The ICA bifurcations are unremarkable.    ANTERIOR CEREBRAL ARTERIES: No flow-limiting stenosis or occlusion.   Anterior communicating artery is unremarkable without aneurysm.     MIDDLE CEREBRAL ARTERIES: No flow-limiting stenosis or occlusion. MCA   bifurcations are unremarkable without aneurysm.     POSTERIOR CEREBRAL ARTERIES: No flow-limiting stenosis or occlusion.   Posterior communicatingarteries are not well seen bilaterally.     VERTEBROBASILAR SYSTEM: No flow-limiting stenosis or occlusion. Basilar   tip is unremarkable.     CTA NECK: The CTA is limited by the phase of injection.        RIGHT CAROTID SYSTEM: Normal in course and caliber without flow-limiting   stenosis or occlusion.     LEFT CAROTID SYSTEM: Normal in course and caliber without flow-limiting   stenosis or occlusion.     VERTEBRAL SYSTEM: The proximal left vertebral artery is not identified in   its left V1 and V2 portions. The left V3 and V4 portions are patent..     AORTIC ARCH: Origin of the great vessels are unremarkable.     IMPRESSION:     CT BRAIN: No CT evidence of acute intracranial hemorrhage, brain edema,   mass effect or acute territorial infarct. 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    CTA BRAIN and neck: Limited by injection.    Patent intracranial circulation. No flow-limiting stenosis or occlusion.      Nonvisualization of the proximal left vertebral artery.    All measurements are performed using standard NASCET criteria.                ELVIA CONNER M.D., RADIOLOGY RESIDENT  This document has been electronically signed.  KYLE PAUL M.D., ATTENDING RADIOLOGIST  This document has been electronically signed. 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 Ongoing fever and leukocytosis from tumor and clot. No infection is apparent at present.  Plan:  monitor off antibiotic  await further neuro and oncologic w/u
CC: f/u for  fever, leukocytosis  Patient reports he is still with hiccups, can walk again    REVIEW OF SYSTEMS:  All other review of systems negative (Comprehensive ROS)    Antimicrobials Day #  :    Other Medications Reviewed    T(F): 100 (10-25-19 @ 18:05), Max: 101.3 (10-25-19 @ 04:05)  HR: 98 (10-25-19 @ 14:24)  BP: 160/88 (10-25-19 @ 14:24)  RR: 18 (10-25-19 @ 14:24)  SpO2: 93% (10-25-19 @ 14:24)  Wt(kg): --    PHYSICAL EXAM:  General: alert, no acute distress  Eyes:  anicteric, no conjunctival injection, no discharge  Oropharynx: no lesions or injection 	  Neck: supple, without adenopathy  Lungs: clear to auscultation  Heart: regular rate and rhythm; no murmur, rubs or gallops  Abdomen: soft, distended, nontender, without mass or organomegaly  Skin: no lesions  Extremities: no clubbing, cyanosis, or edema  Neurologic: alert, oriented, moves all extremities    LAB RESULTS:                        9.2    22.22 )-----------( 384      ( 25 Oct 2019 07:15 )             28.0     10-25    138  |  93<L>  |  16  ----------------------------<  186<H>  3.5   |  27  |  0.74    Ca    7.9<L>      25 Oct 2019 07:15    TPro  6.8  /  Alb  2.4<L>  /  TBili  0.4  /  DBili  x   /  AST  29  /  ALT  47<H>  /  AlkPhos  393<H>  10-25    LIVER FUNCTIONS - ( 25 Oct 2019 07:15 )  Alb: 2.4 g/dL / Pro: 6.8 g/dL / ALK PHOS: 393 U/L / ALT: 47 U/L / AST: 29 U/L / GGT: x             MICROBIOLOGY:  RECENT CULTURES:  10-21 @ 01:12 .Blood     No growth to date.      10-21 @ 01:10 .Blood     No growth to date.          RADIOLOGY REVIEWED:    < from: MR Lumbar Spine No Cont (10.23.19 @ 23:35) >  EXAM:  MR SPINE LUMBAR                          EXAM:  MR SPINE THORACIC                            PROCEDURE DATE:  10/23/2019            INTERPRETATION:  CLINICAL INFORMATION: Bilateral lower extremity weakness   x 1 day.    TECHNIQUE: Multiplanar, multisequence MR imaging was obtained of the   thoracic and lumbosacral spine.  COMPARISON: CT abdomen and pelvis 10/23/2019 and CT chest 10/11/2019    THORACIC SPINE MRI:  No abnormal spinal cord signal to suggest infarct. No epidural fluid   collection or mass. The intervertebral disc heights are preserved. Normal   marrow signal. No evidence of acute spinal fracture. No disc herniation   or central canal stenosis. Anatomic thoracic kyphotic curvature is   preserved.    LUMBOSACRAL SPINE MRI:  SPINAL SEGMENTATION: The study assumes 5 non-rib bearing lumbar type   vertebral bodies.  SPINAL ALIGNMENT: Anatomic lumbar lordotic curvature is preserved.  MARROW: Multiple vertebral body hemangiomata. No evidence of acute   fracture.  DISTAL CORDAND CONUS: The conus terminates at L1. Normal cord and cauda   equina signal.  DISC SPACES: Preserved intervertebral disc space heights.  PARASPINAL MUSCLE AND SOFT TISSUES: Unremarkable.    DISC LEVEL EVALUATION:    T12/L1: No disc bulge, spinal canal stenosis or neural foraminal   narrowing.  L1/L2: No disc bulge, spinal canal stenosis or neural foraminal narrowing.  L2/L3: No disc bulge, spinal canal stenosis or neural foraminal narrowing.  L3/L4: No disc bulge, spinal canal stenosis or neuralforaminal narrowing.  L4/L5: No disc bulge, spinal canal stenosis or neural foraminal narrowing.  L5/S1: Small disc bulge with mild spinal canal stenosis. Bilateral facet   hypertrophy resulting in moderate to severe right and severe left neural   foraminal narrowing.    IMPRESSION:   No evidence of spinal cord infarct.    Bilateral facet hypertrophy at L5-S1 resulting in moderate to severe   right and severe left neural foraminal narrowing.                ELVIA CONNER M.D., RADIOLOGY RESIDENT  This document has been electronically signed.  NINOSKA DALE M.D., ATTENDING RADIOLOGIST  This document has been electronically signed. Oct 24 2019  1:33PM              < end of copied text >            Assessment:  Patient with pancreas mass and mets to liver, pv thrombosis, ongoing fever from tumor and clot. No infection is apparent at present  Plan:  monitor off antibiotics
CC: f/u for fever    Patient reports he is ok    REVIEW OF SYSTEMS:  All other review of systems negative (Comprehensive ROS)    Antimicrobials Day #  :    Other Medications Reviewed    T(F): 101 (10-26-19 @ 21:30), Max: 102.9 (10-26-19 @ 20:12)  HR: 105 (10-26-19 @ 20:12)  BP: 161/99 (10-26-19 @ 20:12)  RR: 18 (10-26-19 @ 20:12)  SpO2: 93% (10-26-19 @ 20:12)  Wt(kg): --    PHYSICAL EXAM:  General: alert, no acute distress  Eyes:  anicteric, no conjunctival injection, no discharge  Oropharynx: no lesions or injection 	  Neck: supple, without adenopathy  Lungs: clear to auscultation  Heart: regular rate and rhythm; no murmur, rubs or gallops  Abdomen: soft, distended, nontender, without mass or organomegaly  Skin: no lesions  Extremities: no clubbing, cyanosis, or edema  Neurologic: alert, oriented, moves all extremities    LAB RESULTS:                        9.8    25.09 )-----------( 441      ( 26 Oct 2019 06:35 )             29.8     10-26    138  |  95<L>  |  19  ----------------------------<  164<H>  4.2   |  31  |  0.86    Ca    8.7      26 Oct 2019 06:35    TPro  7.2  /  Alb  2.5<L>  /  TBili  0.5  /  DBili  x   /  AST  28  /  ALT  42  /  AlkPhos  454<H>  10-26    LIVER FUNCTIONS - ( 26 Oct 2019 06:35 )  Alb: 2.5 g/dL / Pro: 7.2 g/dL / ALK PHOS: 454 U/L / ALT: 42 U/L / AST: 28 U/L / GGT: x             MICROBIOLOGY:  RECENT CULTURES:  Culture - Blood (10.21.19 @ 01:12)    Specimen Source: .Blood    Culture Results:   No growth at 5 days.        RADIOLOGY REVIEWED:    < from: CT Abdomen and Pelvis No Cont (10.23.19 @ 14:24) >    EXAM:  CT ABDOMEN AND PELVIS                            PROCEDURE DATE:  10/23/2019            INTERPRETATION:  CLINICAL INFORMATION: Patient with pancreatic mass   status post liver biopsy now with weakness. Evaluate for retroperitoneal   hematoma.    COMPARISON: CT abdomen and pelvis 10/11/2019 and MR abdomen 10/17/2019    PROCEDURE:   CT of the Abdomen and Pelvis was performed without intravenous contrast.   Intravenous contrast: None.  Oral contrast: None.  Sagittal and coronal reformats were performed.    Evaluation of the solid visceral organs is limited without intravenous   contrast.    FINDINGS:    LOWER CHEST: Bibasilar subsegmental linear atelectasis. Atherosclerotic   calcifications of coronary arteries.    LIVER: Hepatomegaly. Multiple hypodense lesions consistent with   metastatic disease. Comparison with prior CT dated 10/11/2019 is   difficult due to lack of IV contrast on the current study. However, there   is suspicion of increased size of liver lesions, with diffuse involvement   of the medial segment of the left lobe..  BILE DUCTS: Normal caliber.  GALLBLADDER: Contracted.  SPLEEN: Within normal limits.  PANCREAS: Ill-defined pancreatic tail mass with distal pancreatic duct   dilatation and pancreatic tail atrophy, better seen on prior MR   10/17/2019.   ADRENALS: Nodular thickening of the adrenal glands bilaterally.   KIDNEYS/URETERS: Right renal cysts, the largest a right interpolar cyst   measuring 5.8 cm. No hydronephrosis.  Excreted contrast in the collecting   systems and ureters.    BLADDER: Small left posterior bladder diverticulum. Trabeculated bladder   wall.  Excreted contrast layering in the bladder lumen.  REPRODUCTIVE ORGANS: Prostate gland is mildly enlarged.    BOWEL: No bowel obstruction.Appendix is within normal limits.  PERITONEUM: Small volume ascites, new. Moderate perihepatic infiltration   adjacent to the liver.  VESSELS: Atherosclerotic changes. Previously noted right portal vein   thrombosis as seen on MRI dated 10/17/2019, is not evaluated without   intravenous contrast.  RETROPERITONEUM/LYMPH NODES: Previously noted portocaval lymph node is   not well seen on this non-contrast study.    ABDOMINAL WALL: Within normal limits.  BONES: Degenerative changes.    IMPRESSION:     No retroperitoneal hematoma.    Multiple liver lesions consistent with hepatic metastases. Comparison   with prior CT dated 10/11/2019 is difficult due to lack of intravenous   contrast on the current study. However, the lesions appear to have   increased in size. In addition, there is new moderate perihepatic   infiltration.  Differential diagnosis would include bleeding, metastatic   disease, and infection.    Ill-defined pancreatic tail mass with distal pancreatic tail atrophy   better evaluated on prior MR dated 10/17/2019.    New small volume ascites.            < end of copied text >    Assessment:  Patient with fever, pv clot, liver mets and pancreas mass, bx of liver adenoca. On anticoagulation. Still fever. Suspect tumor fever. Has leg weakness which waxes and wanes, neuro follow. Imaging so far negative but repeat mri spine with tacho pends. Maybe paraneoplastic process  Plan:  Monitor off antibiotics  check paraneoplastic panel  await mri
CC: f/u for fever and leukoctosis    Patient reports: no complaints today, he is s/p port placement earlier    REVIEW OF SYSTEMS:  All other review of systems negative (Comprehensive ROS)    Antimicrobials Day #  :off    Other Medications Reviewed    T(F): 99.5 (10-28-19 @ 04:49), Max: 99.8 (10-27-19 @ 21:44)  HR: 93 (10-28-19 @ 09:32)  BP: 119/80 (10-28-19 @ 09:32)  RR: 18 (10-28-19 @ 04:49)  SpO2: 97% (10-28-19 @ 09:32)  Wt(kg): --    PHYSICAL EXAM:  General: alert, no acute distress  Eyes:  anicteric, no conjunctival injection, no discharge  Oropharynx: no lesions or injection 	  Neck: supple, without adenopathy  Lungs: clear to auscultation  Heart: regular rate and rhythm; no murmur, rubs or gallops  Abdomen: soft, nondistended, nontender,   Skin: no lesions  Extremities: no clubbing, cyanosis, or edema  Neurologic: alert, oriented, moves all extremities    LAB RESULTS:                        9.5    26.65 )-----------( 449      ( 28 Oct 2019 09:16 )             30.4     10-28    138  |  94<L>  |  14  ----------------------------<  142<H>  3.8   |  33<H>  |  0.83    Ca    8.5      28 Oct 2019 07:28    TPro  6.7  /  Alb  2.4<L>  /  TBili  0.5  /  DBili  x   /  AST  31  /  ALT  36  /  AlkPhos  476<H>  10-28    LIVER FUNCTIONS - ( 28 Oct 2019 07:28 )  Alb: 2.4 g/dL / Pro: 6.7 g/dL / ALK PHOS: 476 U/L / ALT: 36 U/L / AST: 31 U/L / GGT: x             MICROBIOLOGY:  RECENT CULTURES:      RADIOLOGY REVIEWED:    < from: CT Abdomen and Pelvis No Cont (10.23.19 @ 14:24) >  IMPRESSION:     No retroperitoneal hematoma.    Multiple liver lesions consistent with hepatic metastases. Comparison   with prior CT dated 10/11/2019 is difficult due to lack of intravenous   contrast on the current study. However, the lesions appear to have   increased in size. In addition, there is new moderate perihepatic   infiltration.  Differential diagnosis would include bleeding, metastatic   disease, and infection.    Ill-defined pancreatic tail mass with distal pancreatic tail atrophy   better evaluated on prior MR dated 10/17/2019.    New small volume ascites.      < end of copied text >  < from: MR Thoracic Spine w/wo IV Cont (10.27.19 @ 14:01) >  Impression: No abnormal areas of enhancement seen, otherwise no   significant change when allowing for differences in technique.    < end of copied text >
CC: f/u for fever and leukocytosis    Patient reports feeling better, no abdom pain, no resp Sxs    REVIEW OF SYSTEMS:  All other review of systems negative (Comprehensive ROS)    Antimicrobials Day # 2    piperacillin/tazobactam IVPB.. 3.375 Gram(s) IV Intermittent every 8 hours    Other Medications Reviewed    T(F): 99.6 (10-19-19 @ 06:21), Max: 103 (10-18-19 @ 20:10)  HR: 84 (10-19-19 @ 06:21)  BP: 154/78 (10-19-19 @ 06:21)  RR: 17 (10-19-19 @ 06:21)  SpO2: 96% (10-19-19 @ 06:21)  Wt(kg): --    PHYSICAL EXAM:  General: no acute distress  Eyes: anicteric, no conjunctival injection, no discharge  Oropharynx: no lesions or injection 	  Neck: supple, without adenopathy  Lungs: clear to auscultation  Heart: regular rate and rhythm; no murmur, rubs or gallops  Abdomen: soft, nondistended, nontender, without mass or organomegaly  Skin: no lesions  Extremities: no clubbing, cyanosis, or edema  Neurologic: alert, oriented, moves all extremities    LAB RESULTS:                        9.7    17.95 )-----------( 376      ( 19 Oct 2019 10:30 )             30.3     10-19    139  |  98  |  14  ----------------------------<  153<H>  3.9   |  29  |  0.81    Ca    8.4      19 Oct 2019 07:10      Urinalysis Basic - ( 18 Oct 2019 09:23 )    Color: Yellow / Appearance: Clear / S.025 / pH: x  Gluc: x / Ketone: Negative  / Bili: Negative / Urobili: <2 mg/dL   Blood: x / Protein: 30 mg/dL / Nitrite: Negative   Leuk Esterase: Negative / RBC: 2 /HPF / WBC 1 /HPF   Sq Epi: x / Non Sq Epi: 0 /HPF / Bacteria: Negative      MICROBIOLOGY:  RECENT CULTURES:  10-18 @ 01:51 .Blood     No growth to date.    Culture - Blood (10.11.19 @ 23:14)    Specimen Source: .Blood    Culture Results:   No growth at 5 days.    Rapid Respiratory Viral Panel (10.18.19 @ 15:21)    Rapid RVP Result: [facility]      RADIOLOGY REVIEWED:  Xray Chest 1 View- PORTABLE-Routine (10.18.19 @ 08:58) >  Clear lungs.    MR Abdomen w/wo IV Cont (10.17.19 @ 21:26) >  A 3.4 cm distal body/tail pancreatic mass with targetoid bilobar liver   lesions, highly suspicious for metastatic pancreatic cancer. Greater than   180 degrees encasement of the splenic artery and splenic vein with   narrowing of the splenic vein. Several liver lesions abut the stomach.   Recommend tissue sampling.    Portal vein thrombus involving the anterior branch of the right portal   vein. Patent main, intrahepatic left and posterior right portal vein   branches.
CC: f/u for fever and leukocytosis    Patient resting comfortably, no complaints, no abdom pain, no resp Sxs    REVIEW OF SYSTEMS:  All other review of systems negative (Comprehensive ROS)    Antimicrobials Day # 3    piperacillin/tazobactam IVPB.. 3.375 Gram(s) IV Intermittent every 8 hours  Vanco added earlier    Other Medications Reviewed    Vital Signs Last 24 Hrs  T(F): 102.7 (20 Oct 2019 14:03), Max: 102.7 (20 Oct 2019 14:03)  HR: 85 (20 Oct 2019 14:03) (85 - 104)  BP: 164/80 (20 Oct 2019 14:21) (145/97 - 191/63)  BP(mean): --  RR: 18 (20 Oct 2019 14:03) (18 - 19)  SpO2: 94% (20 Oct 2019 14:03) (94% - 94%)    PHYSICAL EXAM:  General: no acute distress  Eyes: anicteric, no conjunctival injection, no discharge  Oropharynx: no lesions or injection 	  Neck: supple, without adenopathy  Lungs: clear to auscultation  Heart: regular rate and rhythm; no murmur, rubs or gallops  Abdomen: soft, nondistended, nontender, without mass or organomegaly  Skin: no lesions  Extremities: no clubbing, cyanosis, or edema  Neurologic: alert, oriented, moves all extremities    LAB RESULTS:                        10.6   19.16 )-----------( 379      ( 20 Oct 2019 08:33 )             33.9   10-19    139  |  98  |  14  ----------------------------<  153<H>  3.9   |  29  |  0.81    Ca    8.4      19 Oct 2019 07:10    Urinalysis Basic - ( 18 Oct 2019 09:23 )    Color: Yellow / Appearance: Clear / S.025 / pH: x  Gluc: x / Ketone: Negative  / Bili: Negative / Urobili: <2 mg/dL   Blood: x / Protein: 30 mg/dL / Nitrite: Negative   Leuk Esterase: Negative / RBC: 2 /HPF / WBC 1 /HPF   Sq Epi: x / Non Sq Epi: 0 /HPF / Bacteria: Negative      MICROBIOLOGY:  RECENT CULTURES:  10-18 @ 01:51 .Blood     No growth to date.    Culture - Blood (10.11.19 @ 23:14)    Specimen Source: .Blood    Culture Results:   No growth at 5 days.    Rapid Respiratory Viral Panel (10.18.19 @ 15:21)    Rapid RVP Result: [facility]    RADIOLOGY REVIEWED:  Xray Chest 1 View- PORTABLE-Routine (10.18.19 @ 08:58) >  Clear lungs.    MR Abdomen w/wo IV Cont (10.17.19 @ 21:26) >  A 3.4 cm distal body/tail pancreatic mass with targetoid bilobar liver   lesions, highly suspicious for metastatic pancreatic cancer. Greater than   180 degrees encasement of the splenic artery and splenic vein with   narrowing of the splenic vein. Several liver lesions abut the stomach.   Recommend tissue sampling.    Portal vein thrombus involving the anterior branch of the right portal   vein. Patent main, intrahepatic left and posterior right portal vein   branches.
INTERVAL HPI/OVERNIGHT EVENTS:    MEDICATIONS  (STANDING):  amLODIPine   Tablet 10 milliGRAM(s) Oral at bedtime  aspirin enteric coated 81 milliGRAM(s) Oral daily  dextrose 5%. 1000 milliLiter(s) (50 mL/Hr) IV Continuous <Continuous>  dextrose 50% Injectable 12.5 Gram(s) IV Push once  dextrose 50% Injectable 25 Gram(s) IV Push once  dextrose 50% Injectable 25 Gram(s) IV Push once  docusate sodium 100 milliGRAM(s) Oral two times a day  enoxaparin Injectable 90 milliGRAM(s) SubCutaneous every 12 hours  hydrALAZINE 50 milliGRAM(s) Oral two times a day  insulin lispro (HumaLOG) corrective regimen sliding scale   SubCutaneous three times a day before meals  losartan 100 milliGRAM(s) Oral daily  metoprolol succinate  milliGRAM(s) Oral daily  pantoprazole    Tablet 40 milliGRAM(s) Oral before breakfast  polyethylene glycol 3350 17 Gram(s) Oral two times a day    MEDICATIONS  (PRN):  bisacodyl Suppository 10 milliGRAM(s) Rectal daily PRN Constipation  dextrose 40% Gel 15 Gram(s) Oral once PRN Blood Glucose LESS THAN 70 milliGRAM(s)/deciliter  glucagon  Injectable 1 milliGRAM(s) IntraMuscular once PRN Glucose LESS THAN 70 milligrams/deciliter  metoclopramide Injectable 10 milliGRAM(s) IV Push every 8 hours PRN hiccoughs  / nausea      Allergies    No Known Allergies    Intolerances            PHYSICAL EXAM:   Vital Signs:  Vital Signs Last 24 Hrs  T(C): 36.9 (29 Oct 2019 04:28), Max: 36.9 (29 Oct 2019 04:28)  T(F): 98.5 (29 Oct 2019 04:28), Max: 98.5 (29 Oct 2019 04:28)  HR: 93 (29 Oct 2019 04:28) (93 - 93)  BP: 118/72 (29 Oct 2019 04:28) (118/72 - 148/86)  BP(mean): --  RR: 18 (29 Oct 2019 04:28) (18 - 18)  SpO2: 94% (29 Oct 2019 04:28) (94% - 97%)  Daily     Daily     GENERAL:  no distress  HEENT:  NC/AT,  anicteric  CHEST:   no increased effort, breath sounds clear  HEART:  Regular rhythm  ABDOMEN:  Soft, non-tender, non-distended, normoactive bowel sounds,  no masses ,no hepato-splenomegaly, no signs of chronic liver disease  EXTEREMITIES:  no cyanosis      LABS:                        8.8    27.28 )-----------( 436      ( 29 Oct 2019 07:27 )             26.7     10-29    137  |  92<L>  |  15  ----------------------------<  157<H>  3.8   |  26  |  0.73    Ca    7.8<L>      29 Oct 2019 07:26    TPro  6.5  /  Alb  2.1<L>  /  TBili  0.5  /  DBili  x   /  AST  25  /  ALT  29  /  AlkPhos  430<H>  10-29    PT/INR - ( 28 Oct 2019 08:54 )   PT: 16.6 sec;   INR: 1.44 ratio         PTT - ( 28 Oct 2019 08:54 )  PTT:63.4 sec      RADIOLOGY & ADDITIONAL TESTS:
INTERVAL HPI/OVERNIGHT EVENTS:    MEDICATIONS  (STANDING):  amLODIPine   Tablet 10 milliGRAM(s) Oral at bedtime  aspirin enteric coated 81 milliGRAM(s) Oral daily  dextrose 5%. 1000 milliLiter(s) (50 mL/Hr) IV Continuous <Continuous>  dextrose 50% Injectable 12.5 Gram(s) IV Push once  dextrose 50% Injectable 25 Gram(s) IV Push once  dextrose 50% Injectable 25 Gram(s) IV Push once  docusate sodium 100 milliGRAM(s) Oral two times a day  heparin  Infusion.  Unit(s)/Hr (17 mL/Hr) IV Continuous <Continuous>  hydrALAZINE 25 milliGRAM(s) Oral two times a day  insulin lispro (HumaLOG) corrective regimen sliding scale   SubCutaneous three times a day before meals  losartan 100 milliGRAM(s) Oral daily  metoclopramide Injectable 10 milliGRAM(s) IV Push every 8 hours  metoprolol succinate  milliGRAM(s) Oral daily  pantoprazole    Tablet 40 milliGRAM(s) Oral before breakfast  polyethylene glycol 3350 17 Gram(s) Oral two times a day    MEDICATIONS  (PRN):  bisacodyl Suppository 10 milliGRAM(s) Rectal daily PRN Constipation  dextrose 40% Gel 15 Gram(s) Oral once PRN Blood Glucose LESS THAN 70 milliGRAM(s)/deciliter  glucagon  Injectable 1 milliGRAM(s) IntraMuscular once PRN Glucose LESS THAN 70 milligrams/deciliter  heparin  Injectable 7500 Unit(s) IV Push every 6 hours PRN For aPTT less than 40  heparin  Injectable 3500 Unit(s) IV Push every 6 hours PRN For aPTT between 40 - 57      Allergies    No Known Allergies    Intolerances            PHYSICAL EXAM:   Vital Signs:  Vital Signs Last 24 Hrs  T(C): 37.6 (25 Oct 2019 14:24), Max: 38.5 (25 Oct 2019 04:05)  T(F): 99.7 (25 Oct 2019 14:24), Max: 101.3 (25 Oct 2019 04:05)  HR: 98 (25 Oct 2019 14:24) (81 - 98)  BP: 160/88 (25 Oct 2019 14:24) (134/87 - 161/94)  BP(mean): --  RR: 18 (25 Oct 2019 14:24) (18 - 18)  SpO2: 93% (25 Oct 2019 14:24) (89% - 93%)  Daily     Daily     GENERAL:  no distress  HEENT:  NC/AT,  anicteric  CHEST:   no increased effort, breath sounds clear  HEART:  Regular rhythm  ABDOMEN:  Soft, non-tender, non-distended, normoactive bowel sounds,  no masses ,no hepato-splenomegaly, no signs of chronic liver disease  EXTEREMITIES:  no cyanosis      LABS:                        9.2    22.22 )-----------( 384      ( 25 Oct 2019 07:15 )             28.0     10-25    138  |  93<L>  |  16  ----------------------------<  186<H>  3.5   |  27  |  0.74    Ca    7.9<L>      25 Oct 2019 07:15    TPro  6.8  /  Alb  2.4<L>  /  TBili  0.4  /  DBili  x   /  AST  29  /  ALT  47<H>  /  AlkPhos  393<H>  10-25    PT/INR - ( 25 Oct 2019 07:15 )   PT: 17.6 sec;   INR: 1.51 ratio         PTT - ( 25 Oct 2019 07:15 )  PTT:54.3 sec      RADIOLOGY & ADDITIONAL TESTS:
INTERVAL HPI/OVERNIGHT EVENTS:    MEDICATIONS  (STANDING):  amLODIPine   Tablet 10 milliGRAM(s) Oral at bedtime  dextrose 5%. 1000 milliLiter(s) (50 mL/Hr) IV Continuous <Continuous>  dextrose 50% Injectable 12.5 Gram(s) IV Push once  dextrose 50% Injectable 25 Gram(s) IV Push once  dextrose 50% Injectable 25 Gram(s) IV Push once  docusate sodium 100 milliGRAM(s) Oral two times a day  enoxaparin Injectable 90 milliGRAM(s) SubCutaneous every 12 hours  hydrALAZINE 25 milliGRAM(s) Oral two times a day  insulin lispro (HumaLOG) corrective regimen sliding scale   SubCutaneous three times a day before meals  losartan 100 milliGRAM(s) Oral daily  metoclopramide Injectable 10 milliGRAM(s) IV Push every 8 hours  metoprolol succinate  milliGRAM(s) Oral daily  pantoprazole    Tablet 40 milliGRAM(s) Oral before breakfast  polyethylene glycol 3350 17 Gram(s) Oral two times a day    MEDICATIONS  (PRN):  bisacodyl Suppository 10 milliGRAM(s) Rectal daily PRN Constipation  dextrose 40% Gel 15 Gram(s) Oral once PRN Blood Glucose LESS THAN 70 milliGRAM(s)/deciliter  glucagon  Injectable 1 milliGRAM(s) IntraMuscular once PRN Glucose LESS THAN 70 milligrams/deciliter      Allergies    No Known Allergies    Intolerances            PHYSICAL EXAM:   Vital Signs:  Vital Signs Last 24 Hrs  T(C): 36.8 (23 Oct 2019 05:36), Max: 38.4 (22 Oct 2019 23:43)  T(F): 98.2 (23 Oct 2019 05:36), Max: 101.1 (22 Oct 2019 23:43)  HR: 103 (23 Oct 2019 05:36) (62 - 108)  BP: 148/89 (23 Oct 2019 05:36) (146/91 - 170/99)  BP(mean): --  RR: 18 (23 Oct 2019 05:36) (18 - 18)  SpO2: 93% (23 Oct 2019 05:36) (93% - 97%)  Daily     Daily     GENERAL:  no distress  HEENT:  NC/AT,  anicteric  CHEST:   no increased effort, breath sounds clear  HEART:  Regular rhythm  ABDOMEN:  Soft, non-tender, non-distended, normoactive bowel sounds,  no masses ,no hepato-splenomegaly, no signs of chronic liver disease  EXTEREMITIES:  no cyanosis      LABS:                        10.5   20.29 )-----------( 371      ( 22 Oct 2019 09:37 )             33.6     10-23    142  |  93<L>  |  13  ----------------------------<  174<H>  3.6   |  30  |  0.76    Ca    8.7      23 Oct 2019 06:54    TPro  6.9  /  Alb  2.5<L>  /  TBili  0.4  /  DBili  x   /  AST  26  /  ALT  40  /  AlkPhos  282<H>  10-23    PT/INR - ( 22 Oct 2019 09:19 )   PT: 17.4 sec;   INR: 1.49 ratio         PTT - ( 22 Oct 2019 09:19 )  PTT:31.1 sec      RADIOLOGY & ADDITIONAL TESTS:
INTERVAL HPI/OVERNIGHT EVENTS:    MEDICATIONS  (STANDING):  amLODIPine   Tablet 10 milliGRAM(s) Oral at bedtime  dextrose 5%. 1000 milliLiter(s) (50 mL/Hr) IV Continuous <Continuous>  dextrose 50% Injectable 12.5 Gram(s) IV Push once  dextrose 50% Injectable 25 Gram(s) IV Push once  dextrose 50% Injectable 25 Gram(s) IV Push once  docusate sodium 100 milliGRAM(s) Oral two times a day  heparin  Infusion.  Unit(s)/Hr (17 mL/Hr) IV Continuous <Continuous>  insulin lispro (HumaLOG) corrective regimen sliding scale   SubCutaneous three times a day before meals  losartan 100 milliGRAM(s) Oral daily  metoprolol succinate  milliGRAM(s) Oral daily  pantoprazole    Tablet 40 milliGRAM(s) Oral before breakfast  piperacillin/tazobactam IVPB.. 3.375 Gram(s) IV Intermittent every 8 hours  polyethylene glycol 3350 17 Gram(s) Oral two times a day  vancomycin  IVPB      vancomycin  IVPB 1000 milliGRAM(s) IV Intermittent once    MEDICATIONS  (PRN):  acetaminophen   Tablet .. 650 milliGRAM(s) Oral every 6 hours PRN Temp greater or equal to 38C (100.4F), Mild Pain (1 - 3)  bisacodyl Suppository 10 milliGRAM(s) Rectal daily PRN Constipation  chlorproMAZINE    Tablet 25 milliGRAM(s) Oral three times a day PRN Hiccups  dextrose 40% Gel 15 Gram(s) Oral once PRN Blood Glucose LESS THAN 70 milliGRAM(s)/deciliter  glucagon  Injectable 1 milliGRAM(s) IntraMuscular once PRN Glucose LESS THAN 70 milligrams/deciliter  heparin  Injectable 7500 Unit(s) IV Push every 6 hours PRN For aPTT less than 40  heparin  Injectable 3500 Unit(s) IV Push every 6 hours PRN For aPTT between 40 - 57      Allergies    No Known Allergies    Intolerances            PHYSICAL EXAM:   Vital Signs:  Vital Signs Last 24 Hrs  T(C): 36.8 (20 Oct 2019 04:35), Max: 39.3 (19 Oct 2019 13:33)  T(F): 98.3 (20 Oct 2019 04:35), Max: 102.8 (19 Oct 2019 13:33)  HR: 88 (20 Oct 2019 04:35) (88 - 104)  BP: 146/81 (20 Oct 2019 04:35) (145/97 - 177/112)  BP(mean): --  RR: 18 (20 Oct 2019 04:35) (18 - 19)  SpO2: 94% (20 Oct 2019 04:35) (94% - 95%)  Daily     Daily     GENERAL:  no distress  HEENT:  NC/AT,  anicteric  CHEST:   no increased effort, breath sounds clear  HEART:  Regular rhythm  ABDOMEN:  Soft, non-tender, non-distended, normoactive bowel sounds,  no masses ,no hepato-splenomegaly, no signs of chronic liver disease  EXTEREMITIES:  no cyanosis      LABS:                        10.6   19.16 )-----------( 379      ( 20 Oct 2019 08:33 )             33.9     10-19    139  |  98  |  14  ----------------------------<  153<H>  3.9   |  29  |  0.81    Ca    8.4      19 Oct 2019 07:10      PT/INR - ( 19 Oct 2019 08:53 )   PT: 17.7 sec;   INR: 1.53 ratio         PTT - ( 20 Oct 2019 08:40 )  PTT:51.3 sec      RADIOLOGY & ADDITIONAL TESTS:
INTERVAL HPI/OVERNIGHT EVENTS:    MEDICATIONS  (STANDING):  amLODIPine   Tablet 10 milliGRAM(s) Oral at bedtime  dextrose 5%. 1000 milliLiter(s) (50 mL/Hr) IV Continuous <Continuous>  dextrose 50% Injectable 12.5 Gram(s) IV Push once  dextrose 50% Injectable 25 Gram(s) IV Push once  dextrose 50% Injectable 25 Gram(s) IV Push once  docusate sodium 100 milliGRAM(s) Oral two times a day  insulin lispro (HumaLOG) corrective regimen sliding scale   SubCutaneous three times a day before meals  losartan 100 milliGRAM(s) Oral daily  metoprolol succinate  milliGRAM(s) Oral daily  pantoprazole    Tablet 40 milliGRAM(s) Oral before breakfast  polyethylene glycol 3350 17 Gram(s) Oral two times a day    MEDICATIONS  (PRN):  bisacodyl Suppository 10 milliGRAM(s) Rectal daily PRN Constipation  chlorproMAZINE    Tablet 25 milliGRAM(s) Oral three times a day PRN Hiccups  dextrose 40% Gel 15 Gram(s) Oral once PRN Blood Glucose LESS THAN 70 milliGRAM(s)/deciliter  glucagon  Injectable 1 milliGRAM(s) IntraMuscular once PRN Glucose LESS THAN 70 milligrams/deciliter  heparin  Injectable 7500 Unit(s) IV Push every 6 hours PRN For aPTT less than 40  heparin  Injectable 3500 Unit(s) IV Push every 6 hours PRN For aPTT between 40 - 57      Allergies    No Known Allergies    Intolerances            PHYSICAL EXAM:   Vital Signs:  Vital Signs Last 24 Hrs  T(C): 37.1 (22 Oct 2019 04:21), Max: 39.3 (21 Oct 2019 21:12)  T(F): 98.7 (22 Oct 2019 04:21), Max: 102.8 (21 Oct 2019 21:12)  HR: 94 (22 Oct 2019 04:21) (72 - 104)  BP: 160/82 (22 Oct 2019 04:21) (150/92 - 174/94)  BP(mean): --  RR: 17 (22 Oct 2019 04:21) (17 - 18)  SpO2: 95% (22 Oct 2019 04:21) (92% - 99%)  Daily     Daily     GENERAL:  no distress  HEENT:  NC/AT,  anicteric  CHEST:   no increased effort, breath sounds clear  HEART:  Regular rhythm  ABDOMEN:  Soft, non-tender, non-distended, normoactive bowel sounds,  no masses ,no hepato-splenomegaly, no signs of chronic liver disease  EXTEREMITIES:  no cyanosis      LABS:                        9.9    17.97 )-----------( 378      ( 21 Oct 2019 08:22 )             31.1     10-22    137  |  92<L>  |  10  ----------------------------<  194<H>  3.8   |  29  |  0.71    Ca    8.3<L>      22 Oct 2019 06:50    TPro  7.6  /  Alb  2.9<L>  /  TBili  0.5  /  DBili  x   /  AST  36  /  ALT  44  /  AlkPhos  333<H>  10-22    PTT - ( 21 Oct 2019 06:08 )  PTT:87.9 sec      RADIOLOGY & ADDITIONAL TESTS:
INTERVAL HPI/OVERNIGHT EVENTS:    MEDICATIONS  (STANDING):  amLODIPine   Tablet 5 milliGRAM(s) Oral daily  dextrose 5%. 1000 milliLiter(s) (50 mL/Hr) IV Continuous <Continuous>  dextrose 50% Injectable 12.5 Gram(s) IV Push once  dextrose 50% Injectable 25 Gram(s) IV Push once  dextrose 50% Injectable 25 Gram(s) IV Push once  docusate sodium 100 milliGRAM(s) Oral two times a day  heparin  Injectable 5000 Unit(s) SubCutaneous every 8 hours  insulin lispro (HumaLOG) corrective regimen sliding scale   SubCutaneous three times a day before meals  losartan 100 milliGRAM(s) Oral daily  metoprolol succinate  milliGRAM(s) Oral daily  pantoprazole    Tablet 40 milliGRAM(s) Oral before breakfast    MEDICATIONS  (PRN):  acetaminophen   Tablet .. 650 milliGRAM(s) Oral every 6 hours PRN Temp greater or equal to 38C (100.4F), Mild Pain (1 - 3)  chlorproMAZINE    Tablet 25 milliGRAM(s) Oral three times a day PRN Hiccups  dextrose 40% Gel 15 Gram(s) Oral once PRN Blood Glucose LESS THAN 70 milliGRAM(s)/deciliter  glucagon  Injectable 1 milliGRAM(s) IntraMuscular once PRN Glucose LESS THAN 70 milligrams/deciliter  ketorolac   Injectable 15 milliGRAM(s) IV Push every 6 hours PRN Severe Pain (7 - 10)      Allergies    No Known Allergies    Intolerances            PHYSICAL EXAM:   Vital Signs:  Vital Signs Last 24 Hrs  T(C): 36.9 (18 Oct 2019 04:02), Max: 39.4 (17 Oct 2019 20:01)  T(F): 98.5 (18 Oct 2019 04:02), Max: 102.9 (17 Oct 2019 20:01)  HR: 70 (18 Oct 2019 04:02) (70 - 102)  BP: 145/90 (18 Oct 2019 04:23) (145/90 - 202/105)  BP(mean): --  RR: 17 (18 Oct 2019 04:02) (17 - 18)  SpO2: 96% (18 Oct 2019 04:02) (94% - 96%)  Daily Height in cm: 177.8 (17 Oct 2019 16:37)    Daily     GENERAL:  no distress  HEENT:  NC/AT,  anicteric  CHEST:   no increased effort, breath sounds clear  HEART:  Regular rhythm  ABDOMEN:  Soft, non-tender, non-distended, normoactive bowel sounds,  no masses ,no hepato-splenomegaly, no signs of chronic liver disease  EXTEREMITIES:  no cyanosis      LABS:                        10.6   17.56 )-----------( 355      ( 18 Oct 2019 07:55 )             33.0     10-18    139  |  96  |  20  ----------------------------<  132<H>  4.1   |  30  |  0.95    Ca    9.1      18 Oct 2019 05:19    TPro  7.4  /  Alb  2.9<L>  /  TBili  0.4  /  DBili  x   /  AST  35  /  ALT  40  /  AlkPhos  204<H>  10-17    PT/INR - ( 16 Oct 2019 13:24 )   PT: 16.8 sec;   INR: 1.44 ratio         PTT - ( 16 Oct 2019 13:24 )  PTT:30.6 sec      RADIOLOGY & ADDITIONAL TESTS:
INTERVAL HPI/OVERNIGHT EVENTS: MRCP results noted, started on abx and IV heparin, feels better, fever this am, no rigors, had BM    MEDICATIONS  (STANDING):  amLODIPine   Tablet 10 milliGRAM(s) Oral at bedtime  dextrose 5%. 1000 milliLiter(s) (50 mL/Hr) IV Continuous <Continuous>  dextrose 50% Injectable 12.5 Gram(s) IV Push once  dextrose 50% Injectable 25 Gram(s) IV Push once  dextrose 50% Injectable 25 Gram(s) IV Push once  docusate sodium 100 milliGRAM(s) Oral two times a day  heparin  Infusion.  Unit(s)/Hr (17 mL/Hr) IV Continuous <Continuous>  insulin lispro (HumaLOG) corrective regimen sliding scale   SubCutaneous three times a day before meals  losartan 100 milliGRAM(s) Oral daily  metoprolol succinate  milliGRAM(s) Oral daily  pantoprazole    Tablet 40 milliGRAM(s) Oral before breakfast  piperacillin/tazobactam IVPB.. 3.375 Gram(s) IV Intermittent every 8 hours  polyethylene glycol 3350 17 Gram(s) Oral two times a day    MEDICATIONS  (PRN):  acetaminophen   Tablet .. 650 milliGRAM(s) Oral every 6 hours PRN Temp greater or equal to 38C (100.4F), Mild Pain (1 - 3)  bisacodyl Suppository 10 milliGRAM(s) Rectal daily PRN Constipation  chlorproMAZINE    Tablet 25 milliGRAM(s) Oral three times a day PRN Hiccups  dextrose 40% Gel 15 Gram(s) Oral once PRN Blood Glucose LESS THAN 70 milliGRAM(s)/deciliter  glucagon  Injectable 1 milliGRAM(s) IntraMuscular once PRN Glucose LESS THAN 70 milligrams/deciliter  heparin  Injectable 7500 Unit(s) IV Push every 6 hours PRN For aPTT less than 40  heparin  Injectable 3500 Unit(s) IV Push every 6 hours PRN For aPTT between 40 - 57  ketorolac   Injectable 15 milliGRAM(s) IV Push every 6 hours PRN Severe Pain (7 - 10)      Allergies    No Known Allergies    Intolerances            PHYSICAL EXAM:   Vital Signs:  Vital Signs Last 24 Hrs  T(C): 37.6 (19 Oct 2019 06:21), Max: 39.4 (18 Oct 2019 20:10)  T(F): 99.6 (19 Oct 2019 06:21), Max: 103 (18 Oct 2019 20:10)  HR: 84 (19 Oct 2019 06:21) (78 - 105)  BP: 154/78 (19 Oct 2019 06:21) (134/88 - 179/109)  BP(mean): --  RR: 17 (19 Oct 2019 06:21) (17 - 18)  SpO2: 96% (19 Oct 2019 06:21) (93% - 98%)  Daily     Daily     GENERAL:  no distress  HEENT:  NC/AT,  anicteric  CHEST:   no increased effort, breath sounds clear  HEART:  Regular rhythm  ABDOMEN:  Soft,mild upper abdominal tenderness, non-distended, normoactive bowel sounds,  no masses ,no hepato-splenomegaly, no signs of chronic liver disease  EXTEREMITIES:  no cyanosis      LABS:                        9.7    17.95 )-----------( 376      ( 19 Oct 2019 10:30 )             30.3     10-19    139  |  98  |  14  ----------------------------<  153<H>  3.9   |  29  |  0.81    Ca    8.4      19 Oct 2019 07:10      PT/INR - ( 19 Oct 2019 08:53 )   PT: 17.7 sec;   INR: 1.53 ratio         PTT - ( 19 Oct 2019 08:53 )  PTT:52.9 sec  Urinalysis Basic - ( 18 Oct 2019 09:23 )    Color: Yellow / Appearance: Clear / S.025 / pH: x  Gluc: x / Ketone: Negative  / Bili: Negative / Urobili: <2 mg/dL   Blood: x / Protein: 30 mg/dL / Nitrite: Negative   Leuk Esterase: Negative / RBC: 2 /HPF / WBC 1 /HPF   Sq Epi: x / Non Sq Epi: 0 /HPF / Bacteria: Negative        RADIOLOGY & ADDITIONAL TESTS:
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    NPO: 10/17/19 @ 22:00  Anticoagulation: Heparin gtt- off since 08:50; AM PTT 63.4  Antibiotics: none  Adverse reaction to anesthesia: none  Objection to blood products: none    PAST MEDICAL & SURGICAL HISTORY:       Vital Signs Last 24 Hrs  T(C): 37.5 (28 Oct 2019 04:49), Max: 38.6 (27 Oct 2019 14:42)  T(F): 99.5 (28 Oct 2019 04:49), Max: 101.4 (27 Oct 2019 14:42)  HR: 93 (28 Oct 2019 09:32) (76 - 100)  BP: 119/80 (28 Oct 2019 09:32) (119/80 - 164/95)  RR: 18 (28 Oct 2019 04:49) (18 - 18)  SpO2: 97% (28 Oct 2019 09:32) (92% - 97%)    Allergies: No Known Allergies      Physical Exam: Gen: A&Ox3; NAD    Labs:                         9.5    26.65 )-----------( 449      ( 28 Oct 2019 09:16 )             30.4     10-28    138  |  94<L>  |  14  ----------------------------<  142<H>  3.8   |  33<H>  |  0.83    Ca    8.5      28 Oct 2019 07:28    TPro  6.7  /  Alb  2.4<L>  /  TBili  0.5  /  DBili  x   /  AST  31  /  ALT  36  /  AlkPhos  476<H>  10-28    PT/INR - ( 28 Oct 2019 08:54 )   PT: 16.6 sec;   INR: 1.44 ratio         PTT - ( 28 Oct 2019 08:54 )  PTT:63.4 sec    Informed consent obtained. All questions and concerns have been addressed at this time.     Plan: Discussed case with ID, Dr. Yip, regarding patient being febrile with chronically elevated WBC- cleared from ID standpoint and think it's cancer/tumor origin. OK to proceed with chest port placement.
[facility]      Seen earlier today, and examined.  - Today, patient is without complaints.           *****MEDICATIONS: Current medication reviewed and documented.    MEDICATIONS  (STANDING):  amLODIPine   Tablet 10 milliGRAM(s) Oral at bedtime  aspirin enteric coated 81 milliGRAM(s) Oral daily  dextrose 5%. 1000 milliLiter(s) (50 mL/Hr) IV Continuous <Continuous>  dextrose 50% Injectable 12.5 Gram(s) IV Push once  dextrose 50% Injectable 25 Gram(s) IV Push once  dextrose 50% Injectable 25 Gram(s) IV Push once  docusate sodium 100 milliGRAM(s) Oral two times a day  heparin  Infusion.  Unit(s)/Hr (17 mL/Hr) IV Continuous <Continuous>  hydrALAZINE 25 milliGRAM(s) Oral two times a day  insulin lispro (HumaLOG) corrective regimen sliding scale   SubCutaneous three times a day before meals  losartan 100 milliGRAM(s) Oral daily  metoclopramide Injectable 10 milliGRAM(s) IV Push every 8 hours  metoprolol succinate  milliGRAM(s) Oral daily  pantoprazole    Tablet 40 milliGRAM(s) Oral before breakfast  polyethylene glycol 3350 17 Gram(s) Oral two times a day    MEDICATIONS  (PRN):  bisacodyl Suppository 10 milliGRAM(s) Rectal daily PRN Constipation  dextrose 40% Gel 15 Gram(s) Oral once PRN Blood Glucose LESS THAN 70 milliGRAM(s)/deciliter  glucagon  Injectable 1 milliGRAM(s) IntraMuscular once PRN Glucose LESS THAN 70 milligrams/deciliter  heparin  Injectable 7500 Unit(s) IV Push every 6 hours PRN For aPTT less than 40  heparin  Injectable 3500 Unit(s) IV Push every 6 hours PRN For aPTT between 40 - 57          ***** VITAL SIGNS:  T(F): 98.8 (10-24-19 @ 08:11), Max: 102.3 (10-23-19 @ 14:40)  HR: 84 (10-24-19 @ 08:11) (81 - 99)  BP: 165/89 (10-24-19 @ 08:11) (148/83 - 171/98)  RR: 18 (10-24-19 @ 08:11) (18 - 19)  SpO2: 98% (10-24-19 @ 08:11) (93% - 98%)  Wt(kg): --  ,   I&O's Summary    23 Oct 2019 07:01  -  24 Oct 2019 07:00  --------------------------------------------------------  IN: 1161 mL / OUT: 750 mL / NET: 411 mL             *****PHYSICAL EXAM:alerts to repeated verbal stimuli oriented x to place and person    somewhat sluggish  poor attention, unable to spell world backwards.  comprehension are fair . Able to name, repeat, read without any difficulty.   Able to follow 1-2 step commands.     EOMI fundi not visualized blinks to threat.   No facial asymmetry   Tongue is midline   Palate elevates symmetrically   Moving all 4 ext symmetrically   with distraction the right sided drift goes away.   during the exam pt also started to "shake" his left upper extremity which stopped when asked.   rapid alternating mvts are symmetric  RLE better exam today   able to give good strength momentarily ( giveway weakness)   Reflexes are 1+ throughout   sensation is grossly symmetric  Gait : not assessed.  B/L down going toes        *****LAB AND IMAGIN.7    23.26 )-----------( 354      ( 24 Oct 2019 03:28 )             29.0               10-    137  |  96  |  12  ----------------------------<  181<H>  3.6   |  31  |  0.81    Ca    8.6      24 Oct 2019 06:53    TPro  6.7  /  Alb  2.5<L>  /  TBili  0.5  /  DBili  x   /  AST  39  /  ALT  45  /  AlkPhos  326<H>  10-24    PT/INR - ( 23 Oct 2019 09:00 )   PT: 18.1 sec;   INR: 1.57 ratio         PTT - ( 24 Oct 2019 10:39 )  PTT:41.6 sec                     [All pertinent recent Imaging/Reports reviewed]           *****A S S E S S M E N T   A N D   P L A N :     Excerpt from H&P, Pt is a 55 y/o male with pmh of HTN, DM, HLD, CVA 4 years ago presenting for concern of hiccups, abdominal pain and distention and shortness of breath, worsening since Friday. Patient reports that 2 weeks ago he started to have episodes of intractable hiccups. Last week on Tuesday went to [facility] and was admitted for an ACS workup after hiccups were intractable. States that they discharged him with thorazine for the hiccups and discharged him home. Friday he started to experience shortness of breath and abdominal pain so came to the ER here for evaluation, was found to have pancreatic tail mass and questionable liver mets and discharged with oncology follow-up recommended MRI. Patient states he has no PMD to follow-up with and has not made an appt with oncology, reports did not clearly understand results. Abdominal pain and hiccups have been worsening, abdomen distended. States that when hiccups are very bad he feels very short of breath. Denies chest pain. No fevers or chills. Patient took thorazine prior to arrival and hiccups are controlled at this time.   Pt woke up this am with worsening weakness of the Right side.   was noted to be not moving the right side as well as the left.  Pt denies any pain or discomfort of his back or groin.        somewhat sluggish on exam, does follow commands   moving all 4 ext   RLE without full control.   does give some effort, with giveway weakness.      Problem/Recommendations 1: Transient Right sided weakness,   given heparin stat head ct was arranged, no acute hemorrhage.  Cta without any large vessel occlusion ( confirmed with rads)   not a candidate for any acute therapies ( TPA/interventional)  as pt woke up with symptoms and there is no large vessel occlusion.   differential diagnosis also includes seizures will get eeg routine seizures.  also given lovenox would get ct abdomen no evidence for any retroperitoneal hematoma  H/h repeat stable   monitor closely neuro checks.   prior hx of cva with mild residual left sided weakness with some embellishments of tremor on exam.    mri thoracic and lumbar spine read pending.   psych eval for conversion/adjustment disorder.                   Thank you for allowing me to participate in the care of this patient. Please do not hesitate to call me if you have any  questions.        ________________  Korin Hendrickson MD  [facility] ([facility]  [phone]      33 minutes spent on total encounter; more than 50 % of the visit was  spent counseling about plan of care, compliance to diet/exercise and medication regimen and or  coordinating care by the attending physician.      It is advised that stroke patients follow up with DONALD Eugene @ [phone] in 1- 2 weeks.   Others please follow up with Dr. Michael Nissenbaum [phone]
[facility]    Subjective: Patient seen and examined. No new events except as noted.   LE weakness   weakness     REVIEW OF SYSTEMS:    CONSTITUTIONAL: No weakness, fevers or chills  EYES/ENT: No visual changes;  No vertigo or throat pain   NECK: No pain or stiffness  RESPIRATORY: No cough, wheezing, hemoptysis; No shortness of breath  CARDIOVASCULAR: No chest pain or palpitations  GASTROINTESTINAL: No abdominal or epigastric pain.  GENITOURINARY: No dysuria, frequency or hematuria  NEUROLOGICAL: LE weakness, unable to walk   SKIN: No itching, burning, rashes, or lesions   All other review of systems is negative unless indicated above.    MEDICATIONS:  MEDICATIONS  (STANDING):  amLODIPine   Tablet 10 milliGRAM(s) Oral at bedtime  aspirin enteric coated 81 milliGRAM(s) Oral daily  dextrose 5%. 1000 milliLiter(s) (50 mL/Hr) IV Continuous <Continuous>  dextrose 50% Injectable 12.5 Gram(s) IV Push once  dextrose 50% Injectable 25 Gram(s) IV Push once  dextrose 50% Injectable 25 Gram(s) IV Push once  docusate sodium 100 milliGRAM(s) Oral two times a day  heparin  Infusion.  Unit(s)/Hr (17 mL/Hr) IV Continuous <Continuous>  hydrALAZINE 50 milliGRAM(s) Oral two times a day  insulin lispro (HumaLOG) corrective regimen sliding scale   SubCutaneous three times a day before meals  losartan 100 milliGRAM(s) Oral daily  metoclopramide Injectable 10 milliGRAM(s) IV Push every 8 hours  metoprolol succinate  milliGRAM(s) Oral daily  pantoprazole    Tablet 40 milliGRAM(s) Oral before breakfast  polyethylene glycol 3350 17 Gram(s) Oral two times a day      PHYSICAL EXAM:  T(C): 37.6 (10-25-19 @ 14:24), Max: 38.5 (10-25-19 @ 04:05)  HR: 98 (10-25-19 @ 14:24) (81 - 98)  BP: 160/88 (10-25-19 @ 14:24) (134/87 - 161/94)  RR: 18 (10-25-19 @ 14:24) (18 - 18)  SpO2: 93% (10-25-19 @ 14:24) (89% - 93%)  Wt(kg): --  I&O's Summary    24 Oct 2019 07:01  -  25 Oct 2019 07:00  --------------------------------------------------------  IN: 1780 mL / OUT: 200 mL / NET: 1580 mL    25 Oct 2019 07:01  -  25 Oct 2019 17:15  --------------------------------------------------------  IN: 480 mL / OUT: 0 mL / NET: 480 mL          Appearance: Normal	  HEENT:   Normal oral mucosa, PERRL, EOMI	  Lymphatic: No lymphadenopathy , no edema  Cardiovascular: Normal S1 S2  Respiratory: Lungs clear to auscultation, normal effort 	  Gastrointestinal:  Soft, Non-tender, + BS	  Skin: No rashes, No ecchymoses, No cyanosis, warm to touch  Musculoskeletal: LE weakness   Psychiatry:  Mood & affect appropriate  Ext: No edema      All labs, Imaging and EKGs personally reviewed                           9.2    22.22 )-----------( 384      ( 25 Oct 2019 07:15 )             28.0               10-25    138  |  93<L>  |  16  ----------------------------<  186<H>  3.5   |  27  |  0.74    Ca    7.9<L>      25 Oct 2019 07:15    TPro  6.8  /  Alb  2.4<L>  /  TBili  0.4  /  DBili  x   /  AST  29  /  ALT  47<H>  /  AlkPhos  393<H>  10-25    PT/INR - ( 25 Oct 2019 07:15 )   PT: 17.6 sec;   INR: 1.51 ratio         PTT - ( 25 Oct 2019 15:58 )  PTT:74.6 sec                   < from: MR Lumbar Spine No Cont (10.23.19 @ 23:35) >    IMPRESSION:   No evidence of spinal cord infarct.    Bilateral facet hypertrophy at L5-S1 resulting in moderate to severe   right and severe left neural foraminal narrowing.
Patient is a 54y old  Male who presents with a chief complaint of SOB, pancreatic lesion (18 Oct 2019 16:13)      INTERVAL HISTORY: febrile       MEDICATIONS:  amLODIPine   Tablet 5 milliGRAM(s) Oral daily  losartan 100 milliGRAM(s) Oral daily  metoprolol succinate  milliGRAM(s) Oral daily        PHYSICAL EXAM:  T(C): 37.1 (10-18-19 @ 14:38), Max: 39.4 (10-17-19 @ 20:01)  HR: 78 (10-18-19 @ 14:38) (70 - 102)  BP: 173/89 (10-18-19 @ 14:38) (145/90 - 192/98)  RR: 17 (10-18-19 @ 14:38) (17 - 18)  SpO2: 98% (10-18-19 @ 14:38) (94% - 98%)  Wt(kg): --  I&O's Summary    17 Oct 2019 07:01  -  18 Oct 2019 07:00  --------------------------------------------------------  IN: 1240 mL / OUT: 0 mL / NET: 1240 mL    18 Oct 2019 07:01  -  18 Oct 2019 16:53  --------------------------------------------------------  IN: 240 mL / OUT: 0 mL / NET: 240 mL          Appearance: In no distress	  HEENT:    PERRL, EOMI	  Cardiovascular:  S1 S2, No JVD  Respiratory: Lungs clear to auscultation	  Gastrointestinal:  Soft, Non-tender, + BS	  Extremities:  No edema of LE                                10.6   17.56 )-----------( 355      ( 18 Oct 2019 07:55 )             33.0     10-18    139  |  96  |  20  ----------------------------<  132<H>  4.1   |  30  |  0.95    Ca    9.1      18 Oct 2019 05:19    TPro  7.4  /  Alb  2.9<L>  /  TBili  0.4  /  DBili  x   /  AST  35  /  ALT  40  /  AlkPhos  204<H>  10-17        Labs personally reviewed      Assessment and Plan:   Assessment:  · Assessment		  Pt is a 55 y/o male with pmh of HTN, DM, HLD, CVA 4 years ago presenting for concern of hiccups, abdominal pain and distention and shortness of breath, worsening since Friday. Patient reports that 2 weeks ago he started to have episodes of intractable hiccups. found to have pancreatic tail lesion with mets.     Problem/Plan - 1:  ·  Problem: SOB (shortness of breath).  Plan: Echocardiogram noted, unremarkable  SOB ? 2/2 pancreatic mass with lung mets  Will consider ruling out PE if SOB persists as hypercoagulable in setting of panc CA - in view of MRI findings pt needs AC regardless, so will hold AC    Problem/Plan - 2:  ·  Problem: HTN (hypertension).  Plan: start Norvasc 5mg for now  - pt has been on multiple meds in past per EMR records             Tony Yeung DO [facility]  Cardiovascular Medicine  [phone]
Patient is a 54y old  Male who presents with a chief complaint of SOB, pancreatic lesion (20 Oct 2019 16:20)      INTERVAL HISTORY: feels ok    MEDICATIONS:  amLODIPine   Tablet 10 milliGRAM(s) Oral at bedtime  losartan 100 milliGRAM(s) Oral daily  metoprolol succinate  milliGRAM(s) Oral daily        PHYSICAL EXAM:  T(C): 39.3 (10-20-19 @ 14:03), Max: 39.3 (10-20-19 @ 14:03)  HR: 85 (10-20-19 @ 14:03) (85 - 104)  BP: 164/80 (10-20-19 @ 14:21) (145/97 - 191/63)  RR: 18 (10-20-19 @ 14:03) (18 - 19)  SpO2: 94% (10-20-19 @ 14:03) (94% - 94%)  Wt(kg): --  I&O's Summary    19 Oct 2019 07:01  -  20 Oct 2019 07:00  --------------------------------------------------------  IN: 600 mL / OUT: 500 mL / NET: 100 mL    20 Oct 2019 07:01  -  20 Oct 2019 17:13  --------------------------------------------------------  IN: 1310 mL / OUT: 300 mL / NET: 1010 mL          Appearance: In no distress	  HEENT:    PERRL, EOMI	  Cardiovascular:  S1 S2, No JVD  Respiratory: Lungs clear to auscultation	  Gastrointestinal:  Soft, Non-tender, + BS	  Extremities:  No edema of LE                                10.6   19.16 )-----------( 379      ( 20 Oct 2019 08:33 )             33.9     10-19    139  |  98  |  14  ----------------------------<  153<H>  3.9   |  29  |  0.81    Ca    8.4      19 Oct 2019 07:10          Labs personally reviewed      Assessment and Plan:   Assessment:  · Assessment		  Pt is a 53 y/o male with pmh of HTN, DM, HLD, CVA 4 years ago presenting for concern of hiccups, abdominal pain and distention and shortness of breath, worsening since Friday. Patient reports that 2 weeks ago he started to have episodes of intractable hiccups. found to have pancreatic tail lesion with mets.     Problem/Plan - 1:  ·  Problem: SOB (shortness of breath).  Plan: Echocardiogram noted, unremarkable  SOB ? 2/2 pancreatic mass with lung mets  Will consider ruling out PE if SOB persists as hypercoagulable in setting of panc CA - in view of MRI findings pt needs AC regardless, so will c/w AC and hold off CTA    Problem/Plan - 2:  ·  Problem: HTN (hypertension).  Plan: c/w losartan 100mg and Norvasc 10mg, will add Hydal if BP not controlled              Tony Yeung DO [facility]  Cardiovascular Medicine  [phone]
Patient is a 54y old  Male who presents with a chief complaint of SOB, pancreatic lesion (22 Oct 2019 08:57)      INTERVAL HISTORY: feels ok       MEDICATIONS:  amLODIPine   Tablet 10 milliGRAM(s) Oral at bedtime  losartan 100 milliGRAM(s) Oral daily  metoprolol succinate  milliGRAM(s) Oral daily        PHYSICAL EXAM:  T(C): 37.1 (10-22-19 @ 04:21), Max: 39.3 (10-21-19 @ 21:12)  HR: 94 (10-22-19 @ 04:21) (72 - 104)  BP: 160/82 (10-22-19 @ 04:21) (150/92 - 174/94)  RR: 17 (10-22-19 @ 04:21) (17 - 18)  SpO2: 95% (10-22-19 @ 04:21) (92% - 99%)  Wt(kg): --  I&O's Summary    21 Oct 2019 07:01  -  22 Oct 2019 07:00  --------------------------------------------------------  IN: 770 mL / OUT: 0 mL / NET: 770 mL          Appearance: In no distress	  HEENT:    PERRL, EOMI	  Cardiovascular:  S1 S2, No JVD  Respiratory: Lungs clear to auscultation	  Gastrointestinal:  Soft, Non-tender, + BS	  Extremities:  No edema of LE                                10.5   20.29 )-----------( 371      ( 22 Oct 2019 09:37 )             33.6     10-22    137  |  92<L>  |  10  ----------------------------<  194<H>  3.8   |  29  |  0.71    Ca    8.3<L>      22 Oct 2019 06:50    TPro  7.6  /  Alb  2.9<L>  /  TBili  0.5  /  DBili  x   /  AST  36  /  ALT  44  /  AlkPhos  333<H>  10-22        Labs personally reviewed      Assessment and Plan:   Assessment:  · Assessment		  Pt is a 55 y/o male with pmh of HTN, DM, HLD, CVA 4 years ago presenting for concern of hiccups, abdominal pain and distention and shortness of breath, worsening since Friday. Patient reports that 2 weeks ago he started to have episodes of intractable hiccups. found to have pancreatic tail lesion with mets.     Problem/Plan - 1:  ·  Problem: SOB (shortness of breath).  Plan: Echocardiogram noted, unremarkable  SOB ? 2/2 pancreatic mass with lung mets  Will consider ruling out PE if SOB persists as hypercoagulable in setting of panc CA - in view of MRI findings pt needs AC regardless, so will c/w AC and hold off CTA    Problem/Plan - 2:  ·  Problem: HTN (hypertension).  Plan: c/w losartan 100mg and Norvasc 10mg, will add Hydal if BP not controlled            Tony Yeung DO [facility]  Cardiovascular Medicine  [phone]
Patient is a 54y old  Male who presents with a chief complaint of SOB, pancreatic lesion (23 Oct 2019 21:05)      INTERVAL HISTORY: feels ok    	  MEDICATIONS:  amLODIPine   Tablet 10 milliGRAM(s) Oral at bedtime  hydrALAZINE 25 milliGRAM(s) Oral two times a day  losartan 100 milliGRAM(s) Oral daily  metoprolol succinate  milliGRAM(s) Oral daily        PHYSICAL EXAM:  T(C): 37.3 (10-23-19 @ 20:08), Max: 39.1 (10-23-19 @ 14:40)  HR: 81 (10-23-19 @ 20:08) (81 - 103)  BP: 171/98 (10-23-19 @ 20:08) (146/91 - 171/98)  RR: 19 (10-23-19 @ 20:08) (18 - 19)  SpO2: 95% (10-23-19 @ 20:08) (93% - 95%)  Wt(kg): --  I&O's Summary    22 Oct 2019 07:01  -  23 Oct 2019 07:00  --------------------------------------------------------  IN: 1160 mL / OUT: 200 mL / NET: 960 mL    23 Oct 2019 07:01  -  24 Oct 2019 00:23  --------------------------------------------------------  IN: 805 mL / OUT: 200 mL / NET: 605 mL          Appearance: In no distress	  HEENT:    PERRL, EOMI	  Cardiovascular:  S1 S2, No JVD  Respiratory: Lungs clear to auscultation	  Gastrointestinal:  Soft, Non-tender, + BS	  Extremities:  No edema of LE                                9.1    24.74 )-----------( 364      ( 23 Oct 2019 20:51 )             27.5     10-23    142  |  93<L>  |  13  ----------------------------<  174<H>  3.6   |  30  |  0.76    Ca    8.7      23 Oct 2019 06:54    TPro  6.9  /  Alb  2.5<L>  /  TBili  0.4  /  DBili  x   /  AST  26  /  ALT  40  /  AlkPhos  282<H>  10-23        Labs personally reviewed      Assessment and Plan:   Assessment:  · Assessment		  Pt is a 55 y/o male with pmh of HTN, DM, HLD, CVA 4 years ago presenting for concern of hiccups, abdominal pain and distention and shortness of breath, worsening since Friday. Patient reports that 2 weeks ago he started to have episodes of intractable hiccups. found to have pancreatic tail lesion with mets.     Problem/Plan - 1:  ·  Problem: SOB (shortness of breath).  Plan: Echocardiogram noted, unremarkable  SOB ? 2/2 pancreatic mass with lung mets  Will consider ruling out PE if SOB persists as hypercoagulable in setting of panc CA - in view of MRI findings pt needs AC regardless, so will c/w AC and hold off CTA    Problem/Plan - 2:  ·  Problem: HTN (hypertension).  Plan: c/w losartan 100mg and Norvasc 10mg, will add Hydal if BP not controlled     Bx results pending    Tony Yeung DO [facility]  Cardiovascular Medicine  [phone]
Patient is a 54y old  Male who presents with a chief complaint of SOB, pancreatic lesion (25 Oct 2019 16:07)         MEDICATIONS:  amLODIPine   Tablet 10 milliGRAM(s) Oral at bedtime  hydrALAZINE 25 milliGRAM(s) Oral two times a day  losartan 100 milliGRAM(s) Oral daily  metoprolol succinate  milliGRAM(s) Oral daily        PHYSICAL EXAM:  T(C): 37.6 (10-25-19 @ 14:24), Max: 38.5 (10-25-19 @ 04:05)  HR: 98 (10-25-19 @ 14:24) (81 - 98)  BP: 160/88 (10-25-19 @ 14:24) (134/87 - 161/94)  RR: 18 (10-25-19 @ 14:24) (18 - 18)  SpO2: 93% (10-25-19 @ 14:24) (89% - 93%)  Wt(kg): --  I&O's Summary    24 Oct 2019 07:01  -  25 Oct 2019 07:00  --------------------------------------------------------  IN: 1780 mL / OUT: 200 mL / NET: 1580 mL    25 Oct 2019 07:01  -  25 Oct 2019 16:29  --------------------------------------------------------  IN: 480 mL / OUT: 0 mL / NET: 480 mL          Appearance: In no distress	  HEENT:    PERRL, EOMI	  Cardiovascular:  S1 S2, No JVD  Respiratory: Lungs clear to auscultation	  Gastrointestinal:  Soft, Non-tender, + BS	  Extremities:  No edema of LE                                9.2    22.22 )-----------( 384      ( 25 Oct 2019 07:15 )             28.0     10-25    138  |  93<L>  |  16  ----------------------------<  186<H>  3.5   |  27  |  0.74    Ca    7.9<L>      25 Oct 2019 07:15    TPro  6.8  /  Alb  2.4<L>  /  TBili  0.4  /  DBili  x   /  AST  29  /  ALT  47<H>  /  AlkPhos  393<H>  10-25        Labs personally reviewed      Assessment and Plan:   Assessment:  · Assessment		  Pt is a 55 y/o male with pmh of HTN, DM, HLD, CVA 4 years ago presenting for concern of hiccups, abdominal pain and distention and shortness of breath, worsening since Friday. Patient reports that 2 weeks ago he started to have episodes of intractable hiccups. found to have pancreatic tail lesion with mets.     Problem/Plan - 1:  ·  Problem: SOB (shortness of breath).  Plan: Echocardiogram noted, unremarkable  SOB ? 2/2 pancreatic mass with lung mets  Will consider ruling out PE if SOB persists as hypercoagulable in setting of panc CA - in view of MRI findings pt needs AC regardless, so will c/w AC and hold off CTA    Problem/Plan - 2:  ·  Problem: HTN (hypertension).  Plan: c/w losartan 100mg and Norvasc 10mg, uptitrate Hydal to 50mg BID     Bx results with adenoCA    Tony Yeung, DO [facility]  Cardiovascular Medicine  [phone]          Tony Yeung, DO [facility]  Cardiovascular Medicine  [phone]
Patient is a 54y old  Male who presents with a chief complaint of SOB, pancreatic lesion (29 Oct 2019 12:32)      INTERVAL HISTORY: feels ok  	  MEDICATIONS:  amLODIPine   Tablet 10 milliGRAM(s) Oral at bedtime  hydrALAZINE 50 milliGRAM(s) Oral two times a day  losartan 100 milliGRAM(s) Oral daily  metoprolol succinate  milliGRAM(s) Oral daily        PHYSICAL EXAM:  T(C): 37.2 (10-29-19 @ 18:11), Max: 37.6 (10-29-19 @ 13:20)  HR: 84 (10-29-19 @ 18:11) (80 - 93)  BP: 147/91 (10-29-19 @ 18:11) (118/72 - 148/86)  RR: 18 (10-29-19 @ 18:11) (18 - 18)  SpO2: 95% (10-29-19 @ 18:11) (94% - 96%)  Wt(kg): --  I&O's Summary    28 Oct 2019 07:01  -  29 Oct 2019 07:00  --------------------------------------------------------  IN: 480 mL / OUT: 0 mL / NET: 480 mL    29 Oct 2019 07:01  -  29 Oct 2019 18:49  --------------------------------------------------------  IN: 720 mL / OUT: 0 mL / NET: 720 mL          Appearance: In no distress	  HEENT:    PERRL, EOMI	  Cardiovascular:  S1 S2, No JVD  Respiratory: Lungs clear to auscultation	  Gastrointestinal:  Soft, Non-tender, + BS	  Extremities:  No edema of LE                                8.8    27.28 )-----------( 436      ( 29 Oct 2019 07:27 )             26.7     10-29    137  |  92<L>  |  15  ----------------------------<  157<H>  3.8   |  26  |  0.73    Ca    7.8<L>      29 Oct 2019 07:26    TPro  6.5  /  Alb  2.1<L>  /  TBili  0.5  /  DBili  x   /  AST  25  /  ALT  29  /  AlkPhos  430<H>  10-29        Labs personally reviewed    Assessment and Plan:   Assessment:  · Assessment		  Pt is a 53 y/o male with pmh of HTN, DM, HLD, CVA 4 years ago presenting for concern of hiccups, abdominal pain and distention and shortness of breath, worsening since Friday. Patient reports that 2 weeks ago he started to have episodes of intractable hiccups. found to have pancreatic tail lesion with mets.     Problem/Plan - 1:  ·  Problem: SOB (shortness of breath).  Plan: Echocardiogram noted, unremarkable  SOB ? 2/2 pancreatic mass with lung mets  Will consider ruling out PE if SOB persists as hypercoagulable in setting of panc CA - in view of MRI findings pt needs AC regardless, so will c/w AC and hold off CTA    Problem/Plan - 2:  ·  Problem: HTN (hypertension).  Plan: c/w losartan 100mg and Norvasc 10mg, uptitrated Hydal to 50mg BID     Bx results with Alyssa Yeung DO [facility]  Cardiovascular Medicine  [phone]
Patient seen/examiend- brother and sons at bedside feeling well      MEDICATIONS  (STANDING):  amLODIPine   Tablet 10 milliGRAM(s) Oral at bedtime  aspirin enteric coated 81 milliGRAM(s) Oral daily  dextrose 5%. 1000 milliLiter(s) (50 mL/Hr) IV Continuous <Continuous>  dextrose 50% Injectable 12.5 Gram(s) IV Push once  dextrose 50% Injectable 25 Gram(s) IV Push once  dextrose 50% Injectable 25 Gram(s) IV Push once  docusate sodium 100 milliGRAM(s) Oral two times a day  heparin  Infusion.  Unit(s)/Hr (17 mL/Hr) IV Continuous <Continuous>  hydrALAZINE 50 milliGRAM(s) Oral two times a day  insulin lispro (HumaLOG) corrective regimen sliding scale   SubCutaneous three times a day before meals  losartan 100 milliGRAM(s) Oral daily  metoprolol succinate  milliGRAM(s) Oral daily  pantoprazole    Tablet 40 milliGRAM(s) Oral before breakfast  polyethylene glycol 3350 17 Gram(s) Oral two times a day    MEDICATIONS  (PRN):  bisacodyl Suppository 10 milliGRAM(s) Rectal daily PRN Constipation  dextrose 40% Gel 15 Gram(s) Oral once PRN Blood Glucose LESS THAN 70 milliGRAM(s)/deciliter  glucagon  Injectable 1 milliGRAM(s) IntraMuscular once PRN Glucose LESS THAN 70 milligrams/deciliter  heparin  Injectable 7500 Unit(s) IV Push every 6 hours PRN For aPTT less than 40  heparin  Injectable 3500 Unit(s) IV Push every 6 hours PRN For aPTT between 40 - 57  metoclopramide Injectable 10 milliGRAM(s) IV Push every 8 hours PRN hiccoughs  / nausea      ROS  No fever, sweats, chills  No epistaxis, HA, sore throat  No CP, SOB, cough, sputum  No n/v/d, abd pain, melena, hematochezia  No edema  No rash  No anxiety  No back pain, joint pain  No bleeding, bruising  No dysuria, hematuria    Vital Signs Last 24 Hrs  T(C): 37 (26 Oct 2019 05:14), Max: 38.1 (25 Oct 2019 19:53)  T(F): 98.6 (26 Oct 2019 05:14), Max: 100.5 (25 Oct 2019 19:53)  HR: 94 (26 Oct 2019 05:14) (94 - 98)  BP: 150/96 (26 Oct 2019 05:14) (124/82 - 160/88)  BP(mean): --  RR: 18 (26 Oct 2019 05:14) (17 - 18)  SpO2: 94% (26 Oct 2019 05:14) (93% - 94%)    PE  NAD  Awake, alert  Anicteric, MMM  RRR  CTAB  Abd soft, NT, ND  No c/c/e  No rash grossly  FROM                          9.8    25.09 )-----------( 441      ( 26 Oct 2019 06:35 )             29.8       10-26    138  |  95<L>  |  19  ----------------------------<  164<H>  4.2   |  31  |  0.86    Ca    8.7      26 Oct 2019 06:35    TPro  7.2  /  Alb  2.5<L>  /  TBili  0.5  /  DBili  x   /  AST  28  /  ALT  42  /  AlkPhos  454<H>  10-26
Patient to have mediport placed. No ID contraindication
Pt had liver lesion bx yesterday, no pain at bx site today. With mild hiccups today. No other new complaints    MEDICATIONS  (STANDING):  amLODIPine   Tablet 10 milliGRAM(s) Oral at bedtime  dextrose 5%. 1000 milliLiter(s) (50 mL/Hr) IV Continuous <Continuous>  dextrose 50% Injectable 12.5 Gram(s) IV Push once  dextrose 50% Injectable 25 Gram(s) IV Push once  dextrose 50% Injectable 25 Gram(s) IV Push once  docusate sodium 100 milliGRAM(s) Oral two times a day  enoxaparin Injectable 90 milliGRAM(s) SubCutaneous every 12 hours  hydrALAZINE 25 milliGRAM(s) Oral two times a day  insulin lispro (HumaLOG) corrective regimen sliding scale   SubCutaneous three times a day before meals  losartan 100 milliGRAM(s) Oral daily  metoclopramide Injectable 10 milliGRAM(s) IV Push every 8 hours  metoprolol succinate  milliGRAM(s) Oral daily  pantoprazole    Tablet 40 milliGRAM(s) Oral before breakfast  polyethylene glycol 3350 17 Gram(s) Oral two times a day    MEDICATIONS  (PRN):  bisacodyl Suppository 10 milliGRAM(s) Rectal daily PRN Constipation  dextrose 40% Gel 15 Gram(s) Oral once PRN Blood Glucose LESS THAN 70 milliGRAM(s)/deciliter  glucagon  Injectable 1 milliGRAM(s) IntraMuscular once PRN Glucose LESS THAN 70 milligrams/deciliter      ROS  No fever, sweats, chills  No epistaxis, HA, sore throat  No CP, SOB, cough, sputum  No n/v/d, abd pain, melena, hematochezia  No edema  No rash  No anxiety  No back pain, joint pain  No bleeding, bruising  No dysuria, hematuria    Vital Signs Last 24 Hrs  T(C): 36.9 (22 Oct 2019 12:00), Max: 39.3 (21 Oct 2019 21:12)  T(F): 98.5 (22 Oct 2019 12:00), Max: 102.8 (21 Oct 2019 21:12)  HR: 87 (22 Oct 2019 12:00) (87 - 104)  BP: 148/83 (22 Oct 2019 12:00) (148/83 - 162/90)  BP(mean): --  RR: 18 (22 Oct 2019 12:00) (17 - 18)  SpO2: 97% (22 Oct 2019 12:00) (92% - 97%)    PE  NAD  Awake, alert  Anicteric, MMM  RRR  CTAB  Abd soft, distended, NT  No c/c/e  No rash grossly  FROM                          10.5   20.29 )-----------( 371      ( 22 Oct 2019 09:37 )             33.6       10-22    137  |  92<L>  |  10  ----------------------------<  194<H>  3.8   |  29  |  0.71    Ca    8.3<L>      22 Oct 2019 06:50    TPro  7.6  /  Alb  2.9<L>  /  TBili  0.5  /  DBili  x   /  AST  36  /  ALT  44  /  AlkPhos  333<H>  10-22
Pt seen, doing well    MEDICATIONS  (STANDING):  amLODIPine   Tablet 10 milliGRAM(s) Oral at bedtime  dextrose 5%. 1000 milliLiter(s) (50 mL/Hr) IV Continuous <Continuous>  dextrose 50% Injectable 12.5 Gram(s) IV Push once  dextrose 50% Injectable 25 Gram(s) IV Push once  dextrose 50% Injectable 25 Gram(s) IV Push once  docusate sodium 100 milliGRAM(s) Oral two times a day  heparin  Infusion.  Unit(s)/Hr (17 mL/Hr) IV Continuous <Continuous>  insulin lispro (HumaLOG) corrective regimen sliding scale   SubCutaneous three times a day before meals  losartan 100 milliGRAM(s) Oral daily  metoprolol succinate  milliGRAM(s) Oral daily  pantoprazole    Tablet 40 milliGRAM(s) Oral before breakfast  piperacillin/tazobactam IVPB.. 3.375 Gram(s) IV Intermittent every 8 hours  polyethylene glycol 3350 17 Gram(s) Oral two times a day  vancomycin  IVPB      vancomycin  IVPB 1000 milliGRAM(s) IV Intermittent once    MEDICATIONS  (PRN):  acetaminophen   Tablet .. 650 milliGRAM(s) Oral every 6 hours PRN Temp greater or equal to 38C (100.4F), Mild Pain (1 - 3)  bisacodyl Suppository 10 milliGRAM(s) Rectal daily PRN Constipation  chlorproMAZINE    Tablet 25 milliGRAM(s) Oral three times a day PRN Hiccups  dextrose 40% Gel 15 Gram(s) Oral once PRN Blood Glucose LESS THAN 70 milliGRAM(s)/deciliter  glucagon  Injectable 1 milliGRAM(s) IntraMuscular once PRN Glucose LESS THAN 70 milligrams/deciliter  heparin  Injectable 7500 Unit(s) IV Push every 6 hours PRN For aPTT less than 40  heparin  Injectable 3500 Unit(s) IV Push every 6 hours PRN For aPTT between 40 - 57      ROS  No fever, sweats, chills  No epistaxis, HA, sore throat  No CP, SOB, cough, sputum  No n/v/d, melena, hematochezia  No edema  No rash  No anxiety  No back pain, joint pain  No bleeding, bruising  No dysuria, hematuria    Vital Signs Last 24 Hrs  T(C): 36.8 (20 Oct 2019 04:35), Max: 39.3 (19 Oct 2019 13:33)  T(F): 98.3 (20 Oct 2019 04:35), Max: 102.8 (19 Oct 2019 13:33)  HR: 88 (20 Oct 2019 04:35) (88 - 104)  BP: 146/81 (20 Oct 2019 04:35) (145/97 - 177/112)  BP(mean): --  RR: 18 (20 Oct 2019 04:35) (18 - 19)  SpO2: 94% (20 Oct 2019 04:35) (94% - 95%)    PE  NAD  Awake, alert  Anicteric, MMM  RRR  CTAB  Abd soft, NT, ND  No c/c/e  No rash grossly  FROM                          10.6   19.16 )-----------( 379      ( 20 Oct 2019 08:33 )             33.9       10-19    139  |  98  |  14  ----------------------------<  153<H>  3.9   |  29  |  0.81    Ca    8.4      19 Oct 2019 07:10
Pt seen, family at bedside, feeling fine, hiccups improving    MEDICATIONS  (STANDING):  amLODIPine   Tablet 10 milliGRAM(s) Oral at bedtime  dextrose 5%. 1000 milliLiter(s) (50 mL/Hr) IV Continuous <Continuous>  dextrose 50% Injectable 12.5 Gram(s) IV Push once  dextrose 50% Injectable 25 Gram(s) IV Push once  dextrose 50% Injectable 25 Gram(s) IV Push once  docusate sodium 100 milliGRAM(s) Oral two times a day  heparin  Infusion.  Unit(s)/Hr (17 mL/Hr) IV Continuous <Continuous>  insulin lispro (HumaLOG) corrective regimen sliding scale   SubCutaneous three times a day before meals  losartan 100 milliGRAM(s) Oral daily  metoprolol succinate  milliGRAM(s) Oral daily  pantoprazole    Tablet 40 milliGRAM(s) Oral before breakfast  piperacillin/tazobactam IVPB.. 3.375 Gram(s) IV Intermittent every 8 hours  polyethylene glycol 3350 17 Gram(s) Oral two times a day    MEDICATIONS  (PRN):  acetaminophen   Tablet .. 650 milliGRAM(s) Oral every 6 hours PRN Temp greater or equal to 38C (100.4F), Mild Pain (1 - 3)  bisacodyl Suppository 10 milliGRAM(s) Rectal daily PRN Constipation  chlorproMAZINE    Tablet 25 milliGRAM(s) Oral three times a day PRN Hiccups  dextrose 40% Gel 15 Gram(s) Oral once PRN Blood Glucose LESS THAN 70 milliGRAM(s)/deciliter  glucagon  Injectable 1 milliGRAM(s) IntraMuscular once PRN Glucose LESS THAN 70 milligrams/deciliter  heparin  Injectable 7500 Unit(s) IV Push every 6 hours PRN For aPTT less than 40  heparin  Injectable 3500 Unit(s) IV Push every 6 hours PRN For aPTT between 40 - 57  ketorolac   Injectable 15 milliGRAM(s) IV Push every 6 hours PRN Severe Pain (7 - 10)      ROS  No fever, sweats, chills  No epistaxis, HA, sore throat  No CP, SOB, cough, sputum  No n/v/d, abd pain, melena, hematochezia  No edema  No rash  No anxiety  No back pain, joint pain  No bleeding, bruising  No dysuria, hematuria    Vital Signs Last 24 Hrs  T(C): 38.8 (19 Oct 2019 17:27), Max: 39.4 (18 Oct 2019 20:10)  T(F): 101.9 (19 Oct 2019 17:27), Max: 103 (18 Oct 2019 20:10)  HR: 101 (19 Oct 2019 17:27) (84 - 105)  BP: 145/97 (19 Oct 2019 17:27) (134/88 - 179/109)  BP(mean): --  RR: 19 (19 Oct 2019 17:27) (17 - 19)  SpO2: 94% (19 Oct 2019 17:27) (93% - 96%)    PE  NAD  Awake, alert  full exam deferred today, pt with family and eating                          9.7    17.95 )-----------( 376      ( 19 Oct 2019 10:30 )             30.3       10-19    139  |  98  |  14  ----------------------------<  153<H>  3.9   |  29  |  0.81    Ca    8.4      19 Oct 2019 07:10
Pt seen, family at bedside, pt asleep, deferred exam today. Per family, doing well    MEDICATIONS  (STANDING):  amLODIPine   Tablet 10 milliGRAM(s) Oral at bedtime  dextrose 5%. 1000 milliLiter(s) (50 mL/Hr) IV Continuous <Continuous>  dextrose 50% Injectable 12.5 Gram(s) IV Push once  dextrose 50% Injectable 25 Gram(s) IV Push once  dextrose 50% Injectable 25 Gram(s) IV Push once  docusate sodium 100 milliGRAM(s) Oral two times a day  heparin  Infusion.  Unit(s)/Hr (17 mL/Hr) IV Continuous <Continuous>  hydrALAZINE 25 milliGRAM(s) Oral two times a day  insulin lispro (HumaLOG) corrective regimen sliding scale   SubCutaneous three times a day before meals  losartan 100 milliGRAM(s) Oral daily  metoclopramide Injectable 10 milliGRAM(s) IV Push every 8 hours  metoprolol succinate  milliGRAM(s) Oral daily  pantoprazole    Tablet 40 milliGRAM(s) Oral before breakfast  polyethylene glycol 3350 17 Gram(s) Oral two times a day    MEDICATIONS  (PRN):  bisacodyl Suppository 10 milliGRAM(s) Rectal daily PRN Constipation  dextrose 40% Gel 15 Gram(s) Oral once PRN Blood Glucose LESS THAN 70 milliGRAM(s)/deciliter  glucagon  Injectable 1 milliGRAM(s) IntraMuscular once PRN Glucose LESS THAN 70 milligrams/deciliter  heparin  Injectable 7500 Unit(s) IV Push every 6 hours PRN For aPTT less than 40  heparin  Injectable 3500 Unit(s) IV Push every 6 hours PRN For aPTT between 40 - 57      ROS  hiccups controlled per family, no abd pain, deferred otherwise    Vital Signs Last 24 Hrs  T(C): 38.3 (23 Oct 2019 16:15), Max: 39.1 (23 Oct 2019 14:40)  T(F): 100.9 (23 Oct 2019 16:15), Max: 102.3 (23 Oct 2019 14:40)  HR: 92 (23 Oct 2019 14:40) (62 - 108)  BP: 165/86 (23 Oct 2019 14:40) (146/91 - 170/99)  BP(mean): --  RR: 18 (23 Oct 2019 14:40) (18 - 18)  SpO2: 93% (23 Oct 2019 14:40) (93% - 95%)    PE  NAD  asleep  exam deferred                          9.8    21.10 )-----------( 375      ( 23 Oct 2019 12:27 )             30.7       10-23    142  |  93<L>  |  13  ----------------------------<  174<H>  3.6   |  30  |  0.76    Ca    8.7      23 Oct 2019 06:54    TPro  6.9  /  Alb  2.5<L>  /  TBili  0.4  /  DBili  x   /  AST  26  /  ALT  40  /  AlkPhos  282<H>  10-23
Pt seen, family at bedside. Reports a little stronger this am ambulating with PT, able to bear more weight on the RLE. Pt with hiccups again, did not want meds.     MEDICATIONS  (STANDING):  amLODIPine   Tablet 10 milliGRAM(s) Oral at bedtime  aspirin enteric coated 81 milliGRAM(s) Oral daily  dextrose 5%. 1000 milliLiter(s) (50 mL/Hr) IV Continuous <Continuous>  dextrose 50% Injectable 12.5 Gram(s) IV Push once  dextrose 50% Injectable 25 Gram(s) IV Push once  dextrose 50% Injectable 25 Gram(s) IV Push once  docusate sodium 100 milliGRAM(s) Oral two times a day  hydrALAZINE 50 milliGRAM(s) Oral two times a day  insulin lispro (HumaLOG) corrective regimen sliding scale   SubCutaneous three times a day before meals  losartan 100 milliGRAM(s) Oral daily  metoprolol succinate  milliGRAM(s) Oral daily  pantoprazole    Tablet 40 milliGRAM(s) Oral before breakfast  polyethylene glycol 3350 17 Gram(s) Oral two times a day    MEDICATIONS  (PRN):  bisacodyl Suppository 10 milliGRAM(s) Rectal daily PRN Constipation  dextrose 40% Gel 15 Gram(s) Oral once PRN Blood Glucose LESS THAN 70 milliGRAM(s)/deciliter  glucagon  Injectable 1 milliGRAM(s) IntraMuscular once PRN Glucose LESS THAN 70 milligrams/deciliter  heparin  Injectable 7500 Unit(s) IV Push every 6 hours PRN For aPTT less than 40  heparin  Injectable 3500 Unit(s) IV Push every 6 hours PRN For aPTT between 40 - 57  metoclopramide Injectable 10 milliGRAM(s) IV Push every 8 hours PRN hiccoughs  / nausea      ROS  limited 2/2 participation, as above, no pain    Vital Signs Last 24 Hrs  T(C): 37.5 (28 Oct 2019 04:49), Max: 38.6 (27 Oct 2019 14:42)  T(F): 99.5 (28 Oct 2019 04:49), Max: 101.4 (27 Oct 2019 14:42)  HR: 93 (28 Oct 2019 09:32) (76 - 100)  BP: 119/80 (28 Oct 2019 09:32) (119/80 - 164/95)  BP(mean): --  RR: 18 (28 Oct 2019 04:49) (18 - 18)  SpO2: 97% (28 Oct 2019 09:32) (92% - 97%)    PE  NAD, hiccuping intermittently  Awake, alert  Anicteric, MMM  RRR  CTAB ant chest  Abd soft, distended, NT  No edema  4+/5 LLE, 4-/5 RLE  remainder of exam limited 2/2 participation                          9.5    26.65 )-----------( 449      ( 28 Oct 2019 09:16 )             30.4       10-28    138  |  94<L>  |  14  ----------------------------<  142<H>  3.8   |  33<H>  |  0.83    Ca    8.5      28 Oct 2019 07:28    TPro  6.7  /  Alb  2.4<L>  /  TBili  0.5  /  DBili  x   /  AST  31  /  ALT  36  /  AlkPhos  476<H>  10-28
Pt seen, feeling fine. Had fever overnight    MEDICATIONS  (STANDING):  amLODIPine   Tablet 10 milliGRAM(s) Oral at bedtime  dextrose 5%. 1000 milliLiter(s) (50 mL/Hr) IV Continuous <Continuous>  dextrose 50% Injectable 12.5 Gram(s) IV Push once  dextrose 50% Injectable 25 Gram(s) IV Push once  dextrose 50% Injectable 25 Gram(s) IV Push once  docusate sodium 100 milliGRAM(s) Oral two times a day  insulin lispro (HumaLOG) corrective regimen sliding scale   SubCutaneous three times a day before meals  losartan 100 milliGRAM(s) Oral daily  metoprolol succinate  milliGRAM(s) Oral daily  pantoprazole    Tablet 40 milliGRAM(s) Oral before breakfast  piperacillin/tazobactam IVPB.. 3.375 Gram(s) IV Intermittent every 8 hours  polyethylene glycol 3350 17 Gram(s) Oral two times a day  vancomycin  IVPB      vancomycin  IVPB 1000 milliGRAM(s) IV Intermittent every 24 hours    MEDICATIONS  (PRN):  bisacodyl Suppository 10 milliGRAM(s) Rectal daily PRN Constipation  chlorproMAZINE    Tablet 25 milliGRAM(s) Oral three times a day PRN Hiccups  dextrose 40% Gel 15 Gram(s) Oral once PRN Blood Glucose LESS THAN 70 milliGRAM(s)/deciliter  glucagon  Injectable 1 milliGRAM(s) IntraMuscular once PRN Glucose LESS THAN 70 milligrams/deciliter  heparin  Injectable 7500 Unit(s) IV Push every 6 hours PRN For aPTT less than 40  heparin  Injectable 3500 Unit(s) IV Push every 6 hours PRN For aPTT between 40 - 57      ROS  No sweats, chills  No epistaxis, HA, sore throat  No CP, SOB, cough, sputum  No n/v/d, abd pain, melena, hematochezia  No edema  No rash  No anxiety  No back pain, joint pain  No bleeding, bruising  No dysuria, hematuria    Vital Signs Last 24 Hrs  T(C): 38.7 (21 Oct 2019 10:57), Max: 39.4 (20 Oct 2019 23:51)  T(F): 101.7 (21 Oct 2019 10:57), Max: 102.9 (20 Oct 2019 23:51)  HR: 72 (21 Oct 2019 10:57) (72 - 96)  BP: 174/94 (21 Oct 2019 10:57) (140/78 - 191/63)  BP(mean): --  RR: 18 (21 Oct 2019 10:57) (18 - 19)  SpO2: 99% (21 Oct 2019 10:57) (94% - 99%)    PE  NAD  Awake, alert  Anicteric, MMM  RRR  CTAB  Abd soft, NT, ND  No c/c/e  No rash grossly  FROM                          9.9    17.97 )-----------( 378      ( 21 Oct 2019 08:22 )             31.1       10-21    137  |  97  |  11  ----------------------------<  188<H>  3.9   |  30  |  0.83    Ca    8.5      21 Oct 2019 06:08
Subjective: Patient seen and examined. No new events except as noted.   cont to be febrile despite IB zosyn  feels okay   no abdominal pain     REVIEW OF SYSTEMS:    CONSTITUTIONAL: + fever   EYES/ENT: No visual changes;  No vertigo or throat pain   NECK: No pain or stiffness  RESPIRATORY: No cough, wheezing, hemoptysis; No shortness of breath  CARDIOVASCULAR: No chest pain or palpitations  GASTROINTESTINAL: No abdominal or epigastric pain. No nausea, vomiting, or hematemesis; No diarrhea or constipation. No melena or hematochezia.  GENITOURINARY: No dysuria, frequency or hematuria  NEUROLOGICAL: No numbness or weakness  SKIN: No itching, burning, rashes, or lesions   All other review of systems is negative unless indicated above.    MEDICATIONS:  MEDICATIONS  (STANDING):  amLODIPine   Tablet 10 milliGRAM(s) Oral at bedtime  dextrose 5%. 1000 milliLiter(s) (50 mL/Hr) IV Continuous <Continuous>  dextrose 50% Injectable 12.5 Gram(s) IV Push once  dextrose 50% Injectable 25 Gram(s) IV Push once  dextrose 50% Injectable 25 Gram(s) IV Push once  docusate sodium 100 milliGRAM(s) Oral two times a day  heparin  Infusion.  Unit(s)/Hr (17 mL/Hr) IV Continuous <Continuous>  insulin lispro (HumaLOG) corrective regimen sliding scale   SubCutaneous three times a day before meals  losartan 100 milliGRAM(s) Oral daily  metoprolol succinate  milliGRAM(s) Oral daily  pantoprazole    Tablet 40 milliGRAM(s) Oral before breakfast  piperacillin/tazobactam IVPB.. 3.375 Gram(s) IV Intermittent every 8 hours  polyethylene glycol 3350 17 Gram(s) Oral two times a day  vancomycin  IVPB          PHYSICAL EXAM:  T(C): 39.3 (10-20-19 @ 14:03), Max: 39.3 (10-20-19 @ 14:03)  HR: 85 (10-20-19 @ 14:03) (85 - 104)  BP: 164/80 (10-20-19 @ 14:21) (145/97 - 191/63)  RR: 18 (10-20-19 @ 14:03) (18 - 19)  SpO2: 94% (10-20-19 @ 14:03) (94% - 94%)  Wt(kg): --  I&O's Summary    19 Oct 2019 07:01  -  20 Oct 2019 07:00  --------------------------------------------------------  IN: 600 mL / OUT: 500 mL / NET: 100 mL    20 Oct 2019 07:01  -  20 Oct 2019 16:20  --------------------------------------------------------  IN: 960 mL / OUT: 300 mL / NET: 660 mL          Appearance: Normal	  HEENT:   Normal oral mucosa, PERRL, EOMI	  Lymphatic: No lymphadenopathy , no edema  Cardiovascular: Normal S1 S2, No JVD  Respiratory: Lungs clear to auscultation, normal effort 	  Gastrointestinal:  Soft, Non-tender, + BS	  Skin: No rashes, No ecchymoses, No cyanosis, warm to touch  Musculoskeletal: Normal range of motion, normal strength  Psychiatry:  Mood & affect appropriate  Ext: No edema      All labs, Imaging and EKGs personally reviewed                           10.6   19.16 )-----------( 379      ( 20 Oct 2019 08:33 )             33.9               10-19    139  |  98  |  14  ----------------------------<  153<H>  3.9   |  29  |  0.81    Ca    8.4      19 Oct 2019 07:10      PT/INR - ( 19 Oct 2019 08:53 )   PT: 17.7 sec;   INR: 1.53 ratio         PTT - ( 20 Oct 2019 15:28 )  PTT:65.1 sec
Subjective: Patient seen and examined. No new events except as noted.   febrile overnight   denies of any discomfort  NPO for biopsy today     REVIEW OF SYSTEMS:    CONSTITUTIONAL:+ fever   EYES/ENT: No visual changes;  No vertigo or throat pain   NECK: No pain or stiffness  RESPIRATORY: No cough, wheezing, hemoptysis; No shortness of breath  CARDIOVASCULAR: No chest pain or palpitations  GASTROINTESTINAL: No abdominal or epigastric pain.   GENITOURINARY: No dysuria, frequency or hematuria  NEUROLOGICAL: No numbness or weakness  SKIN: No itching, burning, rashes, or lesions   All other review of systems is negative unless indicated above.    MEDICATIONS:  MEDICATIONS  (STANDING):  amLODIPine   Tablet 10 milliGRAM(s) Oral at bedtime  dextrose 5%. 1000 milliLiter(s) (50 mL/Hr) IV Continuous <Continuous>  dextrose 50% Injectable 12.5 Gram(s) IV Push once  dextrose 50% Injectable 25 Gram(s) IV Push once  dextrose 50% Injectable 25 Gram(s) IV Push once  docusate sodium 100 milliGRAM(s) Oral two times a day  insulin lispro (HumaLOG) corrective regimen sliding scale   SubCutaneous three times a day before meals  losartan 100 milliGRAM(s) Oral daily  metoprolol succinate  milliGRAM(s) Oral daily  pantoprazole    Tablet 40 milliGRAM(s) Oral before breakfast  piperacillin/tazobactam IVPB.. 3.375 Gram(s) IV Intermittent every 8 hours  polyethylene glycol 3350 17 Gram(s) Oral two times a day  vancomycin  IVPB      vancomycin  IVPB 1000 milliGRAM(s) IV Intermittent every 24 hours      PHYSICAL EXAM:  T(C): 38.7 (10-21-19 @ 10:57), Max: 39.4 (10-20-19 @ 23:51)  HR: 72 (10-21-19 @ 10:57) (72 - 96)  BP: 174/94 (10-21-19 @ 10:57) (140/78 - 180/90)  RR: 18 (10-21-19 @ 10:57) (18 - 19)  SpO2: 99% (10-21-19 @ 10:57) (94% - 99%)  Wt(kg): --  I&O's Summary    20 Oct 2019 07:01  -  21 Oct 2019 07:00  --------------------------------------------------------  IN: 2033 mL / OUT: 1350 mL / NET: 683 mL    21 Oct 2019 07:01  -  21 Oct 2019 15:38  --------------------------------------------------------  IN: 400 mL / OUT: 0 mL / NET: 400 mL          Appearance: Normal	  HEENT:   Normal oral mucosa, PERRL, EOMI	  Lymphatic: No lymphadenopathy , no edema  Cardiovascular: Normal S1 S2  Respiratory: Lungs clear to auscultation, normal effort 	  Gastrointestinal:  Soft, Non-tender,   Skin: No rashes, No ecchymoses, No cyanosis, warm to touch  Musculoskeletal: Normal range of motion, normal strength  Psychiatry:  Mood & affect appropriate  Ext: No edema      All labs, Imaging and EKGs personally reviewed                           9.9    17.97 )-----------( 378      ( 21 Oct 2019 08:22 )             31.1               10-21    137  |  97  |  11  ----------------------------<  188<H>  3.9   |  30  |  0.83    Ca    8.5      21 Oct 2019 06:08      PTT - ( 21 Oct 2019 06:08 )  PTT:87.9 sec
[facility]    Subjective: Patient seen and examined. No new events except as noted.   LE weakness     REVIEW OF SYSTEMS:    CONSTITUTIONAL: No weakness, fevers or chills  EYES/ENT: No visual changes;  No vertigo or throat pain   NECK: No pain or stiffness  RESPIRATORY: No cough, wheezing, hemoptysis; No shortness of breath  CARDIOVASCULAR: No chest pain or palpitations  GASTROINTESTINAL: No abdominal or epigastric pain. + Hiccups   GENITOURINARY: No dysuria, frequency or hematuria  NEUROLOGICAL: weakness   SKIN: No itching, burning, rashes, or lesions   All other review of systems is negative unless indicated above.    MEDICATIONS:  MEDICATIONS  (STANDING):  amLODIPine   Tablet 10 milliGRAM(s) Oral at bedtime  aspirin enteric coated 81 milliGRAM(s) Oral daily  dextrose 5%. 1000 milliLiter(s) (50 mL/Hr) IV Continuous <Continuous>  dextrose 50% Injectable 12.5 Gram(s) IV Push once  dextrose 50% Injectable 25 Gram(s) IV Push once  dextrose 50% Injectable 25 Gram(s) IV Push once  docusate sodium 100 milliGRAM(s) Oral two times a day  heparin  Infusion.  Unit(s)/Hr (17 mL/Hr) IV Continuous <Continuous>  hydrALAZINE 50 milliGRAM(s) Oral two times a day  insulin lispro (HumaLOG) corrective regimen sliding scale   SubCutaneous three times a day before meals  losartan 100 milliGRAM(s) Oral daily  metoprolol succinate  milliGRAM(s) Oral daily  pantoprazole    Tablet 40 milliGRAM(s) Oral before breakfast  polyethylene glycol 3350 17 Gram(s) Oral two times a day      PHYSICAL EXAM:  T(C): 37 (10-26-19 @ 05:14), Max: 38.1 (10-25-19 @ 19:53)  HR: 94 (10-26-19 @ 05:14) (94 - 98)  BP: 150/96 (10-26-19 @ 05:14) (124/82 - 160/88)  RR: 18 (10-26-19 @ 05:14) (17 - 18)  SpO2: 94% (10-26-19 @ 05:14) (93% - 94%)  Wt(kg): --  I&O's Summary    25 Oct 2019 07:01  -  26 Oct 2019 07:00  --------------------------------------------------------  IN: 1694 mL / OUT: 550 mL / NET: 1144 mL          Appearance: Normal	  HEENT:   Normal oral mucosa, PERRL, EOMI	  Lymphatic: No lymphadenopathy , no edema  Cardiovascular: Normal S1 S2, No JVD  Respiratory: Lungs clear to auscultation, normal effort 	  Gastrointestinal:  Soft, Non-tender, + BS	  Skin: No rashes, No ecchymoses, No cyanosis, warm to touch  Musculoskeletal: LE weakness, intact sensory   Psychiatry:  Mood & affect appropriate  Ext: No edema      All labs, Imaging and EKGs personally reviewed                             9.8    25.09 )-----------( 441      ( 26 Oct 2019 06:35 )             29.8               10-26    138  |  95<L>  |  19  ----------------------------<  164<H>  4.2   |  31  |  0.86    Ca    8.7      26 Oct 2019 06:35    TPro  7.2  /  Alb  2.5<L>  /  TBili  0.5  /  DBili  x   /  AST  28  /  ALT  42  /  AlkPhos  454<H>  10-26    PT/INR - ( 26 Oct 2019 06:35 )   PT: 16.9 sec;   INR: 1.47 ratio         PTT - ( 26 Oct 2019 06:35 )  PTT:82.2 sec
[facility]    Subjective: Patient seen and examined. No new events except as noted.   LE weakness   unable to walk   hiccups     REVIEW OF SYSTEMS:    CONSTITUTIONAL: + weakness   EYES/ENT: No visual changes  NECK: No pain or stiffness  RESPIRATORY: No cough, wheezing  CARDIOVASCULAR: No chest pain or palpitations  GASTROINTESTINAL: No abdominal or epigastric pain. No nausea, vomiting, or hematemesis; No diarrhea or constipation. No melena or hematochezia.  GENITOURINARY: No dysuria, frequency or hematuria  NEUROLOGICAL: LE weakness   SKIN: No itching, burning, rashes, or lesions   All other review of systems is negative unless indicated above.    MEDICATIONS:  MEDICATIONS  (STANDING):  amLODIPine   Tablet 10 milliGRAM(s) Oral at bedtime  aspirin enteric coated 81 milliGRAM(s) Oral daily  dextrose 5%. 1000 milliLiter(s) (50 mL/Hr) IV Continuous <Continuous>  dextrose 50% Injectable 12.5 Gram(s) IV Push once  dextrose 50% Injectable 25 Gram(s) IV Push once  dextrose 50% Injectable 25 Gram(s) IV Push once  docusate sodium 100 milliGRAM(s) Oral two times a day  heparin  Infusion.  Unit(s)/Hr (17 mL/Hr) IV Continuous <Continuous>  hydrALAZINE 25 milliGRAM(s) Oral two times a day  insulin lispro (HumaLOG) corrective regimen sliding scale   SubCutaneous three times a day before meals  losartan 100 milliGRAM(s) Oral daily  metoclopramide Injectable 10 milliGRAM(s) IV Push every 8 hours  metoprolol succinate  milliGRAM(s) Oral daily  pantoprazole    Tablet 40 milliGRAM(s) Oral before breakfast  polyethylene glycol 3350 17 Gram(s) Oral two times a day      PHYSICAL EXAM:  T(C): 37.7 (10-24-19 @ 14:38), Max: 38.3 (10-24-19 @ 00:44)  HR: 80 (10-24-19 @ 14:38) (80 - 99)  BP: 160/87 (10-24-19 @ 14:38) (148/83 - 171/98)  RR: 18 (10-24-19 @ 14:38) (18 - 19)  SpO2: 95% (10-24-19 @ 14:38) (93% - 98%)  Wt(kg): --  I&O's Summary    23 Oct 2019 07:01  -  24 Oct 2019 07:00  --------------------------------------------------------  IN: 1161 mL / OUT: 750 mL / NET: 411 mL    24 Oct 2019 07:01  -  24 Oct 2019 16:34  --------------------------------------------------------  IN: 480 mL / OUT: 0 mL / NET: 480 mL          Appearance: awake, hiccups   HEENT:   Normal oral mucosa, PERRL, EOMI	  Lymphatic: No lymphadenopathy , no edema  Cardiovascular: Normal S1 S2,  Respiratory: Lungs clear to auscultation, normal effort 	  Gastrointestinal:  Soft, Non-tender, + BS	  Skin: No rashes, No ecchymoses, No cyanosis, warm to touch  Musculoskeletal:LE weakness   Psychiatry:  Mood & affect appropriate  Ext: No edema      All labs, Imaging and EKGs personally reviewed                             10.4 24.89 )-----------( 409      ( 24 Oct 2019 12:42 )             31.6               10-24    137  |  96  |  12  ----------------------------<  181<H>  3.6   |  31  |  0.81    Ca    8.6      24 Oct 2019 06:53    TPro  6.7  /  Alb  2.5<L>  /  TBili  0.5  /  DBili  x   /  AST  39  /  ALT  45  /  AlkPhos  326<H>  10-24    PT/INR - ( 23 Oct 2019 09:00 )   PT: 18.1 sec;   INR: 1.57 ratio         PTT - ( 24 Oct 2019 10:39 )  PTT:41.6 sec                   < from: MR Lumbar Spine No Cont (10.23.19 @ 23:35) >  IMPRESSION:   No evidence of spinal cord infarct.    Bilateral facet hypertrophy at L5-S1 resulting in moderate to severe   right and severe left neural foraminal narrowing.      Cytopathology - Non Gyn Report:   ACCESSION No:  [id number]    ASHLEY AVILEZ                     2        Cytopathology Report            Final Diagnosis  LIVER, LEFT LOBE, US GUIDED CORE BIOPSY  POSITIVE FOR MALIGNANT CELLS.  Adenocarcinoma.    Cytology smears, cell block and core biopsy are composed of  crowded 3-dimensional groups, single malignant cells with  pleomorphic, enlarged nuclei containing irregular nuclear  contours, prominent nucleoli, vacuolated cytoplasm and some large  intracytoplasmic mucin vacuoles.  There is scant benign liver  parenchyma in the background as well as areas of necrosis.    Immunohistochemical stains are pending for confirmation of  primary tumor.    Dr. Barrientos and DONALD Cast were informed of the diagnosis via  encrypted email on 10/23/19 13:23.    Screened by: Payton NEVAREZ(ASCP)  Verified by: EBONIE HEATON MD  (Electronic Signature)  Reported on: 10/23/19 13:27 EDT, [facility]. [address] [address]  Cytology technical processing performed at [address] [place] [id number]  _________________________________________________________________      Specimen(s) Submitted  LIVER, LEFT LOBE, US GUIDED CORE BIOPSY      Statement of Adequacy  Immediate cytologic study for adequacy of specimen using aDiff-  Quik stain was performed. FNA acceptable for further evaluation  by GERI Mercado(ASCP).      Clinical Information  Multiple pancreas lesions. Rule out cancer. Liver left lobe  lesion 2.6 cm.      Gross Description  Core Biopsy:            ASHLEY AVILEZ                     2        Cytopathology Report          Tissue collected: Specimen container has been inspected to  confirm patient’s name and date of birth, contains 2  tan cores  measuring 1.1, 1.0 cm in length (and multiple fragments). Entire  specimen submitted in 2 cassette(s) labeled 1B and 1 C.    FNA:  Received: 2 air dried and 3 fixed slides  Needle rinses in 20 ml formalin  Submitted: cell block in one cassette labeled 1A    Prepared:  2 Diff Quick,  3 Pap Stained slides  1 cell block  labeled 1A  1 core biopsy labeled 1B  1 core biopsy labeled 1C  8 Total slides (10.21.19 @ 16:04)
no new complaints    amLODIPine   Tablet 10 milliGRAM(s) Oral at bedtime  aspirin enteric coated 81 milliGRAM(s) Oral daily  bisacodyl Suppository 10 milliGRAM(s) Rectal daily PRN  dextrose 40% Gel 15 Gram(s) Oral once PRN  dextrose 5%. 1000 milliLiter(s) IV Continuous <Continuous>  dextrose 50% Injectable 12.5 Gram(s) IV Push once  dextrose 50% Injectable 25 Gram(s) IV Push once  dextrose 50% Injectable 25 Gram(s) IV Push once  docusate sodium 100 milliGRAM(s) Oral two times a day  glucagon  Injectable 1 milliGRAM(s) IntraMuscular once PRN  heparin  Infusion.  Unit(s)/Hr IV Continuous <Continuous>  heparin  Injectable 7500 Unit(s) IV Push every 6 hours PRN  heparin  Injectable 3500 Unit(s) IV Push every 6 hours PRN  hydrALAZINE 50 milliGRAM(s) Oral two times a day  insulin lispro (HumaLOG) corrective regimen sliding scale   SubCutaneous three times a day before meals  losartan 100 milliGRAM(s) Oral daily  metoclopramide Injectable 10 milliGRAM(s) IV Push every 8 hours PRN  metoprolol succinate  milliGRAM(s) Oral daily  pantoprazole    Tablet 40 milliGRAM(s) Oral before breakfast  polyethylene glycol 3350 17 Gram(s) Oral two times a day      No Known Allergies        T(C): 38.6 (10-27-19 @ 14:42), Max: 39.4 (10-26-19 @ 20:12)  HR: 82 (10-27-19 @ 14:42) (80 - 105)  BP: 126/81 (10-27-19 @ 14:42) (121/82 - 161/99)  RR: 18 (10-27-19 @ 14:42) (18 - 18)  SpO2: 94% (10-27-19 @ 14:42) (93% - 94%)  PHYSICAL EXAM  Gen:  laying in bed, nad  H:  anicteric, eomi  CV:  RRR, S1, S2  Lungs:  CTA b/l  Ab soft/nt/nd  Ext:  no edema  Neuro:  3/5 strength RLE                          8.8    23.37 )-----------( 422      ( 27 Oct 2019 08:40 )             27.2                         9.8    25.09 )-----------( 441      ( 26 Oct 2019 06:35 )             29.8                         9.2    22.22 )-----------( 384      ( 25 Oct 2019 07:15 )             28.0   10-27    143  |  92<L>  |  20  ----------------------------<  198<H>  4.0   |  28  |  0.79    Ca    7.9<L>      27 Oct 2019 05:54    TPro  6.6  /  Alb  2.4<L>  /  TBili  0.5  /  DBili  x   /  AST  26  /  ALT  42  /  AlkPhos  485<H>  10-27
no pain  mobility limited to few steps w assistance    amLODIPine   Tablet 10 milliGRAM(s) Oral at bedtime  aspirin enteric coated 81 milliGRAM(s) Oral daily  bisacodyl Suppository 10 milliGRAM(s) Rectal daily PRN  dextrose 40% Gel 15 Gram(s) Oral once PRN  dextrose 5%. 1000 milliLiter(s) IV Continuous <Continuous>  dextrose 50% Injectable 12.5 Gram(s) IV Push once  dextrose 50% Injectable 25 Gram(s) IV Push once  dextrose 50% Injectable 25 Gram(s) IV Push once  docusate sodium 100 milliGRAM(s) Oral two times a day  glucagon  Injectable 1 milliGRAM(s) IntraMuscular once PRN  heparin  Infusion.  Unit(s)/Hr IV Continuous <Continuous>  heparin  Injectable 7500 Unit(s) IV Push every 6 hours PRN  heparin  Injectable 3500 Unit(s) IV Push every 6 hours PRN  hydrALAZINE 25 milliGRAM(s) Oral two times a day  insulin lispro (HumaLOG) corrective regimen sliding scale   SubCutaneous three times a day before meals  losartan 100 milliGRAM(s) Oral daily  metoclopramide Injectable 10 milliGRAM(s) IV Push every 8 hours  metoprolol succinate  milliGRAM(s) Oral daily  pantoprazole    Tablet 40 milliGRAM(s) Oral before breakfast  polyethylene glycol 3350 17 Gram(s) Oral two times a day      No Known Allergies          T(C): 37.5 (10-24-19 @ 17:22), Max: 38.3 (10-24-19 @ 00:44)  HR: 81 (10-24-19 @ 17:22) (80 - 99)  BP: 134/87 (10-24-19 @ 17:22) (134/87 - 171/98)  RR: 18 (10-24-19 @ 17:22) (18 - 19)  SpO2: 93% (10-24-19 @ 17:22) (93% - 98%)  PHYSICAL EXAM  Gen:  laying in bed, nad  H:  anicteric, eomi  CV:  RRR, S1, S2  Lungs:  CTA b/l  Ab soft/nt/nd  Ext:  no edema                          9.4    23.09 )-----------( 380      ( 24 Oct 2019 17:47 )             28.1                         10.4   24.89 )-----------( 409      ( 24 Oct 2019 12:42 )             31.6                         9.7    23.26 )-----------( 354      ( 24 Oct 2019 03:28 )             29.0   10-24    137  |  96  |  12  ----------------------------<  181<H>  3.6   |  31  |  0.81    Ca    8.6      24 Oct 2019 06:53    TPro  6.7  /  Alb  2.5<L>  /  TBili  0.5  /  DBili  x   /  AST  39  /  ALT  45  /  AlkPhos  326<H>  10-24    Cytopathology - Non Gyn Report:   ACCESSION No:  [id number]    ASHLEY AVILEZ                     2        Cytopathology Report            Final Diagnosis  LIVER, LEFT LOBE, US GUIDED CORE BIOPSY  POSITIVE FOR MALIGNANT CELLS.  Adenocarcinoma.    Cytology smears, cell block and core biopsy are composed of  crowded 3-dimensional groups, single malignant cells with  pleomorphic, enlarged nuclei containing irregular nuclear  contours, prominent nucleoli, vacuolated cytoplasm and some large  intracytoplasmic mucin vacuoles.  There is scant benign liver  parenchyma in the background as well as areas of necrosis.    Immunohistochemical stains are pending for confirmation of  primary tumor.     (10.21.19 @ 16:04)
Pt seen, feeling fine, hiccups much improved but still present. With 2/10 abd discomfort, in center of abd. No other new complaints.     MEDICATIONS  (STANDING):  amLODIPine   Tablet 10 milliGRAM(s) Oral at bedtime  dextrose 5%. 1000 milliLiter(s) (50 mL/Hr) IV Continuous <Continuous>  dextrose 50% Injectable 12.5 Gram(s) IV Push once  dextrose 50% Injectable 25 Gram(s) IV Push once  dextrose 50% Injectable 25 Gram(s) IV Push once  docusate sodium 100 milliGRAM(s) Oral two times a day  heparin  Infusion.  Unit(s)/Hr (17 mL/Hr) IV Continuous <Continuous>  insulin lispro (HumaLOG) corrective regimen sliding scale   SubCutaneous three times a day before meals  losartan 100 milliGRAM(s) Oral daily  metoprolol succinate  milliGRAM(s) Oral daily  pantoprazole    Tablet 40 milliGRAM(s) Oral before breakfast  polyethylene glycol 3350 17 Gram(s) Oral two times a day    MEDICATIONS  (PRN):  acetaminophen   Tablet .. 650 milliGRAM(s) Oral every 6 hours PRN Temp greater or equal to 38C (100.4F), Mild Pain (1 - 3)  chlorproMAZINE    Tablet 25 milliGRAM(s) Oral three times a day PRN Hiccups  dextrose 40% Gel 15 Gram(s) Oral once PRN Blood Glucose LESS THAN 70 milliGRAM(s)/deciliter  glucagon  Injectable 1 milliGRAM(s) IntraMuscular once PRN Glucose LESS THAN 70 milligrams/deciliter  heparin  Injectable 7500 Unit(s) IV Push every 6 hours PRN For aPTT less than 40  heparin  Injectable 3500 Unit(s) IV Push every 6 hours PRN For aPTT between 40 - 57  ketorolac   Injectable 15 milliGRAM(s) IV Push every 6 hours PRN Severe Pain (7 - 10)      ROS  No fever, sweats, chills  No epistaxis, HA, sore throat  No CP, SOB, cough, sputum  No n/v/d, melena, hematochezia  No edema  No rash  No anxiety  No back pain, joint pain  No bleeding, bruising  No dysuria, hematuria    Vital Signs Last 24 Hrs  T(C): 37.1 (18 Oct 2019 14:38), Max: 39.4 (17 Oct 2019 20:01)  T(F): 98.8 (18 Oct 2019 14:38), Max: 102.9 (17 Oct 2019 20:01)  HR: 78 (18 Oct 2019 14:38) (70 - 102)  BP: 173/89 (18 Oct 2019 14:38) (145/90 - 192/98)  BP(mean): --  RR: 17 (18 Oct 2019 14:38) (17 - 18)  SpO2: 98% (18 Oct 2019 14:38) (94% - 98%)    PE  NAD  Awake, alert  Anicteric, MMM  RRR  CTAB  Abd soft, NT, distended  No c/c/e  No rash grossly  FROM                          10.6   17.56 )-----------( 355      ( 18 Oct 2019 07:55 )             33.0       10-18    139  |  96  |  20  ----------------------------<  132<H>  4.1   |  30  |  0.95    Ca    9.1      18 Oct 2019 05:19    TPro  7.4  /  Alb  2.9<L>  /  TBili  0.4  /  DBili  x   /  AST  35  /  ALT  40  /  AlkPhos  204<H>  10-17    MRI abd:   LIVER: Normal morphology. No significant steatosis. As on CT, multiple bilobar targetoid liver lesions. Lesions are T2 hyperintense with   peripheral edema, rim enhancement and several demonstrate delayed central enhancement. A reference in segment IVb measures 3.9 cm (series 14, image 147), unchanged given differences in technique since recent CT. Several lesions are subcapsular. Additionally, at least 3 lesions in the lateral   left lobe directly abut the stomach with a reference measuring 2.6 cm (series 14, image 139). A 2.6 cm caudate lobe lesion abuts the intrahepatic IVC (series 16, image 19).    PANCREAS: A 3.4 x 2.5 cm centrally hypoenhancing mass with peripheral enhancement in the distal body and tail with abrupt cut off of the duct   and resultant upstream ductal dilatation with tail atrophy. Greater than 180 degree encasement of the adjacent splenic artery and the splenic vein   with narrowing of the splenic vein at its midportion where it contacts the mass, possibly partial thrombosis or invasion (series 14, image 146).   Celiac axis, SMA and SMV are patent and uninvolved.    VESSELS: Greater than 180 degrees encasement of the splenic artery and splenic vein with narrowing of the splenic vein at its midportion where   it abuts the mass, as above. Celiac axis, SMA and SMV are patent and uninvolved. Partial nonocclusive thrombus of the anterior branch of the   right portal vein with occlusive thrombus of its distal branches (series 14, image 74 and 137-133).Patent main and intrahepatic left portal vein   and posterior branches of the right portal vein.    RETROPERITONEUM/LYMPH NODES: A prominent portacaval node measures 2.9 x   1.6 cm (series 16, image 32).
[facility]    Subjective: Patient seen and examined. No new events except as noted.   port placement today   will change AC to lovenox from tonight     REVIEW OF SYSTEMS:    CONSTITUTIONAL: No weakness, fevers or chills  EYES/ENT: No visual changes;  No vertigo or throat pain   NECK: No pain or stiffness  RESPIRATORY: No cough, wheezing, hemoptysis; No shortness of breath  CARDIOVASCULAR: No chest pain or palpitations  GASTROINTESTINAL: No abdominal or epigastric pain. No nausea, vomiting, or hematemesis; No diarrhea or constipation. No melena or hematochezia.  GENITOURINARY: No dysuria, frequency or hematuria  NEUROLOGICAL: No numbness or weakness  SKIN: No itching, burning, rashes, or lesions   All other review of systems is negative unless indicated above.    MEDICATIONS:  MEDICATIONS  (STANDING):  amLODIPine   Tablet 10 milliGRAM(s) Oral at bedtime  aspirin enteric coated 81 milliGRAM(s) Oral daily  dextrose 5%. 1000 milliLiter(s) (50 mL/Hr) IV Continuous <Continuous>  dextrose 50% Injectable 12.5 Gram(s) IV Push once  dextrose 50% Injectable 25 Gram(s) IV Push once  dextrose 50% Injectable 25 Gram(s) IV Push once  docusate sodium 100 milliGRAM(s) Oral two times a day  hydrALAZINE 50 milliGRAM(s) Oral two times a day  insulin lispro (HumaLOG) corrective regimen sliding scale   SubCutaneous three times a day before meals  losartan 100 milliGRAM(s) Oral daily  metoprolol succinate  milliGRAM(s) Oral daily  pantoprazole    Tablet 40 milliGRAM(s) Oral before breakfast  polyethylene glycol 3350 17 Gram(s) Oral two times a day      PHYSICAL EXAM:  T(C): 37.5 (10-28-19 @ 04:49), Max: 37.7 (10-27-19 @ 21:44)  HR: 93 (10-28-19 @ 09:32) (76 - 100)  BP: 119/80 (10-28-19 @ 09:32) (119/80 - 164/95)  RR: 18 (10-28-19 @ 04:49) (18 - 18)  SpO2: 97% (10-28-19 @ 09:32) (92% - 97%)  Wt(kg): --  I&O's Summary    27 Oct 2019 07:01  -  28 Oct 2019 07:00  --------------------------------------------------------  IN: 1484 mL / OUT: 1300 mL / NET: 184 mL    28 Oct 2019 07:01  -  28 Oct 2019 15:46  --------------------------------------------------------  IN: 0 mL / OUT: 0 mL / NET: 0 mL          Appearance: Normal	  HEENT:   Normal oral mucosa, PERRL, EOMI	  Lymphatic: No lymphadenopathy , no edema  Cardiovascular: Normal S1 S2, No JVD, No murmurs , Peripheral pulses palpable 2+ bilaterally  Respiratory: Lungs clear to auscultation, normal effort 	  Gastrointestinal:  Soft, Non-tender, + BS	  Skin: No rashes, No ecchymoses, No cyanosis, warm to touch  Musculoskeletal: LE weakness  Psychiatry:  Mood & affect appropriate  Ext: No edema      All labs, Imaging and EKGs personally reviewed                             9.5    26.65 )-----------( 449      ( 28 Oct 2019 09:16 )             30.4               10-28    138  |  94<L>  |  14  ----------------------------<  142<H>  3.8   |  33<H>  |  0.83    Ca    8.5      28 Oct 2019 07:28    TPro  6.7  /  Alb  2.4<L>  /  TBili  0.5  /  DBili  x   /  AST  31  /  ALT  36  /  AlkPhos  476<H>  10-28    PT/INR - ( 28 Oct 2019 08:54 )   PT: 16.6 sec;   INR: 1.44 ratio         PTT - ( 28 Oct 2019 08:54 )  PTT:63.4 sec                   < from: MR Thoracic Spine w/wo IV Cont (10.27.19 @ 14:01) >  Impression: No abnormal areas of enhancement seen, otherwise no   significant change when allowing for differences in technique.
Subjective: Patient seen and examined. No new events except as noted.   febrile overnight  on zosyn  abdominal pain improved     REVIEW OF SYSTEMS:    CONSTITUTIONAL: No weakness, fevers or chills  EYES/ENT: No visual changes;  No vertigo or throat pain   NECK: No pain or stiffness  RESPIRATORY: No cough, wheezing, hemoptysis; No shortness of breath  CARDIOVASCULAR: No chest pain or palpitations  GASTROINTESTINAL: No abdominal or epigastric pain. No nausea, vomiting, or hematemesis; No diarrhea or constipation. No melena or hematochezia.  GENITOURINARY: No dysuria, frequency or hematuria  NEUROLOGICAL: No numbness or weakness  SKIN: No itching, burning, rashes, or lesions   All other review of systems is negative unless indicated above.    MEDICATIONS:  MEDICATIONS  (STANDING):  amLODIPine   Tablet 10 milliGRAM(s) Oral at bedtime  dextrose 5%. 1000 milliLiter(s) (50 mL/Hr) IV Continuous <Continuous>  dextrose 50% Injectable 12.5 Gram(s) IV Push once  dextrose 50% Injectable 25 Gram(s) IV Push once  dextrose 50% Injectable 25 Gram(s) IV Push once  docusate sodium 100 milliGRAM(s) Oral two times a day  heparin  Infusion.  Unit(s)/Hr (17 mL/Hr) IV Continuous <Continuous>  insulin lispro (HumaLOG) corrective regimen sliding scale   SubCutaneous three times a day before meals  losartan 100 milliGRAM(s) Oral daily  metoprolol succinate  milliGRAM(s) Oral daily  pantoprazole    Tablet 40 milliGRAM(s) Oral before breakfast  piperacillin/tazobactam IVPB.. 3.375 Gram(s) IV Intermittent every 8 hours  polyethylene glycol 3350 17 Gram(s) Oral two times a day      PHYSICAL EXAM:  T(C): 39.3 (10-19-19 @ 13:33), Max: 39.4 (10-18-19 @ 20:10)  HR: 103 (10-19-19 @ 13:33) (84 - 105)  BP: 177/112 (10-19-19 @ 13:33) (134/88 - 179/109)  RR: 18 (10-19-19 @ 13:33) (17 - 18)  SpO2: 95% (10-19-19 @ 13:33) (93% - 96%)  Wt(kg): --  I&O's Summary    18 Oct 2019 07:01  -  19 Oct 2019 07:00  --------------------------------------------------------  IN: 884 mL / OUT: 0 mL / NET: 884 mL          Appearance: febrile   HEENT:   Normal oral mucosa, PERRL, EOMI	  Lymphatic: No lymphadenopathy , no edema  Cardiovascular: Normal S1 S2  Respiratory: Lungs clear to auscultation, normal effort 	  Gastrointestinal:  Soft, Non-tender, + BS	  Skin: No rashes, No ecchymoses, No cyanosis, warm to touch  Musculoskeletal: Normal range of motion, normal strength  Psychiatry:  Mood & affect appropriate  Ext: No edema      All labs, Imaging and EKGs personally reviewed                         9.7    17.95 )-----------( 376      ( 19 Oct 2019 10:30 )             30.3               10-    139  |  98  |  14  ----------------------------<  153<H>  3.9   |  29  |  0.81    Ca    8.4      19 Oct 2019 07:10      PT/INR - ( 19 Oct 2019 08:53 )   PT: 17.7 sec;   INR: 1.53 ratio         PTT - ( 19 Oct 2019 08:53 )  PTT:52.9 sec                   Urinalysis Basic - ( 18 Oct 2019 09:23 )    Color: Yellow / Appearance: Clear / S.025 / pH: x  Gluc: x / Ketone: Negative  / Bili: Negative / Urobili: <2 mg/dL   Blood: x / Protein: 30 mg/dL / Nitrite: Negative   Leuk Esterase: Negative / RBC: 2 /HPF / WBC 1 /HPF   Sq Epi: x / Non Sq Epi: 0 /HPF / Bacteria: Negative

## 2019-10-29 NOTE — PROGRESS NOTE ADULT - REASON FOR ADMISSION
SOB, pancreatic lesion

## 2019-10-29 NOTE — PROGRESS NOTE ADULT - PROVIDER SPECIALTY LIST ADULT
Cardiology
Gastroenterology
Heme/Onc
Infectious Disease
Internal Medicine
Intervent Radiology
Neurology
Infectious Disease
Internal Medicine

## 2019-10-29 NOTE — DISCHARGE NOTE PROVIDER - CARE PROVIDER_API CALL
Tessie Parr (MD)  Hematology; Oncology  [address], [address]  [place] [address]  Phone: [phone]  Fax: [phone]  Follow Up Time:     Ramon Zapata ()  [address], [address] [facility] [address]  [place] [address]  Phone: [phone]  Fax: [phone]  Follow Up Time:

## 2019-10-29 NOTE — DISCHARGE NOTE PROVIDER - NSDCMRMEDTOKEN_GEN_ALL_CORE_FT
aspirin 81 mg oral delayed release tablet: 1 tab(s) orally once a day  aspirin 81 mg oral tablet: 1 tab(s) orally once a day  atorvastatin 80 mg oral tablet: 1 tab(s) orally once a day  bisacodyl 10 mg rectal suppository: 1 suppository(ies) rectal once a day, As needed, Constipation  docusate sodium 100 mg oral capsule: 1 cap(s) orally 2 times a day  Eliquis 5 mg oral tablet: 2 tab(s) orally 2 times a day for 7 days   then 1 tab 2 times a day   empagliflozin 10 mg oral tablet: 1 tab(s) orally once a day (in the morning). [facility] says patient picked up 90 day supply in August but unsure if he still takes.  glipiZIDE 10 mg oral tablet: 1 tab(s) orally 2 times a day  hydrALAZINE 50 mg oral tablet: 1 tab(s) orally 2 times a day  hydroCHLOROthiazide 25 mg oral tablet: 1 tab(s) orally once a day  Janumet 50 mg-1000 mg oral tablet: 1 tab(s) orally 2 times a day  losartan 100 mg oral tablet: 1 tab(s) orally once a day  metoprolol succinate 200 mg oral tablet, extended release: 1 tab(s) orally once a day  pantoprazole 40 mg oral delayed release tablet: 1 tab(s) orally once a day (before a meal)  polyethylene glycol 3350 oral powder for reconstitution: 17 gram(s) orally 2 times a day amLODIPine 10 mg oral tablet: 1 tab(s) orally once a day (at bedtime)  aspirin 81 mg oral delayed release tablet: 1 tab(s) orally once a day  atorvastatin 80 mg oral tablet: 1 tab(s) orally once a day  bedside commode :   bisacodyl 10 mg rectal suppository: 1 suppository(ies) rectal once a day, As needed, Constipation  docusate sodium 100 mg oral capsule: 1 cap(s) orally 2 times a day  Eliquis 5 mg oral tablet: 2 tab(s) orally 2 times a day for 7 days   then 1 tab 2 times a day   glipiZIDE 10 mg oral tablet: 1 tab(s) orally 2 times a day  hydrALAZINE 50 mg oral tablet: 1 tab(s) orally 2 times a day  Janumet 50 mg-1000 mg oral tablet: 1 tab(s) orally 2 times a day  losartan 100 mg oral tablet: 1 tab(s) orally once a day  metoprolol succinate 200 mg oral tablet, extended release: 1 tab(s) orally once a day  pantoprazole 40 mg oral delayed release tablet: 1 tab(s) orally once a day (before a meal)  polyethylene glycol 3350 oral powder for reconstitution: 17 gram(s) orally 2 times a day  on month supply   walker:

## 2019-10-31 ENCOUNTER — APPOINTMENT (OUTPATIENT)
Dept: CARDIOLOGY | Facility: CLINIC | Age: 54
End: 2019-10-31
Payer: MEDICARE

## 2019-10-31 ENCOUNTER — NON-APPOINTMENT (OUTPATIENT)
Age: 54
End: 2019-10-31

## 2019-10-31 VITALS — HEART RATE: 67 BPM | OXYGEN SATURATION: 97 % | SYSTOLIC BLOOD PRESSURE: 100 MMHG | DIASTOLIC BLOOD PRESSURE: 70 MMHG

## 2019-10-31 DIAGNOSIS — C25.9 MALIGNANT NEOPLASM OF PANCREAS, UNSPECIFIED: ICD-10-CM

## 2019-10-31 DIAGNOSIS — C78.7 MALIGNANT NEOPLASM OF PANCREAS, UNSPECIFIED: ICD-10-CM

## 2019-10-31 DIAGNOSIS — I82.409 ACUTE EMBOLISM AND THROMBOSIS OF UNSPECIFIED DEEP VEINS OF UNSPECIFIED LOWER EXTREMITY: ICD-10-CM

## 2019-10-31 DIAGNOSIS — I10 ESSENTIAL (PRIMARY) HYPERTENSION: ICD-10-CM

## 2019-10-31 PROCEDURE — 99214 OFFICE O/P EST MOD 30 MIN: CPT | Mod: PD

## 2019-10-31 PROCEDURE — 93000 ELECTROCARDIOGRAM COMPLETE: CPT | Mod: PD

## 2019-10-31 RX ORDER — APIXABAN 5 MG/1
5 TABLET, FILM COATED ORAL
Qty: 60 | Refills: 2 | Status: ACTIVE | COMMUNITY
Start: 2019-10-31

## 2019-10-31 NOTE — PHYSICAL EXAM
[General Appearance - Well Developed] : well developed [General Appearance - Well Nourished] : well nourished [General Appearance - In No Acute Distress] : no acute distress [Normal Conjunctiva] : the conjunctiva exhibited no abnormalities [No Oral Pallor] : no oral pallor [Normal Oropharynx] : normal oropharynx [Normal Jugular Venous V Waves Present] : normal jugular venous V waves present [Respiration, Rhythm And Depth] : normal respiratory rhythm and effort [Auscultation Breath Sounds / Voice Sounds] : lungs were clear to auscultation bilaterally [Heart Sounds] : normal S1 and S2 [Arterial Pulses Normal] : the arterial pulses were normal [Edema] : no peripheral edema present [Regular] : the rhythm was regular [Bowel Sounds] : normal bowel sounds [Abdomen Soft] : soft [Abnormal Walk] : normal gait [Cyanosis, Localized] : no localized cyanosis [Skin Turgor] : normal skin turgor [Impaired Insight] : insight and judgment were intact [FreeTextEntry1] : [facility]

## 2019-10-31 NOTE — DISCUSSION/SUMMARY
[FreeTextEntry1] : Mr. Prasad is a 54-year-old with chronic hypertension, stroke and diabetes, and newly diagnosed metastatic prostate cancer.\par He appears to be in a weakened state and his blood pressure is fairly low.  I have suggested that he reduce his antihypertensive regimen as follows:\par Stop amlodipine.\par Reduce losartan 100 to telmisartan 40 mg (I was able to procure samples for him)\par He is planning to see an oncologist in the near future, however it is unclear to me whether he will be able to tolerate chemotherapeutics.

## 2019-10-31 NOTE — HISTORY OF PRESENT ILLNESS
[FreeTextEntry1] : He is accompanied by his brother and sister-in-law.\par He was recently hospitalized for new onset of right-sided leg weakness and was ultimately diagnosed with metastatic prostate cancer.  A right subclavian port was placed as someone is considering giving him chemotherapy.  He appears weakened and is not as verbally interactive as usual.\par His medications were adjusted and I reviewed this list with his sister-in-law.   \par He denies any pain currently but does not feel well overall.

## 2019-11-01 ENCOUNTER — INPATIENT (INPATIENT)
Facility: HOSPITAL | Age: 54
LOS: 7 days | DRG: 64 | End: 2019-11-09
Attending: STUDENT IN AN ORGANIZED HEALTH CARE EDUCATION/TRAINING PROGRAM | Admitting: INTERNAL MEDICINE
Payer: MEDICARE

## 2019-11-01 VITALS
TEMPERATURE: 99 F | SYSTOLIC BLOOD PRESSURE: 122 MMHG | DIASTOLIC BLOOD PRESSURE: 79 MMHG | OXYGEN SATURATION: 94 % | RESPIRATION RATE: 18 BRPM | WEIGHT: 220.02 LBS | HEIGHT: 68 IN | HEART RATE: 80 BPM

## 2019-11-01 DIAGNOSIS — E11.9 TYPE 2 DIABETES MELLITUS WITHOUT COMPLICATIONS: ICD-10-CM

## 2019-11-01 DIAGNOSIS — N17.9 ACUTE KIDNEY FAILURE, UNSPECIFIED: ICD-10-CM

## 2019-11-01 DIAGNOSIS — R10.9 UNSPECIFIED ABDOMINAL PAIN: ICD-10-CM

## 2019-11-01 DIAGNOSIS — R62.7 ADULT FAILURE TO THRIVE: ICD-10-CM

## 2019-11-01 DIAGNOSIS — R53.1 WEAKNESS: ICD-10-CM

## 2019-11-01 DIAGNOSIS — C25.2 MALIGNANT NEOPLASM OF TAIL OF PANCREAS: ICD-10-CM

## 2019-11-01 DIAGNOSIS — I10 ESSENTIAL (PRIMARY) HYPERTENSION: ICD-10-CM

## 2019-11-01 DIAGNOSIS — R65.10 SYSTEMIC INFLAMMATORY RESPONSE SYNDROME (SIRS) OF NON-INFECTIOUS ORIGIN WITHOUT ACUTE ORGAN DYSFUNCTION: ICD-10-CM

## 2019-11-01 LAB
ALBUMIN SERPL ELPH-MCNC: 2.4 G/DL — LOW (ref 3.3–5)
ALP SERPL-CCNC: 431 U/L — HIGH (ref 40–120)
ALT FLD-CCNC: 27 U/L — SIGNIFICANT CHANGE UP (ref 10–45)
ANION GAP SERPL CALC-SCNC: 15 MMOL/L — SIGNIFICANT CHANGE UP (ref 5–17)
ANISOCYTOSIS BLD QL: SLIGHT — SIGNIFICANT CHANGE UP
APPEARANCE UR: ABNORMAL
AST SERPL-CCNC: 46 U/L — HIGH (ref 10–40)
BACTERIA # UR AUTO: ABNORMAL
BASE EXCESS BLDV CALC-SCNC: 2.2 MMOL/L — HIGH (ref -2–2)
BASE EXCESS BLDV CALC-SCNC: 3.8 MMOL/L — HIGH (ref -2–2)
BASOPHILS # BLD AUTO: 0.03 K/UL — SIGNIFICANT CHANGE UP (ref 0–0.2)
BASOPHILS NFR BLD AUTO: 0.1 % — SIGNIFICANT CHANGE UP (ref 0–2)
BILIRUB SERPL-MCNC: 0.4 MG/DL — SIGNIFICANT CHANGE UP (ref 0.2–1.2)
BILIRUB UR-MCNC: NEGATIVE — SIGNIFICANT CHANGE UP
BUN SERPL-MCNC: 51 MG/DL — HIGH (ref 7–23)
CA-I SERPL-SCNC: 1.03 MMOL/L — LOW (ref 1.12–1.3)
CA-I SERPL-SCNC: 1.08 MMOL/L — LOW (ref 1.12–1.3)
CALCIUM SERPL-MCNC: 8.1 MG/DL — LOW (ref 8.4–10.5)
CHLORIDE BLDV-SCNC: 100 MMOL/L — SIGNIFICANT CHANGE UP (ref 96–108)
CHLORIDE BLDV-SCNC: 102 MMOL/L — SIGNIFICANT CHANGE UP (ref 96–108)
CHLORIDE SERPL-SCNC: 95 MMOL/L — LOW (ref 96–108)
CO2 BLDV-SCNC: 28 MMOL/L — SIGNIFICANT CHANGE UP (ref 22–30)
CO2 BLDV-SCNC: 30 MMOL/L — SIGNIFICANT CHANGE UP (ref 22–30)
CO2 SERPL-SCNC: 23 MMOL/L — SIGNIFICANT CHANGE UP (ref 22–31)
COLOR SPEC: YELLOW — SIGNIFICANT CHANGE UP
CREAT SERPL-MCNC: 1.26 MG/DL — SIGNIFICANT CHANGE UP (ref 0.5–1.3)
DIFF PNL FLD: NEGATIVE — SIGNIFICANT CHANGE UP
ELLIPTOCYTES BLD QL SMEAR: SLIGHT — SIGNIFICANT CHANGE UP
EOSINOPHIL # BLD AUTO: 0.09 K/UL — SIGNIFICANT CHANGE UP (ref 0–0.5)
EOSINOPHIL NFR BLD AUTO: 0.3 % — SIGNIFICANT CHANGE UP (ref 0–6)
EPI CELLS # UR: 2 /HPF — SIGNIFICANT CHANGE UP
GAS PNL BLDV: 132 MMOL/L — LOW (ref 135–145)
GAS PNL BLDV: 132 MMOL/L — LOW (ref 135–145)
GAS PNL BLDV: SIGNIFICANT CHANGE UP
GIANT PLATELETS BLD QL SMEAR: PRESENT — SIGNIFICANT CHANGE UP
GLUCOSE BLDV-MCNC: 105 MG/DL — HIGH (ref 70–99)
GLUCOSE BLDV-MCNC: 145 MG/DL — HIGH (ref 70–99)
GLUCOSE SERPL-MCNC: 146 MG/DL — HIGH (ref 70–99)
GLUCOSE UR QL: NEGATIVE — SIGNIFICANT CHANGE UP
HCO3 BLDV-SCNC: 26 MMOL/L — SIGNIFICANT CHANGE UP (ref 21–29)
HCO3 BLDV-SCNC: 29 MMOL/L — SIGNIFICANT CHANGE UP (ref 21–29)
HCT VFR BLD CALC: 27.1 % — LOW (ref 39–50)
HCT VFR BLDA CALC: 26 % — LOW (ref 39–50)
HCT VFR BLDA CALC: 29 % — LOW (ref 39–50)
HGB BLD CALC-MCNC: 8.3 G/DL — LOW (ref 13–17)
HGB BLD CALC-MCNC: 9.4 G/DL — LOW (ref 13–17)
HGB BLD-MCNC: 8.9 G/DL — LOW (ref 13–17)
HYALINE CASTS # UR AUTO: 3 /LPF — HIGH (ref 0–2)
HYPOCHROMIA BLD QL: SLIGHT — SIGNIFICANT CHANGE UP
IMM GRANULOCYTES NFR BLD AUTO: 2 % — HIGH (ref 0–1.5)
KETONES UR-MCNC: NEGATIVE — SIGNIFICANT CHANGE UP
LACTATE BLDV-MCNC: 1.9 MMOL/L — SIGNIFICANT CHANGE UP (ref 0.7–2)
LACTATE BLDV-MCNC: 3.5 MMOL/L — HIGH (ref 0.7–2)
LEUKOCYTE ESTERASE UR-ACNC: NEGATIVE — SIGNIFICANT CHANGE UP
LG PLATELETS BLD QL AUTO: SIGNIFICANT CHANGE UP
LYMPHOCYTES # BLD AUTO: 0.69 K/UL — LOW (ref 1–3.3)
LYMPHOCYTES # BLD AUTO: 2.4 % — LOW (ref 13–44)
MACROCYTES BLD QL: SLIGHT — SIGNIFICANT CHANGE UP
MANUAL SMEAR VERIFICATION: SIGNIFICANT CHANGE UP
MCHC RBC-ENTMCNC: 24.6 PG — LOW (ref 27–34)
MCHC RBC-ENTMCNC: 32.8 GM/DL — SIGNIFICANT CHANGE UP (ref 32–36)
MCV RBC AUTO: 74.9 FL — LOW (ref 80–100)
MICROCYTES BLD QL: SIGNIFICANT CHANGE UP
MONOCYTES # BLD AUTO: 2.59 K/UL — HIGH (ref 0–0.9)
MONOCYTES NFR BLD AUTO: 9.1 % — SIGNIFICANT CHANGE UP (ref 2–14)
NEUTROPHILS # BLD AUTO: 24.39 K/UL — HIGH (ref 1.8–7.4)
NEUTROPHILS NFR BLD AUTO: 86.1 % — HIGH (ref 43–77)
NITRITE UR-MCNC: NEGATIVE — SIGNIFICANT CHANGE UP
NRBC # BLD: 0 /100 WBCS — SIGNIFICANT CHANGE UP (ref 0–0)
OVALOCYTES BLD QL SMEAR: SLIGHT — SIGNIFICANT CHANGE UP
PCO2 BLDV: 41 MMHG — SIGNIFICANT CHANGE UP (ref 35–50)
PCO2 BLDV: 48 MMHG — SIGNIFICANT CHANGE UP (ref 35–50)
PH BLDV: 7.39 — SIGNIFICANT CHANGE UP (ref 7.35–7.45)
PH BLDV: 7.42 — SIGNIFICANT CHANGE UP (ref 7.35–7.45)
PH UR: 5.5 — SIGNIFICANT CHANGE UP (ref 5–8)
PLAT MORPH BLD: ABNORMAL
PLATELET # BLD AUTO: 332 K/UL — SIGNIFICANT CHANGE UP (ref 150–400)
PO2 BLDV: 23 MMHG — LOW (ref 25–45)
PO2 BLDV: 47 MMHG — HIGH (ref 25–45)
POLYCHROMASIA BLD QL SMEAR: SLIGHT — SIGNIFICANT CHANGE UP
POTASSIUM BLDV-SCNC: 3.5 MMOL/L — SIGNIFICANT CHANGE UP (ref 3.5–5.3)
POTASSIUM BLDV-SCNC: 3.5 MMOL/L — SIGNIFICANT CHANGE UP (ref 3.5–5.3)
POTASSIUM SERPL-MCNC: 4 MMOL/L — SIGNIFICANT CHANGE UP (ref 3.5–5.3)
POTASSIUM SERPL-SCNC: 4 MMOL/L — SIGNIFICANT CHANGE UP (ref 3.5–5.3)
PROT SERPL-MCNC: 6.8 G/DL — SIGNIFICANT CHANGE UP (ref 6–8.3)
PROT UR-MCNC: ABNORMAL
RBC # BLD: 3.62 M/UL — LOW (ref 4.2–5.8)
RBC # FLD: 14.6 % — HIGH (ref 10.3–14.5)
RBC BLD AUTO: ABNORMAL
RBC CASTS # UR COMP ASSIST: 1 /HPF — SIGNIFICANT CHANGE UP (ref 0–4)
SAO2 % BLDV: 28 % — LOW (ref 67–88)
SAO2 % BLDV: 78 % — SIGNIFICANT CHANGE UP (ref 67–88)
SODIUM SERPL-SCNC: 133 MMOL/L — LOW (ref 135–145)
SP GR SPEC: 1.02 — SIGNIFICANT CHANGE UP (ref 1.01–1.02)
TARGETS BLD QL SMEAR: SLIGHT — SIGNIFICANT CHANGE UP
UROBILINOGEN FLD QL: NEGATIVE — SIGNIFICANT CHANGE UP
WBC # BLD: 28.36 K/UL — HIGH (ref 3.8–10.5)
WBC # FLD AUTO: 28.36 K/UL — HIGH (ref 3.8–10.5)
WBC UR QL: 3 /HPF — SIGNIFICANT CHANGE UP (ref 0–5)

## 2019-11-01 PROCEDURE — 99285 EMERGENCY DEPT VISIT HI MDM: CPT | Mod: GC

## 2019-11-01 PROCEDURE — 71045 X-RAY EXAM CHEST 1 VIEW: CPT | Mod: 26

## 2019-11-01 PROCEDURE — 99223 1ST HOSP IP/OBS HIGH 75: CPT

## 2019-11-01 RX ORDER — FAMOTIDINE 10 MG/ML
20 INJECTION INTRAVENOUS ONCE
Refills: 0 | Status: COMPLETED | OUTPATIENT
Start: 2019-11-01 | End: 2019-11-01

## 2019-11-01 RX ORDER — METOPROLOL TARTRATE 50 MG
200 TABLET ORAL DAILY
Refills: 0 | Status: DISCONTINUED | OUTPATIENT
Start: 2019-11-01 | End: 2019-11-03

## 2019-11-01 RX ORDER — TELMISARTAN 20 MG/1
1 TABLET ORAL
Qty: 0 | Refills: 0 | DISCHARGE

## 2019-11-01 RX ORDER — PIPERACILLIN AND TAZOBACTAM 4; .5 G/20ML; G/20ML
3.38 INJECTION, POWDER, LYOPHILIZED, FOR SOLUTION INTRAVENOUS EVERY 8 HOURS
Refills: 0 | Status: DISCONTINUED | OUTPATIENT
Start: 2019-11-01 | End: 2019-11-03

## 2019-11-01 RX ORDER — ATORVASTATIN CALCIUM 80 MG/1
40 TABLET, FILM COATED ORAL AT BEDTIME
Refills: 0 | Status: DISCONTINUED | OUTPATIENT
Start: 2019-11-01 | End: 2019-11-03

## 2019-11-01 RX ORDER — SENNA PLUS 8.6 MG/1
2 TABLET ORAL AT BEDTIME
Refills: 0 | Status: DISCONTINUED | OUTPATIENT
Start: 2019-11-01 | End: 2019-11-02

## 2019-11-01 RX ORDER — POLYETHYLENE GLYCOL 3350 17 G/17G
17 POWDER, FOR SOLUTION ORAL
Refills: 0 | Status: DISCONTINUED | OUTPATIENT
Start: 2019-11-01 | End: 2019-11-02

## 2019-11-01 RX ORDER — APIXABAN 2.5 MG/1
10 TABLET, FILM COATED ORAL EVERY 12 HOURS
Refills: 0 | Status: DISCONTINUED | OUTPATIENT
Start: 2019-11-01 | End: 2019-11-02

## 2019-11-01 RX ORDER — SODIUM CHLORIDE 9 MG/ML
1000 INJECTION INTRAMUSCULAR; INTRAVENOUS; SUBCUTANEOUS
Refills: 0 | Status: DISCONTINUED | OUTPATIENT
Start: 2019-11-01 | End: 2019-11-02

## 2019-11-01 RX ORDER — HYDRALAZINE HCL 50 MG
25 TABLET ORAL EVERY 6 HOURS
Refills: 0 | Status: DISCONTINUED | OUTPATIENT
Start: 2019-11-01 | End: 2019-11-03

## 2019-11-01 RX ORDER — SODIUM CHLORIDE 9 MG/ML
1000 INJECTION INTRAMUSCULAR; INTRAVENOUS; SUBCUTANEOUS ONCE
Refills: 0 | Status: COMPLETED | OUTPATIENT
Start: 2019-11-01 | End: 2019-11-01

## 2019-11-01 RX ORDER — ONDANSETRON 8 MG/1
4 TABLET, FILM COATED ORAL ONCE
Refills: 0 | Status: COMPLETED | OUTPATIENT
Start: 2019-11-01 | End: 2019-11-01

## 2019-11-01 RX ORDER — ASPIRIN/CALCIUM CARB/MAGNESIUM 324 MG
81 TABLET ORAL DAILY
Refills: 0 | Status: DISCONTINUED | OUTPATIENT
Start: 2019-11-01 | End: 2019-11-03

## 2019-11-01 RX ORDER — PIPERACILLIN AND TAZOBACTAM 4; .5 G/20ML; G/20ML
3.38 INJECTION, POWDER, LYOPHILIZED, FOR SOLUTION INTRAVENOUS ONCE
Refills: 0 | Status: COMPLETED | OUTPATIENT
Start: 2019-11-01 | End: 2019-11-01

## 2019-11-01 RX ADMIN — SODIUM CHLORIDE 100 MILLILITER(S): 9 INJECTION INTRAMUSCULAR; INTRAVENOUS; SUBCUTANEOUS at 23:59

## 2019-11-01 RX ADMIN — APIXABAN 10 MILLIGRAM(S): 2.5 TABLET, FILM COATED ORAL at 23:55

## 2019-11-01 RX ADMIN — Medication 81 MILLIGRAM(S): at 23:55

## 2019-11-01 RX ADMIN — SODIUM CHLORIDE 1000 MILLILITER(S): 9 INJECTION INTRAMUSCULAR; INTRAVENOUS; SUBCUTANEOUS at 19:28

## 2019-11-01 RX ADMIN — PIPERACILLIN AND TAZOBACTAM 200 GRAM(S): 4; .5 INJECTION, POWDER, LYOPHILIZED, FOR SOLUTION INTRAVENOUS at 23:59

## 2019-11-01 RX ADMIN — FAMOTIDINE 20 MILLIGRAM(S): 10 INJECTION INTRAVENOUS at 19:28

## 2019-11-01 RX ADMIN — ONDANSETRON 4 MILLIGRAM(S): 8 TABLET, FILM COATED ORAL at 19:27

## 2019-11-01 NOTE — ED PROVIDER NOTE - PHYSICAL EXAMINATION
*GEN:   uncomfortable, ill appearing, lethargic, AOx3  *EYES:   pupils equally round and reactive to light, extra-occular movements intact  *HEENT:   airway patent, moist mucosal membranes, full ROM neck  *CV:   regular rate and rhythm  *RESP:   clear to auscultation bilaterally, non-labored  *ABD:   softly distended  *:   no cva/flank tenderness  *EXTREM:   no MSK tenderness, full ROM throughout, no leg swelling  *SKIN:   dry, intact  *NEURO:   AOx3, global weakness bilateral UE / LE

## 2019-11-01 NOTE — ED PROVIDER NOTE - CLINICAL SUMMARY MEDICAL DECISION MAKING FREE TEXT BOX
54 y old male with a recently diagnosed stage 4 pancreatic ca with splenic vein thrombosis ,discharge 2 days ago ,no chemo yet came in with  a family failure to thrive ,weak fatigue not eating ,abdominal pain and distention ,looks sick ,will obtain blood .palliative consult readmission ZR

## 2019-11-01 NOTE — ED PROVIDER NOTE - OBJECTIVE STATEMENT
54M w/ pmh HTN, DM, HLD, CVA 4 years ago, new pancreatic cancer (not on chemo yet) - p/w worsening 54M w/ pmh HTN, DM, HLD, CVA 4 years ago, new pancreatic cancer (not on chemo yet) - p/w worsening weakness, inability to ambulate, decr po. Incr abd distension as well.     Dc'd 3 days ago after admission which found new pancreatic CA, hasn't started chemo yet. Living at home taken care of by family however they report increased difficulty, considering rehab.

## 2019-11-01 NOTE — ED ADULT NURSE NOTE - OBJECTIVE STATEMENT
54 yrs old male with ho pancreatic cancer, dm , htn, CVA with left side weakness  present to the ER for generalized weakness. As per family members pt was discharged on Tuesday and was being followed by visiting Nurse. As per brother pt has become lethargic and today pt almost fell attempting to get up from the  at home. Brother also state pt is very sleepy and has decrease oral intake. No fever/ chills reported.

## 2019-11-01 NOTE — H&P ADULT - NSHPPHYSICALEXAM_GEN_ALL_CORE
PHYSICAL EXAM:   GENERAL: Alert. +mildly confused. No acute distress. Not thin. Not cachectic. Not obese.  HEAD:  Atraumatic. Normocephalic.  EYES: EOMI. PERRLA. Normal conjunctiva/sclera.  ENT: Neck supple. No JVD. Moist oral mucosa. Not edentulous. No thrush.  LYMPH: Normal supraclavicular/cervical lymph nodes.   CARDIAC: Not tachy, Not augustina. Regular rhythm. Not irregularly irregular. S1. S2. No murmur. No rub. No distant heart sounds.  LUNG/CHEST: CTAB. BS equal bilaterally. No wheezes. No rales. No rhonchi.  ABDOMEN: Soft. No tenderness. +distension. +firm RUQ. No fluid wave. Normal bowel sounds.  BACK: No midline/vertebral tenderness. No flank tenderness.  VASCULAR: +2 b/l radial or ulnar pulses. Palpable DP pulses.  EXTREMITIES:  No clubbing. No cyanosis. No edema. Moving all 4.  NEUROLOGY: A&Ox2. mildly drowsy. flaccid LLE 0/5 strength. LUE biceps 3/5 strength. 4/5 left  strength. 4+/5 RLE plantar flexor. 5-/5 right  strength. Cranial nerves intact. Normal speech. Sensation intact.  PSYCH: Normal behavior. Flat affect.  SKIN: No jaundice. No erythema. No rash/lesion.  Vascular Access:     ICU Vital Signs Last 24 Hrs  T(C): 36.4 (01 Nov 2019 19:40), Max: 37.1 (01 Nov 2019 18:28)  T(F): 97.5 (01 Nov 2019 19:40), Max: 98.8 (01 Nov 2019 18:28)  HR: 73 (01 Nov 2019 19:40) (73 - 82)  BP: 130/69 (01 Nov 2019 19:40) (111/23 - 130/69)  BP(mean): --  ABP: --  ABP(mean): --  RR: 18 (01 Nov 2019 19:40) (18 - 18)  SpO2: 99% (01 Nov 2019 19:40) (93% - 99%)      I&O's Summary

## 2019-11-01 NOTE — ED ADULT NURSE NOTE - CHPI ED NUR SYMPTOMS NEG
no nausea/no dizziness/no tingling/no vomiting/no chills/no decreased eating/drinking/no fever/no pain

## 2019-11-01 NOTE — H&P ADULT - NSHPREVIEWOFSYSTEMS_GEN_ALL_CORE
REVIEW OF SYSTEMS:  CONSTITUTIONAL: +weakness. +fevers. +chills. No rigors. No weight loss. +night sweats. +poor appetite.  EYES: No visual changes. No eye pain.  ENT: No hearing difficulty. No vertigo. No dysphagia. No sore throat. No Sinusitis/rhinorrhea.   NECK: No pain. No stiffness/rigidity.  CARDIAC: No chest pain. No palpitations. No lightheadedness.  RESPIRATORY: +cough. +SOB. No hemoptysis.  GASTROINTESTINAL: No abdominal pain. No nausea. No vomiting. No hematemesis. No diarrhea. +constipation. No melena. No hematochezia.  GENITOURINARY: No dysuria. No frequency. No hesitancy. No hematuria. No oliguria.  NEUROLOGICAL: No numbness/tingling. +focal weakness. No urinary or fecal incontinence. No headache. +unsteady gait.  BACK: No back pain. No flank pain.  EXTREMITIES: No lower extremity edema. Full ROM. No joint pain.  SKIN: No rashes. No itching. No other lesions.  ALLERGIC: No lip swelling. No hives.  All other review of systems is negative unless indicated above.  Unless indicated above, unable to assess ROS 2/2

## 2019-11-01 NOTE — H&P ADULT - PROBLEM SELECTOR PLAN 1
- neuro consult  - ddx includes cva, brain met, paraneoplastic/autoimmune, conversion do  - check ck  - consider MRI head - neuro consult  - ddx includes cva, brain met, paraneoplastic/autoimmune, conversion do  - check ck  - will order MRI head/cspine - attempted to call Dr. Korin Hendrickson's answering service over night, but kept getting disconnected. will need neuro consult in AM  - house neuro defers to Dr. Hendrickson  - ddx includes cva, brain met, paraneoplastic/autoimmune, conversion do  - check ck  - will order MRI head/cspine

## 2019-11-01 NOTE — H&P ADULT - NSHPLABSRESULTS_GEN_ALL_CORE
Personally reviewed labs.   Personally reviewed imaging.   Personally reviewed EKG. NSR, rate 79, . No ST or TW changes.                          8.9    28.36 )-----------( 332      ( 01 Nov 2019 19:29 )             27.1       11-01    133<L>  |  95<L>  |  51<H>  ----------------------------<  146<H>  4.0   |  23  |  1.26    Ca    8.1<L>      01 Nov 2019 19:29    TPro  6.8  /  Alb  2.4<L>  /  TBili  0.4  /  DBili  x   /  AST  46<H>  /  ALT  27  /  AlkPhos  431<H>  11-01            LIVER FUNCTIONS - ( 01 Nov 2019 19:29 )  Alb: 2.4 g/dL / Pro: 6.8 g/dL / ALK PHOS: 431 U/L / ALT: 27 U/L / AST: 46 U/L / GGT: x

## 2019-11-01 NOTE — H&P ADULT - PROBLEM SELECTOR PLAN 4
- worsening leukocytosis, persistent fevers  - no obvious s/s active infection  - send blood and urine cultures  - needs ID consult in AM  - will start empiric abx for now

## 2019-11-01 NOTE — H&P ADULT - ASSESSMENT
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

## 2019-11-01 NOTE — PATIENT PROFILE ADULT - OVER THE PAST TWO WEEKS HAVE YOU FELT DOWN, DEPRESSED OR HOPELESS?
The patient returns today for recheck and his right ventilation tube has extruded and his tympanic membrane is normal. I am very pleased as is the patient. We will now see him for recheck in the future on a when necessary basis.  
no

## 2019-11-01 NOTE — ED ADULT NURSE REASSESSMENT NOTE - NS ED NURSE REASSESS COMMENT FT1
Pt is AAOx3, VS stable, NAD. Pt is admitted to [facility]. Report was just given to RN [place]. Pt and family aware of plan. Safety and comfort maintained and will continue to monitor.

## 2019-11-02 LAB
ALBUMIN SERPL ELPH-MCNC: 2.2 G/DL — LOW (ref 3.3–5)
ALP SERPL-CCNC: 394 U/L — HIGH (ref 40–120)
ALT FLD-CCNC: 23 U/L — SIGNIFICANT CHANGE UP (ref 10–45)
ANION GAP SERPL CALC-SCNC: 14 MMOL/L — SIGNIFICANT CHANGE UP (ref 5–17)
APPEARANCE UR: CLEAR — SIGNIFICANT CHANGE UP
AST SERPL-CCNC: 40 U/L — SIGNIFICANT CHANGE UP (ref 10–40)
BASOPHILS # BLD AUTO: 0 K/UL — SIGNIFICANT CHANGE UP (ref 0–0.2)
BASOPHILS NFR BLD AUTO: 0 % — SIGNIFICANT CHANGE UP (ref 0–2)
BILIRUB SERPL-MCNC: 0.5 MG/DL — SIGNIFICANT CHANGE UP (ref 0.2–1.2)
BILIRUB UR-MCNC: NEGATIVE — SIGNIFICANT CHANGE UP
BUN SERPL-MCNC: 42 MG/DL — HIGH (ref 7–23)
CALCIUM SERPL-MCNC: 7.7 MG/DL — LOW (ref 8.4–10.5)
CHLORIDE SERPL-SCNC: 98 MMOL/L — SIGNIFICANT CHANGE UP (ref 96–108)
CK SERPL-CCNC: 70 U/L — SIGNIFICANT CHANGE UP (ref 30–200)
CO2 SERPL-SCNC: 24 MMOL/L — SIGNIFICANT CHANGE UP (ref 22–31)
COLOR SPEC: YELLOW — SIGNIFICANT CHANGE UP
CREAT ?TM UR-MCNC: 86 MG/DL — SIGNIFICANT CHANGE UP
CREAT SERPL-MCNC: 1.19 MG/DL — SIGNIFICANT CHANGE UP (ref 0.5–1.3)
DIFF PNL FLD: NEGATIVE — SIGNIFICANT CHANGE UP
EOSINOPHIL # BLD AUTO: 0 K/UL — SIGNIFICANT CHANGE UP (ref 0–0.5)
EOSINOPHIL NFR BLD AUTO: 0 % — SIGNIFICANT CHANGE UP (ref 0–6)
GAS PNL BLDA: SIGNIFICANT CHANGE UP
GLUCOSE SERPL-MCNC: 102 MG/DL — HIGH (ref 70–99)
GLUCOSE UR QL: NEGATIVE — SIGNIFICANT CHANGE UP
HCT VFR BLD CALC: 25.8 % — LOW (ref 39–50)
HGB BLD-MCNC: 8.5 G/DL — LOW (ref 13–17)
KETONES UR-MCNC: NEGATIVE — SIGNIFICANT CHANGE UP
LDH SERPL L TO P-CCNC: 364 U/L — HIGH (ref 50–242)
LEUKOCYTE ESTERASE UR-ACNC: NEGATIVE — SIGNIFICANT CHANGE UP
LYMPHOCYTES # BLD AUTO: 0.48 K/UL — LOW (ref 1–3.3)
LYMPHOCYTES # BLD AUTO: 1.7 % — LOW (ref 13–44)
MAGNESIUM SERPL-MCNC: 2.4 MG/DL — SIGNIFICANT CHANGE UP (ref 1.6–2.6)
MCHC RBC-ENTMCNC: 24.9 PG — LOW (ref 27–34)
MCHC RBC-ENTMCNC: 32.9 GM/DL — SIGNIFICANT CHANGE UP (ref 32–36)
MCV RBC AUTO: 75.7 FL — LOW (ref 80–100)
MONOCYTES # BLD AUTO: 1.98 K/UL — HIGH (ref 0–0.9)
MONOCYTES NFR BLD AUTO: 7 % — SIGNIFICANT CHANGE UP (ref 2–14)
NEUTROPHILS # BLD AUTO: 25.76 K/UL — HIGH (ref 1.8–7.4)
NEUTROPHILS NFR BLD AUTO: 91.3 % — HIGH (ref 43–77)
NITRITE UR-MCNC: NEGATIVE — SIGNIFICANT CHANGE UP
PH UR: 6 — SIGNIFICANT CHANGE UP (ref 5–8)
PHOSPHATE SERPL-MCNC: 3.3 MG/DL — SIGNIFICANT CHANGE UP (ref 2.5–4.5)
PLATELET # BLD AUTO: 310 K/UL — SIGNIFICANT CHANGE UP (ref 150–400)
POTASSIUM SERPL-MCNC: 3.7 MMOL/L — SIGNIFICANT CHANGE UP (ref 3.5–5.3)
POTASSIUM SERPL-SCNC: 3.7 MMOL/L — SIGNIFICANT CHANGE UP (ref 3.5–5.3)
PROCALCITONIN SERPL-MCNC: 6.8 NG/ML — HIGH (ref 0.02–0.1)
PROT SERPL-MCNC: 6.2 G/DL — SIGNIFICANT CHANGE UP (ref 6–8.3)
PROT UR-MCNC: ABNORMAL
RBC # BLD: 3.41 M/UL — LOW (ref 4.2–5.8)
RBC # FLD: 14.8 % — HIGH (ref 10.3–14.5)
SODIUM SERPL-SCNC: 136 MMOL/L — SIGNIFICANT CHANGE UP (ref 135–145)
SODIUM UR-SCNC: <20 MMOL/L — SIGNIFICANT CHANGE UP
SP GR SPEC: 1.02 — SIGNIFICANT CHANGE UP (ref 1.01–1.02)
TSH SERPL-MCNC: 1.19 UIU/ML — SIGNIFICANT CHANGE UP (ref 0.27–4.2)
UROBILINOGEN FLD QL: NEGATIVE — SIGNIFICANT CHANGE UP
WBC # BLD: 28.22 K/UL — HIGH (ref 3.8–10.5)
WBC # FLD AUTO: 28.22 K/UL — HIGH (ref 3.8–10.5)

## 2019-11-02 PROCEDURE — 72156 MRI NECK SPINE W/O & W/DYE: CPT | Mod: 26

## 2019-11-02 PROCEDURE — 76705 ECHO EXAM OF ABDOMEN: CPT | Mod: 26

## 2019-11-02 PROCEDURE — 70553 MRI BRAIN STEM W/O & W/DYE: CPT | Mod: 26

## 2019-11-02 PROCEDURE — 99223 1ST HOSP IP/OBS HIGH 75: CPT | Mod: GC

## 2019-11-02 PROCEDURE — 76770 US EXAM ABDO BACK WALL COMP: CPT | Mod: 26

## 2019-11-02 RX ORDER — VANCOMYCIN HCL 1 G
1000 VIAL (EA) INTRAVENOUS EVERY 12 HOURS
Refills: 0 | Status: DISCONTINUED | OUTPATIENT
Start: 2019-11-02 | End: 2019-11-03

## 2019-11-02 RX ORDER — DEXTROSE 50 % IN WATER 50 %
12.5 SYRINGE (ML) INTRAVENOUS ONCE
Refills: 0 | Status: DISCONTINUED | OUTPATIENT
Start: 2019-11-02 | End: 2019-11-07

## 2019-11-02 RX ORDER — SODIUM CHLORIDE 9 MG/ML
1000 INJECTION INTRAMUSCULAR; INTRAVENOUS; SUBCUTANEOUS
Refills: 0 | Status: DISCONTINUED | OUTPATIENT
Start: 2019-11-02 | End: 2019-11-03

## 2019-11-02 RX ORDER — DEXTROSE 50 % IN WATER 50 %
25 SYRINGE (ML) INTRAVENOUS ONCE
Refills: 0 | Status: DISCONTINUED | OUTPATIENT
Start: 2019-11-02 | End: 2019-11-07

## 2019-11-02 RX ORDER — HEPARIN SODIUM 5000 [USP'U]/ML
3500 INJECTION INTRAVENOUS; SUBCUTANEOUS EVERY 6 HOURS
Refills: 0 | Status: DISCONTINUED | OUTPATIENT
Start: 2019-11-02 | End: 2019-11-03

## 2019-11-02 RX ORDER — ACETAMINOPHEN 500 MG
1000 TABLET ORAL ONCE
Refills: 0 | Status: COMPLETED | OUTPATIENT
Start: 2019-11-02 | End: 2019-11-02

## 2019-11-02 RX ORDER — DEXTROSE 50 % IN WATER 50 %
15 SYRINGE (ML) INTRAVENOUS ONCE
Refills: 0 | Status: DISCONTINUED | OUTPATIENT
Start: 2019-11-02 | End: 2019-11-07

## 2019-11-02 RX ORDER — SODIUM CHLORIDE 9 MG/ML
1000 INJECTION, SOLUTION INTRAVENOUS
Refills: 0 | Status: DISCONTINUED | OUTPATIENT
Start: 2019-11-02 | End: 2019-11-07

## 2019-11-02 RX ORDER — CHLORPROMAZINE HCL 10 MG
25 TABLET ORAL ONCE
Refills: 0 | Status: COMPLETED | OUTPATIENT
Start: 2019-11-02 | End: 2019-11-02

## 2019-11-02 RX ORDER — HEPARIN SODIUM 5000 [USP'U]/ML
7000 INJECTION INTRAVENOUS; SUBCUTANEOUS EVERY 6 HOURS
Refills: 0 | Status: DISCONTINUED | OUTPATIENT
Start: 2019-11-02 | End: 2019-11-03

## 2019-11-02 RX ORDER — HEPARIN SODIUM 5000 [USP'U]/ML
INJECTION INTRAVENOUS; SUBCUTANEOUS
Qty: 25000 | Refills: 0 | Status: DISCONTINUED | OUTPATIENT
Start: 2019-11-02 | End: 2019-11-03

## 2019-11-02 RX ORDER — GLUCAGON INJECTION, SOLUTION 0.5 MG/.1ML
1 INJECTION, SOLUTION SUBCUTANEOUS ONCE
Refills: 0 | Status: DISCONTINUED | OUTPATIENT
Start: 2019-11-02 | End: 2019-11-07

## 2019-11-02 RX ORDER — INSULIN LISPRO 100/ML
VIAL (ML) SUBCUTANEOUS
Refills: 0 | Status: DISCONTINUED | OUTPATIENT
Start: 2019-11-02 | End: 2019-11-03

## 2019-11-02 RX ORDER — INSULIN LISPRO 100/ML
VIAL (ML) SUBCUTANEOUS AT BEDTIME
Refills: 0 | Status: DISCONTINUED | OUTPATIENT
Start: 2019-11-02 | End: 2019-11-03

## 2019-11-02 RX ORDER — SODIUM CHLORIDE 9 MG/ML
1000 INJECTION INTRAMUSCULAR; INTRAVENOUS; SUBCUTANEOUS
Refills: 0 | Status: DISCONTINUED | OUTPATIENT
Start: 2019-11-02 | End: 2019-11-02

## 2019-11-02 RX ADMIN — Medication 81 MILLIGRAM(S): at 13:00

## 2019-11-02 RX ADMIN — PIPERACILLIN AND TAZOBACTAM 25 GRAM(S): 4; .5 INJECTION, POWDER, LYOPHILIZED, FOR SOLUTION INTRAVENOUS at 20:58

## 2019-11-02 RX ADMIN — ATORVASTATIN CALCIUM 40 MILLIGRAM(S): 80 TABLET, FILM COATED ORAL at 20:59

## 2019-11-02 RX ADMIN — PIPERACILLIN AND TAZOBACTAM 25 GRAM(S): 4; .5 INJECTION, POWDER, LYOPHILIZED, FOR SOLUTION INTRAVENOUS at 06:35

## 2019-11-02 RX ADMIN — Medication 400 MILLIGRAM(S): at 22:52

## 2019-11-02 RX ADMIN — Medication 250 MILLIGRAM(S): at 23:44

## 2019-11-02 RX ADMIN — APIXABAN 10 MILLIGRAM(S): 2.5 TABLET, FILM COATED ORAL at 13:00

## 2019-11-02 RX ADMIN — PIPERACILLIN AND TAZOBACTAM 25 GRAM(S): 4; .5 INJECTION, POWDER, LYOPHILIZED, FOR SOLUTION INTRAVENOUS at 14:49

## 2019-11-02 RX ADMIN — HEPARIN SODIUM 1600 UNIT(S)/HR: 5000 INJECTION INTRAVENOUS; SUBCUTANEOUS at 22:31

## 2019-11-02 RX ADMIN — Medication 200 MILLIGRAM(S): at 06:35

## 2019-11-02 RX ADMIN — Medication 25 MILLIGRAM(S): at 20:59

## 2019-11-02 RX ADMIN — POLYETHYLENE GLYCOL 3350 17 GRAM(S): 17 POWDER, FOR SOLUTION ORAL at 06:35

## 2019-11-02 NOTE — PROGRESS NOTE ADULT - PROBLEM SELECTOR PLAN 1
worsening LE weakness   MRI brain noted   Neuro eval called, Dr gardner   SPine MRI done on previous admission   Head CT noted

## 2019-11-02 NOTE — PROGRESS NOTE ADULT - SUBJECTIVE AND OBJECTIVE BOX
HPI:    54M c hx HTN, DM2, HLD, CVA 6 years ago with mild residual LUE weakness, grade 2 DHF, anemia, recent hospitalization (10/16-10/29) for newly diagnosed metastatic  pancreatic adenocarcinoma, with mets to liver, c/b splenic and portal vein thromboses on eliquis, intractable hiccups, fevers, leukocytosis, right sided weakness, p/w new alternating left/right hemiplegia, and failure to thrive.    At baseline, pt ambulates, drives, and has mild LUE weakness only. During last hospitalization, pt was found to have right sided focal weakness, for which pt was seen by neuro and had MRI of spine performed that did not elucidate etiology. Starting yesterday, pt has had acute onset left-sided flaccid weakness, but improvement of right sided weakness. No other focal deficits. Pt has been having periods of mild confusion. Pt still continues to have fevers up to 103 at home, with night sweats and nocturnal hiccups. Pt has had reduced appetite. Per family, pt has had increasing abdominal distension, but had a large bowel movement this morning.  Last hospitalization, pt had paracentesis, diagnostic us guided liver biopsy.      Pt is lethargic but easy to arouse    Has abd distention, decreased apetite and abd distention        PAST MEDICAL & SURGICAL HISTORY:  Pancreatic cancer  No significant past surgical history      ROS:  Negative except for:    MEDICATIONS  (STANDING):  apixaban 10 milliGRAM(s) Oral every 12 hours  aspirin enteric coated 81 milliGRAM(s) Oral daily  atorvastatin 40 milliGRAM(s) Oral at bedtime  dextrose 5%. 1000 milliLiter(s) (50 mL/Hr) IV Continuous <Continuous>  dextrose 50% Injectable 12.5 Gram(s) IV Push once  dextrose 50% Injectable 25 Gram(s) IV Push once  dextrose 50% Injectable 25 Gram(s) IV Push once  insulin lispro (HumaLOG) corrective regimen sliding scale   SubCutaneous three times a day before meals  insulin lispro (HumaLOG) corrective regimen sliding scale   SubCutaneous at bedtime  metoprolol succinate  milliGRAM(s) Oral daily  piperacillin/tazobactam IVPB.. 3.375 Gram(s) IV Intermittent every 8 hours  sodium chloride 0.9%. 1000 milliLiter(s) (100 mL/Hr) IV Continuous <Continuous>    MEDICATIONS  (PRN):  dextrose 40% Gel 15 Gram(s) Oral once PRN Blood Glucose LESS THAN 70 milliGRAM(s)/deciliter  glucagon  Injectable 1 milliGRAM(s) IntraMuscular once PRN Glucose LESS THAN 70 milligrams/deciliter  hydrALAZINE 25 milliGRAM(s) Oral every 6 hours PRN Systolic blood pressure > 170      Allergies    No Known Allergies    Intolerances        Vital Signs Last 24 Hrs  T(C): 37.5 (01 Nov 2019 23:00), Max: 37.5 (01 Nov 2019 23:00)  T(F): 99.5 (01 Nov 2019 23:00), Max: 99.5 (01 Nov 2019 23:00)  HR: 92 (02 Nov 2019 06:33) (73 - 92)  BP: 119/79 (02 Nov 2019 06:33) (111/23 - 146/89)  BP(mean): --  RR: 20 (01 Nov 2019 23:00) (18 - 20)  SpO2: 95% (01 Nov 2019 23:00) (93% - 99%)    PHYSICAL EXAM  General: adult in NAD  HEENT: clear oropharynx, anicteric sclera, pink conjunctiva  Neck: supple  CV: normal S1/S2 with no murmur rubs or gallops  Lungs: positive air movement b/l ant lungs,clear to auscultation, no wheezes, no rales  Abdomen: Distended, + Ascites  Ext: no clubbing cyanosis or edema  Skin: no rashes and no petechiae  Neuro: Lethargic, can not raise LUE or LLE   LABS:                          8.5    28.22 )-----------( 310      ( 02 Nov 2019 10:14 )             25.8     Serial CBC's  11-02 @ 10:14  Hct-25.8 / Hgb-8.5 / Plat-310 / RBC-3.41 / WBC-28.22          Serial CBC's  11-01 @ 19:29  Hct-27.1 / Hgb-8.9 / Plat-332 / RBC-3.62 / WBC-28.36            11-02    136  |  98  |  42<H>  ----------------------------<  102<H>  3.7   |  24  |  1.19    Ca    7.7<L>      02 Nov 2019 07:17  Phos  3.3     11-02  Mg     2.4     11-02    TPro  6.2  /  Alb  2.2<L>  /  TBili  0.5  /  DBili  x   /  AST  40  /  ALT  23  /  AlkPhos  394<H>  11-02          WBC Count: 28.22 K/uL (11-02 @ 10:14)  Hemoglobin: 8.5 g/dL (11-02 @ 10:14)            RADIOLOGY & ADDITIONAL STUDIES:

## 2019-11-02 NOTE — CONSULT NOTE ADULT - ASSESSMENT
53 yo male with history of DM,CVA, and HTN now with failure to thrive in setting of recently diagnosed metastatic pancreatic cancer.  The fever may be a neoplastic one given liver involvement.  No evidence of biliary obstruction or cholangitis.  His leukocytosis is not knew, felt to be reactive to malignancy.I am not sure what to make of recurrent focal weakness.  Suggest:  1.Zosyn okay for now  2.Await blood cultures  3.consider neurology f/u  4.It is possible that we will not confirm any infection, will try to limit zosyn to a 48-72 hour course.

## 2019-11-02 NOTE — PROGRESS NOTE ADULT - PROBLEM SELECTOR PLAN 4
- worsening leukocytosis, persistent fevers  - no obvious s/s active infection  - send blood and urine cultures  - ID eval appreciated   - cont zosyn

## 2019-11-02 NOTE — CONSULT NOTE ADULT - ASSESSMENT
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, grade 2 DHF, anemia, recent hospitalization (10/16-10/29) for newly diagnosed metastatic likely pancreatic adenocarcinoma, with met to liver, c/b splenic and portal vein thromboses on Eliquis admitted with sepsis 2/2 to unknown etiology, hospital course c/b new alternating left/right hemiplegia 2/2 to multiple infarcts, MICU consulted for worsened lethargy and tachypnea    # AMS  - Differential diagnosis includes CVA as well as metabolic encephalopathy 2/2 to sepsis   - CT Angio Neck w/ IV Cont: No CT evidence of acute intracranial hemorrhage, brain edema, mass effect or acute territorial infarct. Old right cerebellar, occipital and parietal infarcts.  - MRI head : IMPRESSION: Atrophy for the patient's age. Old bilateral cerebellar infarcts and right parietal infarct. Scattered small vessel white matter ischemic changes. Scattered acute infarcts in the left frontal parietal   cortex and the right parietal cortex as well as the occipital cortex low bilaterally as well as enhancing subacute infarcts along the left frontal cortex. These may be due to embolic infarcts or due to hypercoagulable   state.  - Patient lethargic on exam, however arousable on exam comfortable appearing with tachypnea. Increased abdominal girth 2/2 to moderate sized ascites contributory to tachypnea   - GOC discussion readdressed with family, patient to be full code at this time   - Recommendations to attain ammonia level, continuos pulse oximetry, EEG to rule out seizure activity   - Infectious work up, per primary team including blood cultures x2, urinalysis and culture. CXR currently clear   - To continue with broad spectrum antibiotics Vanc/ zosyn and antipyretic   - Patient will need to have a diagnostic and therapeutic tap, however unable to attain at this time as patient is currently on a heparin gtt   - VBG with no signs of hypercapnia 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, grade 2 DHF, anemia, recent hospitalization (10/16-10/29) for newly diagnosed metastatic likely pancreatic adenocarcinoma, with met to liver, c/b splenic and portal vein thromboses on Eliquis admitted with sepsis 2/2 to unknown etiology, hospital course c/b new alternating left/right hemiplegia 2/2 to multiple infarcts, MICU consulted for worsened lethargy and tachypnea    # AMS  - Differential diagnosis includes CVA as well as metabolic encephalopathy 2/2 to sepsis/fever  - Patient lethargic on exam, however arousable on exam comfortable appearing with tachypnea. Increased abdominal girth 2/2 to moderate sized ascites contributory to tachypnea   - GOC discussion readdressed with family, patient to be full code at this time   - Recommendations to attain ammonia level, continuos pulse oximetry, EEG to rule out seizure activity   - Infectious work up, per primary team including blood cultures x2, urinalysis and culture. CXR currently clear   - To continue with broad spectrum antibiotics Vanc/ zosyn and antipyretic   - Patient will need to have a diagnostic and therapeutic tap, however unable to attain at this time as patient is currently on a heparin gtt   - VBG with no signs of hypercapnia

## 2019-11-02 NOTE — CONSULT NOTE ADULT - SUBJECTIVE AND OBJECTIVE BOX
[facility] Neurological [facility]), [facility]        Patient is a 54y old  Male who presents with a chief complaint of weakness (2019 01:22)    Excerpt from H&P, 54M c hx HTN, DM2, HLD, CVA 6 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    History mostly obtained from pt's sister in law at bedside. Other family members including mother, wife, and brother also at bedside corroborating. Pt is not a good historian, A&Ox2-3, at this time.  At baseline, pt ambulates, drives, and has mild LUE weakness only. During last hospitalization, pt was found to have right sided focal weakness, for which pt was seen by neuro and had MRI of spine performed that did not elucidate etiology. Starting yesterday, pt has had acute onset left-sided flaccid weakness, but improvement of right sided weakness. No other focal deficits. Pt has been having periods of mild confusion. Pt still continues to have fevers up to 103 at home, with night sweats and nocturnal hiccups. Pt has had reduced appetite. Per family, pt has had increasing abdominal distension, but had a large bowel movement this morning. Pt denies pain. Other ROS as below. Last hospitalization, pt had paracentesis, diagnostic us guided liver biopsy. Pt also received broad antibiotics from 10/17 until 10/21 empirically.    VS: Tm 98.8, P 82, Bp 122/79, R 18, 93% RA  In the ED, received NS 1L, pepcid, zofran. (2019 21:52)           *****PAST MEDICAL / Surgical  HISTORY:  PAST MEDICAL & SURGICAL HISTORY:  Pancreatic cancer  No significant past surgical history           *****FAMILY HISTORY:  FAMILY HISTORY:  No pertinent family history in first degree relatives           *****SOCIAL HISTORY:  Alcohol: None  Smoking: None         *****ALLERGIES:   Allergies    No Known Allergies    Intolerances             *****MEDICATIONS: current medication reviewed and documented.   MEDICATIONS  (STANDING):  aspirin enteric coated 81 milliGRAM(s) Oral daily  atorvastatin 80 milliGRAM(s) Oral at bedtime  chlorhexidine 4% Liquid 1 Application(s) Topical <User Schedule>  dextrose 5%. 1000 milliLiter(s) (50 mL/Hr) IV Continuous <Continuous>  dextrose 50% Injectable 12.5 Gram(s) IV Push once  dextrose 50% Injectable 25 Gram(s) IV Push once  dextrose 50% Injectable 25 Gram(s) IV Push once  heparin  Infusion.  Unit(s)/Hr (16 mL/Hr) IV Continuous <Continuous>  insulin lispro (HumaLOG) corrective regimen sliding scale   SubCutaneous three times a day before meals  piperacillin/tazobactam IVPB.. 3.375 Gram(s) IV Intermittent every 8 hours  sodium chloride 0.9%. 1000 milliLiter(s) (50 mL/Hr) IV Continuous <Continuous>  vancomycin  IVPB 1000 milliGRAM(s) IV Intermittent every 12 hours    MEDICATIONS  (PRN):  dextrose 40% Gel 15 Gram(s) Oral once PRN Blood Glucose LESS THAN 70 milliGRAM(s)/deciliter  glucagon  Injectable 1 milliGRAM(s) IntraMuscular once PRN Glucose LESS THAN 70 milligrams/deciliter  heparin  Injectable 7000 Unit(s) IV Push every 6 hours PRN For aPTT less than 40  heparin  Injectable 3500 Unit(s) IV Push every 6 hours PRN For aPTT between 40 - 57           *****REVIEW OF SYSTEM:  GEN: no fever, no chills, no pain  RESP: no SOB, no cough, no sputum  CVS: no chest pain, no palpitations, no edema  GI: no abdominal pain, no nausea, no vomiting, no constipation, no diarrhea  : no dysurea, no frequency, no hematurea  Neuro: no headache, no dizziness  PSYCH: no anxiety, no depression  Derm : no itching, no rash         *****VITAL SIGNS:  T(C): 36.9 (19 @ 00:13), Max: 38.5 (19 @ 22:20)  HR: 73 (19 @ 21:00) (73 - 92)  BP: 138/63 (19 @ 21:00) (119/79 - 138/63)  RR: 18 (19 @ 21:00) (18 - 18)  SpO2: 96% (19 @ 21:00) (96% - 96%)  Wt(kg): --     @ 07:01  -   @ 07:00  --------------------------------------------------------  IN: 1140 mL / OUT: 0 mL / NET: 1140 mL     @ 08:01  -   @ 01:46  --------------------------------------------------------  IN: 0 mL / OUT: 350 mL / NET: -350 mL             *****PHYSICAL EXAM:  lethargic.  limited exam as pt is not cooperative.  Alerts to voice, very uncomfortable due to hiccups   falls asleep mid sentence.   unable to answer questions.  perseverating   followed 1 command to wiggle his toes.      tracks blinks to threat   No facial asymmetry   Tongue is midline      Moving both upper ext spont    does not move both le to painful stimuli     Gait : not assessed.  mute toes.              *****LAB AND IMAGIN.5    28.22 )-----------( 310      ( 2019 10:14 )             25.8                   136  |  98  |  42<H>  ----------------------------<  102<H>  3.7   |  24  |  1.19    Ca    7.7<L>      2019 07:17  Phos  3.3       Mg     2.4         TPro  6.2  /  Alb  2.2<L>  /  TBili  0.5  /  DBili  x   /  AST  40  /  ALT  23  /  AlkPhos  394<H>      PTT - ( 2019 23:19 )  PTT:33.0 sec            CARDIAC MARKERS ( 2019 07:17 )  x     / x     / 70 U/L / x     / x                ABG - ( 2019 22:55 )  pH, Arterial: 7.44  pH, Blood: x     /  pCO2: 39    /  pO2: 49    / HCO3: 26    / Base Excess: 2.2   /  SaO2: 82                Urinalysis Basic - ( 2019 19:05 )    Color: Yellow / Appearance: Clear / S.019 / pH: x  Gluc: x / Ketone: Negative  / Bili: Negative / Urobili: Negative   Blood: x / Protein: Trace / Nitrite: Negative   Leuk Esterase: Negative / RBC: x / WBC x   Sq Epi: x / Non Sq Epi: x / Bacteria: x    < from: MR Head w/wo IV Cont (19 @ 11:16) >  There is normal intracranial vascular enhancement.    IMPRESSION: Atrophy for the patient's age. 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 Scattered small vessel white matter   ischemic changes. Scattered acute infarcts in the left frontal parietal   cortex and the right parietal cortex as well as the occipital cortex low   bilaterally as well as enhancing subacute infarcts along the left frontal   cortex. These may be due to embolic infarcts or due to hypercoagulable   state.        < end of copied text >      [All pertinent recent Imaging reports reviewed]         *****A S S E S S M E N T   A N D   P L A N :     Excerpt from H&P, 54M c hx HTN, DM2, HLD, CVA 6 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    History mostly obtained from pt's sister in law at bedside. Other family members including mother, wife, and brother also at bedside corroborating. Pt is not a good historian, A&Ox2-3, at this time.  At baseline, pt ambulates, drives, and has mild LUE weakness only. During last hospitalization, pt was found to have right sided focal weakness, for which pt was seen by neuro and had MRI of spine performed that did not elucidate etiology. Starting yesterday, pt has had acute onset left-sided flaccid weakness, but improvement of right sided weakness. No other focal deficits. Pt has been having periods of mild confusion. Pt still continues to have fevers up to 103 at home, with night sweats and nocturnal hiccups. Pt has had reduced appetite. Per family, pt has had increasing abdominal distension, but had a large bowel movement this morning. Pt denies pain. Other ROS as below. Last hospitalization, pt had paracentesis, diagnostic us guided liver biopsy. Pt also received broad antibiotics from 10/17 until 10/21 empirically.         Problem/Recommendations 1: CVA   Numerous cerebral infarcts noted on MRI Brain while on full dose anticoagulation.   Pt previously known to me from prior admission when pt developed transient weakness while on full dose heparin was not a candidate for acute therapies at that time .   Etiology of infarcts likely hypercoagulable state related his underlying metastatic pancreatic adenocarcinoma.   Given that pt has had infarcts while on full dose AC, portends a poor outcome     Discussed at length with family, (Brother, sister in law and nephew) regarding findings on MRI, discussed  with them about his poor prognosis. RIsk vs. benefits of anticoagulation was discussed.   Discussed risk of hemorrhagic conversion wile on Anticoagulation, however the benefits outweigh the risks.   Low karnofsky performace score   overall guarded prognosis    neuro checks q4, low threshold for ct scan if any change in neuro exam.     ___________________________  Will follow with you.  Thank you,  Korin Hendrickson MD  Diplomate of the American Board of Neurology and Psychiatry.  Diplomate of the American Board of Vascular Neurology.   [facility] Neurological [facility] ([facility]), [facility]   Ph: [phone]    Differential diagnosis and plan of care discussed with patient after the evaluation.   Advanced care planning options discussed.   Pain assessed and judicious use of narcotics when appropriate was discussed.  Importance of Fall prevention discussed.  Counseling on Smoking and Alcohol cessation was offered when appropriate.  Counseling on Diet, exercise, and medication compliance was done.     123 minutes spent on the total encounter;  more than 50 % of the visit was spent on counseling  and or coordinating care by the attending physician.    Thank you for allowing me to participate in the care of this luci patient. Please do not hesitate to call me if you have any questions.     This and subsequent notes were partially created using voice recognition software and will  inherently be subject to errors including those of syntax and sound alike substitutions which may escape proofreading. In such instances original meaning may be extrapolated by contextual derivation. [facility] Neurological [facility]), [facility]        Patient is a 54y old  Male who presents with a chief complaint of weakness (2019 01:22)    Excerpt from H&P, 54M c hx HTN, DM2, HLD, CVA 6 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    History mostly obtained from pt's sister in law at bedside. Other family members including mother, wife, and brother also at bedside corroborating. Pt is not a good historian, A&Ox2-3, at this time.  At baseline, pt ambulates, drives, and has mild LUE weakness only. During last hospitalization, pt was found to have right sided focal weakness, for which pt was seen by neuro and had MRI of spine performed that did not elucidate etiology. Starting yesterday, pt has had acute onset left-sided flaccid weakness, but improvement of right sided weakness. No other focal deficits. Pt has been having periods of mild confusion. Pt still continues to have fevers up to 103 at home, with night sweats and nocturnal hiccups. Pt has had reduced appetite. Per family, pt has had increasing abdominal distension, but had a large bowel movement this morning. Pt denies pain. Other ROS as below. Last hospitalization, pt had paracentesis, diagnostic us guided liver biopsy. Pt also received broad antibiotics from 10/17 until 10/21 empirically.    VS: Tm 98.8, P 82, Bp 122/79, R 18, 93% RA  In the ED, received NS 1L, pepcid, zofran. (2019 21:52)           *****PAST MEDICAL / Surgical  HISTORY:  PAST MEDICAL & SURGICAL HISTORY:  Pancreatic cancer  No significant past surgical history           *****FAMILY HISTORY:  FAMILY HISTORY:  No pertinent family history in first degree relatives           *****SOCIAL HISTORY:  Alcohol: None  Smoking: None         *****ALLERGIES:   Allergies    No Known Allergies    Intolerances             *****MEDICATIONS: current medication reviewed and documented.   MEDICATIONS  (STANDING):  aspirin enteric coated 81 milliGRAM(s) Oral daily  atorvastatin 80 milliGRAM(s) Oral at bedtime  chlorhexidine 4% Liquid 1 Application(s) Topical <User Schedule>  dextrose 5%. 1000 milliLiter(s) (50 mL/Hr) IV Continuous <Continuous>  dextrose 50% Injectable 12.5 Gram(s) IV Push once  dextrose 50% Injectable 25 Gram(s) IV Push once  dextrose 50% Injectable 25 Gram(s) IV Push once  heparin  Infusion.  Unit(s)/Hr (16 mL/Hr) IV Continuous <Continuous>  insulin lispro (HumaLOG) corrective regimen sliding scale   SubCutaneous three times a day before meals  piperacillin/tazobactam IVPB.. 3.375 Gram(s) IV Intermittent every 8 hours  sodium chloride 0.9%. 1000 milliLiter(s) (50 mL/Hr) IV Continuous <Continuous>  vancomycin  IVPB 1000 milliGRAM(s) IV Intermittent every 12 hours    MEDICATIONS  (PRN):  dextrose 40% Gel 15 Gram(s) Oral once PRN Blood Glucose LESS THAN 70 milliGRAM(s)/deciliter  glucagon  Injectable 1 milliGRAM(s) IntraMuscular once PRN Glucose LESS THAN 70 milligrams/deciliter  heparin  Injectable 7000 Unit(s) IV Push every 6 hours PRN For aPTT less than 40  heparin  Injectable 3500 Unit(s) IV Push every 6 hours PRN For aPTT between 40 - 57           *****REVIEW OF SYSTEM:  GEN: no fever, no chills, no pain  RESP: no SOB, no cough, no sputum  CVS: no chest pain, no palpitations, no edema  GI: no abdominal pain, no nausea, no vomiting, no constipation, no diarrhea  : no dysurea, no frequency, no hematurea  Neuro: no headache, no dizziness  PSYCH: no anxiety, no depression  Derm : no itching, no rash         *****VITAL SIGNS:  T(C): 36.9 (19 @ 00:13), Max: 38.5 (19 @ 22:20)  HR: 73 (19 @ 21:00) (73 - 92)  BP: 138/63 (19 @ 21:00) (119/79 - 138/63)  RR: 18 (19 @ 21:00) (18 - 18)  SpO2: 96% (19 @ 21:00) (96% - 96%)  Wt(kg): --     @ 07:01  -   @ 07:00  --------------------------------------------------------  IN: 1140 mL / OUT: 0 mL / NET: 1140 mL     @ 08:01  -   @ 01:46  --------------------------------------------------------  IN: 0 mL / OUT: 350 mL / NET: -350 mL             *****PHYSICAL EXAM:  lethargic.  limited exam as pt is not cooperative.  Alerts to voice, very uncomfortable due to hiccups   falls asleep mid sentence.   unable to answer questions.  perseverating   followed 1 command to wiggle his toes.      tracks blinks to threat   No facial asymmetry   Tongue is midline      Moving both upper ext spont    does not move both le to painful stimuli     Gait : not assessed.  mute toes.              *****LAB AND IMAGIN.5    28.22 )-----------( 310      ( 2019 10:14 )             25.8                   136  |  98  |  42<H>  ----------------------------<  102<H>  3.7   |  24  |  1.19    Ca    7.7<L>      2019 07:17  Phos  3.3       Mg     2.4         TPro  6.2  /  Alb  2.2<L>  /  TBili  0.5  /  DBili  x   /  AST  40  /  ALT  23  /  AlkPhos  394<H>      PTT - ( 2019 23:19 )  PTT:33.0 sec            CARDIAC MARKERS ( 2019 07:17 )  x     / x     / 70 U/L / x     / x                ABG - ( 2019 22:55 )  pH, Arterial: 7.44  pH, Blood: x     /  pCO2: 39    /  pO2: 49    / HCO3: 26    / Base Excess: 2.2   /  SaO2: 82                Urinalysis Basic - ( 2019 19:05 )    Color: Yellow / Appearance: Clear / S.019 / pH: x  Gluc: x / Ketone: Negative  / Bili: Negative / Urobili: Negative   Blood: x / Protein: Trace / Nitrite: Negative   Leuk Esterase: Negative / RBC: x / WBC x   Sq Epi: x / Non Sq Epi: x / Bacteria: x    < from: MR Head w/wo IV Cont (19 @ 11:16) >  There is normal intracranial vascular enhancement.    IMPRESSION: Atrophy for the patient's age. 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 Scattered small vessel white matter   ischemic changes. Scattered acute infarcts in the left frontal parietal   cortex and the right parietal cortex as well as the occipital cortex low   bilaterally as well as enhancing subacute infarcts along the left frontal   cortex. These may be due to embolic infarcts or due to hypercoagulable   state.        < end of copied text >      [All pertinent recent Imaging reports reviewed]         *****A S S E S S M E N T   A N D   P L A N :     Excerpt from H&P, 54M c hx HTN, DM2, HLD, CVA 6 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    History mostly obtained from pt's sister in law at bedside. Other family members including mother, wife, and brother also at bedside corroborating. Pt is not a good historian, A&Ox2-3, at this time.  At baseline, pt ambulates, drives, and has mild LUE weakness only. During last hospitalization, pt was found to have right sided focal weakness, for which pt was seen by neuro and had MRI of spine performed that did not elucidate etiology. Starting yesterday, pt has had acute onset left-sided flaccid weakness, but improvement of right sided weakness. No other focal deficits. Pt has been having periods of mild confusion. Pt still continues to have fevers up to 103 at home, with night sweats and nocturnal hiccups. Pt has had reduced appetite. Per family, pt has had increasing abdominal distension, but had a large bowel movement this morning. Pt denies pain. Other ROS as below. Last hospitalization, pt had paracentesis, diagnostic us guided liver biopsy. Pt also received broad antibiotics from 10/17 until 10/21 empirically.         Problem/Recommendations 1: CVA   Numerous cerebral infarcts noted on MRI Brain while on full dose anticoagulation.   Pt previously known to me from prior admission when pt developed transient weakness while on full dose heparin was not a candidate for acute therapies at that time .   Etiology of infarcts likely hypercoagulable state related his underlying metastatic pancreatic adenocarcinoma vs. marantic endocarditis or infectious endcarditis  cta recent did not reveal any mycotic aneurysm  tte, or ideally ange may be helpful.   Can repeat cta with contrast to r/o mycotic anuerysm.   Given that pt has had infarcts while on full dose AC, portends a poor outcome     Discussed at length with family, (Brother, sister in law and nephew) regarding findings on MRI, discussed  with them about his poor prognosis. RIsk vs. benefits of anticoagulation was discussed with family.   Discussed risk of hemorrhagic conversion wile on Anticoagulation, however the benefits outweigh the risks.   Low karnofsky performace score   overall guarded prognosis    neuro checks q4, low threshold for ct scan if any change in neuro exam.     ___________________________  Will follow with you.  Thank you,  Korin Hendrickson MD  Diplomate of the American Board of Neurology and Psychiatry.  Diplomate of the American Board of Vascular Neurology.   [facility] ([facility]), [facility]   Ph: [phone]    Differential diagnosis and plan of care discussed with patient after the evaluation.   Advanced care planning options discussed.   Pain assessed and judicious use of narcotics when appropriate was discussed.  Importance of Fall prevention discussed.  Counseling on Smoking and Alcohol cessation was offered when appropriate.  Counseling on Diet, exercise, and medication compliance was done.     123 minutes spent on the total encounter;  more than 50 % of the visit was spent on counseling  and or coordinating care by the attending physician.    Thank you for allowing me to participate in the care of this luci patient. Please do not hesitate to call me if you have any questions.     This and subsequent notes were partially created using voice recognition software and will  inherently be subject to errors including those of syntax and sound alike substitutions which may escape proofreading. In such instances original meaning may be extrapolated by contextual derivation. [facility] Neurological [facility]), [facility]        Patient is a 54y old  Male who presents with a chief complaint of weakness (2019 01:22)    Excerpt from H&P, 54M c hx HTN, DM2, HLD, CVA 6 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    History mostly obtained from pt's sister in law at bedside. Other family members including mother, wife, and brother also at bedside corroborating. Pt is not a good historian, A&Ox2-3, at this time.  At baseline, pt ambulates, drives, and has mild LUE weakness only. During last hospitalization, pt was found to have right sided focal weakness, for which pt was seen by neuro and had MRI of spine performed that did not elucidate etiology. Starting yesterday, pt has had acute onset left-sided flaccid weakness, but improvement of right sided weakness. No other focal deficits. Pt has been having periods of mild confusion. Pt still continues to have fevers up to 103 at home, with night sweats and nocturnal hiccups. Pt has had reduced appetite. Per family, pt has had increasing abdominal distension, but had a large bowel movement this morning. Pt denies pain. Other ROS as below. Last hospitalization, pt had paracentesis, diagnostic us guided liver biopsy. Pt also received broad antibiotics from 10/17 until 10/21 empirically.    VS: Tm 98.8, P 82, Bp 122/79, R 18, 93% RA  In the ED, received NS 1L, pepcid, zofran. (2019 21:52)           *****PAST MEDICAL / Surgical  HISTORY:  PAST MEDICAL & SURGICAL HISTORY:  Pancreatic cancer  No significant past surgical history           *****FAMILY HISTORY:  FAMILY HISTORY:  No pertinent family history in first degree relatives           *****SOCIAL HISTORY:  Alcohol: None  Smoking: None         *****ALLERGIES:   Allergies    No Known Allergies    Intolerances             *****MEDICATIONS: current medication reviewed and documented.   MEDICATIONS  (STANDING):  aspirin enteric coated 81 milliGRAM(s) Oral daily  atorvastatin 80 milliGRAM(s) Oral at bedtime  chlorhexidine 4% Liquid 1 Application(s) Topical <User Schedule>  dextrose 5%. 1000 milliLiter(s) (50 mL/Hr) IV Continuous <Continuous>  dextrose 50% Injectable 12.5 Gram(s) IV Push once  dextrose 50% Injectable 25 Gram(s) IV Push once  dextrose 50% Injectable 25 Gram(s) IV Push once  heparin  Infusion.  Unit(s)/Hr (16 mL/Hr) IV Continuous <Continuous>  insulin lispro (HumaLOG) corrective regimen sliding scale   SubCutaneous three times a day before meals  piperacillin/tazobactam IVPB.. 3.375 Gram(s) IV Intermittent every 8 hours  sodium chloride 0.9%. 1000 milliLiter(s) (50 mL/Hr) IV Continuous <Continuous>  vancomycin  IVPB 1000 milliGRAM(s) IV Intermittent every 12 hours    MEDICATIONS  (PRN):  dextrose 40% Gel 15 Gram(s) Oral once PRN Blood Glucose LESS THAN 70 milliGRAM(s)/deciliter  glucagon  Injectable 1 milliGRAM(s) IntraMuscular once PRN Glucose LESS THAN 70 milligrams/deciliter  heparin  Injectable 7000 Unit(s) IV Push every 6 hours PRN For aPTT less than 40  heparin  Injectable 3500 Unit(s) IV Push every 6 hours PRN For aPTT between 40 - 57           *****REVIEW OF SYSTEM:  GEN: no fever, no chills, no pain  RESP: no SOB, no cough, no sputum  CVS: no chest pain, no palpitations, no edema  GI: no abdominal pain, no nausea, no vomiting, no constipation, no diarrhea  : no dysurea, no frequency, no hematurea  Neuro: no headache, no dizziness  PSYCH: no anxiety, no depression  Derm : no itching, no rash         *****VITAL SIGNS:  T(C): 36.9 (19 @ 00:13), Max: 38.5 (19 @ 22:20)  HR: 73 (19 @ 21:00) (73 - 92)  BP: 138/63 (19 @ 21:00) (119/79 - 138/63)  RR: 18 (19 @ 21:00) (18 - 18)  SpO2: 96% (19 @ 21:00) (96% - 96%)  Wt(kg): --     @ 07:01  -   @ 07:00  --------------------------------------------------------  IN: 1140 mL / OUT: 0 mL / NET: 1140 mL     @ 08:01  -   @ 01:46  --------------------------------------------------------  IN: 0 mL / OUT: 350 mL / NET: -350 mL             *****PHYSICAL EXAM:  lethargic.  limited exam as pt is not cooperative.  Alerts to voice, very uncomfortable due to hiccups   falls asleep mid sentence.   unable to answer questions.  perseverating   followed 1 command to wiggle his toes.      tracks blinks to threat   No facial asymmetry   Tongue is midline      Moving both upper ext spont    does not move both le to painful stimuli     Gait : not assessed.  mute toes.              *****LAB AND IMAGIN.5    28.22 )-----------( 310      ( 2019 10:14 )             25.8                   136  |  98  |  42<H>  ----------------------------<  102<H>  3.7   |  24  |  1.19    Ca    7.7<L>      2019 07:17  Phos  3.3       Mg     2.4         TPro  6.2  /  Alb  2.2<L>  /  TBili  0.5  /  DBili  x   /  AST  40  /  ALT  23  /  AlkPhos  394<H>      PTT - ( 2019 23:19 )  PTT:33.0 sec            CARDIAC MARKERS ( 2019 07:17 )  x     / x     / 70 U/L / x     / x                ABG - ( 2019 22:55 )  pH, Arterial: 7.44  pH, Blood: x     /  pCO2: 39    /  pO2: 49    / HCO3: 26    / Base Excess: 2.2   /  SaO2: 82                Urinalysis Basic - ( 2019 19:05 )    Color: Yellow / Appearance: Clear / S.019 / pH: x  Gluc: x / Ketone: Negative  / Bili: Negative / Urobili: Negative   Blood: x / Protein: Trace / Nitrite: Negative   Leuk Esterase: Negative / RBC: x / WBC x   Sq Epi: x / Non Sq Epi: x / Bacteria: x    < from: MR Head w/wo IV Cont (19 @ 11:16) >  There is normal intracranial vascular enhancement.    IMPRESSION: Atrophy for the patient's age. 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 Scattered small vessel white matter   ischemic changes. Scattered acute infarcts in the left frontal parietal   cortex and the right parietal cortex as well as the occipital cortex low   bilaterally as well as enhancing subacute infarcts along the left frontal   cortex. These may be due to embolic infarcts or due to hypercoagulable   state.        < end of copied text >      [All pertinent recent Imaging reports reviewed]         *****A S S E S S M E N T   A N D   P L A N :     Excerpt from H&P, 54M c hx HTN, DM2, HLD, CVA 6 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    History mostly obtained from pt's sister in law at bedside. Other family members including mother, wife, and brother also at bedside corroborating. Pt is not a good historian, A&Ox2-3, at this time.  At baseline, pt ambulates, drives, and has mild LUE weakness only. During last hospitalization, pt was found to have right sided focal weakness, for which pt was seen by neuro and had MRI of spine performed that did not elucidate etiology. Starting yesterday, pt has had acute onset left-sided flaccid weakness, but improvement of right sided weakness. No other focal deficits. Pt has been having periods of mild confusion. Pt still continues to have fevers up to 103 at home, with night sweats and nocturnal hiccups. Pt has had reduced appetite. Per family, pt has had increasing abdominal distension, but had a large bowel movement this morning. Pt denies pain. Other ROS as below. Last hospitalization, pt had paracentesis, diagnostic us guided liver biopsy. Pt also received broad antibiotics from 10/17 until 10/21 empirically.         Problem/Recommendations 1: CVA   Numerous cerebral infarcts noted on MRI Brain while on full dose anticoagulation.   Pt previously known to me from prior admission when pt developed transient weakness while on full dose heparin was not a candidate for acute therapies at that time .   Etiology of infarcts likely hypercoagulable state related his underlying metastatic pancreatic adenocarcinoma other etiologies include. marantic endocarditis or infectious endcarditis vs. cns vasculitis   cta recent did not reveal any mycotic aneurysm  tte, or ideally ange may be helpful.   Can repeat cta with contrast to r/o mycotic anuerysm.   Given that pt has had infarcts while on full dose AC, portends a poor outcome     Discussed at length with family, (Brother, sister in law and nephew) regarding findings on MRI, discussed  with them about his poor prognosis. RIsk vs. benefits of anticoagulation was discussed with family.   Discussed risk of hemorrhagic conversion wile on Anticoagulation, however the benefits outweigh the risks.   Low karnofsky performace score   overall guarded prognosis    neuro checks q4, low threshold for ct scan if any change in neuro exam.     ___________________________  Will follow with you.  Thank you,  Korin Hendrickson MD  Diplomate of the American Board of Neurology and Psychiatry.  Diplomate of the American Board of Vascular Neurology.   [facility] ([facility]), [facility]   Ph: [phone]    Differential diagnosis and plan of care discussed with patient after the evaluation.   Advanced care planning options discussed.   Pain assessed and judicious use of narcotics when appropriate was discussed.  Importance of Fall prevention discussed.  Counseling on Smoking and Alcohol cessation was offered when appropriate.  Counseling on Diet, exercise, and medication compliance was done.     123 minutes spent on the total encounter;  more than 50 % of the visit was spent on counseling  and or coordinating care by the attending physician.    Thank you for allowing me to participate in the care of this luci patient. Please do not hesitate to call me if you have any questions.     This and subsequent notes were partially created using voice recognition software and will  inherently be subject to errors including those of syntax and sound alike substitutions which may escape proofreading. In such instances original meaning may be extrapolated by contextual derivation.

## 2019-11-02 NOTE — CONSULT NOTE ADULT - SUBJECTIVE AND OBJECTIVE BOX
HPI:   Patient is a 54y male with a past history of DM,HTN, remote CVA with mostly left upper extremity weakness,recently hospitalized for rt sided weakness, abdominal distension and fever,found to have metastatic pancreatic cancer with liver mets, splenic vein thrombosis and portal vein thrombosis,discharged on 10/28 who was admitted  with fever, failure to thrive and right sided weakness.He was felt to have neoplastic fever last admission.He apparently had a waxing and waning course with regards to rt sided weakness, head and spine imaging were negative.He received a limited course of zosyn, he was sent home on no antibiotics.He is very weak, has a mediport, has not received any chemo.He was started on zosyn.    REVIEW OF SYSTEMS:  All other review of systems negative (Comprehensive ROS)Left sided weakness and fever    PAST MEDICAL & SURGICAL HISTORY:  Pancreatic cancer  No significant past surgical history  CVA    Allergies    No Known Allergies    Intolerances        Antimicrobials Day #  :day 2  piperacillin/tazobactam IVPB.. 3.375 Gram(s) IV Intermittent every 8 hours    Other Medications:  apixaban 10 milliGRAM(s) Oral every 12 hours  aspirin enteric coated 81 milliGRAM(s) Oral daily  atorvastatin 40 milliGRAM(s) Oral at bedtime  dextrose 40% Gel 15 Gram(s) Oral once PRN  dextrose 5%. 1000 milliLiter(s) IV Continuous <Continuous>  dextrose 50% Injectable 12.5 Gram(s) IV Push once  dextrose 50% Injectable 25 Gram(s) IV Push once  dextrose 50% Injectable 25 Gram(s) IV Push once  glucagon  Injectable 1 milliGRAM(s) IntraMuscular once PRN  hydrALAZINE 25 milliGRAM(s) Oral every 6 hours PRN  insulin lispro (HumaLOG) corrective regimen sliding scale   SubCutaneous three times a day before meals  insulin lispro (HumaLOG) corrective regimen sliding scale   SubCutaneous at bedtime  metoprolol succinate  milliGRAM(s) Oral daily  polyethylene glycol 3350 17 Gram(s) Oral two times a day  senna 2 Tablet(s) Oral at bedtime PRN  sodium chloride 0.9%. 1000 milliLiter(s) IV Continuous <Continuous>      FAMILY HISTORY:  No pertinent family history in first degree relatives      SOCIAL HISTORY:  Smoking:  x   ETOH:x     Drug Usex:       T(F): 99.5 (19 @ 23:00), Max: 99.5 (19 @ 23:00)  HR: 92 (19 @ 06:33)  BP: 119/79 (19 @ 06:33)  RR: 20 (19 @ 23:00)  SpO2: 95% (19 @ 23:00)  Wt(kg): --    PHYSICAL EXAM:  General: alert, no acute distress, increased RR  Eyes:  anicteric, no conjunctival injection, no discharge  Oropharynx: no lesions or injection 	  Neck: supple, without adenopathy  Lungs: few ronchi  Heart: regular rate and rhythm; no murmur, rubs or gallops  Abdomen: soft, distended, nontender, positive bowel sounds  Skin: no lesions  Extremities: no clubbing, cyanosis, or edema  Neurologic: alert, oriented, left arm weak.    LAB RESULTS:                        8.5    28.22 )-----------( 310      ( 2019 10:14 )             25.8         136  |  98  |  42<H>  ----------------------------<  102<H>  3.7   |  24  |  1.19    Ca    7.7<L>      2019 07:17  Phos  3.3       Mg     2.4         TPro  6.2  /  Alb  2.2<L>  /  TBili  0.5  /  DBili  x   /  AST  40  /  ALT  23  /  AlkPhos  394<H>      LIVER FUNCTIONS - ( 2019 07:17 )  Alb: 2.2 g/dL / Pro: 6.2 g/dL / ALK PHOS: 394 U/L / ALT: 23 U/L / AST: 40 U/L / GGT: x           Urinalysis Basic - ( 2019 22:25 )    Color: Yellow / Appearance: Slightly Turbid / S.018 / pH: x  Gluc: x / Ketone: Negative  / Bili: Negative / Urobili: Negative   Blood: x / Protein: Trace / Nitrite: Negative   Leuk Esterase: Negative / RBC: 1 /hpf / WBC 3 /HPF   Sq Epi: x / Non Sq Epi: 2 /hpf / Bacteria: Few        MICROBIOLOGY:  RECENT CULTURES:        RADIOLOGY REVIEWED:  < from: US Kidney and Bladder (19 @ 11:20) >  IMPRESSION:     Small to moderate ascites most prominent in the right lower quadrant.    Right renal cysts. No hydronephrosis.    < end of copied text >  < from: Xray Chest 1 View- PORTABLE-Urgent (19 @ 19:53) >  IMPRESSION:    Clear lungs.    MRI of spine  in October, no discrete lesions

## 2019-11-02 NOTE — CONSULT NOTE ADULT - SUBJECTIVE AND OBJECTIVE BOX
CHIEF COMPLAINT:    HPI:    54M c hx HTN, DM2, HLD, CVA 6 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    History mostly obtained from pt's sister in law at bedside. Other family members including mother, wife, and brother also at bedside corroborating. Pt is not a good historian, A&Ox2-3, at this time.  At baseline, pt ambulates, drives, and has mild LUE weakness only. During last hospitalization, pt was found to have right sided focal weakness, for which pt was seen by neuro and had MRI of spine performed that did not elucidate etiology. Starting yesterday, pt has had acute onset left-sided flaccid weakness, but improvement of right sided weakness. No other focal deficits. Pt has been having periods of mild confusion. Pt still continues to have fevers up to 103 at home, with night sweats and nocturnal hiccups. Pt has had reduced appetite. Per family, pt has had increasing abdominal distension, but had a large bowel movement this morning. Pt denies pain. Other ROS as below. Last hospitalization, pt had paracentesis, diagnostic us guided liver biopsy. Pt also received broad antibiotics from 10/17 until 10/21 empirically.    VS: Tm 98.8, P 82, Bp 122/79, R 18, 93% RA  In the ED, received NS 1L, pepcid, zofran.    MICU consulted as patient has recent diagnosis of multiple strokes on recent MRI imaging now with new 0/5 strength on B/l LE, only with slight Right side residual strengh. Patient tachypneic to the     PAST MEDICAL & SURGICAL HISTORY:  Pancreatic cancer  No significant past surgical history      FAMILY HISTORY:  No pertinent family history in first degree relatives      SOCIAL HISTORY:  Smoking: __ packs x ___ years  EtOH Use:  Marital Status:  Occupation:  Recent Travel:  Country of Birth:  Advance Directives:    Allergies    No Known Allergies    Intolerances        HOME MEDICATIONS:    REVIEW OF SYSTEMS:  CONSTITUTIONAL: No weakness, fevers or chills  EYES/ENT: No visual changes;  No vertigo or throat pain   NECK: No pain or stiffness  RESPIRATORY: No cough, wheezing, hemoptysis; No shortness of breath  CARDIOVASCULAR: No chest pain or palpitations  GASTROINTESTINAL: No abdominal or epigastric pain. No nausea, vomiting, or hematemesis; No diarrhea or constipation. No melena or hematochezia.  GENITOURINARY: No dysuria, frequency or hematuria  NEUROLOGICAL: No numbness or weakness  SKIN: No itching, burning, rashes, or lesions   All other review of systems is negative unless indicated above.    OBJECTIVE:  ICU Vital Signs Last 24 Hrs  T(C): 38.5 (2019 22:20), Max: 38.5 (2019 22:20)  T(F): 101.3 (2019 22:20), Max: 101.3 (2019 22:20)  HR: 73 (2019 21:00) (73 - 92)  BP: 138/63 (2019 21:00) (119/79 - 138/63)  BP(mean): --  ABP: --  ABP(mean): --  RR: 18 (2019 21:00) (18 - 18)  SpO2: 96% (2019 21:00) (96% - 96%)         @ 07:  -   @ 07:00  --------------------------------------------------------  IN: 1140 mL / OUT: 0 mL / NET: 1140 mL     @ 08:  -   @ 23:01  --------------------------------------------------------  IN: 0 mL / OUT: 350 mL / NET: -350 mL      CAPILLARY BLOOD GLUCOSE      POCT Blood Glucose.: 160 mg/dL (2019 22:02)    PHYSICAL EXAM:  GENERAL: NAD, well-developed  HEAD:  Atraumatic, Normocephalic  EYES: EOMI, PERRLA, conjunctiva and sclera clear  NECK: Supple, No JVD  CHEST/LUNG: Clear to auscultation bilaterally; No wheeze  HEART: Regular rate and rhythm; No murmurs, rubs, or gallops  ABDOMEN: Soft, Nontender, Nondistended; Bowel sounds present  EXTREMITIES:, No clubbing, cyanosis, or edema  PSYCH: AAOx3  NEUROLOGY: non-focal  SKIN: No rashes or lesions    HOSPITAL MEDICATIONS:  MEDICATIONS  (STANDING):  aspirin enteric coated 81 milliGRAM(s) Oral daily  atorvastatin 40 milliGRAM(s) Oral at bedtime  dextrose 5%. 1000 milliLiter(s) (50 mL/Hr) IV Continuous <Continuous>  dextrose 50% Injectable 12.5 Gram(s) IV Push once  dextrose 50% Injectable 25 Gram(s) IV Push once  dextrose 50% Injectable 25 Gram(s) IV Push once  heparin  Infusion.  Unit(s)/Hr (16 mL/Hr) IV Continuous <Continuous>  insulin lispro (HumaLOG) corrective regimen sliding scale   SubCutaneous three times a day before meals  insulin lispro (HumaLOG) corrective regimen sliding scale   SubCutaneous at bedtime  metoprolol succinate  milliGRAM(s) Oral daily  piperacillin/tazobactam IVPB.. 3.375 Gram(s) IV Intermittent every 8 hours  sodium chloride 0.9%. 1000 milliLiter(s) (50 mL/Hr) IV Continuous <Continuous>  vancomycin  IVPB 1000 milliGRAM(s) IV Intermittent every 12 hours    MEDICATIONS  (PRN):  dextrose 40% Gel 15 Gram(s) Oral once PRN Blood Glucose LESS THAN 70 milliGRAM(s)/deciliter  glucagon  Injectable 1 milliGRAM(s) IntraMuscular once PRN Glucose LESS THAN 70 milligrams/deciliter  heparin  Injectable 7000 Unit(s) IV Push every 6 hours PRN For aPTT less than 40  heparin  Injectable 3500 Unit(s) IV Push every 6 hours PRN For aPTT between 40 - 57  hydrALAZINE 25 milliGRAM(s) Oral every 6 hours PRN Systolic blood pressure > 170      LABS:                        8.5    28.22 )-----------( 310      ( 2019 10:14 )             25.8     11-02    136  |  98  |  42<H>  ----------------------------<  102<H>  3.7   |  24  |  1.19    Ca    7.7<L>      2019 07:17  Phos  3.3       Mg     2.4         TPro  6.2  /  Alb  2.2<L>  /  TBili  0.5  /  DBili  x   /  AST  40  /  ALT  23  /  AlkPhos  394<H>        Urinalysis Basic - ( 2019 19:05 )    Color: Yellow / Appearance: Clear / S.019 / pH: x  Gluc: x / Ketone: Negative  / Bili: Negative / Urobili: Negative   Blood: x / Protein: Trace / Nitrite: Negative   Leuk Esterase: Negative / RBC: x / WBC x   Sq Epi: x / Non Sq Epi: x / Bacteria: x      Arterial Blood Gas:   @ 22:55  7.44/39/--//82/2.2  ABG lactate: --    Venous Blood Gas:   @ 22:25  7.39/48/23//28  VBG Lactate: 1.9  Venous Blood Gas:   @ 19:29  7.42/41/47//78  VBG Lactate: 3.5      MICROBIOLOGY:     RADIOLOGY:  [ ] Reviewed and interpreted by me    EKG: CHIEF COMPLAINT:    HPI:    54M c hx HTN, DM2, HLD, CVA 6 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    History mostly obtained from pt's sister in law at bedside. Other family members including mother, wife, and brother also at bedside corroborating. Pt is not a good historian, A&Ox2-3, at this time.  At baseline, pt ambulates, drives, and has mild LUE weakness only. During last hospitalization, pt was found to have right sided focal weakness, for which pt was seen by neuro and had MRI of spine performed that did not elucidate etiology. Starting yesterday, pt has had acute onset left-sided flaccid weakness, but improvement of right sided weakness. No other focal deficits. Pt has been having periods of mild confusion. Pt still continues to have fevers up to 103 at home, with night sweats and nocturnal hiccups. Pt has had reduced appetite. Per family, pt has had increasing abdominal distension, but had a large bowel movement this morning. Pt denies pain. Other ROS as below. Last hospitalization, pt had paracentesis, diagnostic us guided liver biopsy. Pt also received broad antibiotics from 10/17 until 10/21 empirically.    VS: Tm 98.8, P 82, Bp 122/79, R 18, 93% RA  In the ED, received NS 1L, pepcid, zofran.    MICU consulted as patient has recent diagnosis of multiple strokes on recent MRI imaging now with new 0/5 strength on B/l LE, only with slight Right side residual strengh. Patient tachypneic to the     PAST MEDICAL & SURGICAL HISTORY:    Pancreatic cancer.     No significant past surgical history.     No pertinent family history in first degree relatives.     No Pertinent Family History in first degree relatives of: pancreatic ca.    FAMILY HISTORY:  No pertinent family history in first degree relatives      SOCIAL HISTORY:  Social History (marital status, living situation, occupation, tobacco use, alcohol and drug use, and sexual history): Social History:    	Marital Status: ( x ) ,	# of Children: 2 sons  	Lives with: (  ) alone, ( x ) children, ( x ) spouse, (  ) parents, (  ) siblings, (  ) friends, (  ) other:   	Occupation:   	Substance Use/Illicit Drugs: (  ) never used vs other:   	Tobacco Usage: ( x ) never smoked, (  ) former smoker, (  ) current smoker and Total Pack-Years:   	Last Alcohol Usage/Frequency/Amount/Withdrawal/Hx of Abuse:  none  	Foreign travel:       Allergies    No Known Allergies    Intolerances      HOME MEDICATIONS:    REVIEW OF SYSTEMS:  CONSTITUTIONAL: No weakness, fevers or chills  EYES/ENT: No visual changes;  No vertigo or throat pain   NECK: No pain or stiffness  RESPIRATORY: No cough, wheezing, hemoptysis; No shortness of breath  CARDIOVASCULAR: No chest pain or palpitations  GASTROINTESTINAL: No abdominal or epigastric pain. No nausea, vomiting, or hematemesis; No diarrhea or constipation. No melena or hematochezia.  GENITOURINARY: No dysuria, frequency or hematuria  NEUROLOGICAL: No numbness or weakness  SKIN: No itching, burning, rashes, or lesions   All other review of systems is negative unless indicated above.    OBJECTIVE:  ICU Vital Signs Last 24 Hrs  T(C): 38.5 (2019 22:20), Max: 38.5 (2019 22:20)  T(F): 101.3 (2019 22:20), Max: 101.3 (2019 22:20)  HR: 73 (2019 21:00) (73 - 92)  BP: 138/63 (2019 21:00) (119/79 - 138/63)  BP(mean): --  ABP: --  ABP(mean): --  RR: 18 (2019 21:00) (18 - 18)  SpO2: 96% (2019 21:00) (96% - 96%)         @ 07:  -   @ 07:00  --------------------------------------------------------  IN: 1140 mL / OUT: 0 mL / NET: 1140 mL     @ 08:01  -   @ 23:01  --------------------------------------------------------  IN: 0 mL / OUT: 350 mL / NET: -350 mL      CAPILLARY BLOOD GLUCOSE      POCT Blood Glucose.: 160 mg/dL (2019 22:02)    PHYSICAL EXAM:  GENERAL: NAD, well-developed  HEAD:  Atraumatic, Normocephalic  EYES: EOMI, PERRLA, conjunctiva and sclera clear  NECK: Supple, No JVD  CHEST/LUNG: Clear to auscultation bilaterally; No wheeze  HEART: Regular rate and rhythm; No murmurs, rubs, or gallops  ABDOMEN: Soft, Nontender, Nondistended; Bowel sounds present  EXTREMITIES:, No clubbing, cyanosis, or edema  PSYCH: AAOx3  NEUROLOGY: non-focal  SKIN: No rashes or lesions    HOSPITAL MEDICATIONS:  MEDICATIONS  (STANDING):  aspirin enteric coated 81 milliGRAM(s) Oral daily  atorvastatin 40 milliGRAM(s) Oral at bedtime  dextrose 5%. 1000 milliLiter(s) (50 mL/Hr) IV Continuous <Continuous>  dextrose 50% Injectable 12.5 Gram(s) IV Push once  dextrose 50% Injectable 25 Gram(s) IV Push once  dextrose 50% Injectable 25 Gram(s) IV Push once  heparin  Infusion.  Unit(s)/Hr (16 mL/Hr) IV Continuous <Continuous>  insulin lispro (HumaLOG) corrective regimen sliding scale   SubCutaneous three times a day before meals  insulin lispro (HumaLOG) corrective regimen sliding scale   SubCutaneous at bedtime  metoprolol succinate  milliGRAM(s) Oral daily  piperacillin/tazobactam IVPB.. 3.375 Gram(s) IV Intermittent every 8 hours  sodium chloride 0.9%. 1000 milliLiter(s) (50 mL/Hr) IV Continuous <Continuous>  vancomycin  IVPB 1000 milliGRAM(s) IV Intermittent every 12 hours    MEDICATIONS  (PRN):  dextrose 40% Gel 15 Gram(s) Oral once PRN Blood Glucose LESS THAN 70 milliGRAM(s)/deciliter  glucagon  Injectable 1 milliGRAM(s) IntraMuscular once PRN Glucose LESS THAN 70 milligrams/deciliter  heparin  Injectable 7000 Unit(s) IV Push every 6 hours PRN For aPTT less than 40  heparin  Injectable 3500 Unit(s) IV Push every 6 hours PRN For aPTT between 40 - 57  hydrALAZINE 25 milliGRAM(s) Oral every 6 hours PRN Systolic blood pressure > 170      LABS:                        8.5    28.22 )-----------( 310      ( 2019 10:14 )             25.8         136  |  98  |  42<H>  ----------------------------<  102<H>  3.7   |  24  |  1.19    Ca    7.7<L>      2019 07:17  Phos  3.3       Mg     2.4         TPro  6.2  /  Alb  2.2<L>  /  TBili  0.5  /  DBili  x   /  AST  40  /  ALT  23  /  AlkPhos  394<H>        Urinalysis Basic - ( 2019 19:05 )    Color: Yellow / Appearance: Clear / S.019 / pH: x  Gluc: x / Ketone: Negative  / Bili: Negative / Urobili: Negative   Blood: x / Protein: Trace / Nitrite: Negative   Leuk Esterase: Negative / RBC: x / WBC x   Sq Epi: x / Non Sq Epi: x / Bacteria: x      Arterial Blood Gas:   @ 22:55  7.44/39/--//82/2.2  ABG lactate: --    Venous Blood Gas:   @ 22:25  7.39/48/23/29/28  VBG Lactate: 1.9  Venous Blood Gas:   @ 19:29  7.42/41/47//78  VBG Lactate: 3.5      MICROBIOLOGY:     RADIOLOGY:  [ ] Reviewed and interpreted by me    EKG: CHIEF COMPLAINT:    HPI:    54M c hx HTN, DM2, HLD, CVA 6 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    History mostly obtained from pt's sister in law at bedside. Other family members including mother, wife, and brother also at bedside corroborating. Pt is not a good historian, A&Ox2-3, at this time.  At baseline, pt ambulates, drives, and has mild LUE weakness only. During last hospitalization, pt was found to have right sided focal weakness, for which pt was seen by neuro and had MRI of spine performed that did not elucidate etiology. Starting yesterday, pt has had acute onset left-sided flaccid weakness, but improvement of right sided weakness. No other focal deficits. Pt has been having periods of mild confusion. Pt still continues to have fevers up to 103 at home, with night sweats and nocturnal hiccups. Pt has had reduced appetite. Per family, pt has had increasing abdominal distension, but had a large bowel movement this morning. Pt denies pain. Other ROS as below. Last hospitalization, pt had paracentesis, diagnostic us guided liver biopsy. Pt also received broad antibiotics from 10/17 until 10/21 empirically.    VS: Tm 98.8, P 82, Bp 122/79, R 18, 93% RA  In the ED, received NS 1L, pepcid, zofran.    MICU consulted as patient has recent diagnosis of multiple strokes on recent MRI imaging now with new 0/5 strength on B/l LE, only with slight Right side residual strengh. Patient tachypneic to the 40's , now with worsened lethargy. Patient comfortable appearing, saturating well in no apparent distress, arousable .     PAST MEDICAL & SURGICAL HISTORY:    Pancreatic cancer.     No significant past surgical history.     No pertinent family history in first degree relatives.     No Pertinent Family History in first degree relatives of: pancreatic ca.    FAMILY HISTORY:  No pertinent family history in first degree relatives      SOCIAL HISTORY:  Social History (marital status, living situation, occupation, tobacco use, alcohol and drug use, and sexual history): Social History:    	Marital Status: ( x ) ,	# of Children: 2 sons, lives with 2 children   	Tobacco Usage: ( x ) never smoked, (  ) former smoker, (  ) current smoker and Total Pack-Years:   	Last Alcohol Usage/Frequency/Amount/Withdrawal/Hx of Abuse:  none  	Foreign travel:       Allergies    No Known Allergies    Intolerances      HOME MEDICATIONS:    REVIEW OF SYSTEMS:  CONSTITUTIONAL: No weakness, fevers or chills  EYES/ENT: No visual changes;  No vertigo or throat pain   NECK: No pain or stiffness  RESPIRATORY: No SOB   CARDIOVASCULAR: No chest pain or palpitations  GASTROINTESTINAL: No abdominal or epigastric pain. No nausea, vomiting, or hematemesis; No diarrhea or constipation. No melena or hematochezia.  GENITOURINARY: No dysuria, frequency or hematuria  NEUROLOGICAL: No numbness or weakness  SKIN: No itching, burning, rashes, or lesions   All other review of systems is negative unless indicated above.    OBJECTIVE:  ICU Vital Signs Last 24 Hrs  T(C): 38.5 (2019 22:20), Max: 38.5 (2019 22:20)  T(F): 101.3 (2019 22:20), Max: 101.3 (2019 22:20)  HR: 73 (2019 21:00) (73 - 92)  BP: 138/63 (2019 21:00) (119/79 - 138/63)  BP(mean): --  ABP: --  ABP(mean): --  RR: 18 (2019 21:00) (18 - 18)  SpO2: 96% (2019 21:00) (96% - 96%)         @ 07:  -   @ 07:00  --------------------------------------------------------  IN: 1140 mL / OUT: 0 mL / NET: 1140 mL     @ 08:01   @ 23:01  --------------------------------------------------------  IN: 0 mL / OUT: 350 mL / NET: -350 mL    CAPILLARY BLOOD GLUCOSE    POCT Blood Glucose.: 160 mg/dL (2019 22:02)    PHYSICAL EXAM:  GENERAL: NAD, well-developed  HEAD:  Atraumatic, Normocephalic  EYES: EOMI, PERRLA, conjunctiva and sclera clear  NECK: Supple, No JVD  CHEST/LUNG: Clear to auscultation bilaterally; No wheeze  HEART: RRR, normal S1 S2   ABDOMEN:  distended abdomen, + bowel sounds   EXTREMITIES:, No clubbing, cyanosis,  minimal edema b/l   PSYCH: AAOx1  NEUROLOGY: 0/5 strength on b/l lower extremity, Right hand with 3/5 strength, left hand 0/5 strength,  pupils sluggishly reactive to light   SKIN: No rashes or lesions    HOSPITAL MEDICATIONS:  MEDICATIONS  (STANDING):  aspirin enteric coated 81 milliGRAM(s) Oral daily  atorvastatin 40 milliGRAM(s) Oral at bedtime  dextrose 5%. 1000 milliLiter(s) (50 mL/Hr) IV Continuous <Continuous>  dextrose 50% Injectable 12.5 Gram(s) IV Push once  dextrose 50% Injectable 25 Gram(s) IV Push once  dextrose 50% Injectable 25 Gram(s) IV Push once  heparin  Infusion.  Unit(s)/Hr (16 mL/Hr) IV Continuous <Continuous>  insulin lispro (HumaLOG) corrective regimen sliding scale   SubCutaneous three times a day before meals  insulin lispro (HumaLOG) corrective regimen sliding scale   SubCutaneous at bedtime  metoprolol succinate  milliGRAM(s) Oral daily  piperacillin/tazobactam IVPB.. 3.375 Gram(s) IV Intermittent every 8 hours  sodium chloride 0.9%. 1000 milliLiter(s) (50 mL/Hr) IV Continuous <Continuous>  vancomycin  IVPB 1000 milliGRAM(s) IV Intermittent every 12 hours    MEDICATIONS  (PRN):  dextrose 40% Gel 15 Gram(s) Oral once PRN Blood Glucose LESS THAN 70 milliGRAM(s)/deciliter  glucagon  Injectable 1 milliGRAM(s) IntraMuscular once PRN Glucose LESS THAN 70 milligrams/deciliter  heparin  Injectable 7000 Unit(s) IV Push every 6 hours PRN For aPTT less than 40  heparin  Injectable 3500 Unit(s) IV Push every 6 hours PRN For aPTT between 40 - 57  hydrALAZINE 25 milliGRAM(s) Oral every 6 hours PRN Systolic blood pressure > 170      LABS:                        8.5    28.22 )-----------( 310      ( 2019 10:14 )             25.8         136  |  98  |  42<H>  ----------------------------<  102<H>  3.7   |  24  |  1.19    Ca    7.7<L>      2019 07:17  Phos  3.3       Mg     2.4         TPro  6.2  /  Alb  2.2<L>  /  TBili  0.5  /  DBili  x   /  AST  40  /  ALT  23  /  AlkPhos  394<H>        Urinalysis Basic - ( 2019 19:05 )    Color: Yellow / Appearance: Clear / S.019 / pH: x  Gluc: x / Ketone: Negative  / Bili: Negative / Urobili: Negative   Blood: x / Protein: Trace / Nitrite: Negative   Leuk Esterase: Negative / RBC: x / WBC x   Sq Epi: x / Non Sq Epi: x / Bacteria: x      Arterial Blood Gas:   @ 22:55  7.44/39/--//82/2.2  ABG lactate: --    Venous Blood Gas:   @ 22:25  7.39/48/23/29/28  VBG Lactate: 1.9  Venous Blood Gas:   @ 19:29  7.42/41/47//78  VBG Lactate: 3.5      MICROBIOLOGY:     RADIOLOGY:  [ ] Reviewed and interpreted by me    EKG: CHIEF COMPLAINT:    HPI:    54M c hx HTN, DM2, HLD, CVA 6 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    History mostly obtained from pt's sister in law at bedside. Other family members including mother, wife, and brother also at bedside corroborating. Pt is not a good historian, A&Ox2-3, at this time.  At baseline, pt ambulates, drives, and has mild LUE weakness only. During last hospitalization, pt was found to have right sided focal weakness, for which pt was seen by neuro and had MRI of spine performed that did not elucidate etiology. Starting yesterday, pt has had acute onset left-sided flaccid weakness, but improvement of right sided weakness. No other focal deficits. Pt has been having periods of mild confusion. Pt still continues to have fevers up to 103 at home, with night sweats and nocturnal hiccups. Pt has had reduced appetite. Per family, pt has had increasing abdominal distension, but had a large bowel movement this morning. Pt denies pain. Other ROS as below. Last hospitalization, pt had paracentesis, diagnostic us guided liver biopsy. Pt also received broad antibiotics from 10/17 until 10/21 empirically.    VS: Tm 98.8, P 82, Bp 122/79, R 18, 93% RA  In the ED, received NS 1L, pepcid, zofran.    MICU consulted as patient has recent diagnosis of multiple strokes on recent MRI imaging now with new 0/5 strength on B/l LE, only with slight Right side residual strengh. Patient tachypneic to the 40's , now with worsened lethargy. Patient comfortable appearing, saturating well in no apparent distress, arousable .     PAST MEDICAL & SURGICAL HISTORY:    Pancreatic cancer.     No significant past surgical history.     No pertinent family history in first degree relatives.     No Pertinent Family History in first degree relatives of: pancreatic ca.    FAMILY HISTORY:  No pertinent family history in first degree relatives      SOCIAL HISTORY:  Social History (marital status, living situation, occupation, tobacco use, alcohol and drug use, and sexual history): Social History:    	Marital Status: ( x ) ,	# of Children: 2 sons, lives with 2 children   	Tobacco Usage: ( x ) never smoked, (  ) former smoker, (  ) current smoker and Total Pack-Years:   	Last Alcohol Usage/Frequency/Amount/Withdrawal/Hx of Abuse:  none  	Foreign travel:       Allergies  No Known Allergies      REVIEW OF SYSTEMS:  CONSTITUTIONAL: No weakness, fevers or chills  EYES/ENT: No visual changes;  No vertigo or throat pain   NECK: No pain or stiffness  RESPIRATORY: No SOB   CARDIOVASCULAR: No chest pain or palpitations  GASTROINTESTINAL: No abdominal or epigastric pain. No nausea, vomiting, or hematemesis; No diarrhea or constipation. No melena or hematochezia.  GENITOURINARY: No dysuria, frequency or hematuria  NEUROLOGICAL: No numbness or weakness  SKIN: No itching, burning, rashes, or lesions   All other review of systems is negative unless indicated above.    OBJECTIVE:  ICU Vital Signs Last 24 Hrs  T(C): 38.5 (2019 22:20), Max: 38.5 (2019 22:20)  T(F): 101.3 (2019 22:20), Max: 101.3 (2019 22:20)  HR: 73 (2019 21:00) (73 - 92)  BP: 138/63 (2019 21:00) (119/79 - 138/63)  RR: 18 (2019 21:00) (18 - 18)  SpO2: 96% (2019 21:00) (96% - 96%)         @ 07: @ 07:00  --------------------------------------------------------  IN: 1140 mL / OUT: 0 mL / NET: 1140 mL     @ 08:  -   @ 23:01  --------------------------------------------------------  IN: 0 mL / OUT: 350 mL / NET: -350 mL    CAPILLARY BLOOD GLUCOSE    POCT Blood Glucose.: 160 mg/dL (2019 22:02)    PHYSICAL EXAM:  GENERAL: NAD, well-developed  HEAD:  Atraumatic, Normocephalic  EYES: EOMI, PERRLA, conjunctiva and sclera clear  NECK: Supple, No JVD  CHEST/LUNG: Clear to auscultation bilaterally; No wheeze  HEART: RRR, normal S1 S2   ABDOMEN:  distended abdomen, + bowel sounds   EXTREMITIES:, No clubbing, cyanosis,  minimal edema b/l   PSYCH: AAOx1  NEUROLOGY: 0/5 strength on b/l lower extremity, Right hand with 3/5 strength, left hand 0/5 strength,  pupils sluggishly reactive to light   SKIN: No rashes or lesions    HOSPITAL MEDICATIONS:  MEDICATIONS  (STANDING):  aspirin enteric coated 81 milliGRAM(s) Oral daily  atorvastatin 40 milliGRAM(s) Oral at bedtime  dextrose 5%. 1000 milliLiter(s) (50 mL/Hr) IV Continuous <Continuous>  dextrose 50% Injectable 12.5 Gram(s) IV Push once  dextrose 50% Injectable 25 Gram(s) IV Push once  dextrose 50% Injectable 25 Gram(s) IV Push once  heparin  Infusion.  Unit(s)/Hr (16 mL/Hr) IV Continuous <Continuous>  insulin lispro (HumaLOG) corrective regimen sliding scale   SubCutaneous three times a day before meals  insulin lispro (HumaLOG) corrective regimen sliding scale   SubCutaneous at bedtime  metoprolol succinate  milliGRAM(s) Oral daily  piperacillin/tazobactam IVPB.. 3.375 Gram(s) IV Intermittent every 8 hours  sodium chloride 0.9%. 1000 milliLiter(s) (50 mL/Hr) IV Continuous <Continuous>  vancomycin  IVPB 1000 milliGRAM(s) IV Intermittent every 12 hours    MEDICATIONS  (PRN):  dextrose 40% Gel 15 Gram(s) Oral once PRN Blood Glucose LESS THAN 70 milliGRAM(s)/deciliter  glucagon  Injectable 1 milliGRAM(s) IntraMuscular once PRN Glucose LESS THAN 70 milligrams/deciliter  heparin  Injectable 7000 Unit(s) IV Push every 6 hours PRN For aPTT less than 40  heparin  Injectable 3500 Unit(s) IV Push every 6 hours PRN For aPTT between 40 - 57  hydrALAZINE 25 milliGRAM(s) Oral every 6 hours PRN Systolic blood pressure > 170      LABS:                        8.5    28.22 )-----------( 310      ( 2019 10:14 )             25.8         136  |  98  |  42<H>  ----------------------------<  102<H>  3.7   |  24  |  1.19    Ca    7.7<L>      2019 07:17  Phos  3.3       Mg     2.4         TPro  6.2  /  Alb  2.2<L>  /  TBili  0.5  /  DBili  x   /  AST  40  /  ALT  23  /  AlkPhos  394<H>        Urinalysis Basic - ( 2019 19:05 )    Color: Yellow / Appearance: Clear / S.019 / pH: x  Gluc: x / Ketone: Negative  / Bili: Negative / Urobili: Negative   Blood: x / Protein: Trace / Nitrite: Negative   Leuk Esterase: Negative / RBC: x / WBC x   Sq Epi: x / Non Sq Epi: x / Bacteria: x      Arterial Blood Gas:   @ 22:55  7.44/39/--//82/2.2  ABG lactate: --    Venous Blood Gas:   @ 22:25  7.39/48/23/29/28  VBG Lactate: 1.9  Venous Blood Gas:   @ 19:29  7.42/41/47//78  VBG Lactate: 3.5      MICROBIOLOGY: cultures pending    RADIOLOGY:  [x ] Reviewed and interpreted by me  < from: MR Head w/wo IV Cont (19 @ 11:16) >  IMPRESSION: Atrophy for the patient's age. 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 Scattered small vessel white matter   ischemic changes. Scattered acute infarcts in the left frontal parietal   cortex and the right parietal cortex as well as the occipital cortex low   bilaterally as well as enhancing subacute infarcts along the left frontal   cortex. These may be due to embolic infarcts or due to hypercoagulable   state.    < end of copied text >

## 2019-11-02 NOTE — PROGRESS NOTE ADULT - ASSESSMENT
1. Metastatic Pancreatic CA    -- mediport placed  -- plan was for outpt chemo  -- u/s w mod ascites     2. L sided weakness    -- Neuro consult  -- MRI Brain and Spine. Await results    3. Anemia     -- sec to malignancy  -- transfuse prn Hgb < 7    Rosie Ames MD  [phone]

## 2019-11-02 NOTE — CONSULT NOTE ADULT - ATTENDING COMMENTS
care provided 11/2/19  Patient seen and examined.  Agree with resident note as above.  Patient with hx as noted including newly dx metastatic pancreatic cancer with liver mets who presented to [facility] with weakness and hemiplegia.  FOund to have multiple small new CVA and multiple old CVA on MRI.  Pt continued to have fevers and lethargy since previous admit.  Now this evening pt is much more somnolent.  Currently with fever.  VItals stable, pt saturating well, and can open eyes to stimulus.  Recs as above - continuous pulse ox for safety, workup for encephalopathy including ammonia, EEG, cultures, VBG.

## 2019-11-02 NOTE — PROGRESS NOTE ADULT - SUBJECTIVE AND OBJECTIVE BOX
[facility]    Subjective: Patient seen and examined. No new events except as noted.   LE B/L weakness  MRI done   low appetite     REVIEW OF SYSTEMS:    CONSTITUTIONAL: + weakness   EYES/ENT: No visual changes;  No vertigo or throat pain   NECK: No pain or stiffness  RESPIRATORY: No cough,  CARDIOVASCULAR: No chest pain or palpitations  GASTROINTESTINAL: + abdominal swelling   GENITOURINARY: No dysuria, frequency or hematuria  NEUROLOGICAL: LE weakness  SKIN: No itching, burning, rashes, or lesions   All other review of systems is negative unless indicated above.    MEDICATIONS:  MEDICATIONS  (STANDING):  aspirin enteric coated 81 milliGRAM(s) Oral daily  atorvastatin 40 milliGRAM(s) Oral at bedtime  dextrose 5%. 1000 milliLiter(s) (50 mL/Hr) IV Continuous <Continuous>  dextrose 50% Injectable 12.5 Gram(s) IV Push once  dextrose 50% Injectable 25 Gram(s) IV Push once  dextrose 50% Injectable 25 Gram(s) IV Push once  insulin lispro (HumaLOG) corrective regimen sliding scale   SubCutaneous three times a day before meals  insulin lispro (HumaLOG) corrective regimen sliding scale   SubCutaneous at bedtime  metoprolol succinate  milliGRAM(s) Oral daily  piperacillin/tazobactam IVPB.. 3.375 Gram(s) IV Intermittent every 8 hours  sodium chloride 0.9%. 1000 milliLiter(s) (100 mL/Hr) IV Continuous <Continuous>      PHYSICAL EXAM:  T(C): 37.5 (19 @ 23:00), Max: 37.5 (19 @ 23:00)  HR: 92 (19 @ 06:33) (73 - 92)  BP: 119/79 (19 @ 06:33) (111/23 - 146/89)  RR: 20 (19 @ 23:00) (18 - 20)  SpO2: 95% (19 @ 23:00) (93% - 99%)  Wt(kg): --  I&O's Summary    2019 07:01  -  2019 07:00  --------------------------------------------------------  IN: 1140 mL / OUT: 0 mL / NET: 1140 mL      Height (cm): 180.3 ( @ 23:00)  Weight (kg): 87.9 ( @ 23:00)  BMI (kg/m2): 27 ( @ 23:00)  BSA (m2): 2.08 ( @ 23:00)    Appearance: awake, weak looking   HEENT:   Normal oral mucosa  Lymphatic: No lymphadenopathy , no edema  Cardiovascular: Normal S1 S2  Respiratory: Lungs clear to auscultation, normal effort 	  Gastrointestinal: distedned  Skin: No rashes, No ecchymoses, No cyanosis, warm to touch  Musculoskeletal: motor loss B/L LE, intact sensory   Psychiatry:  Mood & affect appropriate  Ext: No edema      All labs, Imaging and EKGs personally reviewed                           8. )-----------( 310      ( 2019 10:14 )             25.8                   136  |  98  |  42<H>  ----------------------------<  102<H>  3.7   |  24  |  1.19    Ca    7.7<L>      2019 07:17  Phos  3.3       Mg     2.4         TPro  6.2  /  Alb  2.2<L>  /  TBili  0.5  /  DBili  x   /  AST  40  /  ALT  23  /  AlkPhos  394<H>             CARDIAC MARKERS ( 2019 07:17 )  x     / x     / 70 U/L / x     / x                  Urinalysis Basic - ( 2019 22:25 )    Color: Yellow / Appearance: Slightly Turbid / S.018 / pH: x  Gluc: x / Ketone: Negative  / Bili: Negative / Urobili: Negative   Blood: x / Protein: Trace / Nitrite: Negative   Leuk Esterase: Negative / RBC: 1 /hpf / WBC 3 /HPF   Sq Epi: x / Non Sq Epi: 2 /hpf / Bacteria: Few      < from: MR Head w/wo IV Cont (19 @ 11:16) >  IMPRESSION: Atrophy for the patient's age. 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 Scattered small vessel white matter   ischemic changes. Scattered acute infarcts in the left frontal parietal   cortex and the right parietal cortex as well as the occipital cortex low   bilaterally as well as enhancing subacute infarcts along the left frontal   cortex. These may be due to embolic infarcts or due to hypercoagulable   state.      < from: US Abdomen Limited (19 @ 11:20) >  IMPRESSION:     Small to moderate ascites most prominent in the right lower quadrant.    Right renal cysts. No hydronephrosis.

## 2019-11-03 ENCOUNTER — RESULT REVIEW (OUTPATIENT)
Age: 54
End: 2019-11-03

## 2019-11-03 DIAGNOSIS — Z29.9 ENCOUNTER FOR PROPHYLACTIC MEASURES, UNSPECIFIED: ICD-10-CM

## 2019-11-03 DIAGNOSIS — J96.00 ACUTE RESPIRATORY FAILURE, UNSPECIFIED WHETHER WITH HYPOXIA OR HYPERCAPNIA: ICD-10-CM

## 2019-11-03 LAB
ALBUMIN FLD-MCNC: 1.6 G/DL — SIGNIFICANT CHANGE UP
ALBUMIN SERPL ELPH-MCNC: 2 G/DL — LOW (ref 3.3–5)
ALBUMIN SERPL ELPH-MCNC: 2 G/DL — LOW (ref 3.3–5)
ALP SERPL-CCNC: 416 U/L — HIGH (ref 40–120)
ALP SERPL-CCNC: 424 U/L — HIGH (ref 40–120)
ALT FLD-CCNC: 21 U/L — SIGNIFICANT CHANGE UP (ref 10–45)
ALT FLD-CCNC: 23 U/L — SIGNIFICANT CHANGE UP (ref 10–45)
AMMONIA BLD-MCNC: 67 UMOL/L — HIGH (ref 11–55)
ANION GAP SERPL CALC-SCNC: 16 MMOL/L — SIGNIFICANT CHANGE UP (ref 5–17)
APTT BLD: 31.2 SEC — SIGNIFICANT CHANGE UP (ref 27.5–36.3)
APTT BLD: 33 SEC — SIGNIFICANT CHANGE UP (ref 27.5–36.3)
APTT BLD: 36.5 SEC — HIGH (ref 27.5–36.3)
AST SERPL-CCNC: 38 U/L — SIGNIFICANT CHANGE UP (ref 10–40)
AST SERPL-CCNC: 42 U/L — HIGH (ref 10–40)
B PERT IGG+IGM PNL SER: ABNORMAL
BILIRUB SERPL-MCNC: 0.5 MG/DL — SIGNIFICANT CHANGE UP (ref 0.2–1.2)
BILIRUB SERPL-MCNC: 0.5 MG/DL — SIGNIFICANT CHANGE UP (ref 0.2–1.2)
BUN SERPL-MCNC: 51 MG/DL — HIGH (ref 7–23)
BUN SERPL-MCNC: 53 MG/DL — HIGH (ref 7–23)
CALCIUM SERPL-MCNC: 7.4 MG/DL — LOW (ref 8.4–10.5)
CALCIUM SERPL-MCNC: 7.6 MG/DL — LOW (ref 8.4–10.5)
CHLORIDE SERPL-SCNC: 99 MMOL/L — SIGNIFICANT CHANGE UP (ref 96–108)
CHLORIDE SERPL-SCNC: 99 MMOL/L — SIGNIFICANT CHANGE UP (ref 96–108)
CO2 SERPL-SCNC: 21 MMOL/L — LOW (ref 22–31)
CO2 SERPL-SCNC: 23 MMOL/L — SIGNIFICANT CHANGE UP (ref 22–31)
COLOR FLD: SIGNIFICANT CHANGE UP
CREAT SERPL-MCNC: 1.34 MG/DL — HIGH (ref 0.5–1.3)
CREAT SERPL-MCNC: 1.47 MG/DL — HIGH (ref 0.5–1.3)
D DIMER BLD IA.RAPID-MCNC: HIGH NG/ML DDU
FIBRINOGEN PPP-MCNC: 569 MG/DL — HIGH (ref 350–510)
FLUID INTAKE SUBSTANCE CLASS: SIGNIFICANT CHANGE UP
FLUID SEGMENTED GRANULOCYTES: 72 % — SIGNIFICANT CHANGE UP
GAS PNL BLDA: SIGNIFICANT CHANGE UP
GAS PNL BLDA: SIGNIFICANT CHANGE UP
GLUCOSE BLDC GLUCOMTR-MCNC: 168 MG/DL — HIGH (ref 70–99)
GLUCOSE BLDC GLUCOMTR-MCNC: 196 MG/DL — HIGH (ref 70–99)
GLUCOSE BLDC GLUCOMTR-MCNC: 205 MG/DL — HIGH (ref 70–99)
GLUCOSE SERPL-MCNC: 192 MG/DL — HIGH (ref 70–99)
GLUCOSE SERPL-MCNC: 222 MG/DL — HIGH (ref 70–99)
GRAM STN FLD: SIGNIFICANT CHANGE UP
HAV IGM SER-ACNC: SIGNIFICANT CHANGE UP
HBV CORE IGM SER-ACNC: SIGNIFICANT CHANGE UP
HBV SURFACE AG SER-ACNC: SIGNIFICANT CHANGE UP
HCT VFR BLD CALC: 24.3 % — LOW (ref 39–50)
HCT VFR BLD CALC: 26 % — LOW (ref 39–50)
HCV AB S/CO SERPL IA: 0.18 S/CO — SIGNIFICANT CHANGE UP (ref 0–0.99)
HCV AB SERPL-IMP: SIGNIFICANT CHANGE UP
HGB BLD-MCNC: 7.9 G/DL — LOW (ref 13–17)
HGB BLD-MCNC: 8.3 G/DL — LOW (ref 13–17)
INR BLD: 3.2 RATIO — HIGH (ref 0.88–1.16)
INR BLD: 3.4 RATIO — HIGH (ref 0.88–1.16)
LDH SERPL L TO P-CCNC: 155 U/L — SIGNIFICANT CHANGE UP
LYMPHOCYTES # FLD: 20 % — SIGNIFICANT CHANGE UP
MAGNESIUM SERPL-MCNC: 2.8 MG/DL — HIGH (ref 1.6–2.6)
MCHC RBC-ENTMCNC: 23.9 PG — LOW (ref 27–34)
MCHC RBC-ENTMCNC: 24.2 PG — LOW (ref 27–34)
MCHC RBC-ENTMCNC: 31.9 GM/DL — LOW (ref 32–36)
MCHC RBC-ENTMCNC: 32.5 GM/DL — SIGNIFICANT CHANGE UP (ref 32–36)
MCV RBC AUTO: 74.5 FL — LOW (ref 80–100)
MCV RBC AUTO: 74.9 FL — LOW (ref 80–100)
MESOTHL CELL # FLD: 3 % — SIGNIFICANT CHANGE UP
MONOS+MACROS # FLD: 5 % — SIGNIFICANT CHANGE UP
NRBC # BLD: 0 /100 WBCS — SIGNIFICANT CHANGE UP (ref 0–0)
NRBC # BLD: 0 /100 WBCS — SIGNIFICANT CHANGE UP (ref 0–0)
PHOSPHATE SERPL-MCNC: 5.3 MG/DL — HIGH (ref 2.5–4.5)
PLATELET # BLD AUTO: 293 K/UL — SIGNIFICANT CHANGE UP (ref 150–400)
PLATELET # BLD AUTO: 320 K/UL — SIGNIFICANT CHANGE UP (ref 150–400)
POTASSIUM SERPL-MCNC: 4.1 MMOL/L — SIGNIFICANT CHANGE UP (ref 3.5–5.3)
POTASSIUM SERPL-MCNC: 4.4 MMOL/L — SIGNIFICANT CHANGE UP (ref 3.5–5.3)
POTASSIUM SERPL-SCNC: 4.1 MMOL/L — SIGNIFICANT CHANGE UP (ref 3.5–5.3)
POTASSIUM SERPL-SCNC: 4.4 MMOL/L — SIGNIFICANT CHANGE UP (ref 3.5–5.3)
PROT ?TM UR-MCNC: 27 MG/DL — HIGH (ref 0–12)
PROT FLD-MCNC: 3.9 G/DL — SIGNIFICANT CHANGE UP
PROT SERPL-MCNC: 5.8 G/DL — LOW (ref 6–8.3)
PROT SERPL-MCNC: 6.1 G/DL — SIGNIFICANT CHANGE UP (ref 6–8.3)
PROTHROM AB SERPL-ACNC: 38.2 SEC — HIGH (ref 10–12.9)
PROTHROM AB SERPL-ACNC: 40.6 SEC — HIGH (ref 10–12.9)
RBC # BLD: 3.26 M/UL — LOW (ref 4.2–5.8)
RBC # BLD: 3.47 M/UL — LOW (ref 4.2–5.8)
RBC # FLD: 14.6 % — HIGH (ref 10.3–14.5)
RBC # FLD: 14.8 % — HIGH (ref 10.3–14.5)
RCV VOL RI: 9250 /UL — HIGH (ref 0–5)
SODIUM SERPL-SCNC: 135 MMOL/L — SIGNIFICANT CHANGE UP (ref 135–145)
SODIUM SERPL-SCNC: 136 MMOL/L — SIGNIFICANT CHANGE UP (ref 135–145)
SPECIMEN SOURCE: SIGNIFICANT CHANGE UP
TOTAL NUCLEATED CELL COUNT, BODY FLUID: 1730 /UL — HIGH (ref 0–5)
TUBE TYPE: SIGNIFICANT CHANGE UP
UUN UR-MCNC: 922 MG/DL — SIGNIFICANT CHANGE UP
WBC # BLD: 24.67 K/UL — HIGH (ref 3.8–10.5)
WBC # BLD: 25.17 K/UL — HIGH (ref 3.8–10.5)
WBC # FLD AUTO: 24.67 K/UL — HIGH (ref 3.8–10.5)
WBC # FLD AUTO: 25.17 K/UL — HIGH (ref 3.8–10.5)

## 2019-11-03 PROCEDURE — 95951: CPT | Mod: 26

## 2019-11-03 PROCEDURE — 71045 X-RAY EXAM CHEST 1 VIEW: CPT | Mod: 26

## 2019-11-03 PROCEDURE — 49083 ABD PARACENTESIS W/IMAGING: CPT | Mod: GC

## 2019-11-03 PROCEDURE — 95816 EEG AWAKE AND DROWSY: CPT | Mod: 26

## 2019-11-03 PROCEDURE — 76604 US EXAM CHEST: CPT | Mod: 26,GC

## 2019-11-03 PROCEDURE — 70450 CT HEAD/BRAIN W/O DYE: CPT | Mod: 26

## 2019-11-03 PROCEDURE — 99291 CRITICAL CARE FIRST HOUR: CPT

## 2019-11-03 PROCEDURE — 88305 TISSUE EXAM BY PATHOLOGIST: CPT | Mod: 26

## 2019-11-03 PROCEDURE — 76705 ECHO EXAM OF ABDOMEN: CPT | Mod: 26,GC

## 2019-11-03 PROCEDURE — 88112 CYTOPATH CELL ENHANCE TECH: CPT | Mod: 26

## 2019-11-03 RX ORDER — CEFTRIAXONE 500 MG/1
2000 INJECTION, POWDER, FOR SOLUTION INTRAMUSCULAR; INTRAVENOUS ONCE
Refills: 0 | Status: COMPLETED | OUTPATIENT
Start: 2019-11-03 | End: 2019-11-03

## 2019-11-03 RX ORDER — CHLORHEXIDINE GLUCONATE 213 G/1000ML
15 SOLUTION TOPICAL EVERY 12 HOURS
Refills: 0 | Status: DISCONTINUED | OUTPATIENT
Start: 2019-11-03 | End: 2019-11-09

## 2019-11-03 RX ORDER — FENTANYL CITRATE 50 UG/ML
0.5 INJECTION INTRAVENOUS
Qty: 5000 | Refills: 0 | Status: DISCONTINUED | OUTPATIENT
Start: 2019-11-03 | End: 2019-11-06

## 2019-11-03 RX ORDER — VANCOMYCIN HCL 1 G
1000 VIAL (EA) INTRAVENOUS EVERY 12 HOURS
Refills: 0 | Status: DISCONTINUED | OUTPATIENT
Start: 2019-11-03 | End: 2019-11-04

## 2019-11-03 RX ORDER — CHLORHEXIDINE GLUCONATE 213 G/1000ML
1 SOLUTION TOPICAL
Refills: 0 | Status: DISCONTINUED | OUTPATIENT
Start: 2019-11-03 | End: 2019-11-09

## 2019-11-03 RX ORDER — HEPARIN SODIUM 5000 [USP'U]/ML
7000 INJECTION INTRAVENOUS; SUBCUTANEOUS EVERY 6 HOURS
Refills: 0 | Status: DISCONTINUED | OUTPATIENT
Start: 2019-11-03 | End: 2019-11-03

## 2019-11-03 RX ORDER — INSULIN LISPRO 100/ML
VIAL (ML) SUBCUTANEOUS EVERY 6 HOURS
Refills: 0 | Status: DISCONTINUED | OUTPATIENT
Start: 2019-11-03 | End: 2019-11-06

## 2019-11-03 RX ORDER — FENTANYL CITRATE 50 UG/ML
0.5 INJECTION INTRAVENOUS
Qty: 5000 | Refills: 0 | Status: DISCONTINUED | OUTPATIENT
Start: 2019-11-03 | End: 2019-11-03

## 2019-11-03 RX ORDER — ASPIRIN/CALCIUM CARB/MAGNESIUM 324 MG
81 TABLET ORAL DAILY
Refills: 0 | Status: DISCONTINUED | OUTPATIENT
Start: 2019-11-03 | End: 2019-11-03

## 2019-11-03 RX ORDER — HEPARIN SODIUM 5000 [USP'U]/ML
INJECTION INTRAVENOUS; SUBCUTANEOUS
Qty: 25000 | Refills: 0 | Status: DISCONTINUED | OUTPATIENT
Start: 2019-11-03 | End: 2019-11-03

## 2019-11-03 RX ORDER — NOREPINEPHRINE BITARTRATE/D5W 8 MG/250ML
0.05 PLASTIC BAG, INJECTION (ML) INTRAVENOUS
Qty: 8 | Refills: 0 | Status: DISCONTINUED | OUTPATIENT
Start: 2019-11-03 | End: 2019-11-04

## 2019-11-03 RX ORDER — HEPARIN SODIUM 5000 [USP'U]/ML
3500 INJECTION INTRAVENOUS; SUBCUTANEOUS EVERY 6 HOURS
Refills: 0 | Status: DISCONTINUED | OUTPATIENT
Start: 2019-11-03 | End: 2019-11-03

## 2019-11-03 RX ORDER — ALBUMIN HUMAN 25 %
100 VIAL (ML) INTRAVENOUS EVERY 4 HOURS
Refills: 0 | Status: COMPLETED | OUTPATIENT
Start: 2019-11-03 | End: 2019-11-04

## 2019-11-03 RX ORDER — CEFTRIAXONE 500 MG/1
2000 INJECTION, POWDER, FOR SOLUTION INTRAMUSCULAR; INTRAVENOUS EVERY 24 HOURS
Refills: 0 | Status: DISCONTINUED | OUTPATIENT
Start: 2019-11-04 | End: 2019-11-04

## 2019-11-03 RX ORDER — PIPERACILLIN AND TAZOBACTAM 4; .5 G/20ML; G/20ML
3.38 INJECTION, POWDER, LYOPHILIZED, FOR SOLUTION INTRAVENOUS EVERY 8 HOURS
Refills: 0 | Status: DISCONTINUED | OUTPATIENT
Start: 2019-11-03 | End: 2019-11-03

## 2019-11-03 RX ORDER — ASPIRIN/CALCIUM CARB/MAGNESIUM 324 MG
81 TABLET ORAL DAILY
Refills: 0 | Status: DISCONTINUED | OUTPATIENT
Start: 2019-11-03 | End: 2019-11-07

## 2019-11-03 RX ORDER — CEFTRIAXONE 500 MG/1
INJECTION, POWDER, FOR SOLUTION INTRAMUSCULAR; INTRAVENOUS
Refills: 0 | Status: DISCONTINUED | OUTPATIENT
Start: 2019-11-03 | End: 2019-11-04

## 2019-11-03 RX ORDER — PROPOFOL 10 MG/ML
5 INJECTION, EMULSION INTRAVENOUS
Qty: 1000 | Refills: 0 | Status: DISCONTINUED | OUTPATIENT
Start: 2019-11-03 | End: 2019-11-03

## 2019-11-03 RX ORDER — PANTOPRAZOLE SODIUM 20 MG/1
40 TABLET, DELAYED RELEASE ORAL DAILY
Refills: 0 | Status: DISCONTINUED | OUTPATIENT
Start: 2019-11-03 | End: 2019-11-07

## 2019-11-03 RX ORDER — ATORVASTATIN CALCIUM 80 MG/1
80 TABLET, FILM COATED ORAL AT BEDTIME
Refills: 0 | Status: DISCONTINUED | OUTPATIENT
Start: 2019-11-03 | End: 2019-11-03

## 2019-11-03 RX ORDER — NOREPINEPHRINE BITARTRATE/D5W 8 MG/250ML
0.05 PLASTIC BAG, INJECTION (ML) INTRAVENOUS
Qty: 8 | Refills: 0 | Status: DISCONTINUED | OUTPATIENT
Start: 2019-11-03 | End: 2019-11-03

## 2019-11-03 RX ADMIN — PANTOPRAZOLE SODIUM 40 MILLIGRAM(S): 20 TABLET, DELAYED RELEASE ORAL at 17:47

## 2019-11-03 RX ADMIN — Medication 50 MILLILITER(S): at 18:31

## 2019-11-03 RX ADMIN — Medication 1: at 11:51

## 2019-11-03 RX ADMIN — Medication 50 MILLILITER(S): at 22:56

## 2019-11-03 RX ADMIN — Medication 2: at 07:12

## 2019-11-03 RX ADMIN — Medication 250 MILLIGRAM(S): at 11:37

## 2019-11-03 RX ADMIN — CEFTRIAXONE 100 MILLIGRAM(S): 500 INJECTION, POWDER, FOR SOLUTION INTRAMUSCULAR; INTRAVENOUS at 18:28

## 2019-11-03 RX ADMIN — Medication 81 MILLIGRAM(S): at 17:22

## 2019-11-03 RX ADMIN — FENTANYL CITRATE 2.2 MICROGRAM(S)/KG/HR: 50 INJECTION INTRAVENOUS at 11:43

## 2019-11-03 RX ADMIN — FENTANYL CITRATE 2.2 MICROGRAM(S)/KG/HR: 50 INJECTION INTRAVENOUS at 03:13

## 2019-11-03 RX ADMIN — CHLORHEXIDINE GLUCONATE 1 APPLICATION(S): 213 SOLUTION TOPICAL at 05:21

## 2019-11-03 RX ADMIN — Medication 8.24 MICROGRAM(S)/KG/MIN: at 11:43

## 2019-11-03 RX ADMIN — CHLORHEXIDINE GLUCONATE 15 MILLILITER(S): 213 SOLUTION TOPICAL at 05:21

## 2019-11-03 RX ADMIN — FENTANYL CITRATE 2.2 MICROGRAM(S)/KG/HR: 50 INJECTION INTRAVENOUS at 20:53

## 2019-11-03 RX ADMIN — Medication 1: at 17:22

## 2019-11-03 RX ADMIN — PIPERACILLIN AND TAZOBACTAM 25 GRAM(S): 4; .5 INJECTION, POWDER, LYOPHILIZED, FOR SOLUTION INTRAVENOUS at 05:21

## 2019-11-03 RX ADMIN — PIPERACILLIN AND TAZOBACTAM 25 GRAM(S): 4; .5 INJECTION, POWDER, LYOPHILIZED, FOR SOLUTION INTRAVENOUS at 13:17

## 2019-11-03 RX ADMIN — CHLORHEXIDINE GLUCONATE 15 MILLILITER(S): 213 SOLUTION TOPICAL at 17:22

## 2019-11-03 RX ADMIN — Medication 250 MILLIGRAM(S): at 23:02

## 2019-11-03 RX ADMIN — Medication 8.24 MICROGRAM(S)/KG/MIN: at 03:13

## 2019-11-03 NOTE — PROGRESS NOTE ADULT - SUBJECTIVE AND OBJECTIVE BOX
Pt is seen and examined  Chart reviewed. Last nights events noted  Pt w progressive LE weakness and somnolence  MRI brain w bilat thromboembolic infarcts  Family at bedside  Pt intubated and sedated         PAST MEDICAL & SURGICAL HISTORY:  Pancreatic cancer  No significant past surgical history      ROS:  Negative except for:    MEDICATIONS  (STANDING):  aspirin enteric coated 81 milliGRAM(s) Oral daily  chlorhexidine 0.12% Liquid 15 milliLiter(s) Oral Mucosa every 12 hours  chlorhexidine 4% Liquid 1 Application(s) Topical <User Schedule>  dextrose 5%. 1000 milliLiter(s) (50 mL/Hr) IV Continuous <Continuous>  dextrose 50% Injectable 12.5 Gram(s) IV Push once  dextrose 50% Injectable 25 Gram(s) IV Push once  dextrose 50% Injectable 25 Gram(s) IV Push once  fentaNYL   Infusion. 0.5 MICROgram(s)/kG/Hr (2.198 mL/Hr) IV Continuous <Continuous>  insulin lispro (HumaLOG) corrective regimen sliding scale   SubCutaneous every 6 hours  norepinephrine Infusion 0.05 MICROgram(s)/kG/Min (8.241 mL/Hr) IV Continuous <Continuous>  piperacillin/tazobactam IVPB.. 3.375 Gram(s) IV Intermittent every 8 hours  vancomycin  IVPB 1000 milliGRAM(s) IV Intermittent every 12 hours    MEDICATIONS  (PRN):  dextrose 40% Gel 15 Gram(s) Oral once PRN Blood Glucose LESS THAN 70 milliGRAM(s)/deciliter  glucagon  Injectable 1 milliGRAM(s) IntraMuscular once PRN Glucose LESS THAN 70 milligrams/deciliter      Allergies    No Known Allergies    Intolerances        Vital Signs Last 24 Hrs  T(C): 36.5 (03 Nov 2019 07:30), Max: 38.5 (02 Nov 2019 22:20)  T(F): 97.7 (03 Nov 2019 07:30), Max: 101.3 (02 Nov 2019 22:20)  HR: 72 (03 Nov 2019 09:45) (65 - 83)  BP: 133/66 (03 Nov 2019 09:45) (81/59 - 146/81)  BP(mean): 82 (03 Nov 2019 09:45) (63 - 105)  RR: 19 (03 Nov 2019 09:45) (0 - 32)  SpO2: 100% (03 Nov 2019 09:45) (96% - 100%)    PHYSICAL EXAM  Intubated Sedated  Chest Clear BS  CVS S1S2+  Abd Distended, + Ascites, NT  Ext Warm Trace edema    LABS:                          8.3    25.17 )-----------( 320      ( 03 Nov 2019 04:52 )             26.0     Serial CBC's  11-03 @ 04:52  Hct-26.0 / Hgb-8.3 / Plat-320 / RBC-3.47 / WBC-25.17          Serial CBC's  11-03 @ 02:17  Hct-24.3 / Hgb-7.9 / Plat-293 / RBC-3.26 / WBC-24.67            11-03    136  |  99  |  53<H>  ----------------------------<  222<H>  4.4   |  21<L>  |  1.47<H>    Ca    7.6<L>      03 Nov 2019 04:52  Phos  5.3     11-03  Mg     2.8     11-03    TPro  6.1  /  Alb  2.0<L>  /  TBili  0.5  /  DBili  x   /  AST  42<H>  /  ALT  23  /  AlkPhos  424<H>  11-03      PT/INR - ( 03 Nov 2019 04:52 )   PT: 38.2 sec;   INR: 3.20 ratio         PTT - ( 03 Nov 2019 04:52 )  PTT:31.2 sec    WBC Count: 25.17 K/uL (11-03 @ 04:52)  Hemoglobin: 8.3 g/dL (11-03 @ 04:52)            RADIOLOGY & ADDITIONAL STUDIES:

## 2019-11-03 NOTE — CHART NOTE - NSCHARTNOTEFT_GEN_A_CORE
MICU Accept Note    CHIEF COMPLAINT: alternating left/right hemiplegia, lethargy, tachypnea    HPI / INTERVAL HISTORY:  54M PMH of HTN, DM, HLD, CVA 6 years ago (mild residual LUE weakness), metastatic pancreatic adenocarcinoma (dx 10/2019) w/ mets to liver c/b splenic and PV thromboses on eliquis, and intractable hiccups presented with alternating R and L hemiplegia. As per chart review and collateral from family: 1 month ago, pt was at [facility] was for intractable hiccups and was d/c'ed with dx of GERD, ACS work up neg. The following week, pt came to this hospital for SOB, fever, and hiccups on 10/11. Pt was found to have pancreatic mass, which was to be work up outpt, however pt did not follow up. Pt came back to the hospital 10/16 for worsening SOB, abdominal distension and pain. Pt had work up done showing metastatic pancreatic adenocarcinoma to liver with splenic and portal vein thromboses. Pt was initially on lovenox and transitioned to eliquis. Pt also complained of new R sided weakness. Pt was seen by neuro and had MRI of spine performed that did not elucidate etiology. Pt d/c'ed 10/29/19 with outpt follow up. Starting yesterday, pt has had acute onset left-sided flaccid weakness, but improvement of right sided weakness. No other focal deficits. Pt has been having periods of mild confusion. Pt still continues to have fevers up to 103 at home, with night sweats and nocturnal hiccups. Pt has had reduced appetite. Per family, pt has had increasing abdominal distension, but had a large bowel movement this morning. As per family, pt has had intermittent diaphoresis, cough starting yesterday, and has had intermittent subjective fevers. Patient admitted to general medicine floors for further work up of new hemiplegia.    Hospital course c/b new AMS. Pt lethargic and tachypneic. Code stroke called as pt's pupils were noted to be fixed. RRT was then called due to hypotension. Patient was intubated and transferred to MICU.    Of note, pt received thorazine a few hours prior to code stroke/RRT.      PAST MEDICAL & SURGICAL HISTORY:  Pancreatic cancer  No significant past surgical history      FAMILY HISTORY:  No pertinent family history in first degree relatives      SOCIAL HISTORY: as per family at bedside  Smoking: denies  Substance Use: denies  EtOH Use: denies    HOME MEDICATIONS:  	hydrALAZINE 50 mg oral tablet: Last Dose Taken:  , 1 tab(s) orally 2 times a day  · 	polyethylene glycol 3350 oral powder for reconstitution: Last Dose Taken:  , 17 gram(s) orally 2 times a day  	on month supply   · 	metoprolol succinate 200 mg oral tablet, extended release: Last Dose Taken:  , 1 tab(s) orally once a day  · 	aspirin 81 mg oral delayed release tablet: Last Dose Taken:  , 1 tab(s) orally once a day  · 	atorvastatin 80 mg oral tablet: Last Dose Taken:  , 1 tab(s) orally once a day  · 	Janumet 50 mg-1000 mg oral tablet: Last Dose Taken:  , 1 tab(s) orally 2 times a day  · 	glipiZIDE 10 mg oral tablet: Last Dose Taken:  , 1 tab(s) orally 2 times a day  · 	Eliquis 5 mg oral tablet: Last Dose Taken:  , 2 tab(s) orally 2 times a day for 7 days   	then 1 tab 2 times a day   · 	bisacodyl 10 mg rectal suppository: Last Dose Taken:  , 1 suppository(ies) rectal once a day, As needed, Constipation  · 	telmisartan 40 mg oral tablet: 1 tab(s) orally once a day    Allergies    No Known Allergies    Intolerances    REVIEW OF SYSTEMS: as per family at bedside  Constitutional: + fevers, + diaphoresis, chills, weight loss, weight gain  HEENT: No vision problems, eye pain, nasal congestion, rhinorrhea, sore throat, dysphagia  CV: No chest pain, orthopnea, palpitations  Resp: + cough, dyspnea, wheezing, hemoptysis  GI: No nausea, vomiting, diarrhea, constipation, abdominal pain  : [ ] dysuria [ ] nocturia [ ] hematuria [ ] increased urinary frequency  Musculoskeletal: [ ] back pain [ ] myalgias [ ] arthralgias [ ] fracture  Skin: [ ] rash [ ] itch  Neurological: [ ] headache [ ] dizziness [ ] syncope [ ] weakness [ ] numbness  Psychiatric: [ ] anxiety [ ] depression  Endocrine: [ ] diabetes [ ] thyroid problem  Hematologic/Lymphatic: [ ] anemia [ ] bleeding problem  Allergic/Immunologic: [ ] itchy eyes [ ] nasal discharge [ ] hives [ ] angioedema  [ ] All other systems negative  [ ] Unable to assess ROS because ________    OBJECTIVE:  ICU Vital Signs Last 24 Hrs  T(C): 36 (2019 02:00), Max: 38.5 (2019 22:20)  T(F): 96.8 (2019 02:00), Max: 101.3 (2019 22:20)  HR: 66 (2019 02:30) (66 - 92)  BP: 81/59 (2019 02:30) (81/59 - 146/81)  BP(mean): 64 (2019 02:30) (64 - 105)  ABP: --  ABP(mean): --  RR: 14 (2019 02:30) (0 - 32)  SpO2: 99% (2019 02:30) (96% - 100%)    Mode: AC/ CMV (Assist Control/ Continuous Mandatory Ventilation), RR (machine): 14, TV (machine): 550, FiO2: 40, PEEP: 5, ITime: 1, MAP: 14, PIP: 41     @ 07: @ 07:00  --------------------------------------------------------  IN: 1140 mL / OUT: 0 mL / NET: 1140 mL     @ 08:01  03 @ 02:38  --------------------------------------------------------  IN: 0 mL / OUT: 350 mL / NET: -350 mL      CAPILLARY BLOOD GLUCOSE      POCT Blood Glucose.: 160 mg/dL (2019 22:02)      PHYSICAL EXAM:  General:   HEENT:   Neck:   Chest/Lungs:  Heart:  Abdomen:   Extremities:   Skin:   Neuro:   Psych:     LINES:     HOSPITAL MEDICATIONS:  MEDICATIONS  (STANDING):  aspirin enteric coated 81 milliGRAM(s) Oral daily  atorvastatin 80 milliGRAM(s) Oral at bedtime  chlorhexidine 0.12% Liquid 15 milliLiter(s) Oral Mucosa every 12 hours  chlorhexidine 4% Liquid 1 Application(s) Topical <User Schedule>  dextrose 5%. 1000 milliLiter(s) (50 mL/Hr) IV Continuous <Continuous>  dextrose 50% Injectable 12.5 Gram(s) IV Push once  dextrose 50% Injectable 25 Gram(s) IV Push once  dextrose 50% Injectable 25 Gram(s) IV Push once  heparin  Infusion.  Unit(s)/Hr (16 mL/Hr) IV Continuous <Continuous>  insulin lispro (HumaLOG) corrective regimen sliding scale   SubCutaneous every 6 hours  piperacillin/tazobactam IVPB.. 3.375 Gram(s) IV Intermittent every 8 hours  sodium chloride 0.9%. 1000 milliLiter(s) (50 mL/Hr) IV Continuous <Continuous>  vancomycin  IVPB 1000 milliGRAM(s) IV Intermittent every 12 hours    MEDICATIONS  (PRN):  dextrose 40% Gel 15 Gram(s) Oral once PRN Blood Glucose LESS THAN 70 milliGRAM(s)/deciliter  glucagon  Injectable 1 milliGRAM(s) IntraMuscular once PRN Glucose LESS THAN 70 milligrams/deciliter  heparin  Injectable 7000 Unit(s) IV Push every 6 hours PRN For aPTT less than 40  heparin  Injectable 3500 Unit(s) IV Push every 6 hours PRN For aPTT between 40 - 57      LABS:                        8.5    28.22 )-----------( 310      ( 2019 10:14 )             25.8     Hgb Trend: 8.5<--, 8.9<--, 8.8<--, 9.5<--, 8.8<--  1102    136  |  98  |  42<H>  ----------------------------<  102<H>  3.7   |  24  |  1.19    Ca    7.7<L>      2019 07:17  Phos  3.3       Mg     2.4         TPro  6.2  /  Alb  2.2<L>  /  TBili  0.5  /  DBili  x   /  AST  40  /  ALT  23  /  AlkPhos  394<H>      Creatinine Trend: 1.19<--, 1.26<--, 0.73<--, 0.83<--, 0.79<--, 0.86<--  PTT - ( 2019 23:19 )  PTT:33.0 sec  Urinalysis Basic - ( 2019 19:05 )    Color: Yellow / Appearance: Clear / S.019 / pH: x  Gluc: x / Ketone: Negative  / Bili: Negative / Urobili: Negative   Blood: x / Protein: Trace / Nitrite: Negative   Leuk Esterase: Negative / RBC: x / WBC x   Sq Epi: x / Non Sq Epi: x / Bacteria: x      Arterial Blood Gas:   @ 02:15  7.42/39/118/25/98/1.2  ABG lactate: --  Arterial Blood Gas:   @ 22:55  7.44/39/49/26/82/2.2  ABG lactate: --    Venous Blood Gas:   @ 22:25  7.39/48/23/29/28  VBG Lactate: 1.9  Venous Blood Gas:   @ 19:29  7.42/41/47/26/78  VBG Lactate: 3.5      MICROBIOLOGY:     RADIOLOGY & ADDITIONAL TESTS:  -10/23/19 CT Angio Neck w/ IV Cont showing old right cerebellar, occipital and parietal infarcts.    -10/23/19: MRI lumbar spine- no evidence of spinal cord infarct. Bilateral facet hypertrophy at L5-S1 resulting in moderate to severe right and severe left neural foraminal narrowing. No changes in MRI 10/27/19    -19: MRI cervical spine- Marrow changes suspicious for anemia. Cannot exclude marrow infiltrative process. No lytic or blastic lesions. No cord compression. No abnormal enhancement    -19 MRI head: Atrophy for the patient's age. Old bilateral cerebellar infarcts and right parietal infarct. Scattered small vessel white matter ischemic changes. Scattered acute infarcts in the left frontal parietal cortex and the right parietal cortex as well as the occipital cortex low bilaterally as well as enhancing subacute infarcts along the left frontal cortex. These may be due to embolic infarcts or due to hypercoagulable state.    -11/3/19: CT head showing redemonstration of small acute infarcts in the right precentral gyrus and medial left frontal region, unchanged from MRI of 2019. Additional punctate acute infarcts seen in both cerebral hemispheres and in the left cerebellar hemisphere on MRI of 2019 are too small to visualize by CT technique. Redemonstration of large chronic right parietal lobe infarct. No CT evidence of acute intracranial hemorrhage, mass effect or new large territorial infarct. Please note that CT scan cannot exclude acute ischemia at the margins of a prior infarct.       ASSESSMENT AND PLAN:    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, grade 2 DHF, anemia, recent hospitalization (10/16-10/29) for newly diagnosed metastatic likely pancreatic adenocarcinoma, with met to liver, c/b splenic and portal vein thromboses on Eliquis admitted with sepsis 2/2 to unknown etiology, hospital course c/b new alternating left/right hemiplegia 2/2 to multiple infarcts, MICU consulted for worsened lethargy and tachypnea    #Neuro  AMS 2/ medication induced vs. metabolic encephalopathy vs. sepsis vs. hepatic encephalopathy  -pt received thorazine today for hiccups, which can cause CNS depression  -MRI (19) showing old b/l cerebellar infarcts and R parietal infarct. Scattered small vessel white matter ischemic changes. Scattered acute infarcts in the left frontal parietal cortex and the right parietal cortex as well as the occipital cortex low bilaterally as well as enhancing subacute infarcts along the left frontal cortex.  -CT head (11/3/19) showing old infarcts, new infarcts seen on MRI from  unable to be visualized on CT  - VBG with no signs of hypercapnia   - EEG to r/o seizure activity    #Cardio  -currently HDS, hold home anti-hypertensives  -not on pressors  -TTE 10/17/19 showing EF 57%, normal LV and RV function    #Pulmonary  -tachypneic, sating well on room air  -intubated for airway protection and impending respiratory failure  -CXR from  showing clear lungs; repeat CXR showing ____    #GI  Metastatic pancreatic adenocarcinoma with mets to liver, c/b splenic and portal vein thromboses  -10/23/19 CT abd: multiple liver lesions consistent with hepatic metastases compared with CT 10/11/2019   -19 US abd: Small to moderate ascites most prominent in the right lower quadrant. Right renal cysts. No hydronephrosis.   -LFTs wnl, alkphos elevated since last admission  -f/u hep panel  -moderate sized ascites on US abd ; will obtain diagnostic and therapeutic paracentesis     #Renal/metabolic  -UA neg  -Cr 1.19, baseline 0.8; monitor SCr  -VBG: pH 7.44, pCO2 39, HCO3 26, lactate 1.6    #ID  -progressively elevated leukocytosis and fever 101.3, tachypneic, meetings SIRS criteria  -ID following; as per ID, fever and leukocytosis felt to be 2/2 malignancy  -UA neg, CXR clear from   -f/u blood cultures  -f/u sputum cultures  -no obvious source of infection currently, on vanc/zosyn for empiric coverage    #Heme/Onc  DVT ppx  - hep gtt for multiple cerebral infarcts, CT head neg for hemorrhage  Metastatic pancreatic adenocarcinoma  -US guided hepatic nodule bx 10/21  -mets to liver, c/b splenic and portal vein thromboses, on eliquis at home; will c/w hep gtt here  -heme/onc following; as per heme/onc, outpt chemo, transfuse for >7, anemia thought to be 2/2 malignancy    #Endocrine  No active issues    #GOC  - GOC discussion readdressed with family, patient to be full code at this time MICU Accept Note    CHIEF COMPLAINT: alternating left/right hemiplegia, lethargy, tachypnea    HPI / INTERVAL HISTORY:  54M PMH of HTN, DM, HLD, CVA 6 years ago (mild residual LUE weakness), metastatic pancreatic adenocarcinoma (dx 10/2019) w/ mets to liver c/b splenic and PV thromboses on eliquis, and intractable hiccups presented with alternating R and L hemiplegia. As per chart review and collateral from family: 1 month ago, pt was at [facility] was for intractable hiccups and was d/c'ed with dx of GERD, ACS work up neg. The following week, pt came to this hospital for SOB, fever, and hiccups on 10/11. Pt was found to have pancreatic mass, which was to be work up outpt, however pt did not follow up. Pt came back to the hospital 10/16 for worsening SOB, abdominal distension and pain. Pt had work up done showing metastatic pancreatic adenocarcinoma to liver with splenic and portal vein thromboses. Pt was initially on lovenox and transitioned to eliquis. Pt also complained of new R sided weakness. Pt was seen by neuro and had MRI of spine performed that did not elucidate etiology. Pt d/c'ed 10/29/19 with outpt follow up. Starting yesterday, pt has had acute onset left-sided flaccid weakness, but improvement of right sided weakness. No other focal deficits. Pt has been having periods of mild confusion. Pt still continues to have fevers up to 103 at home, with night sweats and nocturnal hiccups. Pt has had reduced appetite. Per family, pt has had increasing abdominal distension, but had a large bowel movement this morning. As per family, pt has had intermittent diaphoresis, cough starting yesterday, and has had intermittent subjective fevers. Patient admitted to general medicine floors for further work up of new hemiplegia.    Hospital course c/b new AMS. Pt lethargic and tachypneic. Code stroke called as pt's pupils were noted to be fixed. RRT was then called due to hypotension. Patient was intubated and transferred to MICU.    Of note, pt received thorazine a few hours prior to code stroke/RRT.      PAST MEDICAL & SURGICAL HISTORY:  Pancreatic cancer  No significant past surgical history      FAMILY HISTORY:  No pertinent family history in first degree relatives      SOCIAL HISTORY: as per family at bedside  Smoking: denies  Substance Use: denies  EtOH Use: denies    HOME MEDICATIONS:  	hydrALAZINE 50 mg oral tablet: Last Dose Taken:  , 1 tab(s) orally 2 times a day  · 	polyethylene glycol 3350 oral powder for reconstitution: Last Dose Taken:  , 17 gram(s) orally 2 times a day  	on month supply   · 	metoprolol succinate 200 mg oral tablet, extended release: Last Dose Taken:  , 1 tab(s) orally once a day  · 	aspirin 81 mg oral delayed release tablet: Last Dose Taken:  , 1 tab(s) orally once a day  · 	atorvastatin 80 mg oral tablet: Last Dose Taken:  , 1 tab(s) orally once a day  · 	Janumet 50 mg-1000 mg oral tablet: Last Dose Taken:  , 1 tab(s) orally 2 times a day  · 	glipiZIDE 10 mg oral tablet: Last Dose Taken:  , 1 tab(s) orally 2 times a day  · 	Eliquis 5 mg oral tablet: Last Dose Taken:  , 2 tab(s) orally 2 times a day for 7 days   	then 1 tab 2 times a day   · 	bisacodyl 10 mg rectal suppository: Last Dose Taken:  , 1 suppository(ies) rectal once a day, As needed, Constipation  · 	telmisartan 40 mg oral tablet: 1 tab(s) orally once a day    Allergies    No Known Allergies    Intolerances    REVIEW OF SYSTEMS: as per family at bedside  Constitutional: + fevers, + diaphoresis, chills, weight loss, weight gain  HEENT: No vision problems, eye pain, nasal congestion, rhinorrhea, sore throat, dysphagia  CV: No chest pain, orthopnea, palpitations  Resp: + cough, dyspnea, wheezing, hemoptysis  GI: No nausea, vomiting, diarrhea, constipation, abdominal pain  : [ ] dysuria [ ] nocturia [ ] hematuria [ ] increased urinary frequency  Musculoskeletal: [ ] back pain [ ] myalgias [ ] arthralgias [ ] fracture  Skin: [ ] rash [ ] itch  Neurological: [ ] headache [ ] dizziness [ ] syncope [ ] weakness [ ] numbness  Psychiatric: [ ] anxiety [ ] depression  Endocrine: [ ] diabetes [ ] thyroid problem  Hematologic/Lymphatic: [ ] anemia [ ] bleeding problem  Allergic/Immunologic: [ ] itchy eyes [ ] nasal discharge [ ] hives [ ] angioedema  [ ] All other systems negative  [ ] Unable to assess ROS because ________    OBJECTIVE:  ICU Vital Signs Last 24 Hrs  T(C): 36 (2019 02:00), Max: 38.5 (2019 22:20)  T(F): 96.8 (2019 02:00), Max: 101.3 (2019 22:20)  HR: 66 (2019 02:30) (66 - 92)  BP: 81/59 (2019 02:30) (81/59 - 146/81)  BP(mean): 64 (2019 02:30) (64 - 105)  ABP: --  ABP(mean): --  RR: 14 (2019 02:30) (0 - 32)  SpO2: 99% (2019 02:30) (96% - 100%)    Mode: AC/ CMV (Assist Control/ Continuous Mandatory Ventilation), RR (machine): 14, TV (machine): 550, FiO2: 40, PEEP: 5, ITime: 1, MAP: 14, PIP: 41     @ 07: @ 07:00  --------------------------------------------------------  IN: 1140 mL / OUT: 0 mL / NET: 1140 mL     @ 08:01  03 @ 02:38  --------------------------------------------------------  IN: 0 mL / OUT: 350 mL / NET: -350 mL      CAPILLARY BLOOD GLUCOSE      POCT Blood Glucose.: 160 mg/dL (2019 22:02)      PHYSICAL EXAM:  General:   HEENT:   Neck:   Chest/Lungs:  Heart:  Abdomen:   Extremities:   Skin:   Neuro:   Psych:     LINES:     HOSPITAL MEDICATIONS:  MEDICATIONS  (STANDING):  aspirin enteric coated 81 milliGRAM(s) Oral daily  atorvastatin 80 milliGRAM(s) Oral at bedtime  chlorhexidine 0.12% Liquid 15 milliLiter(s) Oral Mucosa every 12 hours  chlorhexidine 4% Liquid 1 Application(s) Topical <User Schedule>  dextrose 5%. 1000 milliLiter(s) (50 mL/Hr) IV Continuous <Continuous>  dextrose 50% Injectable 12.5 Gram(s) IV Push once  dextrose 50% Injectable 25 Gram(s) IV Push once  dextrose 50% Injectable 25 Gram(s) IV Push once  heparin  Infusion.  Unit(s)/Hr (16 mL/Hr) IV Continuous <Continuous>  insulin lispro (HumaLOG) corrective regimen sliding scale   SubCutaneous every 6 hours  piperacillin/tazobactam IVPB.. 3.375 Gram(s) IV Intermittent every 8 hours  sodium chloride 0.9%. 1000 milliLiter(s) (50 mL/Hr) IV Continuous <Continuous>  vancomycin  IVPB 1000 milliGRAM(s) IV Intermittent every 12 hours    MEDICATIONS  (PRN):  dextrose 40% Gel 15 Gram(s) Oral once PRN Blood Glucose LESS THAN 70 milliGRAM(s)/deciliter  glucagon  Injectable 1 milliGRAM(s) IntraMuscular once PRN Glucose LESS THAN 70 milligrams/deciliter  heparin  Injectable 7000 Unit(s) IV Push every 6 hours PRN For aPTT less than 40  heparin  Injectable 3500 Unit(s) IV Push every 6 hours PRN For aPTT between 40 - 57      LABS:                        8.5    28.22 )-----------( 310      ( 2019 10:14 )             25.8     Hgb Trend: 8.5<--, 8.9<--, 8.8<--, 9.5<--, 8.8<--  1102    136  |  98  |  42<H>  ----------------------------<  102<H>  3.7   |  24  |  1.19    Ca    7.7<L>      2019 07:17  Phos  3.3       Mg     2.4         TPro  6.2  /  Alb  2.2<L>  /  TBili  0.5  /  DBili  x   /  AST  40  /  ALT  23  /  AlkPhos  394<H>      Creatinine Trend: 1.19<--, 1.26<--, 0.73<--, 0.83<--, 0.79<--, 0.86<--  PTT - ( 2019 23:19 )  PTT:33.0 sec  Urinalysis Basic - ( 2019 19:05 )    Color: Yellow / Appearance: Clear / S.019 / pH: x  Gluc: x / Ketone: Negative  / Bili: Negative / Urobili: Negative   Blood: x / Protein: Trace / Nitrite: Negative   Leuk Esterase: Negative / RBC: x / WBC x   Sq Epi: x / Non Sq Epi: x / Bacteria: x      Arterial Blood Gas:   @ 02:15  7.42/39/118/25/98/1.2  ABG lactate: --  Arterial Blood Gas:   @ 22:55  7.44/39/49/26/82/2.2  ABG lactate: --    Venous Blood Gas:   @ 22:25  7.39/48/23/29/28  VBG Lactate: 1.9  Venous Blood Gas:   @ 19:29  7.42/41/47/26/78  VBG Lactate: 3.5      MICROBIOLOGY:     RADIOLOGY & ADDITIONAL TESTS:  -10/23/19 CT Angio Neck w/ IV Cont showing old right cerebellar, occipital and parietal infarcts.    -10/23/19: MRI lumbar spine- no evidence of spinal cord infarct. Bilateral facet hypertrophy at L5-S1 resulting in moderate to severe right and severe left neural foraminal narrowing. No changes in MRI 10/27/19    -19: MRI cervical spine- Marrow changes suspicious for anemia. Cannot exclude marrow infiltrative process. No lytic or blastic lesions. No cord compression. No abnormal enhancement    -19 MRI head: Atrophy for the patient's age. Old bilateral cerebellar infarcts and right parietal infarct. Scattered small vessel white matter ischemic changes. Scattered acute infarcts in the left frontal parietal cortex and the right parietal cortex as well as the occipital cortex low bilaterally as well as enhancing subacute infarcts along the left frontal cortex. These may be due to embolic infarcts or due to hypercoagulable state.    -11/3/19: CT head showing redemonstration of small acute infarcts in the right precentral gyrus and medial left frontal region, unchanged from MRI of 2019. Additional punctate acute infarcts seen in both cerebral hemispheres and in the left cerebellar hemisphere on MRI of 2019 are too small to visualize by CT technique. Redemonstration of large chronic right parietal lobe infarct. No CT evidence of acute intracranial hemorrhage, mass effect or new large territorial infarct. Please note that CT scan cannot exclude acute ischemia at the margins of a prior infarct.       ASSESSMENT AND PLAN:    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, grade 2 DHF, anemia, recent hospitalization (10/16-10/29) for newly diagnosed metastatic likely pancreatic adenocarcinoma, with met to liver, c/b splenic and portal vein thromboses on Eliquis admitted with sepsis 2/2 to unknown etiology, hospital course c/b new alternating left/right hemiplegia 2/2 to multiple infarcts, MICU consulted for worsened lethargy and tachypnea    #Neuro  AMS / medication induced vs. metabolic encephalopathy vs. sepsis vs. hepatic encephalopathy  - pt intubated, not sedated  -pt received thorazine today for hiccups, which can cause CNS depression  -MRI (19) showing old b/l cerebellar infarcts and R parietal infarct. Scattered small vessel white matter ischemic changes. Scattered acute infarcts in the left frontal parietal cortex and the right parietal cortex as well as the occipital cortex low bilaterally as well as enhancing subacute infarcts along the left frontal cortex.  -CT head (11/3/19) showing old infarcts, new infarcts seen on MRI from  unable to be visualized on CT  - VBG with no signs of hypercapnia   - EEG to r/o seizure activity    #Cardio  -currently HDS, hold home anti-hypertensives  -not on pressors  -TTE 10/17/19 showing EF 57%, normal LV and RV function    #Pulmonary  -tachypneic, sating well on room air  -intubated for airway protection and impending respiratory failure  -CXR from  showing clear lungs; repeat CXR showing ____    #GI  Metastatic pancreatic adenocarcinoma with mets to liver, c/b splenic and portal vein thromboses  -10/23/19 CT abd: multiple liver lesions consistent with hepatic metastases compared with CT 10/11/2019   -19 US abd: Small to moderate ascites most prominent in the right lower quadrant. Right renal cysts. No hydronephrosis.   -LFTs wnl, alkphos elevated since last admission  -f/u hep panel  -moderate sized ascites on US abd ; will obtain diagnostic and therapeutic paracentesis     #Renal/metabolic  -UA neg  -Cr 1.19, baseline 0.8; monitor SCr  -VBG: pH 7.44, pCO2 39, HCO3 26, lactate 1.6    #ID  -progressively elevated leukocytosis and fever 101.3, tachypneic, meetings SIRS criteria  -ID following; as per ID, fever and leukocytosis felt to be 2/2 malignancy  -UA neg, CXR clear from   -f/u blood cultures  -f/u sputum cultures  -no obvious source of infection currently, on vanc/zosyn for empiric coverage    #Heme/Onc  DVT ppx  - hep gtt for multiple cerebral infarcts, CT head neg for hemorrhage  Metastatic pancreatic adenocarcinoma  -US guided hepatic nodule bx 10/21  -mets to liver, c/b splenic and portal vein thromboses, on eliquis at home; will c/w hep gtt here  -heme/onc following; as per heme/onc, outpt chemo, transfuse for >7, anemia thought to be 2/2 malignancy    #Endocrine  No active issues    #GOC  - GOC discussion readdressed with family, patient to be full code at this time MICU Accept Note    CHIEF COMPLAINT: alternating left/right hemiplegia, lethargy, tachypnea    HPI / INTERVAL HISTORY:  54M PMH of HTN, DM, HLD, CVA 6 years ago (mild residual LUE weakness), metastatic pancreatic adenocarcinoma (dx 10/2019) w/ mets to liver c/b splenic and PV thromboses on eliquis, and intractable hiccups presented with alternating R and L hemiplegia. As per chart review and collateral from family: 1 month ago, pt was at [facility] was for intractable hiccups and was d/c'ed with dx of GERD, ACS work up neg. The following week, pt came to this hospital for SOB, fever, and hiccups on 10/11. Pt was found to have pancreatic mass, which was to be work up outpt, however pt did not follow up. Pt came back to the hospital 10/16 for worsening SOB, abdominal distension and pain. Pt had work up done showing metastatic pancreatic adenocarcinoma to liver with splenic and portal vein thromboses. Pt was initially on lovenox and transitioned to eliquis. Pt also complained of new R sided weakness. Pt was seen by neuro and had MRI of spine performed that did not elucidate etiology. Pt d/c'ed 10/29/19 with outpt follow up. Starting yesterday, pt has had acute onset left-sided flaccid weakness, but improvement of right sided weakness. No other focal deficits. Pt has been having periods of mild confusion. Pt still continues to have fevers up to 103 at home, with night sweats and nocturnal hiccups. Pt has had reduced appetite. Per family, pt has had increasing abdominal distension, but had a large bowel movement this morning. As per family, pt has had intermittent diaphoresis, cough starting yesterday, and has had intermittent subjective fevers. Patient admitted to general medicine floors for further work up of new hemiplegia.    Hospital course c/b new AMS. Pt lethargic and tachypneic. Code stroke called as pt's pupils were noted to be fixed. RRT was then called due to hypotension. Patient was intubated and transferred to MICU.    Of note, pt received thorazine a few hours prior to code stroke/RRT.      PAST MEDICAL & SURGICAL HISTORY:  Pancreatic cancer  No significant past surgical history      FAMILY HISTORY:  No pertinent family history in first degree relatives      SOCIAL HISTORY: as per family at bedside  Smoking: denies  Substance Use: denies  EtOH Use: denies    HOME MEDICATIONS:  	hydrALAZINE 50 mg oral tablet: Last Dose Taken:  , 1 tab(s) orally 2 times a day  · 	polyethylene glycol 3350 oral powder for reconstitution: Last Dose Taken:  , 17 gram(s) orally 2 times a day  	on month supply   · 	metoprolol succinate 200 mg oral tablet, extended release: Last Dose Taken:  , 1 tab(s) orally once a day  · 	aspirin 81 mg oral delayed release tablet: Last Dose Taken:  , 1 tab(s) orally once a day  · 	atorvastatin 80 mg oral tablet: Last Dose Taken:  , 1 tab(s) orally once a day  · 	Janumet 50 mg-1000 mg oral tablet: Last Dose Taken:  , 1 tab(s) orally 2 times a day  · 	glipiZIDE 10 mg oral tablet: Last Dose Taken:  , 1 tab(s) orally 2 times a day  · 	Eliquis 5 mg oral tablet: Last Dose Taken:  , 2 tab(s) orally 2 times a day for 7 days   	then 1 tab 2 times a day   · 	bisacodyl 10 mg rectal suppository: Last Dose Taken:  , 1 suppository(ies) rectal once a day, As needed, Constipation  · 	telmisartan 40 mg oral tablet: 1 tab(s) orally once a day    Allergies    No Known Allergies    Intolerances    REVIEW OF SYSTEMS: as per family at bedside  Constitutional: + fevers, + diaphoresis, chills, weight loss, weight gain  HEENT: No vision problems, eye pain, nasal congestion, rhinorrhea, sore throat, dysphagia  CV: No chest pain, orthopnea, palpitations  Resp: + cough, dyspnea, wheezing, hemoptysis  GI: No nausea, vomiting, diarrhea, constipation, abdominal pain  : [ ] dysuria [ ] nocturia [ ] hematuria [ ] increased urinary frequency  Musculoskeletal: [ ] back pain [ ] myalgias [ ] arthralgias [ ] fracture  Skin: [ ] rash [ ] itch  Neurological: [ ] headache [ ] dizziness [ ] syncope [ ] weakness [ ] numbness  Psychiatric: [ ] anxiety [ ] depression  Endocrine: [ ] diabetes [ ] thyroid problem  Hematologic/Lymphatic: [ ] anemia [ ] bleeding problem  Allergic/Immunologic: [ ] itchy eyes [ ] nasal discharge [ ] hives [ ] angioedema  [ ] All other systems negative  [ ] Unable to assess ROS because ________    OBJECTIVE:  ICU Vital Signs Last 24 Hrs  T(C): 36 (2019 02:00), Max: 38.5 (2019 22:20)  T(F): 96.8 (2019 02:00), Max: 101.3 (2019 22:20)  HR: 66 (2019 02:30) (66 - 92)  BP: 81/59 (2019 02:30) (81/59 - 146/81)  BP(mean): 64 (2019 02:30) (64 - 105)  ABP: --  ABP(mean): --  RR: 14 (2019 02:30) (0 - 32)  SpO2: 99% (2019 02:30) (96% - 100%)    Mode: AC/ CMV (Assist Control/ Continuous Mandatory Ventilation), RR (machine): 14, TV (machine): 550, FiO2: 40, PEEP: 5, ITime: 1, MAP: 14, PIP: 41     @ 07: @ 07:00  --------------------------------------------------------  IN: 1140 mL / OUT: 0 mL / NET: 1140 mL     @ 08:01  03 @ 02:38  --------------------------------------------------------  IN: 0 mL / OUT: 350 mL / NET: -350 mL      CAPILLARY BLOOD GLUCOSE      POCT Blood Glucose.: 160 mg/dL (2019 22:02)      PHYSICAL EXAM:  General:   HEENT:   Neck:   Chest/Lungs:  Heart:  Abdomen:   Extremities:   Skin:   Neuro:   Psych:     LINES:     HOSPITAL MEDICATIONS:  MEDICATIONS  (STANDING):  aspirin enteric coated 81 milliGRAM(s) Oral daily  atorvastatin 80 milliGRAM(s) Oral at bedtime  chlorhexidine 0.12% Liquid 15 milliLiter(s) Oral Mucosa every 12 hours  chlorhexidine 4% Liquid 1 Application(s) Topical <User Schedule>  dextrose 5%. 1000 milliLiter(s) (50 mL/Hr) IV Continuous <Continuous>  dextrose 50% Injectable 12.5 Gram(s) IV Push once  dextrose 50% Injectable 25 Gram(s) IV Push once  dextrose 50% Injectable 25 Gram(s) IV Push once  heparin  Infusion.  Unit(s)/Hr (16 mL/Hr) IV Continuous <Continuous>  insulin lispro (HumaLOG) corrective regimen sliding scale   SubCutaneous every 6 hours  piperacillin/tazobactam IVPB.. 3.375 Gram(s) IV Intermittent every 8 hours  sodium chloride 0.9%. 1000 milliLiter(s) (50 mL/Hr) IV Continuous <Continuous>  vancomycin  IVPB 1000 milliGRAM(s) IV Intermittent every 12 hours    MEDICATIONS  (PRN):  dextrose 40% Gel 15 Gram(s) Oral once PRN Blood Glucose LESS THAN 70 milliGRAM(s)/deciliter  glucagon  Injectable 1 milliGRAM(s) IntraMuscular once PRN Glucose LESS THAN 70 milligrams/deciliter  heparin  Injectable 7000 Unit(s) IV Push every 6 hours PRN For aPTT less than 40  heparin  Injectable 3500 Unit(s) IV Push every 6 hours PRN For aPTT between 40 - 57      LABS:                        8.5    28.22 )-----------( 310      ( 2019 10:14 )             25.8     Hgb Trend: 8.5<--, 8.9<--, 8.8<--, 9.5<--, 8.8<--  1102    136  |  98  |  42<H>  ----------------------------<  102<H>  3.7   |  24  |  1.19    Ca    7.7<L>      2019 07:17  Phos  3.3       Mg     2.4         TPro  6.2  /  Alb  2.2<L>  /  TBili  0.5  /  DBili  x   /  AST  40  /  ALT  23  /  AlkPhos  394<H>      Creatinine Trend: 1.19<--, 1.26<--, 0.73<--, 0.83<--, 0.79<--, 0.86<--  PTT - ( 2019 23:19 )  PTT:33.0 sec  Urinalysis Basic - ( 2019 19:05 )    Color: Yellow / Appearance: Clear / S.019 / pH: x  Gluc: x / Ketone: Negative  / Bili: Negative / Urobili: Negative   Blood: x / Protein: Trace / Nitrite: Negative   Leuk Esterase: Negative / RBC: x / WBC x   Sq Epi: x / Non Sq Epi: x / Bacteria: x      Arterial Blood Gas:   @ 02:15  7.42/39/118/25/98/1.2  ABG lactate: --  Arterial Blood Gas:   @ 22:55  7.44/39/49/26/82/2.2  ABG lactate: --    Venous Blood Gas:   @ 22:25  7.39/48/23/29/28  VBG Lactate: 1.9  Venous Blood Gas:   @ 19:29  7.42/41/47/26/78  VBG Lactate: 3.5      MICROBIOLOGY:     RADIOLOGY & ADDITIONAL TESTS:  -10/23/19 CT Angio Neck w/ IV Cont showing old right cerebellar, occipital and parietal infarcts.    -10/23/19: MRI lumbar spine- no evidence of spinal cord infarct. Bilateral facet hypertrophy at L5-S1 resulting in moderate to severe right and severe left neural foraminal narrowing. No changes in MRI 10/27/19    -19: MRI cervical spine- Marrow changes suspicious for anemia. Cannot exclude marrow infiltrative process. No lytic or blastic lesions. No cord compression. No abnormal enhancement    -19 MRI head: Atrophy for the patient's age. Old bilateral cerebellar infarcts and right parietal infarct. Scattered small vessel white matter ischemic changes. Scattered acute infarcts in the left frontal parietal cortex and the right parietal cortex as well as the occipital cortex low bilaterally as well as enhancing subacute infarcts along the left frontal cortex. These may be due to embolic infarcts or due to hypercoagulable state.    -11/3/19: CT head showing redemonstration of small acute infarcts in the right precentral gyrus and medial left frontal region, unchanged from MRI of 2019. Additional punctate acute infarcts seen in both cerebral hemispheres and in the left cerebellar hemisphere on MRI of 2019 are too small to visualize by CT technique. Redemonstration of large chronic right parietal lobe infarct. No CT evidence of acute intracranial hemorrhage, mass effect or new large territorial infarct. Please note that CT scan cannot exclude acute ischemia at the margins of a prior infarct.       ASSESSMENT AND PLAN:    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, grade 2 DHF, anemia, recent hospitalization (10/16-10/29) for newly diagnosed metastatic likely pancreatic adenocarcinoma, with met to liver, c/b splenic and portal vein thromboses on Eliquis admitted with sepsis 2/2 to unknown etiology, hospital course c/b new alternating left/right hemiplegia 2/2 to multiple infarcts, MICU consulted for worsened lethargy and tachypnea    #Neuro  AMS / medication induced vs. metabolic encephalopathy vs. sepsis vs. hepatic encephalopathy  - pt intubated and sedated  -pt received thorazine today for hiccups, which can cause CNS depression  -MRI (19) showing old b/l cerebellar infarcts and R parietal infarct. Scattered small vessel white matter ischemic changes. Scattered acute infarcts in the left frontal parietal cortex and the right parietal cortex as well as the occipital cortex low bilaterally as well as enhancing subacute infarcts along the left frontal cortex.  -CT head (11/3/19) showing old infarcts, new infarcts seen on MRI from  unable to be visualized on CT  - VBG with no signs of hypercapnia   - EEG to r/o seizure activity    #Cardio  Hypotension  -on pressors  -TTE 10/17/19 showing EF 57%, normal LV and RV function    #Pulmonary  -tachypneic, sating well on room air  -intubated for airway protection and impending respiratory failure  -CXR from  showing clear lungs; repeat CXR showing ____    #GI  Metastatic pancreatic adenocarcinoma with mets to liver, c/b splenic and portal vein thromboses  -10/23/19 CT abd: multiple liver lesions consistent with hepatic metastases compared with CT 10/11/2019   -19 US abd: Small to moderate ascites most prominent in the right lower quadrant. Right renal cysts. No hydronephrosis.   -LFTs wnl, alkphos elevated since last admission  -f/u hep panel  -moderate sized ascites on US abd ; will obtain diagnostic and therapeutic paracentesis     #Renal/metabolic  -UA neg  -Cr 1.19, baseline 0.8; monitor SCr  -VBG: pH 7.44, pCO2 39, HCO3 26, lactate 1.6    #ID  -progressively elevated leukocytosis and fever 101.3, tachypneic, meetings SIRS criteria  -ID following; as per ID, fever and leukocytosis felt to be 2/2 malignancy  -UA neg, CXR clear from   -f/u blood cultures  -f/u sputum cultures  -no obvious source of infection currently, on vanc/zosyn for empiric coverage    #Heme/Onc  DVT ppx  - hep gtt for multiple cerebral infarcts, CT head neg for hemorrhage  Metastatic pancreatic adenocarcinoma  -US guided hepatic nodule bx 10/21  -mets to liver, c/b splenic and portal vein thromboses, on eliquis at home; will c/w hep gtt here  -heme/onc following; as per heme/onc, outpt chemo, transfuse for >7, anemia thought to be 2/2 malignancy    #Endocrine  No active issues    #GOC  - GOC discussion readdressed with family, patient to be full code at this time MICU Accept Note    CHIEF COMPLAINT: alternating left/right hemiplegia, lethargy, tachypnea    HPI / INTERVAL HISTORY:  54M PMH of HTN, DM, HLD, CVA 6 years ago (mild residual LUE weakness), metastatic pancreatic adenocarcinoma (dx 10/2019) w/ mets to liver c/b splenic and PV thromboses on eliquis, and intractable hiccups presented with alternating R and L hemiplegia. As per chart review and collateral from family: 1 month ago, pt was at [facility] was for intractable hiccups and was d/c'ed with dx of GERD, ACS work up neg. The following week, pt came to this hospital for SOB, fever, and hiccups on 10/11. Pt was found to have pancreatic mass, which was to be work up outpt, however pt did not follow up. Pt came back to the hospital 10/16 for worsening SOB, abdominal distension and pain. Pt had work up done showing metastatic pancreatic adenocarcinoma to liver with splenic and portal vein thromboses. Pt was initially on lovenox and transitioned to eliquis. Pt also complained of new R sided weakness. Pt was seen by neuro and had MRI of spine performed that did not elucidate etiology. Pt d/c'ed 10/29/19 with outpt follow up. Starting yesterday, pt has had acute onset left-sided flaccid weakness, but improvement of right sided weakness. No other focal deficits. Pt has been having periods of mild confusion. Pt still continues to have fevers up to 103 at home, with night sweats and nocturnal hiccups. Pt has had reduced appetite. Per family, pt has had increasing abdominal distension, but had a large bowel movement this morning. As per family, pt has had intermittent diaphoresis, cough starting yesterday, and has had intermittent subjective fevers. Patient admitted to general medicine floors for further work up of new hemiplegia.    Hospital course c/b new AMS. Pt lethargic and tachypneic. Code stroke called as pt's pupils were noted to be fixed. RRT was then called due to hypotension. Patient was intubated and transferred to MICU.    Of note, pt received thorazine a few hours prior to code stroke/RRT.      PAST MEDICAL & SURGICAL HISTORY:  Pancreatic cancer  No significant past surgical history      FAMILY HISTORY:  No pertinent family history in first degree relatives      SOCIAL HISTORY: as per family at bedside  Smoking: denies  Substance Use: denies  EtOH Use: denies    HOME MEDICATIONS:  	hydrALAZINE 50 mg oral tablet: Last Dose Taken:  , 1 tab(s) orally 2 times a day  · 	polyethylene glycol 3350 oral powder for reconstitution: Last Dose Taken:  , 17 gram(s) orally 2 times a day  	on month supply   · 	metoprolol succinate 200 mg oral tablet, extended release: Last Dose Taken:  , 1 tab(s) orally once a day  · 	aspirin 81 mg oral delayed release tablet: Last Dose Taken:  , 1 tab(s) orally once a day  · 	atorvastatin 80 mg oral tablet: Last Dose Taken:  , 1 tab(s) orally once a day  · 	Janumet 50 mg-1000 mg oral tablet: Last Dose Taken:  , 1 tab(s) orally 2 times a day  · 	glipiZIDE 10 mg oral tablet: Last Dose Taken:  , 1 tab(s) orally 2 times a day  · 	Eliquis 5 mg oral tablet: Last Dose Taken:  , 2 tab(s) orally 2 times a day for 7 days   	then 1 tab 2 times a day   · 	bisacodyl 10 mg rectal suppository: Last Dose Taken:  , 1 suppository(ies) rectal once a day, As needed, Constipation  · 	telmisartan 40 mg oral tablet: 1 tab(s) orally once a day    Allergies    No Known Allergies    Intolerances    REVIEW OF SYSTEMS: as per family at bedside  Constitutional: + fevers, + diaphoresis, chills, weight loss, weight gain  HEENT: No vision problems, eye pain, nasal congestion, rhinorrhea, sore throat, dysphagia  CV: No chest pain, orthopnea, palpitations  Resp: + cough, dyspnea, wheezing, hemoptysis  GI: No nausea, vomiting, diarrhea, constipation, abdominal pain  : [ ] dysuria [ ] nocturia [ ] hematuria [ ] increased urinary frequency  Musculoskeletal: [ ] back pain [ ] myalgias [ ] arthralgias [ ] fracture  Skin: [ ] rash [ ] itch  Neurological: [ ] headache [ ] dizziness [ ] syncope [ ] weakness [ ] numbness  Psychiatric: [ ] anxiety [ ] depression  Endocrine: [ ] diabetes [ ] thyroid problem  Hematologic/Lymphatic: [ ] anemia [ ] bleeding problem  Allergic/Immunologic: [ ] itchy eyes [ ] nasal discharge [ ] hives [ ] angioedema  [ ] All other systems negative  [ ] Unable to assess ROS because ________    OBJECTIVE:  ICU Vital Signs Last 24 Hrs  T(C): 36 (2019 02:00), Max: 38.5 (2019 22:20)  T(F): 96.8 (2019 02:00), Max: 101.3 (2019 22:20)  HR: 66 (2019 02:30) (66 - 92)  BP: 81/59 (2019 02:30) (81/59 - 146/81)  BP(mean): 64 (2019 02:30) (64 - 105)  ABP: --  ABP(mean): --  RR: 14 (2019 02:30) (0 - 32)  SpO2: 99% (2019 02:30) (96% - 100%)    Mode: AC/ CMV (Assist Control/ Continuous Mandatory Ventilation), RR (machine): 14, TV (machine): 550, FiO2: 40, PEEP: 5, ITime: 1, MAP: 14, PIP: 41     @ 07:01   @ 07:00  --------------------------------------------------------  IN: 1140 mL / OUT: 0 mL / NET: 1140 mL     @ 08:01  -  03 @ 02:38  --------------------------------------------------------  IN: 0 mL / OUT: 350 mL / NET: -350 mL      CAPILLARY BLOOD GLUCOSE      POCT Blood Glucose.: 160 mg/dL (2019 22:02)      PHYSICAL EXAM:  GENERAL: NAD, well-developed, intubated and sedated  HEAD:  Atraumatic, Normocephalic  EYES: EOMI, PERRLA, conjunctiva and sclera clear + 3 mm b/l  NECK: Supple, No JVD  CHEST/LUNG: Clear to auscultation bilaterally, mechanical breath sounds, No wheeze, rales, rhonchi  HEART: Regular rate and rhythm; No murmurs, rubs, or gallops  ABDOMEN: Soft, Nontender, very distended, hypoactive BS  EXTREMITIES:  2+ Peripheral Pulses, No clubbing, cyanosis, or edema  NEUROLOGY: sedated  SKIN: No rashes or lesions    LINES: [facility], [facility] MEDICATIONS:  MEDICATIONS  (STANDING):  aspirin enteric coated 81 milliGRAM(s) Oral daily  atorvastatin 80 milliGRAM(s) Oral at bedtime  chlorhexidine 0.12% Liquid 15 milliLiter(s) Oral Mucosa every 12 hours  chlorhexidine 4% Liquid 1 Application(s) Topical <User Schedule>  dextrose 5%. 1000 milliLiter(s) (50 mL/Hr) IV Continuous <Continuous>  dextrose 50% Injectable 12.5 Gram(s) IV Push once  dextrose 50% Injectable 25 Gram(s) IV Push once  dextrose 50% Injectable 25 Gram(s) IV Push once  heparin  Infusion.  Unit(s)/Hr (16 mL/Hr) IV Continuous <Continuous>  insulin lispro (HumaLOG) corrective regimen sliding scale   SubCutaneous every 6 hours  piperacillin/tazobactam IVPB.. 3.375 Gram(s) IV Intermittent every 8 hours  sodium chloride 0.9%. 1000 milliLiter(s) (50 mL/Hr) IV Continuous <Continuous>  vancomycin  IVPB 1000 milliGRAM(s) IV Intermittent every 12 hours    MEDICATIONS  (PRN):  dextrose 40% Gel 15 Gram(s) Oral once PRN Blood Glucose LESS THAN 70 milliGRAM(s)/deciliter  glucagon  Injectable 1 milliGRAM(s) IntraMuscular once PRN Glucose LESS THAN 70 milligrams/deciliter  heparin  Injectable 7000 Unit(s) IV Push every 6 hours PRN For aPTT less than 40  heparin  Injectable 3500 Unit(s) IV Push every 6 hours PRN For aPTT between 40 - 57      LABS:                        8.5    28.22 )-----------( 310      ( 2019 10:14 )             25.8     Hgb Trend: 8.5<--, 8.9<--, 8.8<--, 9.5<--, 8.8<--  11-02    136  |  98  |  42<H>  ----------------------------<  102<H>  3.7   |  24  |  1.19    Ca    7.7<L>      2019 07:17  Phos  3.3       Mg     2.4         TPro  6.2  /  Alb  2.2<L>  /  TBili  0.5  /  DBili  x   /  AST  40  /  ALT  23  /  AlkPhos  394<H>      Creatinine Trend: 1.19<--, 1.26<--, 0.73<--, 0.83<--, 0.79<--, 0.86<--  PTT - ( 2019 23:19 )  PTT:33.0 sec  Urinalysis Basic - ( 2019 19:05 )    Color: Yellow / Appearance: Clear / S.019 / pH: x  Gluc: x / Ketone: Negative  / Bili: Negative / Urobili: Negative   Blood: x / Protein: Trace / Nitrite: Negative   Leuk Esterase: Negative / RBC: x / WBC x   Sq Epi: x / Non Sq Epi: x / Bacteria: x      Arterial Blood Gas:   @ 02:15  7.42/39/118/25/98/1.2  ABG lactate: --  Arterial Blood Gas:   @ 22:55  7.44/39/49/26/82/2.2  ABG lactate: --    Venous Blood Gas:   @ 22:25  7.39/48/23/29/28  VBG Lactate: 1.9  Venous Blood Gas:   @ 19:29  7.42/41/47/26/78  VBG Lactate: 3.5      MICROBIOLOGY:     RADIOLOGY & ADDITIONAL TESTS:  -10/23/19 CT Angio Neck w/ IV Cont showing old right cerebellar, occipital and parietal infarcts.    -10/23/19: MRI lumbar spine- no evidence of spinal cord infarct. Bilateral facet hypertrophy at L5-S1 resulting in moderate to severe right and severe left neural foraminal narrowing. No changes in MRI 10/27/19    -19: MRI cervical spine- Marrow changes suspicious for anemia. Cannot exclude marrow infiltrative process. No lytic or blastic lesions. No cord compression. No abnormal enhancement    -19 MRI head: Atrophy for the patient's age. Old bilateral cerebellar infarcts and right parietal infarct. Scattered small vessel white matter ischemic changes. Scattered acute infarcts in the left frontal parietal cortex and the right parietal cortex as well as the occipital cortex low bilaterally as well as enhancing subacute infarcts along the left frontal cortex. These may be due to embolic infarcts or due to hypercoagulable state.    -11/3/19: CT head showing redemonstration of small acute infarcts in the right precentral gyrus and medial left frontal region, unchanged from MRI of 2019. Additional punctate acute infarcts seen in both cerebral hemispheres and in the left cerebellar hemisphere on MRI of 2019 are too small to visualize by CT technique. Redemonstration of large chronic right parietal lobe infarct. No CT evidence of acute intracranial hemorrhage, mass effect or new large territorial infarct. Please note that CT scan cannot exclude acute ischemia at the margins of a prior infarct.       ASSESSMENT AND PLAN:    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, grade 2 DHF, anemia, recent hospitalization (10/16-10/29) for newly diagnosed metastatic likely pancreatic adenocarcinoma, with met to liver, c/b splenic and portal vein thromboses on Eliquis admitted with sepsis 2/2 to unknown etiology, hospital course c/b new alternating left/right hemiplegia 2/2 to multiple infarcts, MICU consulted for worsened lethargy and tachypnea    #Neuro  AMS 2/2 medication induced vs. metabolic encephalopathy vs. sepsis vs. hepatic encephalopathy  - pt intubated and sedated  -pt received thorazine today for hiccups, which can cause CNS depression  -MRI (19) showing old b/l cerebellar infarcts and R parietal infarct. Scattered small vessel white matter ischemic changes. Scattered acute infarcts in the left frontal parietal cortex and the right parietal cortex as well as the occipital cortex low bilaterally as well as enhancing subacute infarcts along the left frontal cortex.  -CT head (11/3/19) showing old infarcts, new infarcts seen on MRI from  unable to be visualized on CT  - VBG with no signs of hypercapnia   - EEG to r/o seizure activity    #Cardio  Hypotension  -on pressors  -TTE 10/17/19 showing EF 57%, normal LV and RV function    #Pulmonary  -tachypneic, sating well on room air  -intubated for airway protection and impending respiratory failure  -CXR from  showing clear lungs; repeat CXR showing ____    #GI  Metastatic pancreatic adenocarcinoma with mets to liver, c/b splenic and portal vein thromboses  -10/23/19 CT abd: multiple liver lesions consistent with hepatic metastases compared with CT 10/11/2019   -19 US abd: Small to moderate ascites most prominent in the right lower quadrant. Right renal cysts. No hydronephrosis.   -LFTs wnl, alkphos elevated since last admission  -f/u hep panel  -moderate sized ascites on US abd ; will obtain diagnostic and therapeutic paracentesis     #Renal/metabolic  -UA neg  -Cr 1.19, baseline 0.8; monitor SCr  -VBG: pH 7.44, pCO2 39, HCO3 26, lactate 1.6    #ID  -progressively elevated leukocytosis and fever 101.3, tachypneic, meetings SIRS criteria  -ID following; as per ID, fever and leukocytosis felt to be 2/2 malignancy  -UA neg, CXR clear from   -f/u blood cultures  -f/u sputum cultures  -no obvious source of infection currently, on vanc/zosyn for empiric coverage    #Heme/Onc  DVT ppx  - hep gtt for multiple cerebral infarcts, CT head neg for hemorrhage  Metastatic pancreatic adenocarcinoma  -US guided hepatic nodule bx 10/21  -mets to liver, c/b splenic and portal vein thromboses, on eliquis at home; will c/w hep gtt here  -heme/onc following; as per heme/onc, outpt chemo, transfuse for >7, anemia thought to be 2/2 malignancy    #Endocrine  No active issues    #GOC  - GOC discussion readdressed with family, patient to be full code at this time MICU Accept Note    CHIEF COMPLAINT: alternating left/right hemiplegia, lethargy, tachypnea    HPI / INTERVAL HISTORY:  54M PMH of HTN, DM, HLD, CVA 6 years ago (mild residual LUE weakness), metastatic pancreatic adenocarcinoma (dx 10/2019) w/ mets to liver c/b splenic and PV thromboses on eliquis, and intractable hiccups presented with alternating R and L hemiplegia. As per chart review and collateral from family: 1 month ago, pt was at [facility] was for intractable hiccups and was d/c'ed with dx of GERD, ACS work up neg. The following week, pt came to this hospital for SOB, fever, and hiccups on 10/11. Pt was found to have pancreatic mass, which was to be work up outpt, however pt did not follow up. Pt came back to the hospital 10/16 for worsening SOB, abdominal distension and pain. Pt had work up done showing metastatic pancreatic adenocarcinoma to liver with splenic and portal vein thromboses. Pt was initially on lovenox and transitioned to eliquis. Pt also complained of new R sided weakness. Pt was seen by neuro and had MRI of spine performed that did not elucidate etiology. Pt d/c'ed 10/29/19 with outpt follow up. Starting yesterday, pt has had acute onset left-sided flaccid weakness, but improvement of right sided weakness. No other focal deficits. Pt has been having periods of mild confusion. Pt still continues to have fevers up to 103 at home, with night sweats and nocturnal hiccups. Pt has had reduced appetite. Per family, pt has had increasing abdominal distension, but had a large bowel movement this morning. As per family, pt has had intermittent diaphoresis, cough starting yesterday, and has had intermittent subjective fevers. Patient admitted to general medicine floors for further work up of new hemiplegia.    Hospital course c/b new AMS. Pt lethargic and tachypneic. Code stroke called as pt's pupils were noted to be fixed. RRT was then called due to hypotension. Patient was intubated and transferred to MICU.    Of note, pt received thorazine a few hours prior to code stroke/RRT.      PAST MEDICAL & SURGICAL HISTORY:  Pancreatic cancer  No significant past surgical history      FAMILY HISTORY:  No pertinent family history in first degree relatives      SOCIAL HISTORY: as per family at bedside  Smoking: denies  Substance Use: denies  EtOH Use: denies    HOME MEDICATIONS:  	hydrALAZINE 50 mg oral tablet: Last Dose Taken:  , 1 tab(s) orally 2 times a day  · 	polyethylene glycol 3350 oral powder for reconstitution: Last Dose Taken:  , 17 gram(s) orally 2 times a day  	on month supply   · 	metoprolol succinate 200 mg oral tablet, extended release: Last Dose Taken:  , 1 tab(s) orally once a day  · 	aspirin 81 mg oral delayed release tablet: Last Dose Taken:  , 1 tab(s) orally once a day  · 	atorvastatin 80 mg oral tablet: Last Dose Taken:  , 1 tab(s) orally once a day  · 	Janumet 50 mg-1000 mg oral tablet: Last Dose Taken:  , 1 tab(s) orally 2 times a day  · 	glipiZIDE 10 mg oral tablet: Last Dose Taken:  , 1 tab(s) orally 2 times a day  · 	Eliquis 5 mg oral tablet: Last Dose Taken:  , 2 tab(s) orally 2 times a day for 7 days   	then 1 tab 2 times a day   · 	bisacodyl 10 mg rectal suppository: Last Dose Taken:  , 1 suppository(ies) rectal once a day, As needed, Constipation  · 	telmisartan 40 mg oral tablet: 1 tab(s) orally once a day    Allergies    No Known Allergies    Intolerances    REVIEW OF SYSTEMS: as per family at bedside  Constitutional: + fevers, + diaphoresis, chills, weight loss, weight gain  HEENT: No vision problems, eye pain, nasal congestion, rhinorrhea, sore throat, dysphagia  CV: No chest pain, orthopnea, palpitations  Resp: + cough, dyspnea, wheezing, hemoptysis  GI: No nausea, vomiting, diarrhea, constipation, abdominal pain  : [ ] dysuria [ ] nocturia [ ] hematuria [ ] increased urinary frequency  Musculoskeletal: [ ] back pain [ ] myalgias [ ] arthralgias [ ] fracture  Skin: [ ] rash [ ] itch  Neurological: [ ] headache [ ] dizziness [ ] syncope [ ] weakness [ ] numbness  Psychiatric: [ ] anxiety [ ] depression  Endocrine: [ ] diabetes [ ] thyroid problem  Hematologic/Lymphatic: [ ] anemia [ ] bleeding problem  Allergic/Immunologic: [ ] itchy eyes [ ] nasal discharge [ ] hives [ ] angioedema  [ ] All other systems negative  [ ] Unable to assess ROS because ________    OBJECTIVE:  ICU Vital Signs Last 24 Hrs  T(C): 36 (2019 02:00), Max: 38.5 (2019 22:20)  T(F): 96.8 (2019 02:00), Max: 101.3 (2019 22:20)  HR: 66 (2019 02:30) (66 - 92)  BP: 81/59 (2019 02:30) (81/59 - 146/81)  BP(mean): 64 (2019 02:30) (64 - 105)  ABP: --  ABP(mean): --  RR: 14 (2019 02:30) (0 - 32)  SpO2: 99% (2019 02:30) (96% - 100%)    Mode: AC/ CMV (Assist Control/ Continuous Mandatory Ventilation), RR (machine): 14, TV (machine): 550, FiO2: 40, PEEP: 5, ITime: 1, MAP: 14, PIP: 41     @ 07:01   @ 07:00  --------------------------------------------------------  IN: 1140 mL / OUT: 0 mL / NET: 1140 mL     @ 08:01  -  03 @ 02:38  --------------------------------------------------------  IN: 0 mL / OUT: 350 mL / NET: -350 mL      CAPILLARY BLOOD GLUCOSE      POCT Blood Glucose.: 160 mg/dL (2019 22:02)      PHYSICAL EXAM:  GENERAL: NAD, well-developed, intubated and sedated  HEAD:  Atraumatic, Normocephalic  EYES: EOMI, PERRLA, conjunctiva and sclera clear + 3 mm b/l  NECK: Supple, No JVD  CHEST/LUNG: Clear to auscultation bilaterally, mechanical breath sounds, No wheeze, rales, rhonchi  HEART: Regular rate and rhythm; No murmurs, rubs, or gallops  ABDOMEN: Soft, Nontender, very distended, hypoactive BS  EXTREMITIES:  2+ Peripheral Pulses, No clubbing, cyanosis, or edema  NEUROLOGY: sedated  SKIN: No rashes or lesions    LINES: [facility], [facility] MEDICATIONS:  MEDICATIONS  (STANDING):  aspirin enteric coated 81 milliGRAM(s) Oral daily  atorvastatin 80 milliGRAM(s) Oral at bedtime  chlorhexidine 0.12% Liquid 15 milliLiter(s) Oral Mucosa every 12 hours  chlorhexidine 4% Liquid 1 Application(s) Topical <User Schedule>  dextrose 5%. 1000 milliLiter(s) (50 mL/Hr) IV Continuous <Continuous>  dextrose 50% Injectable 12.5 Gram(s) IV Push once  dextrose 50% Injectable 25 Gram(s) IV Push once  dextrose 50% Injectable 25 Gram(s) IV Push once  heparin  Infusion.  Unit(s)/Hr (16 mL/Hr) IV Continuous <Continuous>  insulin lispro (HumaLOG) corrective regimen sliding scale   SubCutaneous every 6 hours  piperacillin/tazobactam IVPB.. 3.375 Gram(s) IV Intermittent every 8 hours  sodium chloride 0.9%. 1000 milliLiter(s) (50 mL/Hr) IV Continuous <Continuous>  vancomycin  IVPB 1000 milliGRAM(s) IV Intermittent every 12 hours    MEDICATIONS  (PRN):  dextrose 40% Gel 15 Gram(s) Oral once PRN Blood Glucose LESS THAN 70 milliGRAM(s)/deciliter  glucagon  Injectable 1 milliGRAM(s) IntraMuscular once PRN Glucose LESS THAN 70 milligrams/deciliter  heparin  Injectable 7000 Unit(s) IV Push every 6 hours PRN For aPTT less than 40  heparin  Injectable 3500 Unit(s) IV Push every 6 hours PRN For aPTT between 40 - 57      LABS:                        8.5    28.22 )-----------( 310      ( 2019 10:14 )             25.8     Hgb Trend: 8.5<--, 8.9<--, 8.8<--, 9.5<--, 8.8<--  11-02    136  |  98  |  42<H>  ----------------------------<  102<H>  3.7   |  24  |  1.19    Ca    7.7<L>      2019 07:17  Phos  3.3       Mg     2.4         TPro  6.2  /  Alb  2.2<L>  /  TBili  0.5  /  DBili  x   /  AST  40  /  ALT  23  /  AlkPhos  394<H>      Creatinine Trend: 1.19<--, 1.26<--, 0.73<--, 0.83<--, 0.79<--, 0.86<--  PTT - ( 2019 23:19 )  PTT:33.0 sec  Urinalysis Basic - ( 2019 19:05 )    Color: Yellow / Appearance: Clear / S.019 / pH: x  Gluc: x / Ketone: Negative  / Bili: Negative / Urobili: Negative   Blood: x / Protein: Trace / Nitrite: Negative   Leuk Esterase: Negative / RBC: x / WBC x   Sq Epi: x / Non Sq Epi: x / Bacteria: x      Arterial Blood Gas:   @ 02:15  7.42/39/118/25/98/1.2  ABG lactate: --  Arterial Blood Gas:   @ 22:55  7.44/39/49/26/82/2.2  ABG lactate: --    Venous Blood Gas:   @ 22:25  7.39/48/23/29/28  VBG Lactate: 1.9  Venous Blood Gas:   @ 19:29  7.42/41/47/26/78  VBG Lactate: 3.5      MICROBIOLOGY:     RADIOLOGY & ADDITIONAL TESTS:  -10/23/19 CT Angio Neck w/ IV Cont showing old right cerebellar, occipital and parietal infarcts.    -10/23/19: MRI lumbar spine- no evidence of spinal cord infarct. Bilateral facet hypertrophy at L5-S1 resulting in moderate to severe right and severe left neural foraminal narrowing. No changes in MRI 10/27/19    -19: MRI cervical spine- Marrow changes suspicious for anemia. Cannot exclude marrow infiltrative process. No lytic or blastic lesions. No cord compression. No abnormal enhancement    -19 MRI head: Atrophy for the patient's age. Old bilateral cerebellar infarcts and right parietal infarct. Scattered small vessel white matter ischemic changes. Scattered acute infarcts in the left frontal parietal cortex and the right parietal cortex as well as the occipital cortex low bilaterally as well as enhancing subacute infarcts along the left frontal cortex. These may be due to embolic infarcts or due to hypercoagulable state.    -11/3/19: CT head showing redemonstration of small acute infarcts in the right precentral gyrus and medial left frontal region, unchanged from MRI of 2019. Additional punctate acute infarcts seen in both cerebral hemispheres and in the left cerebellar hemisphere on MRI of 2019 are too small to visualize by CT technique. Redemonstration of large chronic right parietal lobe infarct. No CT evidence of acute intracranial hemorrhage, mass effect or new large territorial infarct. Please note that CT scan cannot exclude acute ischemia at the margins of a prior infarct.       ASSESSMENT AND PLAN:    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, grade 2 DHF, anemia, recent hospitalization (10/16-10/29) for newly diagnosed metastatic likely pancreatic adenocarcinoma, with met to liver, c/b splenic and portal vein thromboses on Eliquis admitted with sepsis 2/2 to unknown etiology, hospital course c/b new alternating left/right hemiplegia 2/2 to multiple infarcts, MICU consulted for worsened lethargy and tachypnea    #Neuro  AMS 2/2 medication induced vs. metabolic encephalopathy vs. sepsis vs. hepatic encephalopathy  -pt intubated and sedated  -pt received thorazine today for hiccups, which can cause CNS depression  -MRI (19) showing old b/l cerebellar infarcts and R parietal infarct. Scattered small vessel white matter ischemic changes. Scattered acute infarcts in the left frontal parietal cortex and the right parietal cortex as well as the occipital cortex low bilaterally as well as enhancing subacute infarcts along the left frontal cortex.  -CT head (11/3/19) showing old infarcts, new infarcts seen on MRI from  unable to be visualized on CT  - VBG with no signs of hypercapnia   - EEG to r/o seizure activity    #Cardio  Hypotension likely 2/2 sepsis  -on pressors  -TTE 10/17/19 showing EF 57%, normal LV and RV function    #Pulmonary  -tachypneic, sating well on room air  -intubated for airway protection and impending respiratory failure  -CXR from  showing clear lungs; repeat CXR today relatively unchanged    #GI  Metastatic pancreatic adenocarcinoma with mets to liver, c/b splenic and portal vein thromboses  -10/23/19 CT abd: multiple liver lesions consistent with hepatic metastases compared with CT 10/11/2019   -19 US abd: Small to moderate ascites most prominent in the right lower quadrant. Right renal cysts. No hydronephrosis.   -LFTs wnl, alkphos elevated since last admission  -f/u hep panel  -moderate sized ascites on US abd ; will obtain diagnostic and therapeutic paracentesis     #Renal/metabolic  -UA neg  -Cr 1.19, baseline 0.8; monitor SCr  -VBG: pH 7.44, pCO2 39, HCO3 26, lactate 1.6    #ID  -progressively elevated leukocytosis and fever 101.3, tachypneic, meetings SIRS criteria  -ID following; as per ID, fever and leukocytosis felt to be 2/2 malignancy  -UA neg, CXR clear from   -f/u blood cultures  -f/u sputum cultures  -no obvious source of infection currently, on vanc/zosyn for empiric coverage    #Heme/Onc  DVT ppx  - hep gtt for multiple cerebral infarcts, CT head neg for hemorrhage  Metastatic pancreatic adenocarcinoma  -US guided hepatic nodule bx 10/21  -mets to liver, c/b splenic and portal vein thromboses, on eliquis at home; will c/w hep gtt here  -heme/onc following; as per heme/onc, outpt chemo, transfuse for >7, anemia thought to be 2/2 malignancy    #Endocrine  No active issues    #GOC  - GOC discussion readdressed with family, patient to be full code at this time MICU Accept Note    CHIEF COMPLAINT: alternating left/right hemiplegia, lethargy, tachypnea    HPI / INTERVAL HISTORY:  54M PMH of HTN, DM, HLD, CVA 6 years ago (mild residual LUE weakness), metastatic pancreatic adenocarcinoma (dx 10/2019) w/ mets to liver c/b splenic and PV thromboses on eliquis, and intractable hiccups presented with alternating R and L hemiplegia. As per chart review and collateral from family: 1 month ago, pt was at [facility] was for intractable hiccups and was d/c'ed with dx of GERD, ACS work up neg. The following week, pt came to this hospital for SOB, fever, and hiccups on 10/11. Pt was found to have pancreatic mass, which was to be work up outpt, however pt did not follow up. Pt came back to the hospital 10/16 for worsening SOB, abdominal distension and pain. Pt had work up done showing metastatic pancreatic adenocarcinoma to liver with splenic and portal vein thromboses. Pt was initially on lovenox and transitioned to eliquis. Pt also complained of new R sided weakness. Pt was seen by neuro and had MRI of spine performed that did not elucidate etiology. Pt d/c'ed 10/29/19 with outpt follow up. Starting yesterday, pt has had acute onset left-sided flaccid weakness, but improvement of right sided weakness. No other focal deficits. Pt has been having periods of mild confusion. Pt still continues to have fevers up to 103 at home, with night sweats and nocturnal hiccups. Pt has had reduced appetite. Per family, pt has had increasing abdominal distension, but had a large bowel movement this morning. As per family, pt has had intermittent diaphoresis, cough starting yesterday, and has had intermittent subjective fevers. Patient admitted to general medicine floors for further work up of new hemiplegia.    Hospital course c/b new AMS. Pt lethargic and tachypneic. Code stroke called as pt's pupils were noted to be fixed. RRT was then called due to hypotension. Patient was intubated and transferred to MICU.    Of note, pt received thorazine a few hours prior to code stroke/RRT.      PAST MEDICAL & SURGICAL HISTORY:  Pancreatic cancer  No significant past surgical history      FAMILY HISTORY:  No pertinent family history in first degree relatives      SOCIAL HISTORY: as per family at bedside  Smoking: denies  Substance Use: denies  EtOH Use: denies    HOME MEDICATIONS:  	hydrALAZINE 50 mg oral tablet: Last Dose Taken:  , 1 tab(s) orally 2 times a day  · 	polyethylene glycol 3350 oral powder for reconstitution: Last Dose Taken:  , 17 gram(s) orally 2 times a day  	on month supply   · 	metoprolol succinate 200 mg oral tablet, extended release: Last Dose Taken:  , 1 tab(s) orally once a day  · 	aspirin 81 mg oral delayed release tablet: Last Dose Taken:  , 1 tab(s) orally once a day  · 	atorvastatin 80 mg oral tablet: Last Dose Taken:  , 1 tab(s) orally once a day  · 	Janumet 50 mg-1000 mg oral tablet: Last Dose Taken:  , 1 tab(s) orally 2 times a day  · 	glipiZIDE 10 mg oral tablet: Last Dose Taken:  , 1 tab(s) orally 2 times a day  · 	Eliquis 5 mg oral tablet: Last Dose Taken:  , 2 tab(s) orally 2 times a day for 7 days   	then 1 tab 2 times a day   · 	bisacodyl 10 mg rectal suppository: Last Dose Taken:  , 1 suppository(ies) rectal once a day, As needed, Constipation  · 	telmisartan 40 mg oral tablet: 1 tab(s) orally once a day    Allergies    No Known Allergies    Intolerances    REVIEW OF SYSTEMS: as per family at bedside  Constitutional: + fevers, + diaphoresis, chills, weight loss, weight gain  HEENT: No vision problems, eye pain, nasal congestion, rhinorrhea, sore throat, dysphagia  CV: No chest pain, orthopnea, palpitations  Resp: + cough, dyspnea, wheezing, hemoptysis  GI: No nausea, vomiting, diarrhea, constipation, abdominal pain  : [ ] dysuria [ ] nocturia [ ] hematuria [ ] increased urinary frequency  Musculoskeletal: [ ] back pain [ ] myalgias [ ] arthralgias [ ] fracture  Skin: [ ] rash [ ] itch  Neurological: [ ] headache [ ] dizziness [ ] syncope [ ] weakness [ ] numbness  Psychiatric: [ ] anxiety [ ] depression  Endocrine: [ ] diabetes [ ] thyroid problem  Hematologic/Lymphatic: [ ] anemia [ ] bleeding problem  Allergic/Immunologic: [ ] itchy eyes [ ] nasal discharge [ ] hives [ ] angioedema  [ ] All other systems negative  [ ] Unable to assess ROS because ________    OBJECTIVE:  ICU Vital Signs Last 24 Hrs  T(C): 36 (2019 02:00), Max: 38.5 (2019 22:20)  T(F): 96.8 (2019 02:00), Max: 101.3 (2019 22:20)  HR: 66 (2019 02:30) (66 - 92)  BP: 81/59 (2019 02:30) (81/59 - 146/81)  BP(mean): 64 (2019 02:30) (64 - 105)  ABP: --  ABP(mean): --  RR: 14 (2019 02:30) (0 - 32)  SpO2: 99% (2019 02:30) (96% - 100%)    Mode: AC/ CMV (Assist Control/ Continuous Mandatory Ventilation), RR (machine): 14, TV (machine): 550, FiO2: 40, PEEP: 5, ITime: 1, MAP: 14, PIP: 41     @ 07:01   @ 07:00  --------------------------------------------------------  IN: 1140 mL / OUT: 0 mL / NET: 1140 mL     @ 08:01  -  03 @ 02:38  --------------------------------------------------------  IN: 0 mL / OUT: 350 mL / NET: -350 mL      CAPILLARY BLOOD GLUCOSE      POCT Blood Glucose.: 160 mg/dL (2019 22:02)      PHYSICAL EXAM:  GENERAL: NAD, well-developed, intubated and sedated  HEAD:  Atraumatic, Normocephalic  EYES: EOMI, PERRLA, conjunctiva and sclera clear + 3 mm b/l  NECK: Supple, No JVD  CHEST/LUNG: Clear to auscultation bilaterally, mechanical breath sounds, No wheeze, rales, rhonchi  HEART: Regular rate and rhythm; No murmurs, rubs, or gallops  ABDOMEN: Soft, Nontender, very distended, hypoactive BS  EXTREMITIES:  2+ Peripheral Pulses, No clubbing, cyanosis, or edema  NEUROLOGY: sedated  SKIN: No rashes or lesions    LINES: [facility], [facility] MEDICATIONS:  MEDICATIONS  (STANDING):  aspirin enteric coated 81 milliGRAM(s) Oral daily  atorvastatin 80 milliGRAM(s) Oral at bedtime  chlorhexidine 0.12% Liquid 15 milliLiter(s) Oral Mucosa every 12 hours  chlorhexidine 4% Liquid 1 Application(s) Topical <User Schedule>  dextrose 5%. 1000 milliLiter(s) (50 mL/Hr) IV Continuous <Continuous>  dextrose 50% Injectable 12.5 Gram(s) IV Push once  dextrose 50% Injectable 25 Gram(s) IV Push once  dextrose 50% Injectable 25 Gram(s) IV Push once  heparin  Infusion.  Unit(s)/Hr (16 mL/Hr) IV Continuous <Continuous>  insulin lispro (HumaLOG) corrective regimen sliding scale   SubCutaneous every 6 hours  piperacillin/tazobactam IVPB.. 3.375 Gram(s) IV Intermittent every 8 hours  sodium chloride 0.9%. 1000 milliLiter(s) (50 mL/Hr) IV Continuous <Continuous>  vancomycin  IVPB 1000 milliGRAM(s) IV Intermittent every 12 hours    MEDICATIONS  (PRN):  dextrose 40% Gel 15 Gram(s) Oral once PRN Blood Glucose LESS THAN 70 milliGRAM(s)/deciliter  glucagon  Injectable 1 milliGRAM(s) IntraMuscular once PRN Glucose LESS THAN 70 milligrams/deciliter  heparin  Injectable 7000 Unit(s) IV Push every 6 hours PRN For aPTT less than 40  heparin  Injectable 3500 Unit(s) IV Push every 6 hours PRN For aPTT between 40 - 57      LABS:                        8.5    28.22 )-----------( 310      ( 2019 10:14 )             25.8     Hgb Trend: 8.5<--, 8.9<--, 8.8<--, 9.5<--, 8.8<--  11-02    136  |  98  |  42<H>  ----------------------------<  102<H>  3.7   |  24  |  1.19    Ca    7.7<L>      2019 07:17  Phos  3.3       Mg     2.4         TPro  6.2  /  Alb  2.2<L>  /  TBili  0.5  /  DBili  x   /  AST  40  /  ALT  23  /  AlkPhos  394<H>      Creatinine Trend: 1.19<--, 1.26<--, 0.73<--, 0.83<--, 0.79<--, 0.86<--  PTT - ( 2019 23:19 )  PTT:33.0 sec  Urinalysis Basic - ( 2019 19:05 )    Color: Yellow / Appearance: Clear / S.019 / pH: x  Gluc: x / Ketone: Negative  / Bili: Negative / Urobili: Negative   Blood: x / Protein: Trace / Nitrite: Negative   Leuk Esterase: Negative / RBC: x / WBC x   Sq Epi: x / Non Sq Epi: x / Bacteria: x      Arterial Blood Gas:   @ 02:15  7.42/39/118/25/98/1.2  ABG lactate: --  Arterial Blood Gas:   @ 22:55  7.44/39/49/26/82/2.2  ABG lactate: --    Venous Blood Gas:   @ 22:25  7.39/48/23/29/28  VBG Lactate: 1.9  Venous Blood Gas:   @ 19:29  7.42/41/47/26/78  VBG Lactate: 3.5      MICROBIOLOGY:     RADIOLOGY & ADDITIONAL TESTS:  -10/23/19 CT Angio Neck w/ IV Cont showing old right cerebellar, occipital and parietal infarcts.    -10/23/19: MRI lumbar spine- no evidence of spinal cord infarct. Bilateral facet hypertrophy at L5-S1 resulting in moderate to severe right and severe left neural foraminal narrowing. No changes in MRI 10/27/19    -19: MRI cervical spine- Marrow changes suspicious for anemia. Cannot exclude marrow infiltrative process. No lytic or blastic lesions. No cord compression. No abnormal enhancement    -19 MRI head: Atrophy for the patient's age. Old bilateral cerebellar infarcts and right parietal infarct. Scattered small vessel white matter ischemic changes. Scattered acute infarcts in the left frontal parietal cortex and the right parietal cortex as well as the occipital cortex low bilaterally as well as enhancing subacute infarcts along the left frontal cortex. These may be due to embolic infarcts or due to hypercoagulable state.    -11/3/19: CT head showing redemonstration of small acute infarcts in the right precentral gyrus and medial left frontal region, unchanged from MRI of 2019. Additional punctate acute infarcts seen in both cerebral hemispheres and in the left cerebellar hemisphere on MRI of 2019 are too small to visualize by CT technique. Redemonstration of large chronic right parietal lobe infarct. No CT evidence of acute intracranial hemorrhage, mass effect or new large territorial infarct. Please note that CT scan cannot exclude acute ischemia at the margins of a prior infarct.       ASSESSMENT AND PLAN:    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, grade 2 DHF, anemia, recent hospitalization (10/16-10/29) for newly diagnosed metastatic likely pancreatic adenocarcinoma, with met to liver, c/b splenic and portal vein thromboses on Eliquis admitted with sepsis 2/2 to unknown etiology, hospital course c/b new alternating left/right hemiplegia 2/2 to multiple infarcts, MICU consulted for worsened lethargy and tachypnea    #Neuro  AMS 2/2 medication induced vs. metabolic encephalopathy vs. sepsis vs. hepatic encephalopathy  -pt intubated and sedated  -pt received thorazine today for hiccups, which can cause CNS depression  -MRI (19) showing old b/l cerebellar infarcts and R parietal infarct. Scattered small vessel white matter ischemic changes. Scattered acute infarcts in the left frontal parietal cortex and the right parietal cortex as well as the occipital cortex low bilaterally as well as enhancing subacute infarcts along the left frontal cortex.  -CT head (11/3/19) showing old infarcts, new infarcts seen on MRI from  unable to be visualized on CT  - ABG with no signs of hypercarbia   - EEG to r/o seizure activity    #Cardio  Hypotension likely 2/2 sepsis  -on pressors  -TTE 10/17/19 showing EF 57%, normal LV and RV function    #Pulmonary  -tachypneic, sating well on room air  -intubated for airway protection and impending respiratory failure  -CXR from  showing clear lungs; repeat CXR today relatively unchanged    #GI  Metastatic pancreatic adenocarcinoma with mets to liver, c/b splenic and portal vein thromboses  -10/23/19 CT abd: multiple liver lesions consistent with hepatic metastases compared with CT 10/11/2019   -19 US abd: Small to moderate ascites most prominent in the right lower quadrant. Right renal cysts. No hydronephrosis.   -LFTs wnl, alkphos elevated since last admission  -f/u hep panel  -moderate sized ascites on US abd ; will obtain diagnostic and therapeutic paracentesis     #Renal/metabolic  [place] likely 2/2 hypotension  -UA neg  -Cr 1.34, baseline 0.8; monitor SCr  -ABG: pH 7.42, pCO2 39, HCO3 25, lactate 1.3    #ID  -progressively elevated leukocytosis and fever 101.3, tachypneic, meetings SIRS criteria  -ID following; as per ID, fever and leukocytosis felt to be 2/2 malignancy  -UA neg, CXR clear from   -f/u blood cultures  -f/u sputum cultures  -no obvious source of infection currently, on vanc/zosyn for empiric coverage    #Heme/Onc  DVT ppx  - hep gtt for multiple cerebral infarcts, CT head neg for hemorrhage  Metastatic pancreatic adenocarcinoma  -US guided hepatic nodule bx 10/21  -mets to liver, c/b splenic and portal vein thromboses, on eliquis at home; will c/w hep gtt here  -heme/onc following; as per heme/onc, outpt chemo, transfuse for >7, anemia thought to be 2/2 malignancy    #Endocrine  No active issues    #GOC  - GOC discussion readdressed with family, patient to be full code at this time MICU Accept Note    CHIEF COMPLAINT: alternating left/right hemiplegia, lethargy, tachypnea    HPI / INTERVAL HISTORY:  54M PMH of HTN, DM, HLD, CVA 6 years ago (mild residual LUE weakness), metastatic pancreatic adenocarcinoma (dx 10/2019) w/ mets to liver c/b splenic and PV thromboses on eliquis, and intractable hiccups presented with alternating R and L hemiplegia. As per chart review and collateral from family: 1 month ago, pt was at [facility] was for intractable hiccups and was d/c'ed with dx of GERD, ACS work up neg. The following week, pt came to this hospital for SOB, fever, and hiccups on 10/11. Pt was found to have pancreatic mass, which was to be work up outpt, however pt did not follow up. Pt came back to the hospital 10/16 for worsening SOB, abdominal distension and pain. Pt had work up done showing metastatic pancreatic adenocarcinoma to liver with splenic and portal vein thromboses. Pt was initially on lovenox and transitioned to eliquis. Pt also complained of new R sided weakness. Pt was seen by neuro and had MRI of spine performed that did not elucidate etiology. Pt d/c'ed 10/29/19 with outpt follow up. Starting yesterday, pt has had acute onset left-sided flaccid weakness, but improvement of right sided weakness. No other focal deficits. Pt has been having periods of mild confusion. Pt still continues to have fevers up to 103 at home, with night sweats and nocturnal hiccups. Pt has had reduced appetite. Per family, pt has had increasing abdominal distension, but had a large bowel movement this morning. As per family, pt has had intermittent diaphoresis, cough starting yesterday, and has had intermittent subjective fevers. Patient admitted to general medicine floors for further work up of new hemiplegia.    Hospital course c/b new AMS. Pt lethargic and tachypneic. Code stroke called as pt's pupils were noted to be fixed. RRT was then called due to hypotension. Patient was intubated and transferred to MICU.    Of note, pt received thorazine a few hours prior to code stroke/RRT.      PAST MEDICAL & SURGICAL HISTORY:  Pancreatic cancer  No significant past surgical history      FAMILY HISTORY:  No pertinent family history in first degree relatives      SOCIAL HISTORY: as per family at bedside  Smoking: denies  Substance Use: denies  EtOH Use: denies    HOME MEDICATIONS:  	hydrALAZINE 50 mg oral tablet: Last Dose Taken:  , 1 tab(s) orally 2 times a day  · 	polyethylene glycol 3350 oral powder for reconstitution: Last Dose Taken:  , 17 gram(s) orally 2 times a day  	on month supply   · 	metoprolol succinate 200 mg oral tablet, extended release: Last Dose Taken:  , 1 tab(s) orally once a day  · 	aspirin 81 mg oral delayed release tablet: Last Dose Taken:  , 1 tab(s) orally once a day  · 	atorvastatin 80 mg oral tablet: Last Dose Taken:  , 1 tab(s) orally once a day  · 	Janumet 50 mg-1000 mg oral tablet: Last Dose Taken:  , 1 tab(s) orally 2 times a day  · 	glipiZIDE 10 mg oral tablet: Last Dose Taken:  , 1 tab(s) orally 2 times a day  · 	Eliquis 5 mg oral tablet: Last Dose Taken:  , 2 tab(s) orally 2 times a day for 7 days   	then 1 tab 2 times a day   · 	bisacodyl 10 mg rectal suppository: Last Dose Taken:  , 1 suppository(ies) rectal once a day, As needed, Constipation  · 	telmisartan 40 mg oral tablet: 1 tab(s) orally once a day    Allergies    No Known Allergies    Intolerances    REVIEW OF SYSTEMS: as per family at bedside  Constitutional: + fevers, + diaphoresis, chills, weight loss, weight gain  HEENT: No vision problems, eye pain, nasal congestion, rhinorrhea, sore throat, dysphagia  CV: No chest pain, orthopnea, palpitations  Resp: + cough, dyspnea, wheezing, hemoptysis  GI: No nausea, vomiting, diarrhea, constipation, abdominal pain  : [ ] dysuria [ ] nocturia [ ] hematuria [ ] increased urinary frequency  Musculoskeletal: [ ] back pain [ ] myalgias [ ] arthralgias [ ] fracture  Skin: [ ] rash [ ] itch  Neurological: [ ] headache [ ] dizziness [ ] syncope [ ] weakness [ ] numbness  Psychiatric: [ ] anxiety [ ] depression  Endocrine: [ ] diabetes [ ] thyroid problem  Hematologic/Lymphatic: [ ] anemia [ ] bleeding problem  Allergic/Immunologic: [ ] itchy eyes [ ] nasal discharge [ ] hives [ ] angioedema  [ ] All other systems negative  [ ] Unable to assess ROS because ________    OBJECTIVE:  ICU Vital Signs Last 24 Hrs  T(C): 36 (2019 02:00), Max: 38.5 (2019 22:20)  T(F): 96.8 (2019 02:00), Max: 101.3 (2019 22:20)  HR: 66 (2019 02:30) (66 - 92)  BP: 81/59 (2019 02:30) (81/59 - 146/81)  BP(mean): 64 (2019 02:30) (64 - 105)  ABP: --  ABP(mean): --  RR: 14 (2019 02:30) (0 - 32)  SpO2: 99% (2019 02:30) (96% - 100%)    Mode: AC/ CMV (Assist Control/ Continuous Mandatory Ventilation), RR (machine): 14, TV (machine): 550, FiO2: 40, PEEP: 5, ITime: 1, MAP: 14, PIP: 41     @ 07:01   @ 07:00  --------------------------------------------------------  IN: 1140 mL / OUT: 0 mL / NET: 1140 mL     @ 08:01  -  03 @ 02:38  --------------------------------------------------------  IN: 0 mL / OUT: 350 mL / NET: -350 mL      CAPILLARY BLOOD GLUCOSE      POCT Blood Glucose.: 160 mg/dL (2019 22:02)      PHYSICAL EXAM:  GENERAL: NAD, well-developed, intubated and sedated  HEAD:  Atraumatic, Normocephalic  EYES: EOMI, PERRLA, conjunctiva and sclera clear + 3 mm b/l  NECK: Supple, No JVD  CHEST/LUNG: Clear to auscultation bilaterally, mechanical breath sounds, No wheeze, rales, rhonchi  HEART: Regular rate and rhythm; No murmurs, rubs, or gallops  ABDOMEN: Soft, Nontender, very distended, hypoactive BS  EXTREMITIES:  2+ Peripheral Pulses, No clubbing, cyanosis, or edema  NEUROLOGY: sedated  SKIN: No rashes or lesions    LINES: [facility], [facility] MEDICATIONS:  MEDICATIONS  (STANDING):  aspirin enteric coated 81 milliGRAM(s) Oral daily  atorvastatin 80 milliGRAM(s) Oral at bedtime  chlorhexidine 0.12% Liquid 15 milliLiter(s) Oral Mucosa every 12 hours  chlorhexidine 4% Liquid 1 Application(s) Topical <User Schedule>  dextrose 5%. 1000 milliLiter(s) (50 mL/Hr) IV Continuous <Continuous>  dextrose 50% Injectable 12.5 Gram(s) IV Push once  dextrose 50% Injectable 25 Gram(s) IV Push once  dextrose 50% Injectable 25 Gram(s) IV Push once  heparin  Infusion.  Unit(s)/Hr (16 mL/Hr) IV Continuous <Continuous>  insulin lispro (HumaLOG) corrective regimen sliding scale   SubCutaneous every 6 hours  piperacillin/tazobactam IVPB.. 3.375 Gram(s) IV Intermittent every 8 hours  sodium chloride 0.9%. 1000 milliLiter(s) (50 mL/Hr) IV Continuous <Continuous>  vancomycin  IVPB 1000 milliGRAM(s) IV Intermittent every 12 hours    MEDICATIONS  (PRN):  dextrose 40% Gel 15 Gram(s) Oral once PRN Blood Glucose LESS THAN 70 milliGRAM(s)/deciliter  glucagon  Injectable 1 milliGRAM(s) IntraMuscular once PRN Glucose LESS THAN 70 milligrams/deciliter  heparin  Injectable 7000 Unit(s) IV Push every 6 hours PRN For aPTT less than 40  heparin  Injectable 3500 Unit(s) IV Push every 6 hours PRN For aPTT between 40 - 57      LABS:                        8.5    28.22 )-----------( 310      ( 2019 10:14 )             25.8     Hgb Trend: 8.5<--, 8.9<--, 8.8<--, 9.5<--, 8.8<--  11-02    136  |  98  |  42<H>  ----------------------------<  102<H>  3.7   |  24  |  1.19    Ca    7.7<L>      2019 07:17  Phos  3.3       Mg     2.4         TPro  6.2  /  Alb  2.2<L>  /  TBili  0.5  /  DBili  x   /  AST  40  /  ALT  23  /  AlkPhos  394<H>      Creatinine Trend: 1.19<--, 1.26<--, 0.73<--, 0.83<--, 0.79<--, 0.86<--  PTT - ( 2019 23:19 )  PTT:33.0 sec  Urinalysis Basic - ( 2019 19:05 )    Color: Yellow / Appearance: Clear / S.019 / pH: x  Gluc: x / Ketone: Negative  / Bili: Negative / Urobili: Negative   Blood: x / Protein: Trace / Nitrite: Negative   Leuk Esterase: Negative / RBC: x / WBC x   Sq Epi: x / Non Sq Epi: x / Bacteria: x      Arterial Blood Gas:   @ 02:15  7.42/39/118/25/98/1.2  ABG lactate: --  Arterial Blood Gas:   @ 22:55  7.44/39/49/26/82/2.2  ABG lactate: --    Venous Blood Gas:   @ 22:25  7.39/48/23/29/28  VBG Lactate: 1.9  Venous Blood Gas:   @ 19:29  7.42/41/47/26/78  VBG Lactate: 3.5      MICROBIOLOGY:     RADIOLOGY & ADDITIONAL TESTS:  -10/23/19 CT Angio Neck w/ IV Cont showing old right cerebellar, occipital and parietal infarcts.    -10/23/19: MRI lumbar spine- no evidence of spinal cord infarct. Bilateral facet hypertrophy at L5-S1 resulting in moderate to severe right and severe left neural foraminal narrowing. No changes in MRI 10/27/19    -19: MRI cervical spine- Marrow changes suspicious for anemia. Cannot exclude marrow infiltrative process. No lytic or blastic lesions. No cord compression. No abnormal enhancement    -19 MRI head: Atrophy for the patient's age. Old bilateral cerebellar infarcts and right parietal infarct. Scattered small vessel white matter ischemic changes. Scattered acute infarcts in the left frontal parietal cortex and the right parietal cortex as well as the occipital cortex low bilaterally as well as enhancing subacute infarcts along the left frontal cortex. These may be due to embolic infarcts or due to hypercoagulable state.    -11/3/19: CT head showing redemonstration of small acute infarcts in the right precentral gyrus and medial left frontal region, unchanged from MRI of 2019. Additional punctate acute infarcts seen in both cerebral hemispheres and in the left cerebellar hemisphere on MRI of 2019 are too small to visualize by CT technique. Redemonstration of large chronic right parietal lobe infarct. No CT evidence of acute intracranial hemorrhage, mass effect or new large territorial infarct. Please note that CT scan cannot exclude acute ischemia at the margins of a prior infarct.       ASSESSMENT AND PLAN:    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, grade 2 DHF, anemia, recent hospitalization (10/16-10/29) for newly diagnosed metastatic likely pancreatic adenocarcinoma, with met to liver, c/b splenic and portal vein thromboses on Eliquis admitted with sepsis 2/2 to unknown etiology, hospital course c/b new alternating left/right hemiplegia 2/2 to multiple infarcts, MICU consulted for worsened lethargy and tachypnea    #Neuro  AMS 2/2 medication induced vs. metabolic encephalopathy vs. sepsis vs. hepatic encephalopathy  -pt intubated and sedated  -pt received thorazine today for hiccups, which can cause CNS depression  -MRI (19) showing old b/l cerebellar infarcts and R parietal infarct. Scattered small vessel white matter ischemic changes. Scattered acute infarcts in the left frontal parietal cortex and the right parietal cortex as well as the occipital cortex low bilaterally as well as enhancing subacute infarcts along the left frontal cortex.  -CT head (11/3/19) showing old infarcts, new infarcts seen on MRI from  unable to be visualized on CT  - ABG with no signs of hypercarbia, electrolytes wnl  - EEG to r/o seizure activity    #Cardio  Hypotension likely 2/2 sepsis  -on pressors  -TTE 10/17/19 showing EF 57%, normal LV and RV function    #Pulmonary  -tachypneic, sating well on room air  -intubated for airway protection and impending respiratory failure  -CXR from  showing clear lungs; repeat CXR today relatively unchanged    #GI  Metastatic pancreatic adenocarcinoma with mets to liver, c/b splenic and portal vein thromboses  -10/23/19 CT abd: multiple liver lesions consistent with hepatic metastases compared with CT 10/11/2019   -19 US abd: Small to moderate ascites most prominent in the right lower quadrant. Right renal cysts. No hydronephrosis.   -LFTs wnl, alkphos elevated since last admission  -f/u hep panel  -moderate sized ascites on US abd ; will obtain diagnostic and therapeutic paracentesis     #Renal/metabolic  [place] likely 2/2 hypotension  -UA neg  -Cr 1.34, baseline 0.8; monitor SCr  -ABG: pH 7.42, pCO2 39, HCO3 25, lactate 1.3    #ID  -progressively elevated leukocytosis and fever 101.3, tachypneic, meetings SIRS criteria  -ID following; as per ID, fever and leukocytosis felt to be 2/2 malignancy  -UA neg, CXR clear from   -f/u blood cultures  -f/u sputum cultures  -no obvious source of infection currently, on vanc/zosyn for empiric coverage    #Heme/Onc  DVT ppx  - hep gtt for multiple cerebral infarcts, CT head neg for hemorrhage  Metastatic pancreatic adenocarcinoma  -US guided hepatic nodule bx 10/21  -mets to liver, c/b splenic and portal vein thromboses, on eliquis at home; will c/w hep gtt here  -heme/onc following; as per heme/onc, outpt chemo, transfuse for >7, anemia thought to be 2/2 malignancy    #Endocrine  No active issues    #GOC  - GOC discussion readdressed with family, patient to be full code at this time MICU Accept Note    CHIEF COMPLAINT: alternating left/right hemiplegia, lethargy, tachypnea    HPI / INTERVAL HISTORY:  54M PMH of HTN, DM, HLD, CVA 6 years ago (mild residual LUE weakness), metastatic pancreatic adenocarcinoma (dx 10/2019) w/ mets to liver c/b splenic and PV thromboses on eliquis, and intractable hiccups presented with alternating R and L hemiplegia. As per chart review and collateral from family: 1 month ago, pt was at [facility] was for intractable hiccups and was d/c'ed with dx of GERD, ACS work up neg. The following week, pt came to this hospital for SOB, fever, and hiccups on 10/11. Pt was found to have pancreatic mass, which was to be work up outpt, however pt did not follow up. Pt came back to the hospital 10/16 for worsening SOB, abdominal distension and pain. Pt had work up done showing metastatic pancreatic adenocarcinoma to liver with splenic and portal vein thromboses. Pt was initially on lovenox and transitioned to eliquis. Pt also complained of new R sided weakness. Pt was seen by neuro and had MRI of spine performed that did not elucidate etiology. Pt d/c'ed 10/29/19 with outpt follow up. Starting yesterday, pt has had acute onset left-sided flaccid weakness, but improvement of right sided weakness. No other focal deficits. Pt has been having periods of mild confusion. Pt still continues to have fevers up to 103 at home, with night sweats and nocturnal hiccups. Pt has had reduced appetite. Per family, pt has had increasing abdominal distension, but had a large bowel movement this morning. As per family, pt has had intermittent diaphoresis, cough starting yesterday, and has had intermittent subjective fevers. Patient admitted to general medicine floors for further work up of new hemiplegia.    Hospital course c/b new AMS. Pt lethargic and tachypneic. Code stroke called as pt's pupils were noted to be fixed. RRT was then called due to hypotension. Patient was intubated and transferred to MICU.    Of note, pt received thorazine a few hours prior to code stroke/RRT.      PAST MEDICAL & SURGICAL HISTORY:  Pancreatic cancer  No significant past surgical history      FAMILY HISTORY:  No pertinent family history in first degree relatives      SOCIAL HISTORY: as per family at bedside  Smoking: denies  Substance Use: denies  EtOH Use: denies    HOME MEDICATIONS:  	hydrALAZINE 50 mg oral tablet: Last Dose Taken:  , 1 tab(s) orally 2 times a day  · 	polyethylene glycol 3350 oral powder for reconstitution: Last Dose Taken:  , 17 gram(s) orally 2 times a day  	on month supply   · 	metoprolol succinate 200 mg oral tablet, extended release: Last Dose Taken:  , 1 tab(s) orally once a day  · 	aspirin 81 mg oral delayed release tablet: Last Dose Taken:  , 1 tab(s) orally once a day  · 	atorvastatin 80 mg oral tablet: Last Dose Taken:  , 1 tab(s) orally once a day  · 	Janumet 50 mg-1000 mg oral tablet: Last Dose Taken:  , 1 tab(s) orally 2 times a day  · 	glipiZIDE 10 mg oral tablet: Last Dose Taken:  , 1 tab(s) orally 2 times a day  · 	Eliquis 5 mg oral tablet: Last Dose Taken:  , 2 tab(s) orally 2 times a day for 7 days   	then 1 tab 2 times a day   · 	bisacodyl 10 mg rectal suppository: Last Dose Taken:  , 1 suppository(ies) rectal once a day, As needed, Constipation  · 	telmisartan 40 mg oral tablet: 1 tab(s) orally once a day    Allergies    No Known Allergies    Intolerances    REVIEW OF SYSTEMS: as per family at bedside  Constitutional: + fevers, + diaphoresis, chills, weight loss, weight gain  HEENT: No vision problems, eye pain, nasal congestion, rhinorrhea, sore throat, dysphagia  CV: No chest pain, orthopnea, palpitations  Resp: + cough, dyspnea, wheezing, hemoptysis  GI: No nausea, vomiting, diarrhea, constipation, abdominal pain  : [ ] dysuria [ ] nocturia [ ] hematuria [ ] increased urinary frequency  Musculoskeletal: [ ] back pain [ ] myalgias [ ] arthralgias [ ] fracture  Skin: [ ] rash [ ] itch  Neurological: [ ] headache [ ] dizziness [ ] syncope [ ] weakness [ ] numbness  Psychiatric: [ ] anxiety [ ] depression  Endocrine: [ ] diabetes [ ] thyroid problem  Hematologic/Lymphatic: [ ] anemia [ ] bleeding problem  Allergic/Immunologic: [ ] itchy eyes [ ] nasal discharge [ ] hives [ ] angioedema  [ ] All other systems negative  [ ] Unable to assess ROS because ________    OBJECTIVE:  ICU Vital Signs Last 24 Hrs  T(C): 36 (2019 02:00), Max: 38.5 (2019 22:20)  T(F): 96.8 (2019 02:00), Max: 101.3 (2019 22:20)  HR: 66 (2019 02:30) (66 - 92)  BP: 81/59 (2019 02:30) (81/59 - 146/81)  BP(mean): 64 (2019 02:30) (64 - 105)  ABP: --  ABP(mean): --  RR: 14 (2019 02:30) (0 - 32)  SpO2: 99% (2019 02:30) (96% - 100%)    Mode: AC/ CMV (Assist Control/ Continuous Mandatory Ventilation), RR (machine): 14, TV (machine): 550, FiO2: 40, PEEP: 5, ITime: 1, MAP: 14, PIP: 41     @ 07:01   @ 07:00  --------------------------------------------------------  IN: 1140 mL / OUT: 0 mL / NET: 1140 mL     @ 08:01  -  03 @ 02:38  --------------------------------------------------------  IN: 0 mL / OUT: 350 mL / NET: -350 mL      CAPILLARY BLOOD GLUCOSE      POCT Blood Glucose.: 160 mg/dL (2019 22:02)      PHYSICAL EXAM:  GENERAL: NAD, well-developed, intubated and sedated  HEAD:  Atraumatic, Normocephalic  EYES: EOMI, PERRLA, conjunctiva and sclera clear + 3 mm b/l  NECK: Supple, No JVD  CHEST/LUNG: Clear to auscultation bilaterally, mechanical breath sounds, No wheeze, rales, rhonchi  HEART: Regular rate and rhythm; No murmurs, rubs, or gallops  ABDOMEN: Soft, Nontender, very distended, hypoactive BS  EXTREMITIES:  2+ Peripheral Pulses, No clubbing, cyanosis, or edema  NEUROLOGY: sedated  SKIN: No rashes or lesions    LINES: [facility], [facility] MEDICATIONS:  MEDICATIONS  (STANDING):  aspirin enteric coated 81 milliGRAM(s) Oral daily  atorvastatin 80 milliGRAM(s) Oral at bedtime  chlorhexidine 0.12% Liquid 15 milliLiter(s) Oral Mucosa every 12 hours  chlorhexidine 4% Liquid 1 Application(s) Topical <User Schedule>  dextrose 5%. 1000 milliLiter(s) (50 mL/Hr) IV Continuous <Continuous>  dextrose 50% Injectable 12.5 Gram(s) IV Push once  dextrose 50% Injectable 25 Gram(s) IV Push once  dextrose 50% Injectable 25 Gram(s) IV Push once  heparin  Infusion.  Unit(s)/Hr (16 mL/Hr) IV Continuous <Continuous>  insulin lispro (HumaLOG) corrective regimen sliding scale   SubCutaneous every 6 hours  piperacillin/tazobactam IVPB.. 3.375 Gram(s) IV Intermittent every 8 hours  sodium chloride 0.9%. 1000 milliLiter(s) (50 mL/Hr) IV Continuous <Continuous>  vancomycin  IVPB 1000 milliGRAM(s) IV Intermittent every 12 hours    MEDICATIONS  (PRN):  dextrose 40% Gel 15 Gram(s) Oral once PRN Blood Glucose LESS THAN 70 milliGRAM(s)/deciliter  glucagon  Injectable 1 milliGRAM(s) IntraMuscular once PRN Glucose LESS THAN 70 milligrams/deciliter  heparin  Injectable 7000 Unit(s) IV Push every 6 hours PRN For aPTT less than 40  heparin  Injectable 3500 Unit(s) IV Push every 6 hours PRN For aPTT between 40 - 57      LABS:                        8.5    28.22 )-----------( 310      ( 2019 10:14 )             25.8     Hgb Trend: 8.5<--, 8.9<--, 8.8<--, 9.5<--, 8.8<--  11-02    136  |  98  |  42<H>  ----------------------------<  102<H>  3.7   |  24  |  1.19    Ca    7.7<L>      2019 07:17  Phos  3.3       Mg     2.4         TPro  6.2  /  Alb  2.2<L>  /  TBili  0.5  /  DBili  x   /  AST  40  /  ALT  23  /  AlkPhos  394<H>      Creatinine Trend: 1.19<--, 1.26<--, 0.73<--, 0.83<--, 0.79<--, 0.86<--  PTT - ( 2019 23:19 )  PTT:33.0 sec  Urinalysis Basic - ( 2019 19:05 )    Color: Yellow / Appearance: Clear / S.019 / pH: x  Gluc: x / Ketone: Negative  / Bili: Negative / Urobili: Negative   Blood: x / Protein: Trace / Nitrite: Negative   Leuk Esterase: Negative / RBC: x / WBC x   Sq Epi: x / Non Sq Epi: x / Bacteria: x      Arterial Blood Gas:   @ 02:15  7.42/39/118/25/98/1.2  ABG lactate: --  Arterial Blood Gas:   @ 22:55  7.44/39/49/26/82/2.2  ABG lactate: --    Venous Blood Gas:   @ 22:25  7.39/48/23/29/28  VBG Lactate: 1.9  Venous Blood Gas:   @ 19:29  7.42/41/47/26/78  VBG Lactate: 3.5      MICROBIOLOGY:     RADIOLOGY & ADDITIONAL TESTS:  -10/23/19 CT Angio Neck w/ IV Cont showing old right cerebellar, occipital and parietal infarcts.    -10/23/19: MRI lumbar spine- no evidence of spinal cord infarct. Bilateral facet hypertrophy at L5-S1 resulting in moderate to severe right and severe left neural foraminal narrowing. No changes in MRI 10/27/19    -19: MRI cervical spine- Marrow changes suspicious for anemia. Cannot exclude marrow infiltrative process. No lytic or blastic lesions. No cord compression. No abnormal enhancement    -19 MRI head: Atrophy for the patient's age. Old bilateral cerebellar infarcts and right parietal infarct. Scattered small vessel white matter ischemic changes. Scattered acute infarcts in the left frontal parietal cortex and the right parietal cortex as well as the occipital cortex low bilaterally as well as enhancing subacute infarcts along the left frontal cortex. These may be due to embolic infarcts or due to hypercoagulable state.    -11/3/19: CT head showing redemonstration of small acute infarcts in the right precentral gyrus and medial left frontal region, unchanged from MRI of 2019. Additional punctate acute infarcts seen in both cerebral hemispheres and in the left cerebellar hemisphere on MRI of 2019 are too small to visualize by CT technique. Redemonstration of large chronic right parietal lobe infarct. No CT evidence of acute intracranial hemorrhage, mass effect or new large territorial infarct. Please note that CT scan cannot exclude acute ischemia at the margins of a prior infarct.       ASSESSMENT AND PLAN:    54M PMH of HTN, DM, HLD, CVA 6 years ago (mild residual LUE weakness), metastatic pancreatic adenocarcinoma (dx 10/2019) w/ mets to liver c/b splenic and PV thromboses on eliquis, and intractable hiccups admitted with sepsis 2/2 to unknown etiology and new alternating left/right hemiplegia 2/2 to multiple infarcts. Transferred to MICU for AMS and hypotension.    #Neuro  AMS 2/2 medication induced vs. metabolic encephalopathy vs. sepsis vs. hepatic encephalopathy  -pt intubated and sedated  -pt received thorazine today for hiccups, which can cause CNS depression  -MRI (19) showing old b/l cerebellar infarcts and R parietal infarct. Scattered small vessel white matter ischemic changes. Scattered acute infarcts in the left frontal parietal cortex and the right parietal cortex as well as the occipital cortex low bilaterally as well as enhancing subacute infarcts along the left frontal cortex.  -CT head (11/3/19) showing old infarcts, new infarcts seen on MRI from  unable to be visualized on CT  - ABG with no signs of hypercarbia, electrolytes wnl  - EEG to r/o seizure activity    #Cardio  Hypotension likely 2/2 sepsis  -on pressors  -TTE 10/17/19 showing EF 57%, normal LV and RV function    #Pulmonary  -tachypneic, sating well on room air  -intubated for airway protection and impending respiratory failure  -CXR from  showing clear lungs; repeat CXR today relatively unchanged    #GI  Metastatic pancreatic adenocarcinoma with mets to liver, c/b splenic and portal vein thromboses  -10/23/19 CT abd: multiple liver lesions consistent with hepatic metastases compared with CT 10/11/2019   -19 US abd: Small to moderate ascites most prominent in the right lower quadrant. Right renal cysts. No hydronephrosis.   -LFTs wnl, alkphos elevated since last admission  -f/u hep panel  -moderate sized ascites on US abd ; will obtain diagnostic and therapeutic paracentesis     #Renal/metabolic  [place] likely 2/2 hypotension  -UA neg  -Cr 1.34, baseline 0.8; monitor SCr  -ABG: pH 7.42, pCO2 39, HCO3 25, lactate 1.3    #ID  -progressively elevated leukocytosis and fever 101.3, tachypneic, meetings SIRS criteria  -ID following; as per ID, fever and leukocytosis felt to be 2/2 malignancy  -UA neg, CXR clear from   -f/u blood cultures  -f/u sputum cultures  -no obvious source of infection currently, on vanc/zosyn for empiric coverage    #Heme/Onc  DVT ppx  - hep gtt for multiple cerebral infarcts, CT head neg for hemorrhage  Metastatic pancreatic adenocarcinoma  -US guided hepatic nodule bx 10/21  -mets to liver, c/b splenic and portal vein thromboses, on eliquis at home; will c/w hep gtt here  -heme/onc following; as per heme/onc, outpt chemo, transfuse for >7, anemia thought to be 2/2 malignancy    #Endocrine  No active issues    #GOC  - GOC discussion readdressed with family, patient to be full code at this time MICU Accept Note    CHIEF COMPLAINT: alternating left/right hemiplegia, lethargy, tachypnea    HPI / INTERVAL HISTORY:  54M PMH of HTN, DM, HLD, CVA 6 years ago (mild residual LUE weakness), metastatic pancreatic adenocarcinoma (dx 10/2019) w/ mets to liver c/b splenic and PV thromboses on eliquis, and intractable hiccups presented with alternating R and L hemiplegia. As per chart review and collateral from family: 1 month ago, pt was at [facility] was for intractable hiccups and was d/c'ed with dx of GERD, ACS work up neg. The following week, pt came to this hospital for SOB, fever, and hiccups on 10/11. Pt was found to have pancreatic mass, which was to be work up outpt, however pt did not follow up. Pt came back to the hospital 10/16 for worsening SOB, abdominal distension and pain. Pt had work up done showing metastatic pancreatic adenocarcinoma to liver with splenic and portal vein thromboses. Pt was initially on lovenox and transitioned to eliquis. Pt also complained of new R sided weakness. Pt was seen by neuro and had MRI of spine performed that did not elucidate etiology. Pt d/c'ed 10/29/19 with outpt follow up. Starting yesterday, pt has had acute onset left-sided flaccid weakness, but improvement of right sided weakness. No other focal deficits. Pt has been having periods of mild confusion. Pt still continues to have fevers up to 103 at home, with night sweats and nocturnal hiccups. Pt has had reduced appetite. Per family, pt has had increasing abdominal distension, but had a large bowel movement this morning. As per family, pt has had intermittent diaphoresis, cough starting yesterday, and has had intermittent subjective fevers. Patient admitted to general medicine floors for further work up of new hemiplegia.    Hospital course c/b new AMS. Pt lethargic and tachypneic. Code stroke called as pt's pupils were noted to be fixed. RRT was then called due to hypotension. Patient was intubated and transferred to MICU.    Of note, pt received thorazine a few hours prior to code stroke/RRT.      PAST MEDICAL & SURGICAL HISTORY:  Pancreatic cancer  No significant past surgical history      FAMILY HISTORY:  No pertinent family history in first degree relatives      SOCIAL HISTORY: as per family at bedside  Smoking: denies  Substance Use: denies  EtOH Use: denies    HOME MEDICATIONS:  	hydrALAZINE 50 mg oral tablet: Last Dose Taken:  , 1 tab(s) orally 2 times a day  · 	polyethylene glycol 3350 oral powder for reconstitution: Last Dose Taken:  , 17 gram(s) orally 2 times a day  	on month supply   · 	metoprolol succinate 200 mg oral tablet, extended release: Last Dose Taken:  , 1 tab(s) orally once a day  · 	aspirin 81 mg oral delayed release tablet: Last Dose Taken:  , 1 tab(s) orally once a day  · 	atorvastatin 80 mg oral tablet: Last Dose Taken:  , 1 tab(s) orally once a day  · 	Janumet 50 mg-1000 mg oral tablet: Last Dose Taken:  , 1 tab(s) orally 2 times a day  · 	glipiZIDE 10 mg oral tablet: Last Dose Taken:  , 1 tab(s) orally 2 times a day  · 	Eliquis 5 mg oral tablet: Last Dose Taken:  , 2 tab(s) orally 2 times a day for 7 days   	then 1 tab 2 times a day   · 	bisacodyl 10 mg rectal suppository: Last Dose Taken:  , 1 suppository(ies) rectal once a day, As needed, Constipation  · 	telmisartan 40 mg oral tablet: 1 tab(s) orally once a day    Allergies    No Known Allergies    Intolerances    REVIEW OF SYSTEMS: as per family at bedside  Constitutional: + fevers, + diaphoresis, chills, weight loss, weight gain  HEENT: No vision problems, eye pain, nasal congestion, rhinorrhea, sore throat, dysphagia  CV: No chest pain, orthopnea, palpitations  Resp: + cough, dyspnea, wheezing, hemoptysis  GI: No nausea, vomiting, diarrhea, constipation, abdominal pain  : [ ] dysuria [ ] nocturia [ ] hematuria [ ] increased urinary frequency  Musculoskeletal: [ ] back pain [ ] myalgias [ ] arthralgias [ ] fracture  Skin: [ ] rash [ ] itch  Neurological: [ ] headache [ ] dizziness [ ] syncope [ ] weakness [ ] numbness  Psychiatric: [ ] anxiety [ ] depression  Endocrine: [ ] diabetes [ ] thyroid problem  Hematologic/Lymphatic: [ ] anemia [ ] bleeding problem  Allergic/Immunologic: [ ] itchy eyes [ ] nasal discharge [ ] hives [ ] angioedema  [ ] All other systems negative  [ ] Unable to assess ROS because ________    OBJECTIVE:  ICU Vital Signs Last 24 Hrs  T(C): 36 (2019 02:00), Max: 38.5 (2019 22:20)  T(F): 96.8 (2019 02:00), Max: 101.3 (2019 22:20)  HR: 66 (2019 02:30) (66 - 92)  BP: 81/59 (2019 02:30) (81/59 - 146/81)  BP(mean): 64 (2019 02:30) (64 - 105)  ABP: --  ABP(mean): --  RR: 14 (2019 02:30) (0 - 32)  SpO2: 99% (2019 02:30) (96% - 100%)    Mode: AC/ CMV (Assist Control/ Continuous Mandatory Ventilation), RR (machine): 14, TV (machine): 550, FiO2: 40, PEEP: 5, ITime: 1, MAP: 14, PIP: 41     @ 07:01   @ 07:00  --------------------------------------------------------  IN: 1140 mL / OUT: 0 mL / NET: 1140 mL     @ 08:01  -  03 @ 02:38  --------------------------------------------------------  IN: 0 mL / OUT: 350 mL / NET: -350 mL      CAPILLARY BLOOD GLUCOSE      POCT Blood Glucose.: 160 mg/dL (2019 22:02)      PHYSICAL EXAM:  GENERAL: NAD, well-developed, intubated and sedated  HEAD:  Atraumatic, Normocephalic  EYES: EOMI, PERRLA, conjunctiva and sclera clear + 3 mm b/l  NECK: Supple, No JVD  CHEST/LUNG: Clear to auscultation bilaterally, mechanical breath sounds, No wheeze, rales, rhonchi  HEART: Regular rate and rhythm; No murmurs, rubs, or gallops  ABDOMEN: Soft, Nontender, very distended, hypoactive BS  EXTREMITIES:  2+ Peripheral Pulses, No clubbing, cyanosis, or edema  NEUROLOGY: sedated  SKIN: No rashes or lesions    LINES: [facility], [facility] MEDICATIONS:  MEDICATIONS  (STANDING):  aspirin enteric coated 81 milliGRAM(s) Oral daily  atorvastatin 80 milliGRAM(s) Oral at bedtime  chlorhexidine 0.12% Liquid 15 milliLiter(s) Oral Mucosa every 12 hours  chlorhexidine 4% Liquid 1 Application(s) Topical <User Schedule>  dextrose 5%. 1000 milliLiter(s) (50 mL/Hr) IV Continuous <Continuous>  dextrose 50% Injectable 12.5 Gram(s) IV Push once  dextrose 50% Injectable 25 Gram(s) IV Push once  dextrose 50% Injectable 25 Gram(s) IV Push once  heparin  Infusion.  Unit(s)/Hr (16 mL/Hr) IV Continuous <Continuous>  insulin lispro (HumaLOG) corrective regimen sliding scale   SubCutaneous every 6 hours  piperacillin/tazobactam IVPB.. 3.375 Gram(s) IV Intermittent every 8 hours  sodium chloride 0.9%. 1000 milliLiter(s) (50 mL/Hr) IV Continuous <Continuous>  vancomycin  IVPB 1000 milliGRAM(s) IV Intermittent every 12 hours    MEDICATIONS  (PRN):  dextrose 40% Gel 15 Gram(s) Oral once PRN Blood Glucose LESS THAN 70 milliGRAM(s)/deciliter  glucagon  Injectable 1 milliGRAM(s) IntraMuscular once PRN Glucose LESS THAN 70 milligrams/deciliter  heparin  Injectable 7000 Unit(s) IV Push every 6 hours PRN For aPTT less than 40  heparin  Injectable 3500 Unit(s) IV Push every 6 hours PRN For aPTT between 40 - 57      LABS:                        8.5    28.22 )-----------( 310      ( 2019 10:14 )             25.8     Hgb Trend: 8.5<--, 8.9<--, 8.8<--, 9.5<--, 8.8<--  11-02    136  |  98  |  42<H>  ----------------------------<  102<H>  3.7   |  24  |  1.19    Ca    7.7<L>      2019 07:17  Phos  3.3       Mg     2.4         TPro  6.2  /  Alb  2.2<L>  /  TBili  0.5  /  DBili  x   /  AST  40  /  ALT  23  /  AlkPhos  394<H>      Creatinine Trend: 1.19<--, 1.26<--, 0.73<--, 0.83<--, 0.79<--, 0.86<--  PTT - ( 2019 23:19 )  PTT:33.0 sec  Urinalysis Basic - ( 2019 19:05 )    Color: Yellow / Appearance: Clear / S.019 / pH: x  Gluc: x / Ketone: Negative  / Bili: Negative / Urobili: Negative   Blood: x / Protein: Trace / Nitrite: Negative   Leuk Esterase: Negative / RBC: x / WBC x   Sq Epi: x / Non Sq Epi: x / Bacteria: x      Arterial Blood Gas:   @ 02:15  7.42/39/118/25/98/1.2  ABG lactate: --  Arterial Blood Gas:   @ 22:55  7.44/39/49/26/82/2.2  ABG lactate: --    Venous Blood Gas:   @ 22:25  7.39/48/23/29/28  VBG Lactate: 1.9  Venous Blood Gas:   @ 19:29  7.42/41/47/26/78  VBG Lactate: 3.5      MICROBIOLOGY:     RADIOLOGY & ADDITIONAL TESTS:  -10/23/19 CT Angio Neck w/ IV Cont showing old right cerebellar, occipital and parietal infarcts.    -10/23/19: MRI lumbar spine- no evidence of spinal cord infarct. Bilateral facet hypertrophy at L5-S1 resulting in moderate to severe right and severe left neural foraminal narrowing. No changes in MRI 10/27/19    -19: MRI cervical spine- Marrow changes suspicious for anemia. Cannot exclude marrow infiltrative process. No lytic or blastic lesions. No cord compression. No abnormal enhancement    -19 MRI head: Atrophy for the patient's age. Old bilateral cerebellar infarcts and right parietal infarct. Scattered small vessel white matter ischemic changes. Scattered acute infarcts in the left frontal parietal cortex and the right parietal cortex as well as the occipital cortex low bilaterally as well as enhancing subacute infarcts along the left frontal cortex. These may be due to embolic infarcts or due to hypercoagulable state.    -11/3/19: CT head showing redemonstration of small acute infarcts in the right precentral gyrus and medial left frontal region, unchanged from MRI of 2019. Additional punctate acute infarcts seen in both cerebral hemispheres and in the left cerebellar hemisphere on MRI of 2019 are too small to visualize by CT technique. Redemonstration of large chronic right parietal lobe infarct. No CT evidence of acute intracranial hemorrhage, mass effect or new large territorial infarct. Please note that CT scan cannot exclude acute ischemia at the margins of a prior infarct.       ASSESSMENT AND PLAN:    54M PMH of HTN, DM, HLD, CVA 6 years ago (mild residual LUE weakness), metastatic pancreatic adenocarcinoma (dx 10/2019) w/ mets to liver c/b splenic and PV thromboses on eliquis, and intractable hiccups admitted with sepsis 2/2 to unknown etiology and new alternating left/right hemiplegia 2/2 to multiple infarcts. Transferred to MICU for AMS and hypotension.    #Neuro  AMS 2/2 medication induced vs. metabolic encephalopathy vs. sepsis vs. hepatic encephalopathy  -pt intubated and sedated  -pt received thorazine today for hiccups, which can cause CNS depression  -MRI (19) showing old b/l cerebellar infarcts and R parietal infarct. Scattered small vessel white matter ischemic changes. Scattered acute infarcts in the left frontal parietal cortex and the right parietal cortex as well as the occipital cortex low bilaterally as well as enhancing subacute infarcts along the left frontal cortex.  -CT head (11/3/19) showing old infarcts, new infarcts seen on MRI from  unable to be visualized on CT  - ABG with no signs of hypercarbia, electrolytes wnl  - EEG to r/o seizure activity    #Cardio  Hypotension likely 2/2 sepsis  -on pressors  -TTE 10/17/19 showing EF 57%, normal LV and RV function    #Pulmonary  -tachypneic, sating well on room air  -intubated for airway protection and impending respiratory failure  -CXR from  showing clear lungs; repeat CXR today relatively unchanged    #GI  Metastatic pancreatic adenocarcinoma with mets to liver, c/b splenic and portal vein thromboses  -10/23/19 CT abd: multiple liver lesions consistent with hepatic metastases compared with CT 10/11/2019   -19 US abd: Small to moderate ascites most prominent in the right lower quadrant. Right renal cysts. No hydronephrosis.   -LFTs wnl, alkphos elevated since last admission  -ammonia 67  -f/u hep panel  -moderate sized ascites on US abd ; will obtain diagnostic/therapeutic paracentesis     #Renal/metabolic  [place] likely 2/2 hypotension  -UA neg  -Cr 1.34, baseline 0.8; monitor SCr  -ABG: pH 7.42, pCO2 39, HCO3 25, lactate 1.3    #ID  -progressively elevated leukocytosis and fever 101.3, tachypneic, meetings SIRS criteria  -ID following; as per ID, fever and leukocytosis felt to be 2/2 malignancy  -UA neg, CXR clear from   -f/u blood cultures  -f/u sputum cultures  -no obvious source of infection currently, on vanc/zosyn for empiric coverage    #Heme/Onc  DVT ppx  - hep gtt for multiple cerebral infarcts, CT head neg for hemorrhage  Metastatic pancreatic adenocarcinoma  -US guided hepatic nodule bx 10/21  -mets to liver, c/b splenic and portal vein thromboses, on eliquis at home; will c/w hep gtt here  -heme/onc following; as per heme/onc, outpt chemo, transfuse for >7, anemia thought to be 2/2 malignancy    #Endocrine  No active issues    #GOC  - GOC discussion readdressed with family, patient to be full code at this time MICU Accept Note    CHIEF COMPLAINT: alternating left/right hemiplegia, lethargy, tachypnea    HPI / INTERVAL HISTORY:  54M PMH of HTN, DM, HLD, CVA 6 years ago (mild residual LUE weakness), metastatic pancreatic adenocarcinoma (dx 10/2019) w/ mets to liver c/b splenic and PV thromboses on eliquis, and intractable hiccups presented with alternating R and L hemiplegia. As per chart review and collateral from family: 1 month ago, pt was at [facility] was for intractable hiccups and was d/c'ed with dx of GERD, ACS work up neg. The following week, pt came to this hospital for SOB, fever, and hiccups on 10/11. Pt was found to have pancreatic mass, which was to be work up outpt, however pt did not follow up. Pt came back to the hospital 10/16 for worsening SOB, abdominal distension and pain. Pt had work up done showing metastatic pancreatic adenocarcinoma to liver with splenic and portal vein thromboses. Pt was initially on lovenox and transitioned to eliquis. Pt also complained of new R sided weakness. Pt was seen by neuro and had MRI of spine performed that did not elucidate etiology. Pt d/c'ed 10/29/19 with outpt follow up. Starting yesterday, pt has had acute onset left-sided flaccid weakness, but improvement of right sided weakness. No other focal deficits. Pt has been having periods of mild confusion. Pt still continues to have fevers up to 103 at home, with night sweats and nocturnal hiccups. Pt has had reduced appetite. Per family, pt has had increasing abdominal distension, but had a large bowel movement this morning. As per family, pt has had intermittent diaphoresis, cough starting yesterday, and has had intermittent subjective fevers. Patient admitted to general medicine floors for further work up of new hemiplegia.    Hospital course c/b new AMS. Pt lethargic and tachypneic. Code stroke called as pt's pupils were noted to be fixed. RRT was then called due to hypotension. Patient was intubated and transferred to MICU.    Of note, pt received thorazine a few hours prior to code stroke/RRT.      PAST MEDICAL & SURGICAL HISTORY:  Pancreatic cancer  No significant past surgical history      FAMILY HISTORY:  No pertinent family history in first degree relatives      SOCIAL HISTORY: as per family at bedside  Smoking: denies  Substance Use: denies  EtOH Use: denies    HOME MEDICATIONS:  	hydrALAZINE 50 mg oral tablet: Last Dose Taken:  , 1 tab(s) orally 2 times a day  · 	polyethylene glycol 3350 oral powder for reconstitution: Last Dose Taken:  , 17 gram(s) orally 2 times a day  	on month supply   · 	metoprolol succinate 200 mg oral tablet, extended release: Last Dose Taken:  , 1 tab(s) orally once a day  · 	aspirin 81 mg oral delayed release tablet: Last Dose Taken:  , 1 tab(s) orally once a day  · 	atorvastatin 80 mg oral tablet: Last Dose Taken:  , 1 tab(s) orally once a day  · 	Janumet 50 mg-1000 mg oral tablet: Last Dose Taken:  , 1 tab(s) orally 2 times a day  · 	glipiZIDE 10 mg oral tablet: Last Dose Taken:  , 1 tab(s) orally 2 times a day  · 	Eliquis 5 mg oral tablet: Last Dose Taken:  , 2 tab(s) orally 2 times a day for 7 days   	then 1 tab 2 times a day   · 	bisacodyl 10 mg rectal suppository: Last Dose Taken:  , 1 suppository(ies) rectal once a day, As needed, Constipation  · 	telmisartan 40 mg oral tablet: 1 tab(s) orally once a day    Allergies    No Known Allergies    Intolerances    REVIEW OF SYSTEMS: as per family at bedside  Constitutional: + fevers, + diaphoresis, chills, weight loss, weight gain  HEENT: No vision problems, eye pain, nasal congestion, rhinorrhea, sore throat, dysphagia  CV: No chest pain, orthopnea, palpitations  Resp: + cough, dyspnea, wheezing, hemoptysis  GI: No nausea, vomiting, diarrhea, constipation, abdominal pain  : [ ] dysuria [ ] nocturia [ ] hematuria [ ] increased urinary frequency  Musculoskeletal: [ ] back pain [ ] myalgias [ ] arthralgias [ ] fracture  Skin: [ ] rash [ ] itch  Neurological: [ ] headache [ ] dizziness [ ] syncope [ ] weakness [ ] numbness  Psychiatric: [ ] anxiety [ ] depression  Endocrine: [ ] diabetes [ ] thyroid problem  Hematologic/Lymphatic: [ ] anemia [ ] bleeding problem  Allergic/Immunologic: [ ] itchy eyes [ ] nasal discharge [ ] hives [ ] angioedema  [ ] All other systems negative  [ ] Unable to assess ROS because ________    OBJECTIVE:  ICU Vital Signs Last 24 Hrs  T(C): 36 (2019 02:00), Max: 38.5 (2019 22:20)  T(F): 96.8 (2019 02:00), Max: 101.3 (2019 22:20)  HR: 66 (2019 02:30) (66 - 92)  BP: 81/59 (2019 02:30) (81/59 - 146/81)  BP(mean): 64 (2019 02:30) (64 - 105)  ABP: --  ABP(mean): --  RR: 14 (2019 02:30) (0 - 32)  SpO2: 99% (2019 02:30) (96% - 100%)    Mode: AC/ CMV (Assist Control/ Continuous Mandatory Ventilation), RR (machine): 14, TV (machine): 550, FiO2: 40, PEEP: 5, ITime: 1, MAP: 14, PIP: 41     @ 07:01   @ 07:00  --------------------------------------------------------  IN: 1140 mL / OUT: 0 mL / NET: 1140 mL     @ 08:01  -  03 @ 02:38  --------------------------------------------------------  IN: 0 mL / OUT: 350 mL / NET: -350 mL      CAPILLARY BLOOD GLUCOSE      POCT Blood Glucose.: 160 mg/dL (2019 22:02)      PHYSICAL EXAM:  GENERAL: NAD, well-developed, intubated and sedated  HEAD:  Atraumatic, Normocephalic  EYES: EOMI, PERRLA, conjunctiva and sclera clear + 3 mm b/l  NECK: Supple, No JVD  CHEST/LUNG: Clear to auscultation bilaterally, mechanical breath sounds, No wheeze, rales, rhonchi  HEART: Regular rate and rhythm; No murmurs, rubs, or gallops  ABDOMEN: Soft, Nontender, very distended, hypoactive BS  EXTREMITIES:  2+ Peripheral Pulses, No clubbing, cyanosis, or edema  NEUROLOGY: sedated  SKIN: No rashes or lesions    LINES: [facility], [facility] MEDICATIONS:  MEDICATIONS  (STANDING):  aspirin enteric coated 81 milliGRAM(s) Oral daily  atorvastatin 80 milliGRAM(s) Oral at bedtime  chlorhexidine 0.12% Liquid 15 milliLiter(s) Oral Mucosa every 12 hours  chlorhexidine 4% Liquid 1 Application(s) Topical <User Schedule>  dextrose 5%. 1000 milliLiter(s) (50 mL/Hr) IV Continuous <Continuous>  dextrose 50% Injectable 12.5 Gram(s) IV Push once  dextrose 50% Injectable 25 Gram(s) IV Push once  dextrose 50% Injectable 25 Gram(s) IV Push once  heparin  Infusion.  Unit(s)/Hr (16 mL/Hr) IV Continuous <Continuous>  insulin lispro (HumaLOG) corrective regimen sliding scale   SubCutaneous every 6 hours  piperacillin/tazobactam IVPB.. 3.375 Gram(s) IV Intermittent every 8 hours  sodium chloride 0.9%. 1000 milliLiter(s) (50 mL/Hr) IV Continuous <Continuous>  vancomycin  IVPB 1000 milliGRAM(s) IV Intermittent every 12 hours    MEDICATIONS  (PRN):  dextrose 40% Gel 15 Gram(s) Oral once PRN Blood Glucose LESS THAN 70 milliGRAM(s)/deciliter  glucagon  Injectable 1 milliGRAM(s) IntraMuscular once PRN Glucose LESS THAN 70 milligrams/deciliter  heparin  Injectable 7000 Unit(s) IV Push every 6 hours PRN For aPTT less than 40  heparin  Injectable 3500 Unit(s) IV Push every 6 hours PRN For aPTT between 40 - 57      LABS:                        8.5    28.22 )-----------( 310      ( 2019 10:14 )             25.8     Hgb Trend: 8.5<--, 8.9<--, 8.8<--, 9.5<--, 8.8<--  11-02    136  |  98  |  42<H>  ----------------------------<  102<H>  3.7   |  24  |  1.19    Ca    7.7<L>      2019 07:17  Phos  3.3       Mg     2.4         TPro  6.2  /  Alb  2.2<L>  /  TBili  0.5  /  DBili  x   /  AST  40  /  ALT  23  /  AlkPhos  394<H>      Creatinine Trend: 1.19<--, 1.26<--, 0.73<--, 0.83<--, 0.79<--, 0.86<--  PTT - ( 2019 23:19 )  PTT:33.0 sec  Urinalysis Basic - ( 2019 19:05 )    Color: Yellow / Appearance: Clear / S.019 / pH: x  Gluc: x / Ketone: Negative  / Bili: Negative / Urobili: Negative   Blood: x / Protein: Trace / Nitrite: Negative   Leuk Esterase: Negative / RBC: x / WBC x   Sq Epi: x / Non Sq Epi: x / Bacteria: x      Arterial Blood Gas:   @ 02:15  7.42/39/118/25/98/1.2  ABG lactate: --  Arterial Blood Gas:   @ 22:55  7.44/39/49/26/82/2.2  ABG lactate: --    Venous Blood Gas:   @ 22:25  7.39/48/23/29/28  VBG Lactate: 1.9  Venous Blood Gas:   @ 19:29  7.42/41/47/26/78  VBG Lactate: 3.5      MICROBIOLOGY:     RADIOLOGY & ADDITIONAL TESTS:  -10/23/19 CT Angio Neck w/ IV Cont showing old right cerebellar, occipital and parietal infarcts.    -10/23/19: MRI lumbar spine- no evidence of spinal cord infarct. Bilateral facet hypertrophy at L5-S1 resulting in moderate to severe right and severe left neural foraminal narrowing. No changes in MRI 10/27/19    -19: MRI cervical spine- Marrow changes suspicious for anemia. Cannot exclude marrow infiltrative process. No lytic or blastic lesions. No cord compression. No abnormal enhancement    -19 MRI head: Atrophy for the patient's age. Old bilateral cerebellar infarcts and right parietal infarct. Scattered small vessel white matter ischemic changes. Scattered acute infarcts in the left frontal parietal cortex and the right parietal cortex as well as the occipital cortex low bilaterally as well as enhancing subacute infarcts along the left frontal cortex. These may be due to embolic infarcts or due to hypercoagulable state.    -11/3/19: CT head showing redemonstration of small acute infarcts in the right precentral gyrus and medial left frontal region, unchanged from MRI of 2019. Additional punctate acute infarcts seen in both cerebral hemispheres and in the left cerebellar hemisphere on MRI of 2019 are too small to visualize by CT technique. Redemonstration of large chronic right parietal lobe infarct. No CT evidence of acute intracranial hemorrhage, mass effect or new large territorial infarct. Please note that CT scan cannot exclude acute ischemia at the margins of a prior infarct.       ASSESSMENT AND PLAN:    54M PMH of HTN, DM, HLD, CVA 6 years ago (mild residual LUE weakness), metastatic pancreatic adenocarcinoma (dx 10/2019) w/ mets to liver c/b splenic and PV thromboses on eliquis, and intractable hiccups admitted with sepsis 2/2 to unknown etiology and new alternating left/right hemiplegia 2/2 to multiple infarcts. Transferred to MICU for AMS and hypotension.    #Neuro  AMS 2/2 medication induced vs. metabolic encephalopathy vs. sepsis vs. hepatic encephalopathy  -pt intubated and sedated  -pt received thorazine today for hiccups, which can cause CNS depression  -MRI (19) showing old b/l cerebellar infarcts and R parietal infarct. Scattered small vessel white matter ischemic changes. Scattered acute infarcts in the left frontal parietal cortex and the right parietal cortex as well as the occipital cortex low bilaterally as well as enhancing subacute infarcts along the left frontal cortex.  -CT head (11/3/19) showing old infarcts, new infarcts seen on MRI from  unable to be visualized on CT  - ABG with no signs of hypercarbia, electrolytes wnl  - EEG to r/o seizure activity  - ammonia 67    #Cardio  Hypotension likely 2/2 sepsis  -on pressors  -TTE 10/17/19 showing EF 57%, normal LV and RV function    #Pulmonary  -tachypneic, sating well on room air  -intubated for airway protection and impending respiratory failure  -CXR from  showing clear lungs; repeat CXR today relatively unchanged    #GI  Metastatic pancreatic adenocarcinoma with mets to liver, c/b splenic and portal vein thromboses  -10/23/19 CT abd: multiple liver lesions consistent with hepatic metastases compared with CT 10/11/2019   -19 US abd: Small to moderate ascites most prominent in the right lower quadrant. Right renal cysts. No hydronephrosis.   -LFTs wnl, alkphos elevated since last admission  -f/u hep panel  -moderate sized ascites on US abd ; will obtain diagnostic/therapeutic paracentesis     #Renal/metabolic  [place] likely 2/2 hypotension  -UA neg  -Cr 1.34, baseline 0.8; monitor SCr  -ABG: pH 7.42, pCO2 39, HCO3 25, lactate 1.3    #ID  -progressively elevated leukocytosis and fever 101.3, tachypneic, meetings SIRS criteria  -ID following; as per ID, fever and leukocytosis felt to be 2/2 malignancy  -UA neg, CXR clear from   -f/u blood cultures  -f/u sputum cultures  -no obvious source of infection currently, on vanc/zosyn for empiric coverage    #Heme/Onc  DVT ppx  - hep gtt for multiple cerebral infarcts, CT head neg for hemorrhage  Metastatic pancreatic adenocarcinoma  -US guided hepatic nodule bx 10/21  -mets to liver, c/b splenic and portal vein thromboses, on eliquis at home; will c/w hep gtt here  -heme/onc following; as per heme/onc, outpt chemo, transfuse for >7, anemia thought to be 2/2 malignancy    #Endocrine  No active issues    #GOC  - GOC discussion readdressed with family, patient to be full code at this time MICU Accept Note    CHIEF COMPLAINT: alternating left/right hemiplegia, lethargy, tachypnea    HPI / INTERVAL HISTORY:  54M PMH of HTN, DM, HLD, CVA 6 years ago (mild residual LUE weakness), metastatic pancreatic adenocarcinoma (dx 10/2019) w/ mets to liver c/b splenic and PV thromboses on eliquis, and intractable hiccups presented with alternating R and L hemiplegia. As per chart review and collateral from family: 1 month ago, pt was at [facility] was for intractable hiccups and was d/c'ed with dx of GERD, ACS work up neg. The following week, pt came to this hospital for SOB, fever, and hiccups on 10/11. Pt was found to have pancreatic mass, which was to be work up outpt, however pt did not follow up. Pt came back to the hospital 10/16 for worsening SOB, abdominal distension and pain. Pt had work up done showing metastatic pancreatic adenocarcinoma to liver with splenic and portal vein thromboses. Pt was initially on lovenox and transitioned to eliquis. Pt also complained of new R sided weakness. Pt was seen by neuro and had MRI of spine performed that did not elucidate etiology. Pt d/c'ed 10/29/19 with outpt follow up. Starting yesterday, pt has had acute onset left-sided flaccid weakness, but improvement of right sided weakness. No other focal deficits. Pt has been having periods of mild confusion. Pt still continues to have fevers up to 103 at home, with night sweats and nocturnal hiccups. Pt has had reduced appetite. Per family, pt has had increasing abdominal distension, but had a large bowel movement this morning. As per family, pt has had intermittent diaphoresis, cough starting yesterday, and has had intermittent subjective fevers. Patient admitted to general medicine floors for further work up of new hemiplegia.    Hospital course c/b new AMS. Pt lethargic and tachypneic. Code stroke called as pt's pupils were noted to be fixed. RRT was then called due to hypotension. Patient was intubated and transferred to MICU.    Of note, pt received thorazine a few hours prior to code stroke/RRT.      PAST MEDICAL & SURGICAL HISTORY:  Pancreatic cancer  No significant past surgical history      FAMILY HISTORY:  No pertinent family history in first degree relatives      SOCIAL HISTORY: as per family at bedside  Smoking: denies  Substance Use: denies  EtOH Use: denies    HOME MEDICATIONS:  	hydrALAZINE 50 mg oral tablet: Last Dose Taken:  , 1 tab(s) orally 2 times a day  · 	polyethylene glycol 3350 oral powder for reconstitution: Last Dose Taken:  , 17 gram(s) orally 2 times a day  	on month supply   · 	metoprolol succinate 200 mg oral tablet, extended release: Last Dose Taken:  , 1 tab(s) orally once a day  · 	aspirin 81 mg oral delayed release tablet: Last Dose Taken:  , 1 tab(s) orally once a day  · 	atorvastatin 80 mg oral tablet: Last Dose Taken:  , 1 tab(s) orally once a day  · 	Janumet 50 mg-1000 mg oral tablet: Last Dose Taken:  , 1 tab(s) orally 2 times a day  · 	glipiZIDE 10 mg oral tablet: Last Dose Taken:  , 1 tab(s) orally 2 times a day  · 	Eliquis 5 mg oral tablet: Last Dose Taken:  , 2 tab(s) orally 2 times a day for 7 days   	then 1 tab 2 times a day   · 	bisacodyl 10 mg rectal suppository: Last Dose Taken:  , 1 suppository(ies) rectal once a day, As needed, Constipation  · 	telmisartan 40 mg oral tablet: 1 tab(s) orally once a day    Allergies    No Known Allergies    Intolerances    REVIEW OF SYSTEMS: as per family at bedside  Constitutional: + fevers, + diaphoresis, chills, weight loss, weight gain  HEENT: No vision problems, eye pain, nasal congestion, rhinorrhea, sore throat, dysphagia  CV: No chest pain, orthopnea, palpitations  Resp: + cough, dyspnea, wheezing, hemoptysis  GI: No nausea, vomiting, diarrhea, constipation, abdominal pain  : [ ] dysuria [ ] nocturia [ ] hematuria [ ] increased urinary frequency  Musculoskeletal: [ ] back pain [ ] myalgias [ ] arthralgias [ ] fracture  Skin: [ ] rash [ ] itch  Neurological: [ ] headache [ ] dizziness [ ] syncope [ ] weakness [ ] numbness  Psychiatric: [ ] anxiety [ ] depression  Endocrine: [ ] diabetes [ ] thyroid problem  Hematologic/Lymphatic: [ ] anemia [ ] bleeding problem  Allergic/Immunologic: [ ] itchy eyes [ ] nasal discharge [ ] hives [ ] angioedema  [ ] All other systems negative  [ ] Unable to assess ROS because ________    OBJECTIVE:  ICU Vital Signs Last 24 Hrs  T(C): 36 (2019 02:00), Max: 38.5 (2019 22:20)  T(F): 96.8 (2019 02:00), Max: 101.3 (2019 22:20)  HR: 66 (2019 02:30) (66 - 92)  BP: 81/59 (2019 02:30) (81/59 - 146/81)  BP(mean): 64 (2019 02:30) (64 - 105)  ABP: --  ABP(mean): --  RR: 14 (2019 02:30) (0 - 32)  SpO2: 99% (2019 02:30) (96% - 100%)    Mode: AC/ CMV (Assist Control/ Continuous Mandatory Ventilation), RR (machine): 14, TV (machine): 550, FiO2: 40, PEEP: 5, ITime: 1, MAP: 14, PIP: 41     @ 07:01   @ 07:00  --------------------------------------------------------  IN: 1140 mL / OUT: 0 mL / NET: 1140 mL     @ 08:01  -  03 @ 02:38  --------------------------------------------------------  IN: 0 mL / OUT: 350 mL / NET: -350 mL      CAPILLARY BLOOD GLUCOSE      POCT Blood Glucose.: 160 mg/dL (2019 22:02)      PHYSICAL EXAM:  GENERAL: NAD, well-developed, intubated and sedated  HEAD:  Atraumatic, Normocephalic  EYES: EOMI, PERRLA, conjunctiva and sclera clear + 3 mm b/l  NECK: Supple, No JVD  CHEST/LUNG: Clear to auscultation bilaterally, mechanical breath sounds, No wheeze, rales, rhonchi  HEART: Regular rate and rhythm; No murmurs, rubs, or gallops  ABDOMEN: Soft, Nontender, very distended, hypoactive BS  EXTREMITIES:  2+ Peripheral Pulses, No clubbing, cyanosis, or edema  NEUROLOGY: sedated  SKIN: No rashes or lesions    LINES: [facility], [facility] MEDICATIONS:  MEDICATIONS  (STANDING):  aspirin enteric coated 81 milliGRAM(s) Oral daily  atorvastatin 80 milliGRAM(s) Oral at bedtime  chlorhexidine 0.12% Liquid 15 milliLiter(s) Oral Mucosa every 12 hours  chlorhexidine 4% Liquid 1 Application(s) Topical <User Schedule>  dextrose 5%. 1000 milliLiter(s) (50 mL/Hr) IV Continuous <Continuous>  dextrose 50% Injectable 12.5 Gram(s) IV Push once  dextrose 50% Injectable 25 Gram(s) IV Push once  dextrose 50% Injectable 25 Gram(s) IV Push once  heparin  Infusion.  Unit(s)/Hr (16 mL/Hr) IV Continuous <Continuous>  insulin lispro (HumaLOG) corrective regimen sliding scale   SubCutaneous every 6 hours  piperacillin/tazobactam IVPB.. 3.375 Gram(s) IV Intermittent every 8 hours  sodium chloride 0.9%. 1000 milliLiter(s) (50 mL/Hr) IV Continuous <Continuous>  vancomycin  IVPB 1000 milliGRAM(s) IV Intermittent every 12 hours    MEDICATIONS  (PRN):  dextrose 40% Gel 15 Gram(s) Oral once PRN Blood Glucose LESS THAN 70 milliGRAM(s)/deciliter  glucagon  Injectable 1 milliGRAM(s) IntraMuscular once PRN Glucose LESS THAN 70 milligrams/deciliter  heparin  Injectable 7000 Unit(s) IV Push every 6 hours PRN For aPTT less than 40  heparin  Injectable 3500 Unit(s) IV Push every 6 hours PRN For aPTT between 40 - 57      LABS:                        8.5    28.22 )-----------( 310      ( 2019 10:14 )             25.8     Hgb Trend: 8.5<--, 8.9<--, 8.8<--, 9.5<--, 8.8<--  11-02    136  |  98  |  42<H>  ----------------------------<  102<H>  3.7   |  24  |  1.19    Ca    7.7<L>      2019 07:17  Phos  3.3       Mg     2.4         TPro  6.2  /  Alb  2.2<L>  /  TBili  0.5  /  DBili  x   /  AST  40  /  ALT  23  /  AlkPhos  394<H>      Creatinine Trend: 1.19<--, 1.26<--, 0.73<--, 0.83<--, 0.79<--, 0.86<--  PTT - ( 2019 23:19 )  PTT:33.0 sec  Urinalysis Basic - ( 2019 19:05 )    Color: Yellow / Appearance: Clear / S.019 / pH: x  Gluc: x / Ketone: Negative  / Bili: Negative / Urobili: Negative   Blood: x / Protein: Trace / Nitrite: Negative   Leuk Esterase: Negative / RBC: x / WBC x   Sq Epi: x / Non Sq Epi: x / Bacteria: x      Arterial Blood Gas:   @ 02:15  7.42/39/118/25/98/1.2  ABG lactate: --  Arterial Blood Gas:   @ 22:55  7.44/39/49/26/82/2.2  ABG lactate: --    Venous Blood Gas:   @ 22:25  7.39/48/23/29/28  VBG Lactate: 1.9  Venous Blood Gas:   @ 19:29  7.42/41/47/26/78  VBG Lactate: 3.5      MICROBIOLOGY:     RADIOLOGY & ADDITIONAL TESTS:  -10/23/19 CT Angio Neck w/ IV Cont showing old right cerebellar, occipital and parietal infarcts.    -10/23/19: MRI lumbar spine- no evidence of spinal cord infarct. Bilateral facet hypertrophy at L5-S1 resulting in moderate to severe right and severe left neural foraminal narrowing. No changes in MRI 10/27/19    -19: MRI cervical spine- Marrow changes suspicious for anemia. Cannot exclude marrow infiltrative process. No lytic or blastic lesions. No cord compression. No abnormal enhancement    -19 MRI head: Atrophy for the patient's age. Old bilateral cerebellar infarcts and right parietal infarct. Scattered small vessel white matter ischemic changes. Scattered acute infarcts in the left frontal parietal cortex and the right parietal cortex as well as the occipital cortex low bilaterally as well as enhancing subacute infarcts along the left frontal cortex. These may be due to embolic infarcts or due to hypercoagulable state.    -11/3/19: CT head showing redemonstration of small acute infarcts in the right precentral gyrus and medial left frontal region, unchanged from MRI of 2019. Additional punctate acute infarcts seen in both cerebral hemispheres and in the left cerebellar hemisphere on MRI of 2019 are too small to visualize by CT technique. Redemonstration of large chronic right parietal lobe infarct. No CT evidence of acute intracranial hemorrhage, mass effect or new large territorial infarct. Please note that CT scan cannot exclude acute ischemia at the margins of a prior infarct.       ASSESSMENT AND PLAN:    54M PMH of HTN, DM, HLD, CVA 6 years ago (mild residual LUE weakness), metastatic pancreatic adenocarcinoma (dx 10/2019) w/ mets to liver c/b splenic and PV thromboses on eliquis, and intractable hiccups admitted with sepsis 2/2 to unknown etiology and new alternating left/right hemiplegia 2/2 to multiple infarcts. Transferred to MICU for AMS and hypotension.    #Neuro  AMS 2/2 medication induced vs. metabolic encephalopathy vs. sepsis vs. hepatic encephalopathy  -pt intubated and sedated  -pt received thorazine today for hiccups, which can cause CNS depression  -MRI (19) showing old b/l cerebellar infarcts and R parietal infarct. Scattered small vessel white matter ischemic changes. Scattered acute infarcts in the left frontal parietal cortex and the right parietal cortex as well as the occipital cortex low bilaterally as well as enhancing subacute infarcts along the left frontal cortex.  -CT head (11/3/19) showing old infarcts, new infarcts seen on MRI from  unable to be visualized on CT  - ABG with no signs of hypercarbia, electrolytes wnl  - EEG to r/o seizure activity  - ammonia 67    #Cardio  Hypotension likely 2/2 sepsis  -on pressors  -TTE 10/17/19 showing EF 57%, normal LV and RV function    #Pulmonary  -tachypneic, sating well on room air  -intubated for airway protection and impending respiratory failure  -CXR from  showing clear lungs; repeat CXR today relatively unchanged    #GI  Metastatic pancreatic adenocarcinoma with mets to liver, c/b splenic and portal vein thromboses  -10/23/19 CT abd: multiple liver lesions consistent with hepatic metastases compared with CT 10/11/2019   -19 US abd: Small to moderate ascites most prominent in the right lower quadrant. Right renal cysts. No hydronephrosis.   -LFTs wnl, alkphos elevated since last admission  -PT/INR elevated  -f/u hep panel  -moderate sized ascites on US abd ; will obtain diagnostic/therapeutic paracentesis     #Renal/metabolic  [place] likely 2/2 hypotension  -UA neg  -Cr 1.34, baseline 0.8; monitor SCr  -ABG: pH 7.42, pCO2 39, HCO3 25, lactate 1.3    #ID  -progressively elevated leukocytosis and fever 101.3, tachypneic, meetings SIRS criteria  -ID following; as per ID, fever and leukocytosis felt to be 2/2 malignancy  -UA neg, CXR clear from   -f/u blood cultures  -f/u sputum cultures  -no obvious source of infection currently, on vanc/zosyn for empiric coverage    #Heme/Onc  DVT ppx  - hep gtt for multiple cerebral infarcts, CT head neg for hemorrhage  Metastatic pancreatic adenocarcinoma  -US guided hepatic nodule bx 10/21  -mets to liver, c/b splenic and portal vein thromboses, on eliquis at home; will c/w hep gtt here  -heme/onc following; as per heme/onc, outpt chemo, transfuse for >7, anemia thought to be 2/2 malignancy    #Endocrine  No active issues    #GOC  - GOC discussion readdressed with family, patient to be full code at this time MICU Accept Note    CHIEF COMPLAINT: alternating left/right hemiplegia, lethargy, tachypnea    HPI / INTERVAL HISTORY:  54M PMH of HTN, DM, HLD, CVA 6 years ago (mild residual LUE weakness), metastatic pancreatic adenocarcinoma (dx 10/2019) w/ mets to liver c/b splenic and PV thromboses on eliquis, and intractable hiccups presented with alternating R and L hemiplegia. As per chart review and collateral from family: 1 month ago, pt was at [facility] was for intractable hiccups and was d/c'ed with dx of GERD, ACS work up neg. The following week, pt came to this hospital for SOB, fever, and hiccups on 10/11. Pt was found to have pancreatic mass, which was to be work up outpt, however pt did not follow up. Pt came back to the hospital 10/16 for worsening SOB, abdominal distension and pain. Pt had work up done showing metastatic pancreatic adenocarcinoma to liver with splenic and portal vein thromboses. Pt was initially on lovenox and transitioned to eliquis. Pt also complained of new R sided weakness. Pt was seen by neuro and had MRI of spine performed that did not elucidate etiology. Pt d/c'ed 10/29/19 with outpt follow up. Starting yesterday, pt has had acute onset left-sided flaccid weakness, but improvement of right sided weakness. No other focal deficits. Pt has been having periods of mild confusion. Pt still continues to have fevers up to 103 at home, with night sweats and nocturnal hiccups. Pt has had reduced appetite. Per family, pt has had increasing abdominal distension, but had a large bowel movement this morning. As per family, pt has had intermittent diaphoresis, cough starting yesterday, and has had intermittent subjective fevers. Patient admitted to general medicine floors for further work up of new hemiplegia.    Hospital course c/b new AMS. Pt lethargic and tachypneic. Code stroke called as pt's pupils were noted to be fixed. RRT was then called due to hypotension. Patient was intubated and transferred to MICU.    Of note, pt received thorazine a few hours prior to code stroke/RRT.      PAST MEDICAL & SURGICAL HISTORY:  Pancreatic cancer  No significant past surgical history      FAMILY HISTORY:  No pertinent family history in first degree relatives      SOCIAL HISTORY: as per family at bedside  Smoking: denies  Substance Use: denies  EtOH Use: denies    HOME MEDICATIONS:  	hydrALAZINE 50 mg oral tablet: Last Dose Taken:  , 1 tab(s) orally 2 times a day  · 	polyethylene glycol 3350 oral powder for reconstitution: Last Dose Taken:  , 17 gram(s) orally 2 times a day  	on month supply   · 	metoprolol succinate 200 mg oral tablet, extended release: Last Dose Taken:  , 1 tab(s) orally once a day  · 	aspirin 81 mg oral delayed release tablet: Last Dose Taken:  , 1 tab(s) orally once a day  · 	atorvastatin 80 mg oral tablet: Last Dose Taken:  , 1 tab(s) orally once a day  · 	Janumet 50 mg-1000 mg oral tablet: Last Dose Taken:  , 1 tab(s) orally 2 times a day  · 	glipiZIDE 10 mg oral tablet: Last Dose Taken:  , 1 tab(s) orally 2 times a day  · 	Eliquis 5 mg oral tablet: Last Dose Taken:  , 2 tab(s) orally 2 times a day for 7 days   	then 1 tab 2 times a day   · 	bisacodyl 10 mg rectal suppository: Last Dose Taken:  , 1 suppository(ies) rectal once a day, As needed, Constipation  · 	telmisartan 40 mg oral tablet: 1 tab(s) orally once a day    Allergies    No Known Allergies    Intolerances    REVIEW OF SYSTEMS: as per family at bedside  Constitutional: + fevers, + diaphoresis, chills, weight loss, weight gain  HEENT: No vision problems, eye pain, nasal congestion, rhinorrhea, sore throat, dysphagia  CV: No chest pain, orthopnea, palpitations  Resp: + cough, dyspnea, wheezing, hemoptysis  GI: No nausea, vomiting, diarrhea, constipation, abdominal pain  : [ ] dysuria [ ] nocturia [ ] hematuria [ ] increased urinary frequency  Musculoskeletal: [ ] back pain [ ] myalgias [ ] arthralgias [ ] fracture  Skin: [ ] rash [ ] itch  Neurological: [ ] headache [ ] dizziness [ ] syncope [ ] weakness [ ] numbness  Psychiatric: [ ] anxiety [ ] depression  Endocrine: [ ] diabetes [ ] thyroid problem  Hematologic/Lymphatic: [ ] anemia [ ] bleeding problem  Allergic/Immunologic: [ ] itchy eyes [ ] nasal discharge [ ] hives [ ] angioedema  [ ] All other systems negative  [ ] Unable to assess ROS because ________    OBJECTIVE:  ICU Vital Signs Last 24 Hrs  T(C): 36 (2019 02:00), Max: 38.5 (2019 22:20)  T(F): 96.8 (2019 02:00), Max: 101.3 (2019 22:20)  HR: 66 (2019 02:30) (66 - 92)  BP: 81/59 (2019 02:30) (81/59 - 146/81)  BP(mean): 64 (2019 02:30) (64 - 105)  ABP: --  ABP(mean): --  RR: 14 (2019 02:30) (0 - 32)  SpO2: 99% (2019 02:30) (96% - 100%)    Mode: AC/ CMV (Assist Control/ Continuous Mandatory Ventilation), RR (machine): 14, TV (machine): 550, FiO2: 40, PEEP: 5, ITime: 1, MAP: 14, PIP: 41     @ 07:01   @ 07:00  --------------------------------------------------------  IN: 1140 mL / OUT: 0 mL / NET: 1140 mL     @ 08:01  -  03 @ 02:38  --------------------------------------------------------  IN: 0 mL / OUT: 350 mL / NET: -350 mL      CAPILLARY BLOOD GLUCOSE      POCT Blood Glucose.: 160 mg/dL (2019 22:02)      PHYSICAL EXAM:  GENERAL: NAD, well-developed, intubated and sedated  HEAD:  Atraumatic, Normocephalic  EYES: EOMI, PERRLA, conjunctiva and sclera clear + 3 mm b/l  NECK: Supple, No JVD  CHEST/LUNG: Clear to auscultation bilaterally, mechanical breath sounds, No wheeze, rales, rhonchi  HEART: Regular rate and rhythm; No murmurs, rubs, or gallops  ABDOMEN: Soft, Nontender, very distended, hypoactive BS  EXTREMITIES:  2+ Peripheral Pulses, No clubbing, cyanosis, or edema  NEUROLOGY: sedated  SKIN: No rashes or lesions    LINES: [facility], [facility] MEDICATIONS:  MEDICATIONS  (STANDING):  aspirin enteric coated 81 milliGRAM(s) Oral daily  atorvastatin 80 milliGRAM(s) Oral at bedtime  chlorhexidine 0.12% Liquid 15 milliLiter(s) Oral Mucosa every 12 hours  chlorhexidine 4% Liquid 1 Application(s) Topical <User Schedule>  dextrose 5%. 1000 milliLiter(s) (50 mL/Hr) IV Continuous <Continuous>  dextrose 50% Injectable 12.5 Gram(s) IV Push once  dextrose 50% Injectable 25 Gram(s) IV Push once  dextrose 50% Injectable 25 Gram(s) IV Push once  heparin  Infusion.  Unit(s)/Hr (16 mL/Hr) IV Continuous <Continuous>  insulin lispro (HumaLOG) corrective regimen sliding scale   SubCutaneous every 6 hours  piperacillin/tazobactam IVPB.. 3.375 Gram(s) IV Intermittent every 8 hours  sodium chloride 0.9%. 1000 milliLiter(s) (50 mL/Hr) IV Continuous <Continuous>  vancomycin  IVPB 1000 milliGRAM(s) IV Intermittent every 12 hours    MEDICATIONS  (PRN):  dextrose 40% Gel 15 Gram(s) Oral once PRN Blood Glucose LESS THAN 70 milliGRAM(s)/deciliter  glucagon  Injectable 1 milliGRAM(s) IntraMuscular once PRN Glucose LESS THAN 70 milligrams/deciliter  heparin  Injectable 7000 Unit(s) IV Push every 6 hours PRN For aPTT less than 40  heparin  Injectable 3500 Unit(s) IV Push every 6 hours PRN For aPTT between 40 - 57      LABS:                        8.5    28.22 )-----------( 310      ( 2019 10:14 )             25.8     Hgb Trend: 8.5<--, 8.9<--, 8.8<--, 9.5<--, 8.8<--  11-02    136  |  98  |  42<H>  ----------------------------<  102<H>  3.7   |  24  |  1.19    Ca    7.7<L>      2019 07:17  Phos  3.3       Mg     2.4         TPro  6.2  /  Alb  2.2<L>  /  TBili  0.5  /  DBili  x   /  AST  40  /  ALT  23  /  AlkPhos  394<H>      Creatinine Trend: 1.19<--, 1.26<--, 0.73<--, 0.83<--, 0.79<--, 0.86<--  PTT - ( 2019 23:19 )  PTT:33.0 sec  Urinalysis Basic - ( 2019 19:05 )    Color: Yellow / Appearance: Clear / S.019 / pH: x  Gluc: x / Ketone: Negative  / Bili: Negative / Urobili: Negative   Blood: x / Protein: Trace / Nitrite: Negative   Leuk Esterase: Negative / RBC: x / WBC x   Sq Epi: x / Non Sq Epi: x / Bacteria: x      Arterial Blood Gas:   @ 02:15  7.42/39/118/25/98/1.2  ABG lactate: --  Arterial Blood Gas:   @ 22:55  7.44/39/49/26/82/2.2  ABG lactate: --    Venous Blood Gas:   @ 22:25  7.39/48/23/29/28  VBG Lactate: 1.9  Venous Blood Gas:   @ 19:29  7.42/41/47/26/78  VBG Lactate: 3.5      MICROBIOLOGY:     RADIOLOGY & ADDITIONAL TESTS:  -10/23/19 CT Angio Neck w/ IV Cont showing old right cerebellar, occipital and parietal infarcts.    -10/23/19: MRI lumbar spine- no evidence of spinal cord infarct. Bilateral facet hypertrophy at L5-S1 resulting in moderate to severe right and severe left neural foraminal narrowing. No changes in MRI 10/27/19    -19: MRI cervical spine- Marrow changes suspicious for anemia. Cannot exclude marrow infiltrative process. No lytic or blastic lesions. No cord compression. No abnormal enhancement    -19 MRI head: Atrophy for the patient's age. Old bilateral cerebellar infarcts and right parietal infarct. Scattered small vessel white matter ischemic changes. Scattered acute infarcts in the left frontal parietal cortex and the right parietal cortex as well as the occipital cortex low bilaterally as well as enhancing subacute infarcts along the left frontal cortex. These may be due to embolic infarcts or due to hypercoagulable state.    -11/3/19: CT head showing redemonstration of small acute infarcts in the right precentral gyrus and medial left frontal region, unchanged from MRI of 2019. Additional punctate acute infarcts seen in both cerebral hemispheres and in the left cerebellar hemisphere on MRI of 2019 are too small to visualize by CT technique. Redemonstration of large chronic right parietal lobe infarct. No CT evidence of acute intracranial hemorrhage, mass effect or new large territorial infarct. Please note that CT scan cannot exclude acute ischemia at the margins of a prior infarct.       ASSESSMENT AND PLAN:    54M PMH of HTN, DM, HLD, CVA 6 years ago (mild residual LUE weakness), metastatic pancreatic adenocarcinoma (dx 10/2019) w/ mets to liver c/b splenic and PV thromboses on eliquis, and intractable hiccups admitted with sepsis 2/2 to unknown etiology and new alternating left/right hemiplegia 2/2 to multiple infarcts. Transferred to MICU for AMS and hypotension.    #Neuro  AMS 2/2 medication induced vs. metabolic encephalopathy vs. sepsis vs. hepatic encephalopathy  -pt intubated and sedated  -pt received thorazine today for hiccups, which can cause CNS depression  -MRI (19) showing old b/l cerebellar infarcts and R parietal infarct. Scattered small vessel white matter ischemic changes. Scattered acute infarcts in the left frontal parietal cortex and the right parietal cortex as well as the occipital cortex low bilaterally as well as enhancing subacute infarcts along the left frontal cortex.  -CT head (11/3/19) showing old infarcts, new infarcts seen on MRI from  unable to be visualized on CT  - ABG with no signs of hypercarbia, electrolytes wnl  - EEG to r/o seizure activity  - ammonia 67    #Cardio  Hypotension likely 2/2 sepsis  -on pressors  -TTE 10/17/19 showing EF 57%, normal LV and RV function    #Pulmonary  -tachypneic, sating well on room air  -intubated for airway protection and impending respiratory failure  -CXR from  showing clear lungs; repeat CXR today relatively unchanged    #GI  Metastatic pancreatic adenocarcinoma with mets to liver, c/b splenic and portal vein thromboses  -10/23/19 CT abd: multiple liver lesions consistent with hepatic metastases compared with CT 10/11/2019   -19 US abd: Small to moderate ascites most prominent in the right lower quadrant. Right renal cysts. No hydronephrosis.   -LFTs wnl, alkphos elevated since last admission  -PT/INR elevated  -f/u hep panel  -moderate sized ascites on US abd ; will obtain diagnostic/therapeutic paracentesis     #Renal/metabolic  [place] likely 2/2 hypotension  -UA neg  -Cr 1.34, baseline 0.8; monitor SCr  -ABG: pH 7.42, pCO2 39, HCO3 25, lactate 1.3    #ID  -progressively elevated leukocytosis and fever 101.3, tachypneic, meetings SIRS criteria  -ID following; as per ID, fever and leukocytosis felt to be 2/2 malignancy  -UA neg, CXR clear from   -f/u blood cultures  -f/u sputum cultures  -no obvious source of infection currently, on vanc/zosyn for empiric coverage    #Heme/Onc  DVT ppx  -hep gtt being held for possible paracentesis  Elevated d-dimer  -d-dimer >17,000  -acute DIC unlikely, plt ct normal, PTT normal, fibrinogen in 500's (though in setting of pt's sepsis and malignancy, may be markedly increased as acute phase reactant, so normal range may represent substantial consumption)  -PTT/INR elevated likely due to liver dysfunction  Metastatic pancreatic adenocarcinoma  -US guided hepatic nodule bx 10/21  -mets to liver, c/b splenic and portal vein thromboses, on eliquis at home; AC held for paracentesis  -heme/onc following; as per heme/onc, outpt chemo, transfuse for >7, anemia thought to be 2/2 malignancy    #Endocrine  No active issues    #GOC  - GOC discussion readdressed with family, patient to be full code at this time MICU Accept Note    CHIEF COMPLAINT: alternating left/right hemiplegia, lethargy, tachypnea    HPI / INTERVAL HISTORY:  54M PMH of HTN, DM, HLD, CVA 6 years ago (mild residual LUE weakness), metastatic pancreatic adenocarcinoma (dx 10/2019) w/ mets to liver c/b splenic and PV thromboses on eliquis, and intractable hiccups presented with alternating R and L hemiplegia. As per chart review and collateral from family: 1 month ago, pt was at [facility] was for intractable hiccups and was d/c'ed with dx of GERD, ACS work up neg. The following week, pt came to this hospital for SOB, fever, and hiccups on 10/11. Pt was found to have pancreatic mass, which was to be work up outpt, however pt did not follow up. Pt came back to the hospital 10/16 for worsening SOB, abdominal distension and pain. Pt had work up done showing metastatic pancreatic adenocarcinoma to liver with splenic and portal vein thromboses. Pt was initially on lovenox and transitioned to eliquis. Pt also complained of new R sided weakness. Pt was seen by neuro and had MRI of spine performed that did not elucidate etiology. Pt d/c'ed 10/29/19 with outpt follow up. Starting yesterday, pt has had acute onset left-sided flaccid weakness, but improvement of right sided weakness. No other focal deficits. Pt has been having periods of mild confusion. Pt still continues to have fevers up to 103 at home, with night sweats and nocturnal hiccups. Pt has had reduced appetite. Per family, pt has had increasing abdominal distension, but had a large bowel movement this morning. As per family, pt has had intermittent diaphoresis, cough starting yesterday, and has had intermittent subjective fevers. Patient admitted to general medicine floors for further work up of new hemiplegia.    Hospital course c/b new AMS. Pt lethargic and tachypneic. Code stroke called as pt's pupils were noted to be fixed. RRT was then called due to hypotension. Patient was intubated and transferred to MICU.    Of note, pt received thorazine a few hours prior to code stroke/RRT.      PAST MEDICAL & SURGICAL HISTORY:  Pancreatic cancer  No significant past surgical history      FAMILY HISTORY:  No pertinent family history in first degree relatives      SOCIAL HISTORY: as per family at bedside  Smoking: denies  Substance Use: denies  EtOH Use: denies    HOME MEDICATIONS:  	hydrALAZINE 50 mg oral tablet: Last Dose Taken:  , 1 tab(s) orally 2 times a day  · 	polyethylene glycol 3350 oral powder for reconstitution: Last Dose Taken:  , 17 gram(s) orally 2 times a day  	on month supply   · 	metoprolol succinate 200 mg oral tablet, extended release: Last Dose Taken:  , 1 tab(s) orally once a day  · 	aspirin 81 mg oral delayed release tablet: Last Dose Taken:  , 1 tab(s) orally once a day  · 	atorvastatin 80 mg oral tablet: Last Dose Taken:  , 1 tab(s) orally once a day  · 	Janumet 50 mg-1000 mg oral tablet: Last Dose Taken:  , 1 tab(s) orally 2 times a day  · 	glipiZIDE 10 mg oral tablet: Last Dose Taken:  , 1 tab(s) orally 2 times a day  · 	Eliquis 5 mg oral tablet: Last Dose Taken:  , 2 tab(s) orally 2 times a day for 7 days   	then 1 tab 2 times a day   · 	bisacodyl 10 mg rectal suppository: Last Dose Taken:  , 1 suppository(ies) rectal once a day, As needed, Constipation  · 	telmisartan 40 mg oral tablet: 1 tab(s) orally once a day    Allergies    No Known Allergies    Intolerances    REVIEW OF SYSTEMS: as per family at bedside  Constitutional: + fevers, + diaphoresis, chills, weight loss, weight gain  HEENT: No vision problems, eye pain, nasal congestion, rhinorrhea, sore throat, dysphagia  CV: No chest pain, orthopnea, palpitations  Resp: + cough, dyspnea, wheezing, hemoptysis  GI: No nausea, vomiting, diarrhea, constipation, abdominal pain  : [ ] dysuria [ ] nocturia [ ] hematuria [ ] increased urinary frequency  Musculoskeletal: [ ] back pain [ ] myalgias [ ] arthralgias [ ] fracture  Skin: [ ] rash [ ] itch  Neurological: [ ] headache [ ] dizziness [ ] syncope [ ] weakness [ ] numbness  Psychiatric: [ ] anxiety [ ] depression  Endocrine: [ ] diabetes [ ] thyroid problem  Hematologic/Lymphatic: [ ] anemia [ ] bleeding problem  Allergic/Immunologic: [ ] itchy eyes [ ] nasal discharge [ ] hives [ ] angioedema  [ ] All other systems negative  [ ] Unable to assess ROS because ________    OBJECTIVE:  ICU Vital Signs Last 24 Hrs  T(C): 36 (2019 02:00), Max: 38.5 (2019 22:20)  T(F): 96.8 (2019 02:00), Max: 101.3 (2019 22:20)  HR: 66 (2019 02:30) (66 - 92)  BP: 81/59 (2019 02:30) (81/59 - 146/81)  BP(mean): 64 (2019 02:30) (64 - 105)  ABP: --  ABP(mean): --  RR: 14 (2019 02:30) (0 - 32)  SpO2: 99% (2019 02:30) (96% - 100%)    Mode: AC/ CMV (Assist Control/ Continuous Mandatory Ventilation), RR (machine): 14, TV (machine): 550, FiO2: 40, PEEP: 5, ITime: 1, MAP: 14, PIP: 41     @ 07:01   @ 07:00  --------------------------------------------------------  IN: 1140 mL / OUT: 0 mL / NET: 1140 mL     @ 08:01  -  03 @ 02:38  --------------------------------------------------------  IN: 0 mL / OUT: 350 mL / NET: -350 mL      CAPILLARY BLOOD GLUCOSE      POCT Blood Glucose.: 160 mg/dL (2019 22:02)      PHYSICAL EXAM:  GENERAL: NAD, well-developed, intubated and sedated  HEAD:  Atraumatic, Normocephalic  EYES: EOMI, PERRLA, conjunctiva and sclera clear + 3 mm b/l  NECK: Supple, No JVD  CHEST/LUNG: Clear to auscultation bilaterally, mechanical breath sounds, No wheeze, rales, rhonchi  HEART: Regular rate and rhythm; No murmurs, rubs, or gallops  ABDOMEN: Soft, Nontender, very distended, hypoactive BS  EXTREMITIES:  2+ Peripheral Pulses, No clubbing, cyanosis, or edema  NEUROLOGY: sedated  SKIN: No rashes or lesions    LINES: [facility], [facility] MEDICATIONS:  MEDICATIONS  (STANDING):  aspirin enteric coated 81 milliGRAM(s) Oral daily  atorvastatin 80 milliGRAM(s) Oral at bedtime  chlorhexidine 0.12% Liquid 15 milliLiter(s) Oral Mucosa every 12 hours  chlorhexidine 4% Liquid 1 Application(s) Topical <User Schedule>  dextrose 5%. 1000 milliLiter(s) (50 mL/Hr) IV Continuous <Continuous>  dextrose 50% Injectable 12.5 Gram(s) IV Push once  dextrose 50% Injectable 25 Gram(s) IV Push once  dextrose 50% Injectable 25 Gram(s) IV Push once  heparin  Infusion.  Unit(s)/Hr (16 mL/Hr) IV Continuous <Continuous>  insulin lispro (HumaLOG) corrective regimen sliding scale   SubCutaneous every 6 hours  piperacillin/tazobactam IVPB.. 3.375 Gram(s) IV Intermittent every 8 hours  sodium chloride 0.9%. 1000 milliLiter(s) (50 mL/Hr) IV Continuous <Continuous>  vancomycin  IVPB 1000 milliGRAM(s) IV Intermittent every 12 hours    MEDICATIONS  (PRN):  dextrose 40% Gel 15 Gram(s) Oral once PRN Blood Glucose LESS THAN 70 milliGRAM(s)/deciliter  glucagon  Injectable 1 milliGRAM(s) IntraMuscular once PRN Glucose LESS THAN 70 milligrams/deciliter  heparin  Injectable 7000 Unit(s) IV Push every 6 hours PRN For aPTT less than 40  heparin  Injectable 3500 Unit(s) IV Push every 6 hours PRN For aPTT between 40 - 57      LABS:                        8.5    28.22 )-----------( 310      ( 2019 10:14 )             25.8     Hgb Trend: 8.5<--, 8.9<--, 8.8<--, 9.5<--, 8.8<--  11-02    136  |  98  |  42<H>  ----------------------------<  102<H>  3.7   |  24  |  1.19    Ca    7.7<L>      2019 07:17  Phos  3.3       Mg     2.4         TPro  6.2  /  Alb  2.2<L>  /  TBili  0.5  /  DBili  x   /  AST  40  /  ALT  23  /  AlkPhos  394<H>      Creatinine Trend: 1.19<--, 1.26<--, 0.73<--, 0.83<--, 0.79<--, 0.86<--  PTT - ( 2019 23:19 )  PTT:33.0 sec  Urinalysis Basic - ( 2019 19:05 )    Color: Yellow / Appearance: Clear / S.019 / pH: x  Gluc: x / Ketone: Negative  / Bili: Negative / Urobili: Negative   Blood: x / Protein: Trace / Nitrite: Negative   Leuk Esterase: Negative / RBC: x / WBC x   Sq Epi: x / Non Sq Epi: x / Bacteria: x      Arterial Blood Gas:   @ 02:15  7.42/39/118/25/98/1.2  ABG lactate: --  Arterial Blood Gas:   @ 22:55  7.44/39/49/26/82/2.2  ABG lactate: --    Venous Blood Gas:   @ 22:25  7.39/48/23/29/28  VBG Lactate: 1.9  Venous Blood Gas:   @ 19:29  7.42/41/47/26/78  VBG Lactate: 3.5      MICROBIOLOGY:     RADIOLOGY & ADDITIONAL TESTS:  -10/23/19 CT Angio Neck w/ IV Cont showing old right cerebellar, occipital and parietal infarcts.    -10/23/19: MRI lumbar spine- no evidence of spinal cord infarct. Bilateral facet hypertrophy at L5-S1 resulting in moderate to severe right and severe left neural foraminal narrowing. No changes in MRI 10/27/19    -19: MRI cervical spine- Marrow changes suspicious for anemia. Cannot exclude marrow infiltrative process. No lytic or blastic lesions. No cord compression. No abnormal enhancement    -19 MRI head: Atrophy for the patient's age. Old bilateral cerebellar infarcts and right parietal infarct. Scattered small vessel white matter ischemic changes. Scattered acute infarcts in the left frontal parietal cortex and the right parietal cortex as well as the occipital cortex low bilaterally as well as enhancing subacute infarcts along the left frontal cortex. These may be due to embolic infarcts or due to hypercoagulable state.    -11/3/19: CT head showing redemonstration of small acute infarcts in the right precentral gyrus and medial left frontal region, unchanged from MRI of 2019. Additional punctate acute infarcts seen in both cerebral hemispheres and in the left cerebellar hemisphere on MRI of 2019 are too small to visualize by CT technique. Redemonstration of large chronic right parietal lobe infarct. No CT evidence of acute intracranial hemorrhage, mass effect or new large territorial infarct. Please note that CT scan cannot exclude acute ischemia at the margins of a prior infarct.       ASSESSMENT AND PLAN:    54M PMH of HTN, DM, HLD, CVA 6 years ago (mild residual LUE weakness), metastatic pancreatic adenocarcinoma (dx 10/2019) w/ mets to liver c/b splenic and PV thromboses on eliquis, and intractable hiccups admitted with sepsis 2/2 to unknown etiology and new alternating left/right hemiplegia 2/2 to multiple infarcts. Transferred to MICU for AMS and hypotension.    #Neuro  AMS 2/2 medication induced vs. metabolic encephalopathy vs. sepsis vs. hepatic encephalopathy  -pt intubated and sedated  -pt received thorazine today for hiccups, which can cause CNS depression  -MRI (19) showing old b/l cerebellar infarcts and R parietal infarct. Scattered small vessel white matter ischemic changes. Scattered acute infarcts in the left frontal parietal cortex and the right parietal cortex as well as the occipital cortex low bilaterally as well as enhancing subacute infarcts along the left frontal cortex.  -CT head (11/3/19) showing old infarcts, new infarcts seen on MRI from  unable to be visualized on CT  - ABG with no signs of hypercarbia, electrolytes wnl  - EEG to r/o seizure activity  - ammonia 67    #Cardio  Hypotension likely 2/2 sepsis  -on pressors  -TTE 10/17/19 showing EF 57%, normal LV and RV function    #Pulmonary  -tachypneic, sating well on room air  -intubated for airway protection and impending respiratory failure  -CXR from  showing clear lungs; repeat CXR today relatively unchanged    #GI  Metastatic pancreatic adenocarcinoma with mets to liver, c/b splenic and portal vein thromboses  -10/23/19 CT abd: multiple liver lesions consistent with hepatic metastases compared with CT 10/11/2019   -19 US abd: Small to moderate ascites most prominent in the right lower quadrant. Right renal cysts. No hydronephrosis.   -LFTs wnl, alkphos elevated since last admission  -PT/INR elevated  -f/u hep panel  -moderate sized ascites on US abd ; will obtain diagnostic/therapeutic paracentesis     #Renal/metabolic  [place] likely 2/2 hypotension  -UA neg  -Cr 1.34, baseline 0.8; monitor SCr  -ABG: pH 7.42, pCO2 39, HCO3 25, lactate 1.3    #ID  -progressively elevated leukocytosis and fever 101.3, tachypneic, meetings SIRS criteria  -ID following; as per ID, fever and leukocytosis felt to be 2/2 malignancy  -UA neg, CXR clear from   -f/u blood cultures  -f/u sputum cultures  -no obvious source of infection currently, on vanc/zosyn for empiric coverage    #Heme/Onc  DVT ppx  -hep gtt being held for possible paracentesis  Elevated d-dimer  -d-dimer >17,000  -acute DIC unlikely, plt ct normal, PTT normal, fibrinogen in 500's (though in setting of pt's sepsis and malignancy, may be markedly increased as acute phase reactant, so normal range may represent substantial consumption)  -PTT/INR elevated may be due to liver dysfunction  Metastatic pancreatic adenocarcinoma  -US guided hepatic nodule bx 10/21  -mets to liver, c/b splenic and portal vein thromboses, on eliquis at home; AC held for paracentesis  -heme/onc following; as per heme/onc, outpt chemo, transfuse for >7, anemia thought to be 2/2 malignancy    #Endocrine  No active issues    #GOC  - GOC discussion readdressed with family, patient to be full code at this time MICU Accept Note    CHIEF COMPLAINT: alternating left/right hemiplegia, lethargy, tachypnea    HPI / INTERVAL HISTORY:  54M PMH of HTN, DM, HLD, CVA 6 years ago (mild residual LUE weakness), metastatic pancreatic adenocarcinoma (dx 10/2019) w/ mets to liver c/b splenic and PV thromboses on eliquis, and intractable hiccups presented with alternating R and L hemiplegia. As per chart review and collateral from family: 1 month ago, pt was at [facility] was for intractable hiccups and was d/c'ed with dx of GERD, ACS work up neg. The following week, pt came to this hospital for SOB, fever, and hiccups on 10/11. Pt was found to have pancreatic mass, which was to be work up outpt, however pt did not follow up. Pt came back to the hospital 10/16 for worsening SOB, abdominal distension and pain. Pt had work up done showing metastatic pancreatic adenocarcinoma to liver with splenic and portal vein thromboses. Pt was initially on lovenox and transitioned to eliquis. Pt also complained of new R sided weakness. Pt was seen by neuro and had MRI of spine performed that did not elucidate etiology. Pt d/c'ed 10/29/19 with outpt follow up. Starting yesterday, pt has had acute onset left-sided flaccid weakness, but improvement of right sided weakness. No other focal deficits. Pt has been having periods of mild confusion. Pt still continues to have fevers up to 103 at home, with night sweats and nocturnal hiccups. Pt has had reduced appetite. Per family, pt has had increasing abdominal distension, but had a large bowel movement this morning. As per family, pt has had intermittent diaphoresis, cough starting yesterday, and has had intermittent subjective fevers. Patient admitted to general medicine floors for further work up of new hemiplegia.    Hospital course c/b new AMS. Pt lethargic and tachypneic. Code stroke called as pt's pupils were noted to be fixed. RRT was then called due to hypotension. Patient was intubated and transferred to MICU.    Of note, pt received thorazine a few hours prior to code stroke/RRT.      PAST MEDICAL & SURGICAL HISTORY:  Pancreatic cancer  No significant past surgical history      FAMILY HISTORY:  No pertinent family history in first degree relatives      SOCIAL HISTORY: as per family at bedside  Smoking: denies  Substance Use: denies  EtOH Use: denies    HOME MEDICATIONS:  	hydrALAZINE 50 mg oral tablet: Last Dose Taken:  , 1 tab(s) orally 2 times a day  · 	polyethylene glycol 3350 oral powder for reconstitution: Last Dose Taken:  , 17 gram(s) orally 2 times a day  	on month supply   · 	metoprolol succinate 200 mg oral tablet, extended release: Last Dose Taken:  , 1 tab(s) orally once a day  · 	aspirin 81 mg oral delayed release tablet: Last Dose Taken:  , 1 tab(s) orally once a day  · 	atorvastatin 80 mg oral tablet: Last Dose Taken:  , 1 tab(s) orally once a day  · 	Janumet 50 mg-1000 mg oral tablet: Last Dose Taken:  , 1 tab(s) orally 2 times a day  · 	glipiZIDE 10 mg oral tablet: Last Dose Taken:  , 1 tab(s) orally 2 times a day  · 	Eliquis 5 mg oral tablet: Last Dose Taken:  , 2 tab(s) orally 2 times a day for 7 days   	then 1 tab 2 times a day   · 	bisacodyl 10 mg rectal suppository: Last Dose Taken:  , 1 suppository(ies) rectal once a day, As needed, Constipation  · 	telmisartan 40 mg oral tablet: 1 tab(s) orally once a day    Allergies    No Known Allergies    Intolerances    REVIEW OF SYSTEMS: as per family at bedside  Constitutional: + fevers, + diaphoresis, chills, weight loss, weight gain  HEENT: No vision problems, eye pain, nasal congestion, rhinorrhea, sore throat, dysphagia  CV: No chest pain, orthopnea, palpitations  Resp: + cough, dyspnea, wheezing, hemoptysis  GI: No nausea, vomiting, diarrhea, constipation, abdominal pain  : [ ] dysuria [ ] nocturia [ ] hematuria [ ] increased urinary frequency  Musculoskeletal: [ ] back pain [ ] myalgias [ ] arthralgias [ ] fracture  Skin: [ ] rash [ ] itch  Neurological: [ ] headache [ ] dizziness [ ] syncope [ ] weakness [ ] numbness  Psychiatric: [ ] anxiety [ ] depression  Endocrine: [ ] diabetes [ ] thyroid problem  Hematologic/Lymphatic: [ ] anemia [ ] bleeding problem  Allergic/Immunologic: [ ] itchy eyes [ ] nasal discharge [ ] hives [ ] angioedema  [ ] All other systems negative  [ ] Unable to assess ROS because ________    OBJECTIVE:  ICU Vital Signs Last 24 Hrs  T(C): 36 (2019 02:00), Max: 38.5 (2019 22:20)  T(F): 96.8 (2019 02:00), Max: 101.3 (2019 22:20)  HR: 66 (2019 02:30) (66 - 92)  BP: 81/59 (2019 02:30) (81/59 - 146/81)  BP(mean): 64 (2019 02:30) (64 - 105)  ABP: --  ABP(mean): --  RR: 14 (2019 02:30) (0 - 32)  SpO2: 99% (2019 02:30) (96% - 100%)    Mode: AC/ CMV (Assist Control/ Continuous Mandatory Ventilation), RR (machine): 14, TV (machine): 550, FiO2: 40, PEEP: 5, ITime: 1, MAP: 14, PIP: 41     @ 07:01   @ 07:00  --------------------------------------------------------  IN: 1140 mL / OUT: 0 mL / NET: 1140 mL     @ 08:01  -  03 @ 02:38  --------------------------------------------------------  IN: 0 mL / OUT: 350 mL / NET: -350 mL      CAPILLARY BLOOD GLUCOSE      POCT Blood Glucose.: 160 mg/dL (2019 22:02)      PHYSICAL EXAM:  GENERAL: NAD, well-developed, intubated and sedated  HEAD:  Atraumatic, Normocephalic  EYES: EOMI, PERRLA, conjunctiva and sclera clear + 3 mm b/l  NECK: Supple, No JVD  CHEST/LUNG: Clear to auscultation bilaterally, mechanical breath sounds, No wheeze, rales, rhonchi  HEART: Regular rate and rhythm; No murmurs, rubs, or gallops  ABDOMEN: Soft, Nontender, very distended, hypoactive BS  EXTREMITIES:  2+ Peripheral Pulses, No clubbing, cyanosis, or edema  NEUROLOGY: sedated  SKIN: No rashes or lesions    LINES: [facility], [facility] MEDICATIONS:  MEDICATIONS  (STANDING):  aspirin enteric coated 81 milliGRAM(s) Oral daily  atorvastatin 80 milliGRAM(s) Oral at bedtime  chlorhexidine 0.12% Liquid 15 milliLiter(s) Oral Mucosa every 12 hours  chlorhexidine 4% Liquid 1 Application(s) Topical <User Schedule>  dextrose 5%. 1000 milliLiter(s) (50 mL/Hr) IV Continuous <Continuous>  dextrose 50% Injectable 12.5 Gram(s) IV Push once  dextrose 50% Injectable 25 Gram(s) IV Push once  dextrose 50% Injectable 25 Gram(s) IV Push once  heparin  Infusion.  Unit(s)/Hr (16 mL/Hr) IV Continuous <Continuous>  insulin lispro (HumaLOG) corrective regimen sliding scale   SubCutaneous every 6 hours  piperacillin/tazobactam IVPB.. 3.375 Gram(s) IV Intermittent every 8 hours  sodium chloride 0.9%. 1000 milliLiter(s) (50 mL/Hr) IV Continuous <Continuous>  vancomycin  IVPB 1000 milliGRAM(s) IV Intermittent every 12 hours    MEDICATIONS  (PRN):  dextrose 40% Gel 15 Gram(s) Oral once PRN Blood Glucose LESS THAN 70 milliGRAM(s)/deciliter  glucagon  Injectable 1 milliGRAM(s) IntraMuscular once PRN Glucose LESS THAN 70 milligrams/deciliter  heparin  Injectable 7000 Unit(s) IV Push every 6 hours PRN For aPTT less than 40  heparin  Injectable 3500 Unit(s) IV Push every 6 hours PRN For aPTT between 40 - 57      LABS:                        8.5    28.22 )-----------( 310      ( 2019 10:14 )             25.8     Hgb Trend: 8.5<--, 8.9<--, 8.8<--, 9.5<--, 8.8<--  11-02    136  |  98  |  42<H>  ----------------------------<  102<H>  3.7   |  24  |  1.19    Ca    7.7<L>      2019 07:17  Phos  3.3       Mg     2.4         TPro  6.2  /  Alb  2.2<L>  /  TBili  0.5  /  DBili  x   /  AST  40  /  ALT  23  /  AlkPhos  394<H>      Creatinine Trend: 1.19<--, 1.26<--, 0.73<--, 0.83<--, 0.79<--, 0.86<--  PTT - ( 2019 23:19 )  PTT:33.0 sec  Urinalysis Basic - ( 2019 19:05 )    Color: Yellow / Appearance: Clear / S.019 / pH: x  Gluc: x / Ketone: Negative  / Bili: Negative / Urobili: Negative   Blood: x / Protein: Trace / Nitrite: Negative   Leuk Esterase: Negative / RBC: x / WBC x   Sq Epi: x / Non Sq Epi: x / Bacteria: x      Arterial Blood Gas:   @ 02:15  7.42/39/118/25/98/1.2  ABG lactate: --  Arterial Blood Gas:   @ 22:55  7.44/39/49/26/82/2.2  ABG lactate: --    Venous Blood Gas:   @ 22:25  7.39/48/23/29/28  VBG Lactate: 1.9  Venous Blood Gas:   @ 19:29  7.42/41/47/26/78  VBG Lactate: 3.5      MICROBIOLOGY:     RADIOLOGY & ADDITIONAL TESTS:  -10/23/19 CT Angio Neck w/ IV Cont showing old right cerebellar, occipital and parietal infarcts.    -10/23/19: MRI lumbar spine- no evidence of spinal cord infarct. Bilateral facet hypertrophy at L5-S1 resulting in moderate to severe right and severe left neural foraminal narrowing. No changes in MRI 10/27/19    -19: MRI cervical spine- Marrow changes suspicious for anemia. Cannot exclude marrow infiltrative process. No lytic or blastic lesions. No cord compression. No abnormal enhancement    -19 MRI head: Atrophy for the patient's age. Old bilateral cerebellar infarcts and right parietal infarct. Scattered small vessel white matter ischemic changes. Scattered acute infarcts in the left frontal parietal cortex and the right parietal cortex as well as the occipital cortex low bilaterally as well as enhancing subacute infarcts along the left frontal cortex. These may be due to embolic infarcts or due to hypercoagulable state.    -11/3/19: CT head showing redemonstration of small acute infarcts in the right precentral gyrus and medial left frontal region, unchanged from MRI of 2019. Additional punctate acute infarcts seen in both cerebral hemispheres and in the left cerebellar hemisphere on MRI of 2019 are too small to visualize by CT technique. Redemonstration of large chronic right parietal lobe infarct. No CT evidence of acute intracranial hemorrhage, mass effect or new large territorial infarct. Please note that CT scan cannot exclude acute ischemia at the margins of a prior infarct.       ASSESSMENT AND PLAN:    54M PMH of HTN, DM, HLD, CVA 6 years ago (mild residual LUE weakness), metastatic pancreatic adenocarcinoma (dx 10/2019) w/ mets to liver c/b splenic and PV thromboses on eliquis, and intractable hiccups admitted with sepsis 2/2 to unknown etiology and new alternating left/right hemiplegia 2/2 to multiple infarcts. Transferred to MICU for AMS and hypotension.    #Neuro  AMS 2/2 medication induced vs. metabolic encephalopathy vs. sepsis vs. hepatic encephalopathy  -pt intubated and sedated on fentanyl; pt had worsening hypotension on propofol  -pt received thorazine today for hiccups, which can cause CNS depression  -MRI (19) showing old b/l cerebellar infarcts and R parietal infarct. Scattered small vessel white matter ischemic changes. Scattered acute infarcts in the left frontal parietal cortex and the right parietal cortex as well as the occipital cortex low bilaterally as well as enhancing subacute infarcts along the left frontal cortex.  -CT head (11/3/19) showing old infarcts, new infarcts seen on MRI from  unable to be visualized on CT  - ABG with no signs of hypercarbia, electrolytes wnl  - EEG to r/o seizure activity  - ammonia 67    #Cardio  Hypotension likely 2/2 sepsis and sedative agents  -on pressors, levo  -TTE 10/17/19 showing EF 57%, normal LV and RV function    #Pulmonary  -pt was tachypneic, was sating well on room air  -intubated for airway protection and impending respiratory failure  -CXR from  showing clear lungs; repeat CXR today relatively unchanged    #GI  Metastatic pancreatic adenocarcinoma with mets to liver, c/b splenic and portal vein thromboses  -10/23/19 CT abd: multiple liver lesions consistent with hepatic metastases compared with CT 10/11/2019   -19 US abd: Small to moderate ascites most prominent in the right lower quadrant. Right renal cysts. No hydronephrosis.   -LFTs wnl, alkphos elevated since last admission  -PT/INR elevated  -f/u hep panel  -moderate sized ascites on US abd ; will obtain diagnostic/therapeutic paracentesis     #Renal/metabolic  [place] likely 2/2 hypotension  -UA neg  -Cr 1.34, baseline 0.8; monitor SCr  -ABG: pH 7.42, pCO2 39, HCO3 25, lactate 1.3    #ID  -progressively elevated leukocytosis and fever 101.3, tachypneic, meetings SIRS criteria  -ID following; as per ID, fever and leukocytosis felt to be 2/2 malignancy  -UA neg, CXR clear from   -f/u blood cultures  -f/u sputum cultures  -no obvious source of infection currently, on vanc/zosyn for empiric coverage    #Heme/Onc  DVT ppx  -hep gtt being held for possible paracentesis, PT/INR also elevated  Elevated d-dimer  -d-dimer >17,000  -acute DIC unlikely, plt ct normal, PTT normal, fibrinogen in 500's (though in setting of pt's sepsis and malignancy, may be markedly increased as acute phase reactant, so normal range may represent substantial consumption)  -PT/INR elevated may be due to liver dysfunction  -trend INR  Metastatic pancreatic adenocarcinoma  -US guided hepatic nodule bx 10/21  -mets to liver, c/b splenic and portal vein thromboses, on eliquis at home; AC held for paracentesis  -heme/onc following; as per heme/onc, outpt chemo, transfuse for goal >7, anemia thought to be 2/2 malignancy    #Endocrine  No active issues    #GOC  - GOC discussion readdressed with family, patient to be full code at this time MICU Accept Note    CHIEF COMPLAINT: alternating left/right hemiplegia, lethargy, tachypnea    HPI / INTERVAL HISTORY:  54M PMH of HTN, DM, HLD, CVA 6 years ago (mild residual LUE weakness), metastatic pancreatic adenocarcinoma (dx 10/2019) w/ mets to liver c/b splenic and PV thromboses on eliquis, and intractable hiccups presented with alternating R and L hemiplegia. As per chart review and collateral from family: 1 month ago, pt was at [facility] was for intractable hiccups and was d/c'ed with dx of GERD, ACS work up neg. The following week, pt came to this hospital for SOB, fever, and hiccups on 10/11. Pt was found to have pancreatic mass, which was to be work up outpt, however pt did not follow up. Pt came back to the hospital 10/16 for worsening SOB, abdominal distension and pain. Pt had work up done showing metastatic pancreatic adenocarcinoma to liver with splenic and portal vein thromboses. Pt was initially on lovenox and transitioned to eliquis. Pt also complained of new R sided weakness. Pt was seen by neuro and had MRI of spine performed that did not elucidate etiology. Pt d/c'ed 10/29/19 with outpt follow up. Starting yesterday, pt has had acute onset left-sided flaccid weakness, but improvement of right sided weakness. No other focal deficits. Pt has been having periods of mild confusion. Pt still continues to have fevers up to 103 at home, with night sweats and nocturnal hiccups. Pt has had reduced appetite. Per family, pt has had increasing abdominal distension, but had a large bowel movement this morning. As per family, pt has had intermittent diaphoresis, cough starting yesterday, and has had intermittent subjective fevers. Patient admitted to general medicine floors for further work up of new hemiplegia.    Hospital course c/b new AMS. Pt lethargic and tachypneic. Code stroke called as pt's pupils were noted to be fixed. RRT was then called due to hypotension. Patient was intubated and transferred to MICU.    Of note, pt received thorazine a few hours prior to code stroke/RRT.      PAST MEDICAL & SURGICAL HISTORY:  Pancreatic cancer  No significant past surgical history      FAMILY HISTORY:  No pertinent family history in first degree relatives      SOCIAL HISTORY: as per family at bedside  Smoking: denies  Substance Use: denies  EtOH Use: denies    HOME MEDICATIONS:  	hydrALAZINE 50 mg oral tablet: Last Dose Taken:  , 1 tab(s) orally 2 times a day  · 	polyethylene glycol 3350 oral powder for reconstitution: Last Dose Taken:  , 17 gram(s) orally 2 times a day  	on month supply   · 	metoprolol succinate 200 mg oral tablet, extended release: Last Dose Taken:  , 1 tab(s) orally once a day  · 	aspirin 81 mg oral delayed release tablet: Last Dose Taken:  , 1 tab(s) orally once a day  · 	atorvastatin 80 mg oral tablet: Last Dose Taken:  , 1 tab(s) orally once a day  · 	Janumet 50 mg-1000 mg oral tablet: Last Dose Taken:  , 1 tab(s) orally 2 times a day  · 	glipiZIDE 10 mg oral tablet: Last Dose Taken:  , 1 tab(s) orally 2 times a day  · 	Eliquis 5 mg oral tablet: Last Dose Taken:  , 2 tab(s) orally 2 times a day for 7 days   	then 1 tab 2 times a day   · 	bisacodyl 10 mg rectal suppository: Last Dose Taken:  , 1 suppository(ies) rectal once a day, As needed, Constipation  · 	telmisartan 40 mg oral tablet: 1 tab(s) orally once a day    Allergies    No Known Allergies    Intolerances    REVIEW OF SYSTEMS: as per family at bedside  Constitutional: + fevers, + diaphoresis, chills, weight loss, weight gain  HEENT: No vision problems, eye pain, nasal congestion, rhinorrhea, sore throat, dysphagia  CV: No chest pain, orthopnea, palpitations  Resp: + cough, dyspnea, wheezing, hemoptysis  GI: No nausea, vomiting, diarrhea, constipation, abdominal pain  : [ ] dysuria [ ] nocturia [ ] hematuria [ ] increased urinary frequency  Musculoskeletal: [ ] back pain [ ] myalgias [ ] arthralgias [ ] fracture  Skin: [ ] rash [ ] itch  Neurological: [ ] headache [ ] dizziness [ ] syncope [ ] weakness [ ] numbness  Psychiatric: [ ] anxiety [ ] depression  Endocrine: [ ] diabetes [ ] thyroid problem  Hematologic/Lymphatic: [ ] anemia [ ] bleeding problem  Allergic/Immunologic: [ ] itchy eyes [ ] nasal discharge [ ] hives [ ] angioedema  [ ] All other systems negative  [ ] Unable to assess ROS because ________    OBJECTIVE:  ICU Vital Signs Last 24 Hrs  T(C): 36 (2019 02:00), Max: 38.5 (2019 22:20)  T(F): 96.8 (2019 02:00), Max: 101.3 (2019 22:20)  HR: 66 (2019 02:30) (66 - 92)  BP: 81/59 (2019 02:30) (81/59 - 146/81)  BP(mean): 64 (2019 02:30) (64 - 105)  ABP: --  ABP(mean): --  RR: 14 (2019 02:30) (0 - 32)  SpO2: 99% (2019 02:30) (96% - 100%)    Mode: AC/ CMV (Assist Control/ Continuous Mandatory Ventilation), RR (machine): 14, TV (machine): 550, FiO2: 40, PEEP: 5, ITime: 1, MAP: 14, PIP: 41     @ 07:01   @ 07:00  --------------------------------------------------------  IN: 1140 mL / OUT: 0 mL / NET: 1140 mL     @ 08:01  -  03 @ 02:38  --------------------------------------------------------  IN: 0 mL / OUT: 350 mL / NET: -350 mL      CAPILLARY BLOOD GLUCOSE      POCT Blood Glucose.: 160 mg/dL (2019 22:02)      PHYSICAL EXAM:  GENERAL: NAD, well-developed, intubated and sedated  HEAD:  Atraumatic, Normocephalic  EYES: EOMI, PERRLA, conjunctiva and sclera clear + 3 mm b/l  NECK: Supple, No JVD  CHEST/LUNG: Clear to auscultation bilaterally, mechanical breath sounds, No wheeze, rales, rhonchi  HEART: Regular rate and rhythm; No murmurs, rubs, or gallops  ABDOMEN: Soft, Nontender, very distended, hypoactive BS  EXTREMITIES:  2+ Peripheral Pulses, No clubbing, cyanosis, or edema  NEUROLOGY: sedated  SKIN: No rashes or lesions    LINES: [facility] MEDICATIONS:  MEDICATIONS  (STANDING):  aspirin enteric coated 81 milliGRAM(s) Oral daily  atorvastatin 80 milliGRAM(s) Oral at bedtime  chlorhexidine 0.12% Liquid 15 milliLiter(s) Oral Mucosa every 12 hours  chlorhexidine 4% Liquid 1 Application(s) Topical <User Schedule>  dextrose 5%. 1000 milliLiter(s) (50 mL/Hr) IV Continuous <Continuous>  dextrose 50% Injectable 12.5 Gram(s) IV Push once  dextrose 50% Injectable 25 Gram(s) IV Push once  dextrose 50% Injectable 25 Gram(s) IV Push once  heparin  Infusion.  Unit(s)/Hr (16 mL/Hr) IV Continuous <Continuous>  insulin lispro (HumaLOG) corrective regimen sliding scale   SubCutaneous every 6 hours  piperacillin/tazobactam IVPB.. 3.375 Gram(s) IV Intermittent every 8 hours  sodium chloride 0.9%. 1000 milliLiter(s) (50 mL/Hr) IV Continuous <Continuous>  vancomycin  IVPB 1000 milliGRAM(s) IV Intermittent every 12 hours    MEDICATIONS  (PRN):  dextrose 40% Gel 15 Gram(s) Oral once PRN Blood Glucose LESS THAN 70 milliGRAM(s)/deciliter  glucagon  Injectable 1 milliGRAM(s) IntraMuscular once PRN Glucose LESS THAN 70 milligrams/deciliter  heparin  Injectable 7000 Unit(s) IV Push every 6 hours PRN For aPTT less than 40  heparin  Injectable 3500 Unit(s) IV Push every 6 hours PRN For aPTT between 40 - 57      LABS:                        8.5    28.22 )-----------( 310      ( 2019 10:14 )             25.8     Hgb Trend: 8.5<--, 8.9<--, 8.8<--, 9.5<--, 8.8<--  11-02    136  |  98  |  42<H>  ----------------------------<  102<H>  3.7   |  24  |  1.19    Ca    7.7<L>      2019 07:17  Phos  3.3       Mg     2.4         TPro  6.2  /  Alb  2.2<L>  /  TBili  0.5  /  DBili  x   /  AST  40  /  ALT  23  /  AlkPhos  394<H>      Creatinine Trend: 1.19<--, 1.26<--, 0.73<--, 0.83<--, 0.79<--, 0.86<--  PTT - ( 2019 23:19 )  PTT:33.0 sec  Urinalysis Basic - ( 2019 19:05 )    Color: Yellow / Appearance: Clear / S.019 / pH: x  Gluc: x / Ketone: Negative  / Bili: Negative / Urobili: Negative   Blood: x / Protein: Trace / Nitrite: Negative   Leuk Esterase: Negative / RBC: x / WBC x   Sq Epi: x / Non Sq Epi: x / Bacteria: x      Arterial Blood Gas:   @ 02:15  7.42/39/118/25/98/1.2  ABG lactate: --  Arterial Blood Gas:   @ 22:55  7.44/39/49/26/82/2.2  ABG lactate: --    Venous Blood Gas:   @ 22:25  7.39/48/23/29/28  VBG Lactate: 1.9  Venous Blood Gas:   @ 19:29  7.42/41/47/26/78  VBG Lactate: 3.5      MICROBIOLOGY:     RADIOLOGY & ADDITIONAL TESTS:  -10/23/19 CT Angio Neck w/ IV Cont showing old right cerebellar, occipital and parietal infarcts.    -10/23/19: MRI lumbar spine- no evidence of spinal cord infarct. Bilateral facet hypertrophy at L5-S1 resulting in moderate to severe right and severe left neural foraminal narrowing. No changes in MRI 10/27/19    -19: MRI cervical spine- Marrow changes suspicious for anemia. Cannot exclude marrow infiltrative process. No lytic or blastic lesions. No cord compression. No abnormal enhancement    -19 MRI head: Atrophy for the patient's age. Old bilateral cerebellar infarcts and right parietal infarct. Scattered small vessel white matter ischemic changes. Scattered acute infarcts in the left frontal parietal cortex and the right parietal cortex as well as the occipital cortex low bilaterally as well as enhancing subacute infarcts along the left frontal cortex. These may be due to embolic infarcts or due to hypercoagulable state.    -11/3/19: CT head showing redemonstration of small acute infarcts in the right precentral gyrus and medial left frontal region, unchanged from MRI of 2019. Additional punctate acute infarcts seen in both cerebral hemispheres and in the left cerebellar hemisphere on MRI of 2019 are too small to visualize by CT technique. Redemonstration of large chronic right parietal lobe infarct. No CT evidence of acute intracranial hemorrhage, mass effect or new large territorial infarct. Please note that CT scan cannot exclude acute ischemia at the margins of a prior infarct.       ASSESSMENT AND PLAN:    54M PMH of HTN, DM, HLD, CVA 6 years ago (mild residual LUE weakness), metastatic pancreatic adenocarcinoma (dx 10/2019) w/ mets to liver c/b splenic and PV thromboses on eliquis, and intractable hiccups admitted with sepsis 2/2 to unknown etiology and new alternating left/right hemiplegia 2/2 to multiple infarcts. Transferred to MICU for AMS and hypotension.    #Neuro  AMS 2/2 medication induced vs. metabolic encephalopathy vs. sepsis vs. hepatic encephalopathy  -pt intubated and sedated on fentanyl; pt had worsening hypotension on propofol  -pt received thorazine today for hiccups, which can cause CNS depression  -MRI (19) showing old b/l cerebellar infarcts and R parietal infarct. Scattered small vessel white matter ischemic changes. Scattered acute infarcts in the left frontal parietal cortex and the right parietal cortex as well as the occipital cortex low bilaterally as well as enhancing subacute infarcts along the left frontal cortex.  -CT head (11/3/19) showing old infarcts, new infarcts seen on MRI from  unable to be visualized on CT  - ABG with no signs of hypercarbia, electrolytes wnl  - EEG to r/o seizure activity  - ammonia 67    #Cardio  Hypotension likely 2/2 sepsis and sedative agents  -on pressors, levo  -TTE 10/17/19 showing EF 57%, normal LV and RV function    #Pulmonary  -pt was tachypneic, was sating well on room air  -intubated for airway protection and impending respiratory failure  -CXR from  showing clear lungs; repeat CXR today relatively unchanged    #GI  Metastatic pancreatic adenocarcinoma with mets to liver, c/b splenic and portal vein thromboses  -10/23/19 CT abd: multiple liver lesions consistent with hepatic metastases compared with CT 10/11/2019   -19 US abd: Small to moderate ascites most prominent in the right lower quadrant. Right renal cysts. No hydronephrosis.   -LFTs wnl, alkphos elevated since last admission  -PT/INR elevated  -f/u hep panel  -moderate sized ascites on US abd ; will obtain diagnostic/therapeutic paracentesis     #Renal/metabolic  [place] likely 2/2 hypotension  -UA neg  -Cr 1.34, baseline 0.8; monitor SCr  -ABG: pH 7.42, pCO2 39, HCO3 25, lactate 1.3  -19 US abd: Right renal cysts. No hydronephrosis.     #ID  -progressively elevated leukocytosis and fever 101.3, tachypneic, meetings SIRS criteria  -ID following; as per ID, fever and leukocytosis felt to be 2/2 malignancy  -UA neg, CXR clear from   -f/u blood cultures  -f/u sputum cultures  -no obvious source of infection currently, on vanc/zosyn for empiric coverage    #Heme/Onc  DVT ppx  -hep gtt being held for possible paracentesis, PT/INR also elevated  Elevated d-dimer  -d-dimer >17,000  -acute DIC unlikely, plt ct normal, PTT normal, fibrinogen in 500's (though in setting of pt's sepsis and malignancy, may be markedly increased as acute phase reactant, so normal range may represent substantial consumption)  -PT/INR elevated may be due to liver dysfunction  -trend INR  Metastatic pancreatic adenocarcinoma  -US guided hepatic nodule bx 10/21  -mets to liver, c/b splenic and portal vein thromboses, on eliquis at home; AC held for paracentesis  -heme/onc following; as per heme/onc, outpt chemo, transfuse for goal >7, anemia thought to be 2/2 malignancy    #Endocrine  No active issues    #GOC  - GOC discussion readdressed with family, patient to be full code at this time MICU Accept Note    CHIEF COMPLAINT: alternating left/right hemiplegia, lethargy, tachypnea    HPI / INTERVAL HISTORY:  54M PMH of HTN, DM, HLD, CVA 6 years ago (mild residual LUE weakness), metastatic pancreatic adenocarcinoma (dx 10/2019) w/ mets to liver c/b splenic and PV thromboses on eliquis, and intractable hiccups presented with alternating R and L hemiplegia. As per chart review and collateral from family: 1 month ago, pt was at [facility] was for intractable hiccups and was d/c'ed with dx of GERD, ACS work up neg. The following week, pt came to this hospital for SOB, fever, and hiccups on 10/11. Pt was found to have pancreatic mass, which was to be work up outpt, however pt did not follow up. Pt came back to the hospital 10/16 for worsening SOB, abdominal distension and pain. Pt had work up done showing metastatic pancreatic adenocarcinoma to liver with splenic and portal vein thromboses. Pt was initially on lovenox and transitioned to eliquis. Pt also complained of new R sided weakness. Pt was seen by neuro and had MRI of spine performed that did not elucidate etiology. Pt d/c'ed 10/29/19 with outpt follow up. Starting yesterday, pt has had acute onset left-sided flaccid weakness, but improvement of right sided weakness. No other focal deficits. Pt has been having periods of mild confusion. Pt still continues to have fevers up to 103 at home, with night sweats and nocturnal hiccups. Pt has had reduced appetite. Per family, pt has had increasing abdominal distension, but had a large bowel movement this morning. As per family, pt has had intermittent diaphoresis, cough starting yesterday, and has had intermittent subjective fevers. Patient admitted to general medicine floors for further work up of new hemiplegia.    Hospital course c/b new AMS. Pt lethargic and tachypneic. Code stroke called as pt's pupils were noted to be fixed. RRT was then called due to hypotension. Patient was intubated and transferred to MICU.    Of note, pt received thorazine a few hours prior to code stroke/RRT.      PAST MEDICAL & SURGICAL HISTORY:  Pancreatic cancer  No significant past surgical history      FAMILY HISTORY:  No pertinent family history in first degree relatives      SOCIAL HISTORY: as per family at bedside  Smoking: denies  Substance Use: denies  EtOH Use: denies    HOME MEDICATIONS:  	hydrALAZINE 50 mg oral tablet: Last Dose Taken:  , 1 tab(s) orally 2 times a day  · 	polyethylene glycol 3350 oral powder for reconstitution: Last Dose Taken:  , 17 gram(s) orally 2 times a day  	on month supply   · 	metoprolol succinate 200 mg oral tablet, extended release: Last Dose Taken:  , 1 tab(s) orally once a day  · 	aspirin 81 mg oral delayed release tablet: Last Dose Taken:  , 1 tab(s) orally once a day  · 	atorvastatin 80 mg oral tablet: Last Dose Taken:  , 1 tab(s) orally once a day  · 	Janumet 50 mg-1000 mg oral tablet: Last Dose Taken:  , 1 tab(s) orally 2 times a day  · 	glipiZIDE 10 mg oral tablet: Last Dose Taken:  , 1 tab(s) orally 2 times a day  · 	Eliquis 5 mg oral tablet: Last Dose Taken:  , 2 tab(s) orally 2 times a day for 7 days   	then 1 tab 2 times a day   · 	bisacodyl 10 mg rectal suppository: Last Dose Taken:  , 1 suppository(ies) rectal once a day, As needed, Constipation  · 	telmisartan 40 mg oral tablet: 1 tab(s) orally once a day    Allergies    No Known Allergies    Intolerances    REVIEW OF SYSTEMS: as per family at bedside  Constitutional: + fevers, + diaphoresis, chills, weight loss, weight gain  HEENT: No vision problems, eye pain, nasal congestion, rhinorrhea, sore throat, dysphagia  CV: No chest pain, orthopnea, palpitations  Resp: + cough, dyspnea, wheezing, hemoptysis  GI: No nausea, vomiting, diarrhea, constipation, abdominal pain  : [ ] dysuria [ ] nocturia [ ] hematuria [ ] increased urinary frequency  Musculoskeletal: [ ] back pain [ ] myalgias [ ] arthralgias [ ] fracture  Skin: [ ] rash [ ] itch  Neurological: [ ] headache [ ] dizziness [ ] syncope [ ] weakness [ ] numbness  Psychiatric: [ ] anxiety [ ] depression  Endocrine: [ ] diabetes [ ] thyroid problem  Hematologic/Lymphatic: [ ] anemia [ ] bleeding problem  Allergic/Immunologic: [ ] itchy eyes [ ] nasal discharge [ ] hives [ ] angioedema  [ ] All other systems negative  [ ] Unable to assess ROS because ________    OBJECTIVE:  ICU Vital Signs Last 24 Hrs  T(C): 36 (2019 02:00), Max: 38.5 (2019 22:20)  T(F): 96.8 (2019 02:00), Max: 101.3 (2019 22:20)  HR: 66 (2019 02:30) (66 - 92)  BP: 81/59 (2019 02:30) (81/59 - 146/81)  BP(mean): 64 (2019 02:30) (64 - 105)  ABP: --  ABP(mean): --  RR: 14 (2019 02:30) (0 - 32)  SpO2: 99% (2019 02:30) (96% - 100%)    Mode: AC/ CMV (Assist Control/ Continuous Mandatory Ventilation), RR (machine): 14, TV (machine): 550, FiO2: 40, PEEP: 5, ITime: 1, MAP: 14, PIP: 41     @ 07:01   @ 07:00  --------------------------------------------------------  IN: 1140 mL / OUT: 0 mL / NET: 1140 mL     @ 08:01  -  03 @ 02:38  --------------------------------------------------------  IN: 0 mL / OUT: 350 mL / NET: -350 mL      CAPILLARY BLOOD GLUCOSE      POCT Blood Glucose.: 160 mg/dL (2019 22:02)      PHYSICAL EXAM:  GENERAL: NAD, well-developed, intubated and sedated  HEAD:  Atraumatic, Normocephalic  EYES: EOMI, PERRLA, conjunctiva and sclera clear + 3 mm b/l  NECK: Supple, No JVD  CHEST/LUNG: Clear to auscultation bilaterally, mechanical breath sounds, No wheeze, rales, rhonchi  HEART: Regular rate and rhythm; No murmurs, rubs, or gallops  ABDOMEN: Soft, Nontender, very distended, hypoactive BS  EXTREMITIES:  2+ Peripheral Pulses, No clubbing, cyanosis, or edema  NEUROLOGY: sedated  SKIN: No rashes or lesions    LINES: [facility], [facility] MEDICATIONS:  MEDICATIONS  (STANDING):  aspirin enteric coated 81 milliGRAM(s) Oral daily  atorvastatin 80 milliGRAM(s) Oral at bedtime  chlorhexidine 0.12% Liquid 15 milliLiter(s) Oral Mucosa every 12 hours  chlorhexidine 4% Liquid 1 Application(s) Topical <User Schedule>  dextrose 5%. 1000 milliLiter(s) (50 mL/Hr) IV Continuous <Continuous>  dextrose 50% Injectable 12.5 Gram(s) IV Push once  dextrose 50% Injectable 25 Gram(s) IV Push once  dextrose 50% Injectable 25 Gram(s) IV Push once  heparin  Infusion.  Unit(s)/Hr (16 mL/Hr) IV Continuous <Continuous>  insulin lispro (HumaLOG) corrective regimen sliding scale   SubCutaneous every 6 hours  piperacillin/tazobactam IVPB.. 3.375 Gram(s) IV Intermittent every 8 hours  sodium chloride 0.9%. 1000 milliLiter(s) (50 mL/Hr) IV Continuous <Continuous>  vancomycin  IVPB 1000 milliGRAM(s) IV Intermittent every 12 hours    MEDICATIONS  (PRN):  dextrose 40% Gel 15 Gram(s) Oral once PRN Blood Glucose LESS THAN 70 milliGRAM(s)/deciliter  glucagon  Injectable 1 milliGRAM(s) IntraMuscular once PRN Glucose LESS THAN 70 milligrams/deciliter  heparin  Injectable 7000 Unit(s) IV Push every 6 hours PRN For aPTT less than 40  heparin  Injectable 3500 Unit(s) IV Push every 6 hours PRN For aPTT between 40 - 57      LABS:                        8.5    28.22 )-----------( 310      ( 2019 10:14 )             25.8     Hgb Trend: 8.5<--, 8.9<--, 8.8<--, 9.5<--, 8.8<--  11-02    136  |  98  |  42<H>  ----------------------------<  102<H>  3.7   |  24  |  1.19    Ca    7.7<L>      2019 07:17  Phos  3.3       Mg     2.4         TPro  6.2  /  Alb  2.2<L>  /  TBili  0.5  /  DBili  x   /  AST  40  /  ALT  23  /  AlkPhos  394<H>      Creatinine Trend: 1.19<--, 1.26<--, 0.73<--, 0.83<--, 0.79<--, 0.86<--  PTT - ( 2019 23:19 )  PTT:33.0 sec  Urinalysis Basic - ( 2019 19:05 )    Color: Yellow / Appearance: Clear / S.019 / pH: x  Gluc: x / Ketone: Negative  / Bili: Negative / Urobili: Negative   Blood: x / Protein: Trace / Nitrite: Negative   Leuk Esterase: Negative / RBC: x / WBC x   Sq Epi: x / Non Sq Epi: x / Bacteria: x      Arterial Blood Gas:   @ 02:15  7.42/39/118/25/98/1.2  ABG lactate: --  Arterial Blood Gas:   @ 22:55  7.44/39/49/26/82/2.2  ABG lactate: --    Venous Blood Gas:   @ 22:25  7.39/48/23/29/28  VBG Lactate: 1.9  Venous Blood Gas:   @ 19:29  7.42/41/47/26/78  VBG Lactate: 3.5      MICROBIOLOGY:     RADIOLOGY & ADDITIONAL TESTS:  -10/23/19 CT Angio Neck w/ IV Cont showing old right cerebellar, occipital and parietal infarcts.    -10/23/19: MRI lumbar spine- no evidence of spinal cord infarct. Bilateral facet hypertrophy at L5-S1 resulting in moderate to severe right and severe left neural foraminal narrowing. No changes in MRI 10/27/19    -19: MRI cervical spine- Marrow changes suspicious for anemia. Cannot exclude marrow infiltrative process. No lytic or blastic lesions. No cord compression. No abnormal enhancement    -19 MRI head: Atrophy for the patient's age. Old bilateral cerebellar infarcts and right parietal infarct. Scattered small vessel white matter ischemic changes. Scattered acute infarcts in the left frontal parietal cortex and the right parietal cortex as well as the occipital cortex low bilaterally as well as enhancing subacute infarcts along the left frontal cortex. These may be due to embolic infarcts or due to hypercoagulable state.    -11/3/19: CT head showing redemonstration of small acute infarcts in the right precentral gyrus and medial left frontal region, unchanged from MRI of 2019. Additional punctate acute infarcts seen in both cerebral hemispheres and in the left cerebellar hemisphere on MRI of 2019 are too small to visualize by CT technique. Redemonstration of large chronic right parietal lobe infarct. No CT evidence of acute intracranial hemorrhage, mass effect or new large territorial infarct. Please note that CT scan cannot exclude acute ischemia at the margins of a prior infarct.       ASSESSMENT AND PLAN:    54M PMH of HTN, DM, HLD, CVA 6 years ago (mild residual LUE weakness), metastatic pancreatic adenocarcinoma (dx 10/2019) w/ mets to liver c/b splenic and PV thromboses on eliquis, and intractable hiccups admitted with sepsis 2/2 to unknown etiology and new alternating left/right hemiplegia 2/2 to multiple infarcts. Transferred to MICU for AMS and hypotension.    #Neuro  AMS 2/2 medication induced vs. metabolic encephalopathy vs. sepsis vs. hepatic encephalopathy  -pt intubated and sedated on fentanyl; pt had worsening hypotension on propofol  -pt received thorazine today for hiccups, which can cause CNS depression  -MRI (19) showing old b/l cerebellar infarcts and R parietal infarct. Scattered small vessel white matter ischemic changes. Scattered acute infarcts in the left frontal parietal cortex and the right parietal cortex as well as the occipital cortex low bilaterally as well as enhancing subacute infarcts along the left frontal cortex.  -CT head (11/3/19) showing old infarcts, new infarcts seen on MRI from  unable to be visualized on CT  - ABG with no signs of hypercarbia, electrolytes wnl  - EEG to r/o seizure activity  - ammonia 67    #Cardio  Hypotension likely 2/2 sepsis and sedative agents  -on pressors, levo  -TTE 10/17/19 showing EF 57%, normal LV and RV function    #Pulmonary  -pt was tachypneic, was sating well on room air  -intubated for airway protection and impending respiratory failure  -CXR from  showing clear lungs; repeat CXR today relatively unchanged    #GI  Metastatic pancreatic adenocarcinoma with mets to liver, c/b splenic and portal vein thromboses  -10/23/19 CT abd: multiple liver lesions consistent with hepatic metastases compared with CT 10/11/2019   -19 US abd: Small to moderate ascites most prominent in the right lower quadrant. Right renal cysts. No hydronephrosis.   -LFTs wnl, alkphos elevated since last admission  -PT/INR elevated, reduced albumin  -f/u hep panel  -moderate sized ascites on US abd ; will obtain diagnostic/therapeutic paracentesis     #Renal/metabolic  [place] likely 2/2 hypotension  -UA neg  -Cr 1.34, baseline 0.8; monitor SCr  -ABG: pH 7.42, pCO2 39, HCO3 25, lactate 1.3  -19 US abd: Right renal cysts. No hydronephrosis.     #ID  -progressively elevated leukocytosis and fever 101.3, tachypneic, meetings SIRS criteria  -ID following; as per ID, fever and leukocytosis felt to be 2/2 malignancy  -UA neg, CXR clear from   -f/u blood cultures  -f/u sputum cultures  -no obvious source of infection currently, on vanc/zosyn for empiric coverage    #Heme/Onc  DVT ppx  -hep gtt being held for possible paracentesis, PT/INR also elevated  Elevated d-dimer  -d-dimer >17,000  -acute DIC unlikely, plt ct normal, PTT normal, fibrinogen in 500's (though in setting of pt's sepsis and malignancy, may be markedly increased as acute phase reactant, so normal range may represent substantial consumption)  -PT/INR elevated may be due to liver dysfunction  -trend INR  Metastatic pancreatic adenocarcinoma  -US guided hepatic nodule bx 10/21  -mets to liver, c/b splenic and portal vein thromboses, on eliquis at home; AC held for paracentesis  -heme/onc following; as per heme/onc, outpt chemo, transfuse for goal >7, anemia thought to be 2/2 malignancy    #Endocrine  No active issues    #GOC  - GOC discussion readdressed with family, patient to be full code at this time MICU Accept Note    CHIEF COMPLAINT: alternating left/right hemiplegia, lethargy, tachypnea    HPI / INTERVAL HISTORY:  54M PMH of HTN, DM, HLD, CVA 6 years ago (mild residual LUE weakness), metastatic pancreatic adenocarcinoma (dx 10/2019) w/ mets to liver c/b splenic and PV thromboses on eliquis, and intractable hiccups presented with alternating R and L hemiplegia. As per chart review and collateral from family: 1 month ago, pt was at [facility] was for intractable hiccups and was d/c'ed with dx of GERD, ACS work up neg. The following week, pt came to this hospital for SOB, fever, and hiccups on 10/11. Pt was found to have pancreatic mass, which was to be work up outpt, however pt did not follow up. Pt came back to the hospital 10/16 for worsening SOB, abdominal distension and pain. Pt had work up done showing metastatic pancreatic adenocarcinoma to liver with splenic and portal vein thromboses. Pt was initially on lovenox and transitioned to eliquis. Pt also complained of new R sided weakness. Pt was seen by neuro and had MRI of spine performed that did not elucidate etiology. Pt d/c'ed 10/29/19 with outpt follow up. Starting yesterday, pt has had acute onset left-sided flaccid weakness, but improvement of right sided weakness. No other focal deficits. Pt has been having periods of mild confusion. Pt still continues to have fevers up to 103 at home, with night sweats and nocturnal hiccups. Pt has had reduced appetite. Per family, pt has had increasing abdominal distension, but had a large bowel movement this morning. As per family, pt has had intermittent diaphoresis, cough starting yesterday, and has had intermittent subjective fevers. Patient admitted to general medicine floors for further work up of new hemiplegia.    Hospital course c/b new AMS. Pt lethargic and tachypneic. Code stroke called as pt's pupils were noted to be fixed. RRT was then called due to hypotension. Patient was intubated and transferred to MICU.    Of note, pt received thorazine a few hours prior to code stroke/RRT.      PAST MEDICAL & SURGICAL HISTORY:  Pancreatic cancer  No significant past surgical history      FAMILY HISTORY:  No pertinent family history in first degree relatives      SOCIAL HISTORY: as per family at bedside  Smoking: denies  Substance Use: denies  EtOH Use: denies    HOME MEDICATIONS:  	hydrALAZINE 50 mg oral tablet: Last Dose Taken:  , 1 tab(s) orally 2 times a day  · 	polyethylene glycol 3350 oral powder for reconstitution: Last Dose Taken:  , 17 gram(s) orally 2 times a day  	on month supply   · 	metoprolol succinate 200 mg oral tablet, extended release: Last Dose Taken:  , 1 tab(s) orally once a day  · 	aspirin 81 mg oral delayed release tablet: Last Dose Taken:  , 1 tab(s) orally once a day  · 	atorvastatin 80 mg oral tablet: Last Dose Taken:  , 1 tab(s) orally once a day  · 	Janumet 50 mg-1000 mg oral tablet: Last Dose Taken:  , 1 tab(s) orally 2 times a day  · 	glipiZIDE 10 mg oral tablet: Last Dose Taken:  , 1 tab(s) orally 2 times a day  · 	Eliquis 5 mg oral tablet: Last Dose Taken:  , 2 tab(s) orally 2 times a day for 7 days   	then 1 tab 2 times a day   · 	bisacodyl 10 mg rectal suppository: Last Dose Taken:  , 1 suppository(ies) rectal once a day, As needed, Constipation  · 	telmisartan 40 mg oral tablet: 1 tab(s) orally once a day    Allergies    No Known Allergies    Intolerances    REVIEW OF SYSTEMS: as per family at bedside  Constitutional: + fevers, + diaphoresis, chills, weight loss, weight gain  HEENT: No vision problems, eye pain, nasal congestion, rhinorrhea, sore throat, dysphagia  CV: No chest pain, orthopnea, palpitations  Resp: + cough, dyspnea, wheezing, hemoptysis  GI: No nausea, vomiting, diarrhea, constipation, abdominal pain  : [ ] dysuria [ ] nocturia [ ] hematuria [ ] increased urinary frequency  Musculoskeletal: [ ] back pain [ ] myalgias [ ] arthralgias [ ] fracture  Skin: [ ] rash [ ] itch  Neurological: [ ] headache [ ] dizziness [ ] syncope [ ] weakness [ ] numbness  Psychiatric: [ ] anxiety [ ] depression  Endocrine: [ ] diabetes [ ] thyroid problem  Hematologic/Lymphatic: [ ] anemia [ ] bleeding problem  Allergic/Immunologic: [ ] itchy eyes [ ] nasal discharge [ ] hives [ ] angioedema  [ ] All other systems negative  [ ] Unable to assess ROS because ________    OBJECTIVE:  ICU Vital Signs Last 24 Hrs  T(C): 36 (2019 02:00), Max: 38.5 (2019 22:20)  T(F): 96.8 (2019 02:00), Max: 101.3 (2019 22:20)  HR: 66 (2019 02:30) (66 - 92)  BP: 81/59 (2019 02:30) (81/59 - 146/81)  BP(mean): 64 (2019 02:30) (64 - 105)  ABP: --  ABP(mean): --  RR: 14 (2019 02:30) (0 - 32)  SpO2: 99% (2019 02:30) (96% - 100%)    Mode: AC/ CMV (Assist Control/ Continuous Mandatory Ventilation), RR (machine): 14, TV (machine): 550, FiO2: 40, PEEP: 5, ITime: 1, MAP: 14, PIP: 41     @ 07:01   @ 07:00  --------------------------------------------------------  IN: 1140 mL / OUT: 0 mL / NET: 1140 mL     @ 08:01  -  03 @ 02:38  --------------------------------------------------------  IN: 0 mL / OUT: 350 mL / NET: -350 mL      CAPILLARY BLOOD GLUCOSE      POCT Blood Glucose.: 160 mg/dL (2019 22:02)      PHYSICAL EXAM:  GENERAL: NAD, well-developed, intubated and sedated  HEAD:  Atraumatic, Normocephalic  EYES: EOMI, PERRL, conjunctiva and sclera clear + 3 mm b/l  NECK: Supple, No JVD  CHEST/LUNG: Clear to auscultation bilaterally, mechanical breath sounds, No wheeze, rales, rhonchi  HEART: Regular rate and rhythm; No murmurs, rubs, or gallops  ABDOMEN: Soft, Nontender, very distended, hypoactive BS, hepatomegaly  EXTREMITIES:  2+ Peripheral Pulses, No clubbing, cyanosis, or edema  NEUROLOGY: sedated  SKIN: No rashes or lesions    LINES: [facility] MEDICATIONS:  MEDICATIONS  (STANDING):  aspirin enteric coated 81 milliGRAM(s) Oral daily  atorvastatin 80 milliGRAM(s) Oral at bedtime  chlorhexidine 0.12% Liquid 15 milliLiter(s) Oral Mucosa every 12 hours  chlorhexidine 4% Liquid 1 Application(s) Topical <User Schedule>  dextrose 5%. 1000 milliLiter(s) (50 mL/Hr) IV Continuous <Continuous>  dextrose 50% Injectable 12.5 Gram(s) IV Push once  dextrose 50% Injectable 25 Gram(s) IV Push once  dextrose 50% Injectable 25 Gram(s) IV Push once  heparin  Infusion.  Unit(s)/Hr (16 mL/Hr) IV Continuous <Continuous>  insulin lispro (HumaLOG) corrective regimen sliding scale   SubCutaneous every 6 hours  piperacillin/tazobactam IVPB.. 3.375 Gram(s) IV Intermittent every 8 hours  sodium chloride 0.9%. 1000 milliLiter(s) (50 mL/Hr) IV Continuous <Continuous>  vancomycin  IVPB 1000 milliGRAM(s) IV Intermittent every 12 hours    MEDICATIONS  (PRN):  dextrose 40% Gel 15 Gram(s) Oral once PRN Blood Glucose LESS THAN 70 milliGRAM(s)/deciliter  glucagon  Injectable 1 milliGRAM(s) IntraMuscular once PRN Glucose LESS THAN 70 milligrams/deciliter  heparin  Injectable 7000 Unit(s) IV Push every 6 hours PRN For aPTT less than 40  heparin  Injectable 3500 Unit(s) IV Push every 6 hours PRN For aPTT between 40 - 57      LABS:                        8.5    28.22 )-----------( 310      ( 2019 10:14 )             25.8     Hgb Trend: 8.5<--, 8.9<--, 8.8<--, 9.5<--, 8.8<--  11-02    136  |  98  |  42<H>  ----------------------------<  102<H>  3.7   |  24  |  1.19    Ca    7.7<L>      2019 07:17  Phos  3.3       Mg     2.4         TPro  6.2  /  Alb  2.2<L>  /  TBili  0.5  /  DBili  x   /  AST  40  /  ALT  23  /  AlkPhos  394<H>      Creatinine Trend: 1.19<--, 1.26<--, 0.73<--, 0.83<--, 0.79<--, 0.86<--  PTT - ( 2019 23:19 )  PTT:33.0 sec  Urinalysis Basic - ( 2019 19:05 )    Color: Yellow / Appearance: Clear / S.019 / pH: x  Gluc: x / Ketone: Negative  / Bili: Negative / Urobili: Negative   Blood: x / Protein: Trace / Nitrite: Negative   Leuk Esterase: Negative / RBC: x / WBC x   Sq Epi: x / Non Sq Epi: x / Bacteria: x      Arterial Blood Gas:   @ 02:15  7.42/39/118/25/98/1.2  ABG lactate: --  Arterial Blood Gas:   @ 22:55  7.44/39/49/26/82/2.2  ABG lactate: --    Venous Blood Gas:   @ 22:25  7.39/48/23/29/28  VBG Lactate: 1.9  Venous Blood Gas:   @ 19:29  7.42/41/47/26/78  VBG Lactate: 3.5      MICROBIOLOGY:     RADIOLOGY & ADDITIONAL TESTS:  -10/23/19 CT Angio Neck w/ IV Cont showing old right cerebellar, occipital and parietal infarcts.    -10/23/19: MRI lumbar spine- no evidence of spinal cord infarct. Bilateral facet hypertrophy at L5-S1 resulting in moderate to severe right and severe left neural foraminal narrowing. No changes in MRI 10/27/19    -19: MRI cervical spine- Marrow changes suspicious for anemia. Cannot exclude marrow infiltrative process. No lytic or blastic lesions. No cord compression. No abnormal enhancement    -19 MRI head: Atrophy for the patient's age. Old bilateral cerebellar infarcts and right parietal infarct. Scattered small vessel white matter ischemic changes. Scattered acute infarcts in the left frontal parietal cortex and the right parietal cortex as well as the occipital cortex low bilaterally as well as enhancing subacute infarcts along the left frontal cortex. These may be due to embolic infarcts or due to hypercoagulable state.    -11/3/19: CT head showing redemonstration of small acute infarcts in the right precentral gyrus and medial left frontal region, unchanged from MRI of 2019. Additional punctate acute infarcts seen in both cerebral hemispheres and in the left cerebellar hemisphere on MRI of 2019 are too small to visualize by CT technique. Redemonstration of large chronic right parietal lobe infarct. No CT evidence of acute intracranial hemorrhage, mass effect or new large territorial infarct. Please note that CT scan cannot exclude acute ischemia at the margins of a prior infarct.       ASSESSMENT AND PLAN:    54M PMH of HTN, DM, HLD, CVA 6 years ago (mild residual LUE weakness), metastatic pancreatic adenocarcinoma (dx 10/2019) w/ mets to liver c/b splenic and PV thromboses on eliquis, and intractable hiccups admitted with sepsis 2/2 to unknown etiology and new alternating left/right hemiplegia 2/2 to multiple infarcts. Transferred to MICU for AMS and hypotension.    #Neuro  AMS 2/2 medication induced vs. metabolic encephalopathy vs. sepsis vs. hepatic encephalopathy  -pt intubated and sedated on fentanyl; pt had worsening hypotension on propofol  -pt received thorazine today for hiccups, which can cause CNS depression  -MRI (19) showing old b/l cerebellar infarcts and R parietal infarct. Scattered small vessel white matter ischemic changes. Scattered acute infarcts in the left frontal parietal cortex and the right parietal cortex as well as the occipital cortex low bilaterally as well as enhancing subacute infarcts along the left frontal cortex.  -CT head (11/3/19) showing old infarcts, new infarcts seen on MRI from  unable to be visualized on CT  - ABG with no signs of hypercarbia, electrolytes wnl  - EEG to r/o seizure activity  - ammonia 67    #Cardio  Hypotension likely 2/2 sepsis and sedative agents  -on pressors, levo  -TTE 10/17/19 showing EF 57%, normal LV and RV function    #Pulmonary  -pt was tachypneic, was sating well on room air  -intubated for airway protection and impending respiratory failure  -CXR from  showing clear lungs; repeat CXR today relatively unchanged    #GI  Metastatic pancreatic adenocarcinoma with mets to liver, c/b splenic and portal vein thromboses  -10/23/19 CT abd: multiple liver lesions consistent with hepatic metastases compared with CT 10/11/2019   -19 US abd: Small to moderate ascites most prominent in the right lower quadrant. Right renal cysts. No hydronephrosis.   -LFTs wnl, alkphos elevated since last admission  -PT/INR elevated, reduced albumin  -f/u hep panel  -moderate sized ascites on US abd ; will obtain diagnostic/therapeutic paracentesis     #Renal/metabolic  [place] likely 2/2 hypotension  -UA neg  -Cr 1.34, baseline 0.8; monitor SCr  -ABG: pH 7.42, pCO2 39, HCO3 25, lactate 1.3  -19 US abd: Right renal cysts. No hydronephrosis.     #ID  -progressively elevated leukocytosis and fever 101.3, tachypneic, meetings SIRS criteria  -ID following; as per ID, fever and leukocytosis felt to be 2/2 malignancy  -UA neg, CXR clear from   -f/u blood cultures  -f/u sputum cultures  -no obvious source of infection currently, on vanc/zosyn for empiric coverage    #Heme/Onc  DVT ppx  -hep gtt being held for possible paracentesis, PT/INR also elevated  Elevated d-dimer  -d-dimer >17,000  -acute DIC unlikely, plt ct normal, PTT normal, fibrinogen in 500's (though in setting of pt's sepsis and malignancy, may be markedly increased as acute phase reactant, so normal range may represent substantial consumption)  -PT/INR elevated may be due to liver dysfunction  -trend INR  Metastatic pancreatic adenocarcinoma  -US guided hepatic nodule bx 10/21  -mets to liver, c/b splenic and portal vein thromboses, on eliquis at home; AC held for paracentesis  -heme/onc following; as per heme/onc, outpt chemo, transfuse for goal >7, anemia thought to be 2/2 malignancy    #Endocrine  No active issues    #GOC  - GOC discussion readdressed with family, patient to be full code at this time MICU Accept Note    CHIEF COMPLAINT: alternating left/right hemiplegia, lethargy, tachypnea    HPI / INTERVAL HISTORY:  54M PMH of HTN, DM, HLD, CVA 6 years ago (mild residual LUE weakness), metastatic pancreatic adenocarcinoma (dx 10/2019) w/ mets to liver c/b splenic and PV thromboses on eliquis, and intractable hiccups presented with alternating R and L hemiplegia. As per chart review and collateral from family: 1 month ago, pt was at [facility] was for intractable hiccups and was d/c'ed with dx of GERD, ACS work up neg. The following week, pt came to this hospital for SOB, fever, and hiccups on 10/11. Pt was found to have pancreatic mass, which was to be work up outpt, however pt did not follow up. Pt came back to the hospital 10/16 for worsening SOB, abdominal distension and pain. Pt had work up done showing metastatic pancreatic adenocarcinoma to liver with splenic and portal vein thromboses. Pt was initially on lovenox and transitioned to eliquis. Pt also complained of new R sided weakness. Pt was seen by neuro and had MRI of spine performed that did not elucidate etiology. Pt d/c'ed 10/29/19 with outpt follow up. Starting 10/31, pt had acute onset left-sided flaccid weakness, but improvement of right sided weakness. No other focal deficits. Pt also had been having periods of mild confusion and fevers up to 103 at home, with night sweats, nocturnal hiccups, and reduced appetite, all of which prompted pt to come back to hospital. Patient was admitted to general medicine floors for further work up of new hemiplegia and possible sepsis.    Hospital course c/b new AMS. Pt lethargic and tachypneic. Code stroke called as pt's pupils were noted to be fixed. RRT was then called due to hypotension. Patient was intubated and transferred to MICU.    Of note, pt received thorazine a few hours prior to code stroke/RRT. Pt's family endorses that pt has had intermittent diaphoresis and cough starting yesterday.       PAST MEDICAL & SURGICAL HISTORY:  Pancreatic cancer  No significant past surgical history      FAMILY HISTORY:  No pertinent family history in first degree relatives      SOCIAL HISTORY: as per family at bedside  Smoking: denies  Substance Use: denies  EtOH Use: denies    HOME MEDICATIONS:  	hydrALAZINE 50 mg oral tablet: Last Dose Taken:  , 1 tab(s) orally 2 times a day  · 	polyethylene glycol 3350 oral powder for reconstitution: Last Dose Taken:  , 17 gram(s) orally 2 times a day  	on month supply   · 	metoprolol succinate 200 mg oral tablet, extended release: Last Dose Taken:  , 1 tab(s) orally once a day  · 	aspirin 81 mg oral delayed release tablet: Last Dose Taken:  , 1 tab(s) orally once a day  · 	atorvastatin 80 mg oral tablet: Last Dose Taken:  , 1 tab(s) orally once a day  · 	Janumet 50 mg-1000 mg oral tablet: Last Dose Taken:  , 1 tab(s) orally 2 times a day  · 	glipiZIDE 10 mg oral tablet: Last Dose Taken:  , 1 tab(s) orally 2 times a day  · 	Eliquis 5 mg oral tablet: Last Dose Taken:  , 2 tab(s) orally 2 times a day for 7 days   	then 1 tab 2 times a day   · 	bisacodyl 10 mg rectal suppository: Last Dose Taken:  , 1 suppository(ies) rectal once a day, As needed, Constipation  · 	telmisartan 40 mg oral tablet: 1 tab(s) orally once a day    Allergies    No Known Allergies    Intolerances    REVIEW OF SYSTEMS: as per family at bedside  Constitutional: + fevers, + diaphoresis, chills, weight loss, weight gain  HEENT: No vision problems, eye pain, nasal congestion, rhinorrhea, sore throat, dysphagia  CV: No chest pain, orthopnea, palpitations  Resp: + cough, dyspnea, wheezing, hemoptysis  GI: No nausea, vomiting, diarrhea, constipation, abdominal pain  : [ ] dysuria [ ] nocturia [ ] hematuria [ ] increased urinary frequency  Musculoskeletal: [ ] back pain [ ] myalgias [ ] arthralgias [ ] fracture  Skin: [ ] rash [ ] itch  Neurological: [ ] headache [ ] dizziness [ ] syncope [ ] weakness [ ] numbness  Psychiatric: [ ] anxiety [ ] depression  Endocrine: [ ] diabetes [ ] thyroid problem  Hematologic/Lymphatic: [ ] anemia [ ] bleeding problem  Allergic/Immunologic: [ ] itchy eyes [ ] nasal discharge [ ] hives [ ] angioedema  [ ] All other systems negative  [ ] Unable to assess ROS because ________    OBJECTIVE:  ICU Vital Signs Last 24 Hrs  T(C): 36 (2019 02:00), Max: 38.5 (2019 22:20)  T(F): 96.8 (2019 02:00), Max: 101.3 (2019 22:20)  HR: 66 (2019 02:30) (66 - 92)  BP: 81/59 (2019 02:30) (81/59 - 146/81)  BP(mean): 64 (:30) (64 - 105)  ABP: --  ABP(mean): --  RR: 14 (:30) (0 - 32)  SpO2: 99% (:30) (96% - 100%)    Mode: AC/ CMV (Assist Control/ Continuous Mandatory Ventilation), RR (machine): 14, TV (machine): 550, FiO2: 40, PEEP: 5, ITime: 1, MAP: 14, PIP: 41     @ 07:01  -   @ 07:00  --------------------------------------------------------  IN: 1140 mL / OUT: 0 mL / NET: 1140 mL     @ 08:01  -  03 @ 02:38  --------------------------------------------------------  IN: 0 mL / OUT: 350 mL / NET: -350 mL      CAPILLARY BLOOD GLUCOSE      POCT Blood Glucose.: 160 mg/dL (2019 22:02)      PHYSICAL EXAM:  GENERAL: NAD, well-developed, intubated and sedated  HEAD:  Atraumatic, Normocephalic  EYES: EOMI, PERRL, conjunctiva and sclera clear + 3 mm b/l  NECK: Supple, No JVD  CHEST/LUNG: Clear to auscultation bilaterally, mechanical breath sounds, No wheeze, rales, rhonchi  HEART: Regular rate and rhythm; No murmurs, rubs, or gallops  ABDOMEN: Soft, Nontender, very distended, hypoactive BS, hepatomegaly  EXTREMITIES:  2+ Peripheral Pulses, No clubbing, cyanosis, or edema  NEUROLOGY: sedated  SKIN: No rashes or lesions    LINES: [facility] MEDICATIONS:  MEDICATIONS  (STANDING):  aspirin enteric coated 81 milliGRAM(s) Oral daily  atorvastatin 80 milliGRAM(s) Oral at bedtime  chlorhexidine 0.12% Liquid 15 milliLiter(s) Oral Mucosa every 12 hours  chlorhexidine 4% Liquid 1 Application(s) Topical <User Schedule>  dextrose 5%. 1000 milliLiter(s) (50 mL/Hr) IV Continuous <Continuous>  dextrose 50% Injectable 12.5 Gram(s) IV Push once  dextrose 50% Injectable 25 Gram(s) IV Push once  dextrose 50% Injectable 25 Gram(s) IV Push once  heparin  Infusion.  Unit(s)/Hr (16 mL/Hr) IV Continuous <Continuous>  insulin lispro (HumaLOG) corrective regimen sliding scale   SubCutaneous every 6 hours  piperacillin/tazobactam IVPB.. 3.375 Gram(s) IV Intermittent every 8 hours  sodium chloride 0.9%. 1000 milliLiter(s) (50 mL/Hr) IV Continuous <Continuous>  vancomycin  IVPB 1000 milliGRAM(s) IV Intermittent every 12 hours    MEDICATIONS  (PRN):  dextrose 40% Gel 15 Gram(s) Oral once PRN Blood Glucose LESS THAN 70 milliGRAM(s)/deciliter  glucagon  Injectable 1 milliGRAM(s) IntraMuscular once PRN Glucose LESS THAN 70 milligrams/deciliter  heparin  Injectable 7000 Unit(s) IV Push every 6 hours PRN For aPTT less than 40  heparin  Injectable 3500 Unit(s) IV Push every 6 hours PRN For aPTT between 40 - 57      LABS:                        8.5    28.22 )-----------( 310      ( 2019 10:14 )             25.8     Hgb Trend: 8.5<--, 8.9<--, 8.8<--, 9.5<--, 8.8<--  11-02    136  |  98  |  42<H>  ----------------------------<  102<H>  3.7   |  24  |  1.19    Ca    7.7<L>      2019 07:17  Phos  3.3       Mg     2.4         TPro  6.2  /  Alb  2.2<L>  /  TBili  0.5  /  DBili  x   /  AST  40  /  ALT  23  /  AlkPhos  394<H>      Creatinine Trend: 1.19<--, 1.26<--, 0.73<--, 0.83<--, 0.79<--, 0.86<--  PTT - ( 2019 23:19 )  PTT:33.0 sec  Urinalysis Basic - ( 2019 19:05 )    Color: Yellow / Appearance: Clear / S.019 / pH: x  Gluc: x / Ketone: Negative  / Bili: Negative / Urobili: Negative   Blood: x / Protein: Trace / Nitrite: Negative   Leuk Esterase: Negative / RBC: x / WBC x   Sq Epi: x / Non Sq Epi: x / Bacteria: x      Arterial Blood Gas:   @ 02:15  7.42/39/118/25/98/1.2  ABG lactate: --  Arterial Blood Gas:   @ 22:55  7.44/39/49/26/82/2.2  ABG lactate: --    Venous Blood Gas:   @ 22:25  7.39/48/23/29/28  VBG Lactate: 1.9  Venous Blood Gas:   @ 19:29  7.42/41/47/26/78  VBG Lactate: 3.5      MICROBIOLOGY:     RADIOLOGY & ADDITIONAL TESTS:  -10/23/19 CT Angio Neck w/ IV Cont showing old right cerebellar, occipital and parietal infarcts.    -10/23/19: MRI lumbar spine- no evidence of spinal cord infarct. Bilateral facet hypertrophy at L5-S1 resulting in moderate to severe right and severe left neural foraminal narrowing. No changes in MRI 10/27/19    -19: MRI cervical spine- Marrow changes suspicious for anemia. Cannot exclude marrow infiltrative process. No lytic or blastic lesions. No cord compression. No abnormal enhancement    -19 MRI head: Atrophy for the patient's age. Old bilateral cerebellar infarcts and right parietal infarct. Scattered small vessel white matter ischemic changes. Scattered acute infarcts in the left frontal parietal cortex and the right parietal cortex as well as the occipital cortex low bilaterally as well as enhancing subacute infarcts along the left frontal cortex. These may be due to embolic infarcts or due to hypercoagulable state.    -11/3/19: CT head showing redemonstration of small acute infarcts in the right precentral gyrus and medial left frontal region, unchanged from MRI of 2019. Additional punctate acute infarcts seen in both cerebral hemispheres and in the left cerebellar hemisphere on MRI of 2019 are too small to visualize by CT technique. Redemonstration of large chronic right parietal lobe infarct. No CT evidence of acute intracranial hemorrhage, mass effect or new large territorial infarct. Please note that CT scan cannot exclude acute ischemia at the margins of a prior infarct.       ASSESSMENT AND PLAN:    54M PMH of HTN, DM, HLD, CVA 6 years ago (mild residual LUE weakness), metastatic pancreatic adenocarcinoma (dx 10/2019) w/ mets to liver c/b splenic and PV thromboses on eliquis, and intractable hiccups admitted with sepsis 2/2 to unknown etiology and new alternating left/right hemiplegia 2/2 to multiple infarcts. Transferred to MICU for AMS and hypotension.    #Neuro  AMS 2/2 medication induced vs. metabolic encephalopathy vs. sepsis vs. hepatic encephalopathy  -pt intubated and sedated on fentanyl; pt had worsening hypotension on propofol  -pt received thorazine today for hiccups, which can cause CNS depression  -MRI (19) showing old b/l cerebellar infarcts and R parietal infarct. Scattered small vessel white matter ischemic changes. Scattered acute infarcts in the left frontal parietal cortex and the right parietal cortex as well as the occipital cortex low bilaterally as well as enhancing subacute infarcts along the left frontal cortex.  -CT head (11/3/19) showing old infarcts, new infarcts seen on MRI from  unable to be visualized on CT  - ABG with no signs of hypercarbia, electrolytes wnl  - EEG to r/o seizure activity  - ammonia 67    #Cardio  Hypotension likely 2/2 sepsis and sedative agents  -on pressors, levo  -TTE 10/17/19 showing EF 57%, normal LV and RV function    #Pulmonary  -pt was tachypneic, was sating well on room air  -intubated for airway protection and impending respiratory failure  -CXR from  showing clear lungs; repeat CXR today relatively unchanged    #GI  Metastatic pancreatic adenocarcinoma with mets to liver, c/b splenic and portal vein thromboses  -10/23/19 CT abd: multiple liver lesions consistent with hepatic metastases compared with CT 10/11/2019   -19 US abd: Small to moderate ascites most prominent in the right lower quadrant. Right renal cysts. No hydronephrosis.   -LFTs wnl, alkphos elevated since last admission  -PT/INR elevated, reduced albumin  -f/u hep panel  -moderate sized ascites on US abd ; will obtain diagnostic/therapeutic paracentesis     #Renal/metabolic  [place] likely 2/2 hypotension  -UA neg  -Cr 1.34, baseline 0.8; monitor SCr  -ABG: pH 7.42, pCO2 39, HCO3 25, lactate 1.3  -19 US abd: Right renal cysts. No hydronephrosis.     #ID  -progressively elevated leukocytosis and fever 101.3, tachypneic, meetings SIRS criteria  -ID following; as per ID, fever and leukocytosis felt to be 2/2 malignancy  -UA neg, CXR clear from   -f/u blood cultures  -f/u sputum cultures  -no obvious source of infection currently, on vanc/zosyn for empiric coverage    #Heme/Onc  DVT ppx  -hep gtt being held for possible paracentesis, PT/INR also elevated  Elevated d-dimer  -d-dimer >17,000  -acute DIC unlikely, plt ct normal, PTT normal, fibrinogen in 500's (though in setting of pt's sepsis and malignancy, may be markedly increased as acute phase reactant, so normal range may represent substantial consumption)  -PT/INR elevated may be due to liver dysfunction  -trend INR  Metastatic pancreatic adenocarcinoma  -US guided hepatic nodule bx 10/21  -mets to liver, c/b splenic and portal vein thromboses, on eliquis at home; AC held for paracentesis  -heme/onc following; as per heme/onc, outpt chemo, transfuse for goal >7, anemia thought to be 2/2 malignancy    #Endocrine  No active issues    #GOC  - GOC discussion readdressed with family, patient to be full code at this time MICU Accept Note    CHIEF COMPLAINT: alternating left/right hemiplegia, lethargy, tachypnea    HPI / INTERVAL HISTORY:  54M PMH of HTN, DM, HLD, CVA 6 years ago (mild residual LUE weakness), metastatic pancreatic adenocarcinoma (dx 10/2019) w/ mets to liver c/b splenic and PV thromboses on eliquis, and intractable hiccups presented with alternating R and L hemiplegia. As per chart review and collateral from family: 1 month ago, pt was at [facility] was for intractable hiccups and was d/c'ed with dx of GERD, ACS work up neg. The following week, pt came to this hospital for SOB, fever, and hiccups on 10/11. Pt was found to have pancreatic mass, which was to be work up outpt, however pt did not follow up. Pt came back to the hospital 10/16 for worsening SOB, abdominal distension and pain. Pt had work up done showing metastatic pancreatic adenocarcinoma to liver with splenic and portal vein thromboses. Pt was initially on lovenox and transitioned to eliquis. Pt also complained of new R sided weakness. Pt was seen by neuro and had MRI of spine performed that did not elucidate etiology. Pt d/c'ed 10/29/19 with outpt follow up. Starting 10/31, pt had acute onset left-sided flaccid weakness, but improvement of right sided weakness. No other focal deficits. Pt also had been having periods of mild confusion and fevers up to 103 at home, with night sweats, nocturnal hiccups, and reduced appetite, all of which prompted pt to come back to hospital. Patient was admitted to general medicine floors for further work up of new hemiplegia and possible sepsis.    Hospital course c/b new AMS. Pt lethargic and tachypneic. Code stroke called as pt's pupils were noted to be fixed. RRT was then called due to hypotension. Patient was intubated and transferred to MICU.    Of note, pt received thorazine a few hours prior to code stroke/RRT. Pt's family endorses that pt has had intermittent diaphoresis and cough starting yesterday.       PAST MEDICAL & SURGICAL HISTORY:  Pancreatic cancer  No significant past surgical history      FAMILY HISTORY:  No pertinent family history in first degree relatives      SOCIAL HISTORY: as per family at bedside  Smoking: denies  Substance Use: denies  EtOH Use: denies    HOME MEDICATIONS:  	hydrALAZINE 50 mg oral tablet: Last Dose Taken:  , 1 tab(s) orally 2 times a day  · 	polyethylene glycol 3350 oral powder for reconstitution: Last Dose Taken:  , 17 gram(s) orally 2 times a day  	on month supply   · 	metoprolol succinate 200 mg oral tablet, extended release: Last Dose Taken:  , 1 tab(s) orally once a day  · 	aspirin 81 mg oral delayed release tablet: Last Dose Taken:  , 1 tab(s) orally once a day  · 	atorvastatin 80 mg oral tablet: Last Dose Taken:  , 1 tab(s) orally once a day  · 	Janumet 50 mg-1000 mg oral tablet: Last Dose Taken:  , 1 tab(s) orally 2 times a day  · 	glipiZIDE 10 mg oral tablet: Last Dose Taken:  , 1 tab(s) orally 2 times a day  · 	Eliquis 5 mg oral tablet: Last Dose Taken:  , 2 tab(s) orally 2 times a day for 7 days   	then 1 tab 2 times a day   · 	bisacodyl 10 mg rectal suppository: Last Dose Taken:  , 1 suppository(ies) rectal once a day, As needed, Constipation  · 	telmisartan 40 mg oral tablet: 1 tab(s) orally once a day    Allergies  No Known Allergies      REVIEW OF SYSTEMS: as per family at bedside  Constitutional: + fevers, + diaphoresis, chills, weight loss, weight gain  HEENT: No vision problems, eye pain, nasal congestion, rhinorrhea, sore throat, dysphagia  CV: No chest pain, orthopnea, palpitations  Resp: + cough, dyspnea, wheezing, hemoptysis  GI: No nausea, vomiting, diarrhea, constipation, abdominal pain  [ x] Unable to fully assess ROS because ___encephalopathy_____    OBJECTIVE:  ICU Vital Signs Last 24 Hrs  T(C): 36 (2019 02:00), Max: 38.5 (2019 22:20)  T(F): 96.8 (2019 02:00), Max: 101.3 (2019 22:20)  HR: 66 (2019 02:30) (66 - 92)  BP: 81/59 (2019 02:30) (81/59 - 146/81)  BP(mean): 64 (2019 02:30) (64 - 105)  ABP: --  ABP(mean): --  RR: 14 (2019 02:30) (0 - 32)  SpO2: 99% (2019 02:30) (96% - 100%)    Mode: AC/ CMV (Assist Control/ Continuous Mandatory Ventilation), RR (machine): 14, TV (machine): 550, FiO2: 40, PEEP: 5, ITime: 1, MAP: 14, PIP: 41     @ 07:01   @ 07:00  --------------------------------------------------------  IN: 1140 mL / OUT: 0 mL / NET: 1140 mL     @ 08:01  03 @ 02:38  --------------------------------------------------------  IN: 0 mL / OUT: 350 mL / NET: -350 mL      CAPILLARY BLOOD GLUCOSE      POCT Blood Glucose.: 160 mg/dL (2019 22:02)      PHYSICAL EXAM:  GENERAL: NAD, well-developed, intubated and sedated  HEAD:  Atraumatic, Normocephalic  EYES: EOMI, PERRL, conjunctiva and sclera clear + 3 mm b/l  NECK: Supple, No JVD  CHEST/LUNG: Clear to auscultation bilaterally, mechanical breath sounds, No wheeze, rales, rhonchi  HEART: Regular rate and rhythm; No murmurs, rubs, or gallops  ABDOMEN: Soft, Nontender, very distended, hypoactive BS, hepatomegaly  EXTREMITIES:  2+ Peripheral Pulses, No clubbing, cyanosis, or edema  NEUROLOGY: sedated  SKIN: No rashes or lesions    LINES: [facility] MEDICATIONS:  MEDICATIONS  (STANDING):  aspirin enteric coated 81 milliGRAM(s) Oral daily  atorvastatin 80 milliGRAM(s) Oral at bedtime  chlorhexidine 0.12% Liquid 15 milliLiter(s) Oral Mucosa every 12 hours  chlorhexidine 4% Liquid 1 Application(s) Topical <User Schedule>  dextrose 5%. 1000 milliLiter(s) (50 mL/Hr) IV Continuous <Continuous>  dextrose 50% Injectable 12.5 Gram(s) IV Push once  dextrose 50% Injectable 25 Gram(s) IV Push once  dextrose 50% Injectable 25 Gram(s) IV Push once  heparin  Infusion.  Unit(s)/Hr (16 mL/Hr) IV Continuous <Continuous>  insulin lispro (HumaLOG) corrective regimen sliding scale   SubCutaneous every 6 hours  piperacillin/tazobactam IVPB.. 3.375 Gram(s) IV Intermittent every 8 hours  sodium chloride 0.9%. 1000 milliLiter(s) (50 mL/Hr) IV Continuous <Continuous>  vancomycin  IVPB 1000 milliGRAM(s) IV Intermittent every 12 hours    MEDICATIONS  (PRN):  dextrose 40% Gel 15 Gram(s) Oral once PRN Blood Glucose LESS THAN 70 milliGRAM(s)/deciliter  glucagon  Injectable 1 milliGRAM(s) IntraMuscular once PRN Glucose LESS THAN 70 milligrams/deciliter  heparin  Injectable 7000 Unit(s) IV Push every 6 hours PRN For aPTT less than 40  heparin  Injectable 3500 Unit(s) IV Push every 6 hours PRN For aPTT between 40 - 57      LABS:                        8.5    28.22 )-----------( 310      ( 2019 10:14 )             25.8     Hgb Trend: 8.5<--, 8.9<--, 8.8<--, 9.5<--, 8.8<--  1102    136  |  98  |  42<H>  ----------------------------<  102<H>  3.7   |  24  |  1.19    Ca    7.7<L>      2019 07:17  Phos  3.3     11-  Mg     2.4         TPro  6.2  /  Alb  2.2<L>  /  TBili  0.5  /  DBili  x   /  AST  40  /  ALT  23  /  AlkPhos  394<H>  1102    Creatinine Trend: 1.19<--, 1.26<--, 0.73<--, 0.83<--, 0.79<--, 0.86<--  PTT - ( 2019 23:19 )  PTT:33.0 sec  Urinalysis Basic - ( 2019 19:05 )    Color: Yellow / Appearance: Clear / S.019 / pH: x  Gluc: x / Ketone: Negative  / Bili: Negative / Urobili: Negative   Blood: x / Protein: Trace / Nitrite: Negative   Leuk Esterase: Negative / RBC: x / WBC x   Sq Epi: x / Non Sq Epi: x / Bacteria: x      Arterial Blood Gas:   @ 02:15  7.42/39/118/25/98/1.2  ABG lactate: --  Arterial Blood Gas:   @ 22:55  7.44/39/49/26/82/2.2  ABG lactate: --    Venous Blood Gas:   @ 22:25  7.39/48/23/29/28  VBG Lactate: 1.9  Venous Blood Gas:   @ 19:29  7.42/41/47/26/78  VBG Lactate: 3.5      MICROBIOLOGY:     RADIOLOGY & ADDITIONAL TESTS:  -10/23/19 CT Angio Neck w/ IV Cont showing old right cerebellar, occipital and parietal infarcts.    -10/23/19: MRI lumbar spine- no evidence of spinal cord infarct. Bilateral facet hypertrophy at L5-S1 resulting in moderate to severe right and severe left neural foraminal narrowing. No changes in MRI 10/27/19    -19: MRI cervical spine- Marrow changes suspicious for anemia. Cannot exclude marrow infiltrative process. No lytic or blastic lesions. No cord compression. No abnormal enhancement    -19 MRI head: Atrophy for the patient's age. Old bilateral cerebellar infarcts and right parietal infarct. Scattered small vessel white matter ischemic changes. Scattered acute infarcts in the left frontal parietal cortex and the right parietal cortex as well as the occipital cortex low bilaterally as well as enhancing subacute infarcts along the left frontal cortex. These may be due to embolic infarcts or due to hypercoagulable state.    -11/3/19: CT head showing redemonstration of small acute infarcts in the right precentral gyrus and medial left frontal region, unchanged from MRI of 2019. Additional punctate acute infarcts seen in both cerebral hemispheres and in the left cerebellar hemisphere on MRI of 2019 are too small to visualize by CT technique. Redemonstration of large chronic right parietal lobe infarct. No CT evidence of acute intracranial hemorrhage, mass effect or new large territorial infarct. Please note that CT scan cannot exclude acute ischemia at the margins of a prior infarct.       ASSESSMENT AND PLAN:    54M PMH of HTN, DM, HLD, CVA 6 years ago (mild residual LUE weakness), metastatic pancreatic adenocarcinoma (dx 10/2019) w/ mets to liver c/b splenic and PV thromboses on eliquis, and intractable hiccups admitted with sepsis 2/2 to unknown etiology and new alternating left/right hemiplegia 2/2 to multiple infarcts. Transferred to MICU for encephalopathy and failure to protect his airway.    #Neuro  AMS 2/2 medication induced vs. metabolic encephalopathy vs. sepsis vs. hepatic encephalopathy  -pt intubated and sedated on fentanyl; pt had worsening hypotension on propofol  -pt received thorazine today for hiccups, which can cause CNS depression  -MRI (19) showing old b/l cerebellar infarcts and R parietal infarct. Scattered small vessel white matter ischemic changes. Scattered acute infarcts in the left frontal parietal cortex and the right parietal cortex as well as the occipital cortex low bilaterally as well as enhancing subacute infarcts along the left frontal cortex.  -CT head (11/3/19) showing old infarcts, new infarcts seen on MRI from  unable to be visualized on CT  - ABG with no signs of hypercarbia, electrolytes wnl  - EEG to r/o seizure activity  - ammonia 67    #Cardio  Hypotension likely 2/2  sedative agents  -on pressors, levo  -TTE 10/17/19 showing EF 57%, normal LV and RV function    #Pulmonary  -pt was tachypneic, was sating well on room air  -intubated for airway protection and impending respiratory failure  -CXR from  showing clear lungs; repeat CXR today relatively unchanged    #GI  Metastatic pancreatic adenocarcinoma with mets to liver, c/b splenic and portal vein thromboses  -10/23/19 CT abd: multiple liver lesions consistent with hepatic metastases compared with CT 10/11/2019   -19 US abd: Small to moderate ascites most prominent in the right lower quadrant. Right renal cysts. No hydronephrosis.   -LFTs wnl, alkphos elevated since last admission  -PT/INR elevated, reduced albumin  -f/u hep panel  -moderate sized ascites on US abd ; will obtain diagnostic/therapeutic paracentesis     #Renal/metabolic  [place] likely 2/2 hypotension vs abd compartment syndrome  -UA neg  -Cr 1.34, baseline 0.8; monitor SCr, check bladder pressure, consider therapeutic paracentesis  -ABG: pH 7.42, pCO2 39, HCO3 25, lactate 1.3  -19 US abd: Right renal cysts. No hydronephrosis.     #ID  -progressively elevated leukocytosis and fever 101.3, tachypneic, meetings SIRS criteria  -ID following; as per ID, fever and leukocytosis felt to be 2/2 malignancy  -UA neg, CXR clear from   -f/u blood cultures  -f/u sputum cultures  -no obvious source of infection currently, on vanc/zosyn for empiric coverage    #Heme/Onc  DVT ppx  -hep gtt being held for possible paracentesis, PT/INR also elevated  Elevated d-dimer  -d-dimer >17,000  -acute DIC unlikely, plt ct normal, PTT normal, fibrinogen in 500's (though in setting of pt's sepsis and malignancy, may be markedly increased as acute phase reactant, so normal range may represent substantial consumption)  -PT/INR elevated may be due to liver dysfunction  -trend INR  Metastatic pancreatic adenocarcinoma  -US guided hepatic nodule bx 10/21  -mets to liver, c/b splenic and portal vein thromboses, on eliquis at home; AC held for paracentesis  -heme/onc following; as per heme/onc, outpt chemo, transfuse for goal >7, anemia thought to be 2/2 malignancy    #Endocrine  No active issues    #GOC  - GOC discussion readdressed with family, patient to be full code at this time

## 2019-11-03 NOTE — PHYSICAL THERAPY INITIAL EVALUATION ADULT - PERTINENT HX OF CURRENT PROBLEM, REHAB EVAL
54M hx HTN, DM2, HLD, CVA(mild residual LUE weakness), grade 2 DHF, anemia, recent hospitalization (10/16-10/29) for newly diagnosed metastatic pancreatic adenocarcinoma, with met to liver, c/b splenic and portal vein thromboses on eliquis, intractable hiccups, fevers, leukocytosis, right sided weakness, pw new alternating left/right hemiplegia, and failure to thrive. Hosp Course: MR Neck negative for acute pathology; MRI head +scattered acute infarcts; 54M hx HTN, DM2, HLD, CVA(mild residual LUE weakness), grade 2 DHF, anemia, recent hospitalization (10/16-10/29) for newly diagnosed metastatic pancreatic adenocarcinoma, with met to liver, c/b splenic and portal vein thromboses on eliquis, intractable hiccups, fevers, leukocytosis, right sided weakness, pw new alternating left/right hemiplegia, and failure to thrive. Hosp Course: MR Neck negative for acute pathology; MRI head +scattered acute infarcts; 11/3 RRT for hypotension/AMS and intubated

## 2019-11-03 NOTE — PROGRESS NOTE ADULT - SUBJECTIVE AND OBJECTIVE BOX
CC: f/u for fever    Patient reports: events noted, MRI and CT of head felt to show multiple acute cerebral infarcts, felt to be hypercoagulable.He required intubation for decreased level of responsiveness.    REVIEW OF SYSTEMS:  All other review of systems negative (Comprehensive ROS): limited, obtunded on vent    Antimicrobials Day #  :day 2  piperacillin/tazobactam IVPB.. 3.375 Gram(s) IV Intermittent every 8 hours  vancomycin  IVPB 1000 milliGRAM(s) IV Intermittent every 12 hours    Other Medications Reviewed    T(F): 97.4 (19 @ 06:00), Max: 101.3 (19 @ 22:20)  HR: 71 (19 @ 06:15)  BP: 93/63 (19 @ 06:15)  RR: 14 (19 @ 06:15)  SpO2: 100% (19 @ 06:15)  Wt(kg): --    PHYSICAL EXAM:  General: poorly interactive, no acute distress  Eyes:  anicteric, no conjunctival injection, no discharge  Oropharynx:ETT	  Neck: supple, without adenopathy  Lungs: few ronchi  Heart: regular rate and rhythm; no murmur, rubs or gallops  Abdomen: soft, distended, nontender,   Skin: no lesions  Extremities: no clubbing, cyanosis, or edema  Neurologic:poorly interactive on vent    LAB RESULTS:                        8.3    25.17 )-----------( 320      ( 2019 04:52 )             26.0         136  |  99  |  53<H>  ----------------------------<  222<H>  4.4   |  21<L>  |  1.47<H>    Ca    7.6<L>      2019 04:52  Phos  5.3       Mg     2.8         TPro  6.1  /  Alb  2.0<L>  /  TBili  0.5  /  DBili  x   /  AST  42<H>  /  ALT  23  /  AlkPhos  424<H>      LIVER FUNCTIONS - ( 2019 04:52 )  Alb: 2.0 g/dL / Pro: 6.1 g/dL / ALK PHOS: 424 U/L / ALT: 23 U/L / AST: 42 U/L / GGT: x           Urinalysis Basic - ( 2019 19:05 )    Color: Yellow / Appearance: Clear / S.019 / pH: x  Gluc: x / Ketone: Negative  / Bili: Negative / Urobili: Negative   Blood: x / Protein: Trace / Nitrite: Negative   Leuk Esterase: Negative / RBC: x / WBC x   Sq Epi: x / Non Sq Epi: x / Bacteria: x      MICROBIOLOGY:  RECENT CULTURES:   @ 03:11 .Blood Blood-Catheter     No growth to date.       @ 01:52 .Blood Blood-Peripheral     No growth to date.          RADIOLOGY REVIEWED:    < from: CT Head No Cont (19 @ 01:32) >  IMPRESSION:  Redemonstration of small acute infarcts in the right precentral gyrus and   medial left frontal region, unchanged from MRI of 2019.    Additional punctate acute infarcts seen in both cerebral hemispheres and   in the left cerebellar hemisphere on MRI of 2019 are too small to   visualize by CT technique.    Redemonstration of large chronic right parietal lobe infarct.    No CT evidence of acute intracranial hemorrhage, mass effect or new large   territorial infarct.  Please note that CT scan cannot exclude acute   ischemia at the margins of a prior infarct.  Follow-up MRI may be   obtained for further evaluation.    Dr. Crowe  discussed these findings with Dr. Lopez on 11/3/2019 at   1:37 AM.  Read back verification was obtained.    < end of copied text >  < from: MR Head w/wo IV Cont (19 @ 11:16) >    IMPRESSION: Atrophy for the patient's age. 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 Scattered small vessel white matter   ischemic changes. Scattered acute infarcts in the left frontal parietal   cortex and the right parietal cortex as well as the occipital cortex low   bilaterally as well as enhancing subacute infarcts along the left frontal   cortex. These may be due to embolic infarcts or due to hypercoagulable   state.    < end of copied text >  < from: US Kidney and Bladder (19 @ 11:20) >  FINDINGS:    Abdomen:  Limited four-quadrant abdominal ultrasound for evaluation of ascites   demonstrates small tomoderate ascites most prominent in the right lower   quadrant. There is diffuse heterogeneous echogenicity of the partially   visualized liver.    Right kidney: 14.0 cm. Multiple cysts are present the largest measuring   5.4 cm. cm. No stone or hydronephrosis.    Left kidney: 10.9 cm. No hydronephrosis, calculus, or mass.    Bladder: Within normal limits.    IMPRESSION:     Small to moderate ascites most prominent in the right lower quadrant.    Right renal cysts. No hydronephrosi    < end of copied text >

## 2019-11-03 NOTE — CHART NOTE - NSCHARTNOTEFT_GEN_A_CORE
[id number]  ASHLEY AVILEZ    Patient transferred        Jae Farfan PA-C  Dept of Medicine [id number]  FATMATA ASHLEY    Patient transferred from 8M to 4M for telemetry monitoring. Upon arrival, patient's pupils noted to be fixed and a code stroke was called which was converted to a RRT secondary to hypotension. Patient was intubated and transferred to MICU. Attending and family (brother Cleveland Prasad [phone]) notified of the above.        Jae Farfan PA-C  Dept of Medicine  [id number]  FATMATA ASHLEY    Patient transferred from 8M to 4M for continuous pulse ox. Upon arrival, patient's pupils noted to be fixed and a code stroke was called which was converted to a RRT secondary to hypotension. Patient was intubated and transferred to MICU. Attending and family (brother Cleveland Prasad [phone]) notified of the above.        Jae Farfan PA-C  Dept of Medicine  [id number]

## 2019-11-03 NOTE — CHART NOTE - NSCHARTNOTEFT_GEN_A_CORE
Patient is a 54y old  Male who presents with a chief complaint of weakness (2019 23:00)    HPI: MICU consulted for new diagnosis of multiple strokes on recent MRI imaging done for B/l UE weakness, with only RUE 4/5 strength. Informed by attending to call MICU consult if patient becomes tachypneic in the settling of encephalopathy and ascites or decompensates or becomes hemodynamically unstable. Pt noted to be awake, AxOx2, but lethargic and with RR of 40.    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, grade 2 DHF, anemia, recent hospitalization (10/16-10/29) for newly diagnosed metastatic likely pancreatic adenocarcinoma, with met to liver, c/b splenic and portal vein thromboses on Eliquis, intractable hiccups, fevers, leukocytosis, right sided weakness, p/w new alternating left/right hemiplegia, and failure to thrive.  Pt is not a good historian, A&Ox2-3, at time of admission. At baseline, pt ambulates, drives, and has mild LUE weakness only. During last hospitalization, pt was found to have right sided focal weakness, for which pt was seen by neuro and had MRI of spine performed that did not elucidate etiology. Starting yesterday, pt has had acute onset left-sided flaccid weakness, but improvement of right sided weakness. No other focal deficits. Pt has been having periods of mild confusion. Pt still continues to have fevers up to 103 at home, with night sweats and nocturnal hiccups. Pt has had reduced appetite. Per family, pt has had increasing abdominal distension, but had a large bowel movement this morning. Pt denies pain. Last hospitalization, pt had paracentesis, diagnostic us guided liver biopsy. Pt also received broad antibiotics from 10/17 until 10/21 empirically.    SUBJECTIVE / OVERNIGHT EVENTS:    MEDICATIONS  (STANDING):  aspirin enteric coated 81 milliGRAM(s) Oral daily  atorvastatin 40 milliGRAM(s) Oral at bedtime  dextrose 5%. 1000 milliLiter(s) (50 mL/Hr) IV Continuous <Continuous>  dextrose 50% Injectable 12.5 Gram(s) IV Push once  dextrose 50% Injectable 25 Gram(s) IV Push once  dextrose 50% Injectable 25 Gram(s) IV Push once  heparin  Infusion.  Unit(s)/Hr (16 mL/Hr) IV Continuous <Continuous>  insulin lispro (HumaLOG) corrective regimen sliding scale   SubCutaneous three times a day before meals  insulin lispro (HumaLOG) corrective regimen sliding scale   SubCutaneous at bedtime  metoprolol succinate  milliGRAM(s) Oral daily  piperacillin/tazobactam IVPB.. 3.375 Gram(s) IV Intermittent every 8 hours  sodium chloride 0.9%. 1000 milliLiter(s) (50 mL/Hr) IV Continuous <Continuous>  vancomycin  IVPB 1000 milliGRAM(s) IV Intermittent every 12 hours    MEDICATIONS  (PRN):  dextrose 40% Gel 15 Gram(s) Oral once PRN Blood Glucose LESS THAN 70 milliGRAM(s)/deciliter  glucagon  Injectable 1 milliGRAM(s) IntraMuscular once PRN Glucose LESS THAN 70 milligrams/deciliter  heparin  Injectable 7000 Unit(s) IV Push every 6 hours PRN For aPTT less than 40  heparin  Injectable 3500 Unit(s) IV Push every 6 hours PRN For aPTT between 40 - 57  hydrALAZINE 25 milliGRAM(s) Oral every 6 hours PRN Systolic blood pressure > 170        CAPILLARY BLOOD GLUCOSE      POCT Blood Glucose.: 160 mg/dL (2019 22:02)  POCT Blood Glucose.: 143 mg/dL (2019 16:38)  POCT Blood Glucose.: 130 mg/dL (2019 12:30)  POCT Blood Glucose.: 132 mg/dL (2019 08:54)    I&O's Summary    2019 07:01  -  2019 07:00  --------------------------------------------------------  IN: 1140 mL / OUT: 0 mL / NET: 1140 mL    2019 08:01  -  2019 00:24  --------------------------------------------------------  IN: 0 mL / OUT: 350 mL / NET: -350 mL        PHYSICAL EXAM:  GENERAL: NAD, well-developed  HEAD:  Atraumatic, Normocephalic  EYES: EOMI, PERRLA, conjunctiva and sclera clear + 3 mm b/l  NECK: Supple, No JVD  CHEST/LUNG: Clear to auscultation bilaterally but with poor inspiratory effort, No wheeze, rales, rhonchi  HEART: Regular rate and rhythm; No murmurs, rubs, or gallops  ABDOMEN: Soft, Nontender, very distended, hypoactive BS  EXTREMITIES:  2+ Peripheral Pulses, No clubbing, cyanosis, or edema  PSYCH: Alert AxOx2, unable to recall place  NEUROLOGY: RUE 4/5, 2/5 on LUE, RLE 2/5, + sensation only to upper extremities  SKIN: No rashes or lesions    LABS:                        8.5    28.22 )-----------( 310      ( 2019 10:14 )             25.8         136  |  98  |  42<H>  ----------------------------<  102<H>  3.7   |  24  |  1.19    Ca    7.7<L>      2019 07:17  Phos  3.3       Mg     2.4         TPro  6.2  /  Alb  2.2<L>  /  TBili  0.5  /  DBili  x   /  AST  40  /  ALT  23  /  AlkPhos  394<H>      PTT - ( 2019 23:19 )  PTT:33.0 sec  CARDIAC MARKERS ( 2019 07:17 )  x     / x     / 70 U/L / x     / x          Urinalysis Basic - ( 2019 19:05 )    Color: Yellow / Appearance: Clear / S.019 / pH: x  Gluc: x / Ketone: Negative  / Bili: Negative / Urobili: Negative   Blood: x / Protein: Trace / Nitrite: Negative   Leuk Esterase: Negative / RBC: x / WBC x   Sq Epi: x / Non Sq Epi: x / Bacteria: x        RADIOLOGY & ADDITIONAL TESTS:  < from: MR Head w/wo IV Cont (19 @ 11:16) >    IMPRESSION: Atrophy for the patient's age. 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 Scattered small vessel white matter   ischemic changes. Scattered acute infarcts in the left frontal parietal   cortex and the right parietal cortex as well as the occipital cortex low   bilaterally as well as enhancing subacute infarcts along the left frontal   cortex. These may be due to embolic infarcts or due to hypercoagulable   state.    < end of copied text >    A/P: 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, grade 2 DHF, anemia, recent hospitalization (10/16-10/29) for newly diagnosed metastatic likely pancreatic adenocarcinoma, with met to liver, c/b splenic and portal vein thromboses on Eliquis, intractable hiccups, fevers, leukocytosis, right sided weakness, p/w new alternating left/right hemiplegia, and failure to thrive.  Pt is not a good historian, A&Ox2-3, at time of admission. At baseline, pt ambulates, drives, and has mild LUE weakness only. During last hospitalization, pt was found to have right sided focal weakness, for which pt was seen by neuro and had MRI of spine performed that did not elucidate etiology. Starting yesterday, pt has had acute onset left-sided flaccid weakness, but improvement of right sided weakness. No other focal deficits. Pt has been having periods of mild confusion. Pt still continues to have fevers up to 103 at home, with night sweats and nocturnal hiccups. Pt has had reduced appetite. Per family, pt has had increasing abdominal distension, but had a large bowel movement this morning. Pt denies pain. Last hospitalization, pt had paracentesis, diagnostic us guided liver biopsy. Pt also received broad antibiotics from 10/17 until 10/21 empirically.   Today pt found with abnormal result for MRI  of brain done for b/l hemiplegia found with multiple infarcts on MRI, notified by Neurologist Dr. Hendrickson. Informed to start Heparin gtt with no bolus, Eliquis d/c by attending Dr. Zapata who was notified of MRI results by the Neurologist. Dr. Hendrickson requested to start neuro checks Q 4 hr. Attending informed to consult MICU if pt becomes tachypneic. Pt found with RR 40s, febrile 101.3F, lethargic, confused but alert and following commands to pt's best ability.   MICU consulted, seen by Attending Dr. Lantigua and resident Dr. Swathi Louis deemed requiring floor coverage with continuous pulse ox, not MICU candidate. .   ABG done noted likely venous draw, but without hypercarbia, and O2 saturation WNL  Vancomycin PPX added and BCX 2 ordered for fever as per MICU consult  BrotherCleveland notified of events and reviewed GOC as requested by MICU, wants full code for now.     Erika Wiley[place] NP  x [phone]

## 2019-11-03 NOTE — PROGRESS NOTE ADULT - SUBJECTIVE AND OBJECTIVE BOX
[facility]    Subjective: Patient seen and examined. No new events except as noted.   overnight patient had acute AMS and respiratory distress S/P intubation for airway protection and transferred to MICU   family at the bedside     REVIEW OF SYSTEMS:  intubated and sedated     MEDICATIONS:  MEDICATIONS  (STANDING):  aspirin  chewable 81 milliGRAM(s) Oral daily  chlorhexidine 0.12% Liquid 15 milliLiter(s) Oral Mucosa every 12 hours  chlorhexidine 4% Liquid 1 Application(s) Topical <User Schedule>  dextrose 5%. 1000 milliLiter(s) (50 mL/Hr) IV Continuous <Continuous>  dextrose 50% Injectable 12.5 Gram(s) IV Push once  dextrose 50% Injectable 25 Gram(s) IV Push once  dextrose 50% Injectable 25 Gram(s) IV Push once  fentaNYL   Infusion. 0.5 MICROgram(s)/kG/Hr (2.198 mL/Hr) IV Continuous <Continuous>  insulin lispro (HumaLOG) corrective regimen sliding scale   SubCutaneous every 6 hours  norepinephrine Infusion 0.05 MICROgram(s)/kG/Min (8.241 mL/Hr) IV Continuous <Continuous>  piperacillin/tazobactam IVPB.. 3.375 Gram(s) IV Intermittent every 8 hours  vancomycin  IVPB 1000 milliGRAM(s) IV Intermittent every 12 hours      PHYSICAL EXAM:  T(C): 36.5 (19 @ 07:30), Max: 38.5 (19 @ 22:20)  HR: 70 (19 @ 12:45) (65 - 83)  BP: 98/64 (19 @ 12:45) (81/59 - 146/81)  RR: 14 (19 @ 12:45) (0 - 32)  SpO2: 100% (19 @ 12:45) (96% - 100%)  Wt(kg): --  I&O's Summary    2019 08:  -  2019 07:00  --------------------------------------------------------  IN: 130 mL / OUT: 350 mL / NET: -220 mL    2019 07:01  -  2019 13:23  --------------------------------------------------------  IN: 411.2 mL / OUT: 460 mL / NET: -48.8 mL          Appearance: sedated, intubated   HEENT: tube 	  Lymphatic: No lymphadenopathy   Cardiovascular: Normal S1 S2  Respiratory: B/L air entry, intubated 	  Gastrointestinal:  Soft, distended   Skin: No rashes  Musculoskeletal: laying flat   Psychiatry:  sedated   Ext: No edema      All labs, Imaging and EKGs personally reviewed                           8.3    25.17 )-----------( 320      ( 2019 04:52 )             26.0               11    136  |  99  |  53<H>  ----------------------------<  222<H>  4.4   |  21<L>  |  1.47<H>    Ca    7.6<L>      2019 04:52  Phos  5.3     11  Mg     2.8     11    TPro  6.1  /  Alb  2.0<L>  /  TBili  0.5  /  DBili  x   /  AST  42<H>  /  ALT  23  /  AlkPhos  424<H>  11-03    PT/INR - ( 2019 04:52 )   PT: 38.2 sec;   INR: 3.20 ratio         PTT - ( 2019 04:52 )  PTT:31.2 sec       CARDIAC MARKERS ( 2019 07:17 )  x     / x     / 70 U/L / x     / x                ABG - ( 2019 04:52 )  pH, Arterial: 7.36  pH, Blood: x     /  pCO2: 43    /  pO2: 98    / HCO3: 24    / Base Excess: -.7   /  SaO2: 97                Urinalysis Basic - ( 2019 19:05 )    Color: Yellow / Appearance: Clear / S.019 / pH: x  Gluc: x / Ketone: Negative  / Bili: Negative / Urobili: Negative   Blood: x / Protein: Trace / Nitrite: Negative   Leuk Esterase: Negative / RBC: x / WBC x   Sq Epi: x / Non Sq Epi: x / Bacteria: x      < from: MR Head w/wo IV Cont (19 @ 11:16) >    IMPRESSION: Atrophy for the patient's age. 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 Scattered small vessel white matter   ischemic changes. Scattered acute infarcts in the left frontal parietal   cortex and the right parietal cortex as well as the occipital cortex low   bilaterally as well as enhancing subacute infarcts along the left frontal   cortex. These may be due to embolic infarcts or due to hypercoagulable   state.    < from: US Kidney and Bladder (19 @ 11:20) >  IMPRESSION:     Small to moderate ascites most prominent in the right lower quadrant.    Right renal cysts. No hydronephrosis.

## 2019-11-03 NOTE — EEG REPORT - NS EEG TEXT BOX
[facility]   COMPREHENSIVE EPILEPSY CENTER   REPORT OF ROUTINE VIDEO EEG     [facility]: [facility] [address], [place] [address], Ph#: [phone]  [facility]: [id number], [place] [address], Ph#: [phone]  [facility]: [address] [place] [address], Ph#: [phone]    Patient Name: ASHLEY AVILEZ  Age and : 54y (63)  MRN #: [id number]  Location: [address]  Referring Physician: Pallavi Lantigua    Study Date: 19    _____________________________________________________________  TECHNICAL INFORMATION    Placement and Labeling of Electrodes:  The EEG was performed utilizing 20 channels referential EEG connections (coronal over temporal over parasagittal montage) using all standard 10-20 electrode placements with EKG.  Recording was at a sampling rate of 256 samples per second per channel.  Time synchronized digital video recording was done simultaneously with EEG recording.  A low light infrared camera was used for low light recording.  Killian and seizure detection algorithms were utilized.    _____________________________________________________________  HISTORY    Patient is a 54y old  Male who presents with a chief complaint of weakness (2019 11:03)      PERTINENT MEDICATION:  None.    _____________________________________________________________  STUDY INTERPRETATION    Findings: The background was continuous, spontaneously variable and non-reactive. No posterior dominant rhythm seen.    Background Slowing:  Diffuse polymorphic delta slowing.    Focal Slowing:   None were present.    Sleep Background:  Stage II sleep transients were not recorded.    Other Non-Epileptiform Findings:  None were present.    Interictal Epileptiform Activity:   None were present.    Events:  Clinical events: None recorded.  Seizures: None recorded.    Activation Procedures:   Hyperventilation was not performed.    Photic stimulation was not performed.     Artifacts:  Intermittent myogenic and movement artifacts were noted.    ECG:  The heart rate on single channel ECG was predominantly between 60-70 BPM.    _____________________________________________________________  EEG SUMMARY/CLASSIFICATION    Abnormal EEG in an altered patient.   - Severe generalized slowing.    _____________________________________________________________  EEG IMPRESSION/CLINICAL CORRELATE    Abnormal EEG study.  1. Severe nonspecific diffuse or multifocal cerebral dysfunction.   2. No epileptiform pattern or seizure seen.    Preliminary Fellow Report  _____________________________________________________________    Abby Cifuentes MD  Epilepsy Fellow    Ryan Silva MD  Attending Physician, [facility] Epilepsy [facility]   COMPREHENSIVE EPILEPSY CENTER   REPORT OF ROUTINE VIDEO EEG     [facility]: [facility] [address], [place] [address], Ph#: [phone]  [facility]: [address], [place] [address], Ph#: [phone]  [facility]: [address] [place] [address], Ph#: [phone]    Patient Name: ASHLEY AVILEZ  Age and : 54y (63)  MRN #: [id number]  Location: [address]  Referring Physician: Pallavi Lantigua    Study Date: 19    _____________________________________________________________  TECHNICAL INFORMATION    Placement and Labeling of Electrodes:  The EEG was performed utilizing 20 channels referential EEG connections (coronal over temporal over parasagittal montage) using all standard 10-20 electrode placements with EKG.  Recording was at a sampling rate of 256 samples per second per channel.  Time synchronized digital video recording was done simultaneously with EEG recording.  A low light infrared camera was used for low light recording.  Killian and seizure detection algorithms were utilized.    _____________________________________________________________  HISTORY    Patient is a 54y old  Male who presents with a chief complaint of weakness (2019 11:03)      PERTINENT MEDICATION:  None.    _____________________________________________________________  STUDY INTERPRETATION    Findings: The background was continuous, spontaneously variable and non-reactive. No posterior dominant rhythm seen.    Background Slowing:  Diffuse polymorphic delta slowing.    Focal Slowing:   None were present.    Sleep Background:  Stage II sleep transients were not recorded.    Other Non-Epileptiform Findings:  None were present.    Interictal Epileptiform Activity:   None were present.    Events:  Clinical events: None recorded.  Seizures: None recorded.    Activation Procedures:   Hyperventilation was not performed.    Photic stimulation was not performed.     Artifacts:  Intermittent myogenic and movement artifacts were noted.    ECG:  The heart rate on single channel ECG was predominantly between 60-70 BPM.    _____________________________________________________________  EEG SUMMARY/CLASSIFICATION    Abnormal EEG in an altered patient.   - Severe generalized slowing.    _____________________________________________________________  EEG IMPRESSION/CLINICAL CORRELATE    Abnormal EEG study.  1. Severe nonspecific diffuse or multifocal cerebral dysfunction.   2. No epileptiform pattern or seizure seen.  _____________________________________________________________    Abby Cifuentes MD  Epilepsy Fellow    Ryan Silva MD  Attending Physician, [facility] Epilepsy [address]

## 2019-11-03 NOTE — PROCEDURE NOTE - NSPROCDETAILS_GEN_ALL_CORE
location identified, sterile technique used, catheter introduced, fluid drained/Seldinger technique/sterile dressing applied/ultrasound utilization

## 2019-11-03 NOTE — RAPID RESPONSE TEAM SUMMARY - NSSITUATIONBACKGROUNDRRT_GEN_ALL_CORE
Patient is a 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, HFpEF (grade 2 diastolic dysfunction), anemia, recent hospitalization (10/16-10/29) for newly diagnosed metastatic CA likely pancreatic adenocarcinoma, with mets to liver, c/b splenic and portal vein thromboses (on Eliquis) admitted with sepsis 2/2 to unknown etiology, hospital course c/b new alternating left/right hemiplegia 2/2 to multiple infarcts.    RRT called following code stroke due to acute worsening mental status. Upon entering the room, patient unresponsive to verbal or noxious stimuli. Vitals at the time revealing HR 70s, SBP 110s. Given newly diagnosed multiple infarcts, and that patient was briefly initiated on a Hep gtt earlier this evening, there was concern for a possible ICH. MICU team reconsulted and came to bedside at that time. Anesthesia then called to intubate the patient and was successfully intubated. Low dose levo and prop gtt started in order to insure sedation and to maintain his BPs. Patient ordered for a stat head CT. Remainder of care as per MICU team

## 2019-11-03 NOTE — PROGRESS NOTE ADULT - ASSESSMENT
55 yo male with history of DM,CVA, and HTN now with failure to thrive in setting of recently diagnosed metastatic pancreatic cancer.  The fever may be a neoplastic one given liver involvement.  No evidence of biliary obstruction or cholangitis.  His leukocytosis is not knew, felt to be reactive to malignancy.  The CT scan and MRI have confirmed multiple cerebral infarcts as cause of weakness.  ? hypercoagulable due to malignancy.Antibiotics are empiric.  Consider ECHO to assess for NBTE although will not   Suggest:  1.Zosyn/Vanco okay for now, will try to limit to short course if cultures remain negative.  2.Await additional incubation of blood cultures  3.Appreciate  neurology f/u  4.It is possible that we will not confirm any infection, will try to limit zosyn to a 48-72 hour course.

## 2019-11-03 NOTE — CHART NOTE - NSCHARTNOTEFT_GEN_A_CORE
: Saleem Yeh    INDICATION: Shock     PROCEDURE:  [ ] LIMITED ECHO  [ x] LIMITED CHEST  [ ] LIMITED RETROPERITONEAL  [x ] LIMITED ABDOMINAL  [ ] LIMITED DVT  [ ] NEEDLE GUIDANCE VASCULAR  [ ] NEEDLE GUIDANCE THORACENTESIS  [ ] NEEDLE GUIDANCE PARACENTESIS  [ ] NEEDLE GUIDANCE PERICARDIOCENTESIS  [ ] OTHER    FINDINGS: A line pattern bilateral with scattered B lines in bases. No pleural effusions or consolidation seen. Right renal cysts evident. No hydronephrosis. Moderate to large amount of ascites seen with safe pocket for paracentesis.       INTERPRETATION: Lung ultrasound with normal aeration pattern and no pleural effusions or consolidation seen. Moderate to large ascites with safe pocket for paracentesis. No hydronephrosis.       --------------------------------------------------------  Saleem Monte, PGY-4  Pulmonary/Critical Care Fellow  Pager: [phone] ([facility]), [phone] ([place]) : Saleem Yeh    INDICATION: Shock     PROCEDURE:  [ ] LIMITED ECHO  [ x] LIMITED CHEST  [ ] LIMITED RETROPERITONEAL  [x ] LIMITED ABDOMINAL  [ ] LIMITED DVT  [ ] NEEDLE GUIDANCE VASCULAR  [ ] NEEDLE GUIDANCE THORACENTESIS  [ ] NEEDLE GUIDANCE PARACENTESIS  [ ] NEEDLE GUIDANCE PERICARDIOCENTESIS  [ ] OTHER    FINDINGS: A line pattern bilateral with scattered B lines in bases. No pleural effusions or consolidation seen. Right renal cysts evident. No hydronephrosis. Bladder not distended. Moderate to large amount of ascites seen with safe pocket for paracentesis.       INTERPRETATION: Lung ultrasound with normal aeration pattern and no pleural effusions or consolidation seen. Moderate to large ascites with safe pocket for paracentesis. No hydronephrosis. No urinary retention.       --------------------------------------------------------  Saleem Monte, PGY-4  Pulmonary/Critical Care Fellow  Pager: [phone] ([facility]), [phone] ([facility]) : Saleem Yeh    INDICATION: Shock     PROCEDURE:  [ ] LIMITED ECHO  [ x] LIMITED CHEST  [ ] LIMITED RETROPERITONEAL  [x ] LIMITED ABDOMINAL  [ ] LIMITED DVT  [ ] NEEDLE GUIDANCE VASCULAR  [ ] NEEDLE GUIDANCE THORACENTESIS  [ ] NEEDLE GUIDANCE PARACENTESIS  [ ] NEEDLE GUIDANCE PERICARDIOCENTESIS  [ ] OTHER    FINDINGS: A line pattern bilateral with scattered B lines in bases. No pleural effusions or consolidation seen. Right renal cysts evident. No hydronephrosis. Bladder not distended. Moderate to large amount of ascites seen with safe pocket for paracentesis.       INTERPRETATION: Lung ultrasound with normal aeration pattern and no pleural effusions or consolidation seen. Moderate to large ascites with safe pocket for paracentesis. No hydronephrosis. No urinary retention.       --------------------------------------------------------  Saleem Monte, PGY-4  Pulmonary/Critical Care Fellow  Pager: [phone] ([facility]), [phone] ([facility])    MICU Attending    I was present throughout the procedure and agree with findings and interpretation as outlined above.

## 2019-11-03 NOTE — AIRWAY PLACEMENT NOTE ADULT - POST AIRWAY PLACEMENT ASSESSMENT:
chest excursion noted/breath sounds bilateral/positive end tidal CO2 noted
breath sounds bilateral/breath sounds equal/positive end tidal CO2 noted/chest excursion noted/CXR pending

## 2019-11-03 NOTE — PROGRESS NOTE ADULT - SUBJECTIVE AND OBJECTIVE BOX
Anesthesia called for emergency intubation.  On arrival, pt obtunded; minimally responsive. Bag mask ventilation with 100% FiO2 being performed. Nurse at bedside obtaining IV access in left forearm, 18G PIV placed by nurse. Administered etomidate 10mg, succinylcholine 120mg.  DL x 1 with MAC 4 blade, grade 1 view, 7.5 cuffed ETT passed without trauma, + ETCO2 via EasyCap, + BBS, taped at 23cm, teeth intact, no complications.

## 2019-11-03 NOTE — PROGRESS NOTE ADULT - ASSESSMENT
1. 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 Metastatic Pancreatic CA    -- plan was for outpt palliative chemo. in light of acute events, I dont think he will recover to allow for chemo  -- family aware of gravity of situation  -- Palliative Care/Hospice is very reasonable    3. Leukocytosis    -- consider paracentesis to r/o peritonitis and for comfort  -- Cultures have been negative  -- may be leukemoid rxn from malignancy    4. Anemia     -- sec to malignancy  -- transfuse prn Hgb < 7    Rosie Ames MD  [phone]

## 2019-11-03 NOTE — CONSULT NOTE ADULT - ASSESSMENT
Assessment: 54 year old M with a PMH of metastatic likely pancreatic adenocarcinoma, ischemic stroke, HTN, HLD, T2DM, splenic and portal vein thromboses, intractable hiccups, and grade II diastolic heart failure who initially presented with acute weakness.  Code stroke was called by primary team at 00:32 on 11/3 as patient's pupils were pinpoint and he was obtunded. LKN was 23:50 on 11/2. Physical exam significant for diaphoresis, no reaction to noxious stimuli in the extremities, no spontaneous eye opening or movement of extremities, 2 mm but reactive pupils OU. Labs significant for a WBC of 28.22, Hgb 8.5, and a procalcitonin of 6.80. CTH w/o showed no acute findings. Patient not a tPA candidate as he received eliquis within the last 48 hours. Patient not a thrombectomy candidate due to high MRS/outside window.     Impression: acute left frontoparietal, right parietal, and occipital infarcts likely embolic with likely etiology of stroke of other determined source (hypercoagulability from malignancy). Patient's AMS may be medication related (thorazine), consider toxic-metabolic/infectious encephalopathy, seizures     Imaging:  CTH w/o showed no acute findings  Consider VEEG    Medications:  Continue therapeutic heparin  Consider discontinuation of thorazine  Rescue: Ativan 2 mg IVP prn for convulsions >3 minutes. Can give additional dose if no improvement 2-3 minutes after first dose.     Labs:  Hemoglobin A1c, Lipid panel    Other:  Patient requires ICU level of care  Neurochecks q1h   Telemetry monitoring  PT/OT, SLP if amenable  Seizure, fall, aspiration precautions    Case discussed with patient's neurologist, Dr. Hendrickson.  Case discussed with stroke attending, Dr. Waters.

## 2019-11-03 NOTE — PROGRESS NOTE ADULT - PROBLEM SELECTOR PLAN 1
S/P intubation  MICU care   all BP meds on hold   on pressors for BP control   ABx and acute care as per ICU team

## 2019-11-03 NOTE — PROGRESS NOTE ADULT - PROBLEM SELECTOR PLAN 3
Hide Additional Notes?: No Onc eval appreciated   ABd US noted  plan for paracentesis   hold eliquis for now and started on heparin for AC   poor prognosis   Onc eval appreciated Detail Level: Zone

## 2019-11-03 NOTE — CONSULT NOTE ADULT - SUBJECTIVE AND OBJECTIVE BOX
HPI:  54M c hx HTN, DM2, HLD, CVA 6 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    History mostly obtained from pt's sister in law at bedside. Other family members including mother, wife, and brother also at bedside corroborating. Pt is not a good historian, A&Ox2-3, at this time.  At baseline, pt ambulates, drives, and has mild LUE weakness only. During last hospitalization, pt was found to have right sided focal weakness, for which pt was seen by neuro and had MRI of spine performed that did not elucidate etiology. Starting yesterday, pt has had acute onset left-sided flaccid weakness, but improvement of right sided weakness. No other focal deficits. Pt has been having periods of mild confusion. Pt still continues to have fevers up to 103 at home, with night sweats and nocturnal hiccups. Pt has had reduced appetite. Per family, pt has had increasing abdominal distension, but had a large bowel movement this morning. Pt denies pain. Other ROS as below. Last hospitalization, pt had paracentesis, diagnostic us guided liver biopsy. Pt also received broad antibiotics from 10/17 until 10/21 empirically.    VS: Tm 98.8, P 82, Bp 122/79, R 18, 93% RA  In the ED, received NS 1L, pepcid, zofran. (2019 21:52)    (Stroke only)  NIHSS: 34  MRS: 4  ICH:     REVIEW OF SYSTEMS  Patient unable to participate        PAST MEDICAL & SURGICAL HISTORY:  Pancreatic cancer  No significant past surgical history    FAMILY HISTORY:  No pertinent family history in first degree relatives         MEDICATIONS (HOME):  Home Medications:  bisacodyl 10 mg rectal suppository: 1 suppository(ies) rectal once a day, As needed, Constipation (2019 22:10)  telmisartan 40 mg oral tablet: 1 tab(s) orally once a day (2019 22:10)    MEDICATIONS  (STANDING):  aspirin enteric coated 81 milliGRAM(s) Oral daily  atorvastatin 80 milliGRAM(s) Oral at bedtime  dextrose 5%. 1000 milliLiter(s) (50 mL/Hr) IV Continuous <Continuous>  dextrose 50% Injectable 12.5 Gram(s) IV Push once  dextrose 50% Injectable 25 Gram(s) IV Push once  dextrose 50% Injectable 25 Gram(s) IV Push once  heparin  Infusion.  Unit(s)/Hr (16 mL/Hr) IV Continuous <Continuous>  insulin lispro (HumaLOG) corrective regimen sliding scale   SubCutaneous three times a day before meals  piperacillin/tazobactam IVPB.. 3.375 Gram(s) IV Intermittent every 8 hours  sodium chloride 0.9%. 1000 milliLiter(s) (50 mL/Hr) IV Continuous <Continuous>  vancomycin  IVPB 1000 milliGRAM(s) IV Intermittent every 12 hours    MEDICATIONS  (PRN):  dextrose 40% Gel 15 Gram(s) Oral once PRN Blood Glucose LESS THAN 70 milliGRAM(s)/deciliter  glucagon  Injectable 1 milliGRAM(s) IntraMuscular once PRN Glucose LESS THAN 70 milligrams/deciliter  heparin  Injectable 7000 Unit(s) IV Push every 6 hours PRN For aPTT less than 40  heparin  Injectable 3500 Unit(s) IV Push every 6 hours PRN For aPTT between 40 - 57    ALLERGIES/INTOLERANCES:  Allergies  No Known Allergies    Intolerances    VITALS & EXAMINATION:  Vital Signs Last 24 Hrs  T(C): 36.9 (2019 00:13), Max: 38.5 (2019 22:20)  T(F): 98.4 (2019 00:13), Max: 101.3 (2019 22:20)  HR: 73 (2019 21:00) (73 - 92)  BP: 138/63 (2019 21:00) (119/79 - 138/63)  BP(mean): --  RR: 18 (2019 21:00) (18 - 18)  SpO2: 96% (2019 21:00) (96% - 96%)    General:  Constitutional: Obese Male, appears stated age, in no apparent distress including pain  Head: Normocephalic & atraumatic.  ENT: Patent ear canals, intact TM, mucus membranes moist & pink, neck supple, no lymphadenopathy.   Respiratory: Patent airway. All lung fields are clear to auscultation bilaterally.  Extremities: No cyanosis, clubbing, or edema.  Skin: No rashes, bruising, or discoloration.    Cardiovascular (>2): RRR no murmurs. Carotid pulsations symmetric, no bruits. Normal capillary beds refill, 1-2 seconds or less.     Neurological (>12):  MS: Awake, alert, oriented to person, place, situation, time. Normal affect. Follows all commands.    Language: Speech is clear, fluent with good repetition & comprehension (able to name objects___)    CNs: PERRLA (R = 3mm, L = 3mm). VFF. EOMI no nystagmus, no diplopia. V1-3 intact to LT/pinprick, well developed masseter muscles b/l. No facial asymmetry b/l, full eye closure strength b/l. Hearing grossly normal (rubbing fingers) b/l. Symmetric palate elevation in midline. Gag reflex deferred. Head turning & shoulder shrug intact b/l. Tongue midline, normal movements, no atrophy.    Fundoscopic: pale w/ sharp discs margins No vascular changes.      Motor: Normal muscle bulk & tone. No noticeable tremor or seizure. No pronator drift.              Deltoid	Biceps	Triceps	Wrist	Finger ABd	   R	5	5	5	5	5		5 	  L	5	5	5	5	5		5    	H-Flex	H-Ext	H-ABd	H-ADd	K-Flex	K-Ext	D-Flex	P-Flex  R	5	5	5	5	5	5	5	5 	   L	5	5	5	5	5	5	5	5	     Sensation: Intact to LT/PP/Temp/Vibration/Position b/l throughout.     Cortical: Extinction on DSS (neglect): none    Reflexes:              Biceps(C5)       BR(C6)     Triceps(C7)               Patellar(L4)    Achilles(S1)    Plantar Resp  R	2	          2	             2		        2		    2		Down   L	2	          2	             2		        2		    2		Down     Coordination: intact rapid-alt movements. No dysmetria to FTN/HTS    Gait: Normal Romberg. No postural instability. Normal stance and tandem gait.     LABORATORY:  CBC                       8.5    28.22 )-----------( 310      ( 2019 10:14 )             25.8     Chem 11-    136  |  98  |  42<H>  ----------------------------<  102<H>  3.7   |  24  |  1.19    Ca    7.7<L>      2019 07:17  Phos  3.3     11-  Mg     2.4     11-    TPro  6.2  /  Alb  2.2<L>  /  TBili  0.5  /  DBili  x   /  AST  40  /  ALT  23  /  AlkPhos  394<H>  11-    LFTs LIVER FUNCTIONS - ( 2019 07:17 )  Alb: 2.2 g/dL / Pro: 6.2 g/dL / ALK PHOS: 394 U/L / ALT: 23 U/L / AST: 40 U/L / GGT: x           Coagulopathy PTT - ( 2019 23:19 )  PTT:33.0 sec  Lipid Panel 10-17 Chol 64 LDL 28 HDL 23<L> Trig 66  A1c 10-29 [id number] 7.8  10-17 [id number] 7.2    Cardiac enzymes CARDIAC MARKERS ( 2019 07:17 )  x     / x     / 70 U/L / x     / x          U/A Urinalysis Basic - ( 2019 19:05 )    Color: Yellow / Appearance: Clear / S.019 / pH: x  Gluc: x / Ketone: Negative  / Bili: Negative / Urobili: Negative   Blood: x / Protein: Trace / Nitrite: Negative   Leuk Esterase: Negative / RBC: x / WBC x   Sq Epi: x / Non Sq Epi: x / Bacteria: x      CSF  Immunological  Other    STUDIES & IMAGING:  Studies (EKG, EEG, EMG, etc):     Radiology (XR, CT, MR, U/S, TTE/JOCELYNN): HPI:  54M c hx HTN, DM2, HLD, CVA 6 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    History mostly obtained from pt's sister in law at bedside. Other family members including mother, wife, and brother also at bedside corroborating. Pt is not a good historian, A&Ox2-3, at this time.  At baseline, pt ambulates, drives, and has mild LUE weakness only. During last hospitalization, pt was found to have right sided focal weakness, for which pt was seen by neuro and had MRI of spine performed that did not elucidate etiology. Starting yesterday, pt has had acute onset left-sided flaccid weakness, but improvement of right sided weakness. No other focal deficits. Pt has been having periods of mild confusion. Pt still continues to have fevers up to 103 at home, with night sweats and nocturnal hiccups. Pt has had reduced appetite. Per family, pt has had increasing abdominal distension, but had a large bowel movement this morning. Pt denies pain. Other ROS as below. Last hospitalization, pt had paracentesis, diagnostic us guided liver biopsy. Pt also received broad antibiotics from 10/17 until 10/21 empirically.    VS: Tm 98.8, P 82, Bp 122/79, R 18, 93% RA  In the ED, received NS 1L, pepcid, zofran. (2019 21:52)    Neurology consulted: 54 year old M with a PMH as above who initially presented with acute left sided weakness. Patient was seen by his neurologist Dr. Hendrickson on . Patient had an MRI brain on  in the AM that showed acute left frontoparietal, right parietal and right occipital infarcts. Code stroke was called by primary team at 00:32 on 11/3 as patient's pupils were pinpoint and he was obtunded. LKN was 23:50 on . An RRT was called as patient was diaphoretic with hypotension, minimally responsive, and had no IV access. Of note, patient had started thorazine on  for intractable hiccups (last dose at 20:00).     NIHSS: 34  MRS: 4     REVIEW OF SYSTEMS  Patient unable to participate        PAST MEDICAL & SURGICAL HISTORY:  Pancreatic cancer  No significant past surgical history    FAMILY HISTORY:  No pertinent family history in first degree relatives         MEDICATIONS (HOME):  Home Medications:  bisacodyl 10 mg rectal suppository: 1 suppository(ies) rectal once a day, As needed, Constipation (2019 22:10)  telmisartan 40 mg oral tablet: 1 tab(s) orally once a day (2019 22:10)    MEDICATIONS  (STANDING):  aspirin enteric coated 81 milliGRAM(s) Oral daily  atorvastatin 80 milliGRAM(s) Oral at bedtime  dextrose 5%. 1000 milliLiter(s) (50 mL/Hr) IV Continuous <Continuous>  dextrose 50% Injectable 12.5 Gram(s) IV Push once  dextrose 50% Injectable 25 Gram(s) IV Push once  dextrose 50% Injectable 25 Gram(s) IV Push once  heparin  Infusion.  Unit(s)/Hr (16 mL/Hr) IV Continuous <Continuous>  insulin lispro (HumaLOG) corrective regimen sliding scale   SubCutaneous three times a day before meals  piperacillin/tazobactam IVPB.. 3.375 Gram(s) IV Intermittent every 8 hours  sodium chloride 0.9%. 1000 milliLiter(s) (50 mL/Hr) IV Continuous <Continuous>  vancomycin  IVPB 1000 milliGRAM(s) IV Intermittent every 12 hours    MEDICATIONS  (PRN):  dextrose 40% Gel 15 Gram(s) Oral once PRN Blood Glucose LESS THAN 70 milliGRAM(s)/deciliter  glucagon  Injectable 1 milliGRAM(s) IntraMuscular once PRN Glucose LESS THAN 70 milligrams/deciliter  heparin  Injectable 7000 Unit(s) IV Push every 6 hours PRN For aPTT less than 40  heparin  Injectable 3500 Unit(s) IV Push every 6 hours PRN For aPTT between 40 - 57    ALLERGIES/INTOLERANCES:  Allergies  No Known Allergies    Intolerances    VITALS & EXAMINATION:  Vital Signs Last 24 Hrs  T(C): 36.9 (2019 00:13), Max: 38.5 (2019 22:20)  T(F): 98.4 (2019 00:13), Max: 101.3 (2019 22:20)  HR: 73 (2019 21:00) (73 - 92)  BP: 138/63 (2019 21:00) (119/79 - 138/63)  BP(mean): --  RR: 18 (2019 21:00) (18 - 18)  SpO2: 96% (2019 21:00) (96% - 96%)    General: Obese Male, diaphoretic, lying in bed    Abdomen: distended    Neurological (>12):  MS: minimal response to vigorous noxious stimuli (sternal rub): eye opening    Language: no verbal output    CNs: PERRL (R = 2mm, L = 2mm). no BTT b/l, intact corneal reflexes. unable to elicit extraocular movements with oculocephalic maneuver, no nystagmus. No facial asymmetry b/l.    Motor: Flaccid, upper and lower extremities fall symmetrically  RUE: no movement  LUE: no movement  RLE: no movement  LLE: no movement          	     Sensation: no response to noxious stimuli in b/l upper and lower extremities.      LABORATORY:  CBC                       8.5    28.22 )-----------( 310      ( 2019 10:14 )             25.8     Chem 11-    136  |  98  |  42<H>  ----------------------------<  102<H>  3.7   |  24  |  1.19    Ca    7.7<L>      2019 07:17  Phos  3.3     11  Mg     2.4         TPro  6.2  /  Alb  2.2<L>  /  TBili  0.5  /  DBili  x   /  AST  40  /  ALT  23  /  AlkPhos  394<H>  11-02    LFTs LIVER FUNCTIONS - ( 2019 07:17 )  Alb: 2.2 g/dL / Pro: 6.2 g/dL / ALK PHOS: 394 U/L / ALT: 23 U/L / AST: 40 U/L / GGT: x           Coagulopathy PTT - ( 2019 23:19 )  PTT:33.0 sec  Lipid Panel 10-17 Chol 64 LDL 28 HDL 23<L> Trig 66  A1c 10-29 [id number] 7.8  10-17 BminoeukdsE6S 7.2    Cardiac enzymes CARDIAC MARKERS ( 2019 07:17 )  x     / x     / 70 U/L / x     / x          U/A Urinalysis Basic - ( 2019 19:05 )    Color: Yellow / Appearance: Clear / S.019 / pH: x  Gluc: x / Ketone: Negative  / Bili: Negative / Urobili: Negative   Blood: x / Protein: Trace / Nitrite: Negative   Leuk Esterase: Negative / RBC: x / WBC x   Sq Epi: x / Non Sq Epi: x / Bacteria: x      CSF  Immunological  Other    STUDIES & IMAGING:  Studies (EKG, EEG, EMG, etc):     Radiology (XR, CT, MR, U/S, TTE/JOCELYNN):  < from: CT Head No Cont (19 @ 01:32) >  FINDINGS:   There is loss of gray matter-white matter differentiation within the   right precentral gyrus (2:25-27) and posterior medial left frontal lobe   (2:26-27), corresponding to acute infarcts seen on MRI from 2019.    Additional patchy punctate acute infarcts seen in in both cerebral   hemispheres and the left cerebellar hemisphere on previous MRI are too   small to visualize by CT scan.    There is redemonstration of a large chronic infarct centered in the right   parietal lobe.    There is no evidence of acute intracranial hemorrhage, mass effect,   midline shift or hydrocephalus.    Mild cerebral volume loss and mild periventricular white matter   hypoattenuation compatible chronic microvascular ischemic disease are   stable.    There is a small mucousretention cyst the right maxillary sinus.    No mastoid or middle ear effusion is visualized.  The calvarium and skull   base appear within normal limits    IMPRESSION:  Redemonstration of small acute infarcts in the right precentral gyrus and   medial left frontal region, unchanged from MRI of 2019.    Additional punctate acute infarcts seen in both cerebral hemispheres and   in the left cerebellar hemisphere on MRI of 2019 are too small to   visualize by CT technique.    Redemonstration of large chronic right parietal lobe infarct.    No CT evidence of acute intracranial hemorrhage, mass effect or new large   territorial infarct.  Please note that CT scan cannot exclude acute   ischemia at the margins of a prior infarct.  Follow-up MRI may be   obtained for further evaluation.    < end of copied text >

## 2019-11-04 DIAGNOSIS — Z51.5 ENCOUNTER FOR PALLIATIVE CARE: ICD-10-CM

## 2019-11-04 DIAGNOSIS — Z71.89 OTHER SPECIFIED COUNSELING: ICD-10-CM

## 2019-11-04 DIAGNOSIS — J96.01 ACUTE RESPIRATORY FAILURE WITH HYPOXIA: ICD-10-CM

## 2019-11-04 DIAGNOSIS — R53.2 FUNCTIONAL QUADRIPLEGIA: ICD-10-CM

## 2019-11-04 LAB
ALBUMIN SERPL ELPH-MCNC: 2.3 G/DL — LOW (ref 3.3–5)
ALBUMIN SERPL ELPH-MCNC: 3.1 G/DL — LOW (ref 3.3–5)
ALP SERPL-CCNC: 278 U/L — HIGH (ref 40–120)
ALP SERPL-CCNC: 329 U/L — HIGH (ref 40–120)
ALT FLD-CCNC: 18 U/L — SIGNIFICANT CHANGE UP (ref 10–45)
ALT FLD-CCNC: 19 U/L — SIGNIFICANT CHANGE UP (ref 10–45)
ANION GAP SERPL CALC-SCNC: 13 MMOL/L — SIGNIFICANT CHANGE UP (ref 5–17)
ANION GAP SERPL CALC-SCNC: 16 MMOL/L — SIGNIFICANT CHANGE UP (ref 5–17)
APPEARANCE UR: ABNORMAL
APTT BLD: 27.3 SEC — LOW (ref 27.5–36.3)
AST SERPL-CCNC: 40 U/L — SIGNIFICANT CHANGE UP (ref 10–40)
AST SERPL-CCNC: 44 U/L — HIGH (ref 10–40)
BILIRUB SERPL-MCNC: 0.5 MG/DL — SIGNIFICANT CHANGE UP (ref 0.2–1.2)
BILIRUB SERPL-MCNC: 0.5 MG/DL — SIGNIFICANT CHANGE UP (ref 0.2–1.2)
BILIRUB UR-MCNC: NEGATIVE — SIGNIFICANT CHANGE UP
BUN SERPL-MCNC: 71 MG/DL — HIGH (ref 7–23)
BUN SERPL-MCNC: 72 MG/DL — HIGH (ref 7–23)
CALCIUM SERPL-MCNC: 7.6 MG/DL — LOW (ref 8.4–10.5)
CALCIUM SERPL-MCNC: 7.7 MG/DL — LOW (ref 8.4–10.5)
CHLORIDE SERPL-SCNC: 100 MMOL/L — SIGNIFICANT CHANGE UP (ref 96–108)
CHLORIDE SERPL-SCNC: 98 MMOL/L — SIGNIFICANT CHANGE UP (ref 96–108)
CHLORIDE UR-SCNC: <35 MMOL/L — SIGNIFICANT CHANGE UP
CO2 SERPL-SCNC: 23 MMOL/L — SIGNIFICANT CHANGE UP (ref 22–31)
CO2 SERPL-SCNC: 24 MMOL/L — SIGNIFICANT CHANGE UP (ref 22–31)
COLOR SPEC: YELLOW — SIGNIFICANT CHANGE UP
CREAT ?TM UR-MCNC: 161 MG/DL — SIGNIFICANT CHANGE UP
CREAT SERPL-MCNC: 2.26 MG/DL — HIGH (ref 0.5–1.3)
CREAT SERPL-MCNC: 2.49 MG/DL — HIGH (ref 0.5–1.3)
DIFF PNL FLD: ABNORMAL
GLUCOSE BLDC GLUCOMTR-MCNC: 141 MG/DL — HIGH (ref 70–99)
GLUCOSE BLDC GLUCOMTR-MCNC: 155 MG/DL — HIGH (ref 70–99)
GLUCOSE BLDC GLUCOMTR-MCNC: 158 MG/DL — HIGH (ref 70–99)
GLUCOSE BLDC GLUCOMTR-MCNC: 172 MG/DL — HIGH (ref 70–99)
GLUCOSE BLDC GLUCOMTR-MCNC: 188 MG/DL — HIGH (ref 70–99)
GLUCOSE BLDC GLUCOMTR-MCNC: 188 MG/DL — HIGH (ref 70–99)
GLUCOSE SERPL-MCNC: 145 MG/DL — HIGH (ref 70–99)
GLUCOSE SERPL-MCNC: 147 MG/DL — HIGH (ref 70–99)
GLUCOSE UR QL: NEGATIVE — SIGNIFICANT CHANGE UP
HCT VFR BLD CALC: 20 % — CRITICAL LOW (ref 39–50)
HCT VFR BLD CALC: 23.6 % — LOW (ref 39–50)
HGB BLD-MCNC: 6.6 G/DL — CRITICAL LOW (ref 13–17)
HGB BLD-MCNC: 7.5 G/DL — LOW (ref 13–17)
INR BLD: 2.34 RATIO — HIGH (ref 0.88–1.16)
INR BLD: 2.89 RATIO — HIGH (ref 0.88–1.16)
KETONES UR-MCNC: NEGATIVE — SIGNIFICANT CHANGE UP
LEUKOCYTE ESTERASE UR-ACNC: ABNORMAL
MAGNESIUM SERPL-MCNC: 2.9 MG/DL — HIGH (ref 1.6–2.6)
MAGNESIUM SERPL-MCNC: 3.1 MG/DL — HIGH (ref 1.6–2.6)
MCHC RBC-ENTMCNC: 24 PG — LOW (ref 27–34)
MCHC RBC-ENTMCNC: 24.6 PG — LOW (ref 27–34)
MCHC RBC-ENTMCNC: 31.8 GM/DL — LOW (ref 32–36)
MCHC RBC-ENTMCNC: 33 GM/DL — SIGNIFICANT CHANGE UP (ref 32–36)
MCV RBC AUTO: 74.6 FL — LOW (ref 80–100)
MCV RBC AUTO: 75.4 FL — LOW (ref 80–100)
NITRITE UR-MCNC: NEGATIVE — SIGNIFICANT CHANGE UP
NRBC # BLD: 0 /100 WBCS — SIGNIFICANT CHANGE UP (ref 0–0)
NRBC # BLD: 0 /100 WBCS — SIGNIFICANT CHANGE UP (ref 0–0)
OSMOLALITY UR: 358 MOS/KG — SIGNIFICANT CHANGE UP (ref 300–900)
PH UR: 5.5 — SIGNIFICANT CHANGE UP (ref 5–8)
PHOSPHATE SERPL-MCNC: 4.8 MG/DL — HIGH (ref 2.5–4.5)
PHOSPHATE SERPL-MCNC: 5.5 MG/DL — HIGH (ref 2.5–4.5)
PLATELET # BLD AUTO: 260 K/UL — SIGNIFICANT CHANGE UP (ref 150–400)
PLATELET # BLD AUTO: 312 K/UL — SIGNIFICANT CHANGE UP (ref 150–400)
POTASSIUM SERPL-MCNC: 4.1 MMOL/L — SIGNIFICANT CHANGE UP (ref 3.5–5.3)
POTASSIUM SERPL-MCNC: 4.5 MMOL/L — SIGNIFICANT CHANGE UP (ref 3.5–5.3)
POTASSIUM SERPL-SCNC: 4.1 MMOL/L — SIGNIFICANT CHANGE UP (ref 3.5–5.3)
POTASSIUM SERPL-SCNC: 4.5 MMOL/L — SIGNIFICANT CHANGE UP (ref 3.5–5.3)
POTASSIUM UR-SCNC: 40 MMOL/L — SIGNIFICANT CHANGE UP
PROT SERPL-MCNC: 5.8 G/DL — LOW (ref 6–8.3)
PROT SERPL-MCNC: 6.1 G/DL — SIGNIFICANT CHANGE UP (ref 6–8.3)
PROT UR-MCNC: ABNORMAL
PROTHROM AB SERPL-ACNC: 27.4 SEC — HIGH (ref 10–12.9)
PROTHROM AB SERPL-ACNC: 34.4 SEC — HIGH (ref 10–12.9)
RBC # BLD: 2.68 M/UL — LOW (ref 4.2–5.8)
RBC # BLD: 3.13 M/UL — LOW (ref 4.2–5.8)
RBC # FLD: 14.9 % — HIGH (ref 10.3–14.5)
RBC # FLD: 14.9 % — HIGH (ref 10.3–14.5)
SODIUM SERPL-SCNC: 137 MMOL/L — SIGNIFICANT CHANGE UP (ref 135–145)
SODIUM SERPL-SCNC: 137 MMOL/L — SIGNIFICANT CHANGE UP (ref 135–145)
SODIUM UR-SCNC: <20 MMOL/L — SIGNIFICANT CHANGE UP
SP GR SPEC: 1.02 — SIGNIFICANT CHANGE UP (ref 1.01–1.02)
UROBILINOGEN FLD QL: NEGATIVE — SIGNIFICANT CHANGE UP
UUN UR-MCNC: 494 MG/DL — SIGNIFICANT CHANGE UP
WBC # BLD: 25.69 K/UL — HIGH (ref 3.8–10.5)
WBC # BLD: 26.21 K/UL — HIGH (ref 3.8–10.5)
WBC # FLD AUTO: 25.69 K/UL — HIGH (ref 3.8–10.5)
WBC # FLD AUTO: 26.21 K/UL — HIGH (ref 3.8–10.5)

## 2019-11-04 PROCEDURE — 93321 DOPPLER ECHO F-UP/LMTD STD: CPT | Mod: 26

## 2019-11-04 PROCEDURE — 99223 1ST HOSP IP/OBS HIGH 75: CPT

## 2019-11-04 PROCEDURE — 93308 TTE F-UP OR LMTD: CPT | Mod: 26

## 2019-11-04 PROCEDURE — 95951: CPT | Mod: 26

## 2019-11-04 PROCEDURE — 99291 CRITICAL CARE FIRST HOUR: CPT

## 2019-11-04 PROCEDURE — 74176 CT ABD & PELVIS W/O CONTRAST: CPT | Mod: 26

## 2019-11-04 RX ORDER — CEFEPIME 1 G/1
1000 INJECTION, POWDER, FOR SOLUTION INTRAMUSCULAR; INTRAVENOUS ONCE
Refills: 0 | Status: COMPLETED | OUTPATIENT
Start: 2019-11-04 | End: 2019-11-04

## 2019-11-04 RX ORDER — CEFEPIME 1 G/1
1000 INJECTION, POWDER, FOR SOLUTION INTRAMUSCULAR; INTRAVENOUS EVERY 12 HOURS
Refills: 0 | Status: DISCONTINUED | OUTPATIENT
Start: 2019-11-04 | End: 2019-11-05

## 2019-11-04 RX ORDER — HEPARIN SODIUM 5000 [USP'U]/ML
1600 INJECTION INTRAVENOUS; SUBCUTANEOUS
Qty: 25000 | Refills: 0 | Status: DISCONTINUED | OUTPATIENT
Start: 2019-11-04 | End: 2019-11-04

## 2019-11-04 RX ORDER — CEFEPIME 1 G/1
INJECTION, POWDER, FOR SOLUTION INTRAMUSCULAR; INTRAVENOUS
Refills: 0 | Status: DISCONTINUED | OUTPATIENT
Start: 2019-11-04 | End: 2019-11-05

## 2019-11-04 RX ORDER — DEXMEDETOMIDINE HYDROCHLORIDE IN 0.9% SODIUM CHLORIDE 4 UG/ML
0.4 INJECTION INTRAVENOUS
Qty: 200 | Refills: 0 | Status: DISCONTINUED | OUTPATIENT
Start: 2019-11-04 | End: 2019-11-06

## 2019-11-04 RX ORDER — ACETAMINOPHEN 500 MG
650 TABLET ORAL ONCE
Refills: 0 | Status: COMPLETED | OUTPATIENT
Start: 2019-11-04 | End: 2019-11-04

## 2019-11-04 RX ORDER — CEFEPIME 1 G/1
INJECTION, POWDER, FOR SOLUTION INTRAMUSCULAR; INTRAVENOUS
Refills: 0 | Status: DISCONTINUED | OUTPATIENT
Start: 2019-11-04 | End: 2019-11-04

## 2019-11-04 RX ADMIN — CEFEPIME 100 MILLIGRAM(S): 1 INJECTION, POWDER, FOR SOLUTION INTRAMUSCULAR; INTRAVENOUS at 09:59

## 2019-11-04 RX ADMIN — Medication 650 MILLIGRAM(S): at 14:50

## 2019-11-04 RX ADMIN — DEXMEDETOMIDINE HYDROCHLORIDE IN 0.9% SODIUM CHLORIDE 8.79 MICROGRAM(S)/KG/HR: 4 INJECTION INTRAVENOUS at 09:56

## 2019-11-04 RX ADMIN — CHLORHEXIDINE GLUCONATE 15 MILLILITER(S): 213 SOLUTION TOPICAL at 18:44

## 2019-11-04 RX ADMIN — CHLORHEXIDINE GLUCONATE 1 APPLICATION(S): 213 SOLUTION TOPICAL at 05:37

## 2019-11-04 RX ADMIN — Medication 81 MILLIGRAM(S): at 11:42

## 2019-11-04 RX ADMIN — Medication 1: at 11:49

## 2019-11-04 RX ADMIN — Medication 50 MILLILITER(S): at 11:43

## 2019-11-04 RX ADMIN — Medication 50 MILLILITER(S): at 14:50

## 2019-11-04 RX ADMIN — Medication 650 MILLIGRAM(S): at 16:25

## 2019-11-04 RX ADMIN — Medication 50 MILLILITER(S): at 05:33

## 2019-11-04 RX ADMIN — CEFEPIME 100 MILLIGRAM(S): 1 INJECTION, POWDER, FOR SOLUTION INTRAMUSCULAR; INTRAVENOUS at 21:04

## 2019-11-04 RX ADMIN — CHLORHEXIDINE GLUCONATE 15 MILLILITER(S): 213 SOLUTION TOPICAL at 05:37

## 2019-11-04 RX ADMIN — FENTANYL CITRATE 2.2 MICROGRAM(S)/KG/HR: 50 INJECTION INTRAVENOUS at 11:59

## 2019-11-04 RX ADMIN — Medication 1: at 00:59

## 2019-11-04 RX ADMIN — Medication 50 MILLILITER(S): at 01:02

## 2019-11-04 RX ADMIN — PANTOPRAZOLE SODIUM 40 MILLIGRAM(S): 20 TABLET, DELAYED RELEASE ORAL at 11:42

## 2019-11-04 RX ADMIN — Medication 1: at 18:34

## 2019-11-04 NOTE — CONSULT NOTE ADULT - SUBJECTIVE AND OBJECTIVE BOX
HPI:  54M c hx HTN, DM2, HLD, CVA 6 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    History mostly obtained from pt's sister in law at bedside. Other family members including mother, wife, and brother also at bedside corroborating. Pt is not a good historian, A&Ox2-3, at this time.  At baseline, pt ambulates, drives, and has mild LUE weakness only. During last hospitalization, pt was found to have right sided focal weakness, for which pt was seen by neuro and had MRI of spine performed that did not elucidate etiology. Starting yesterday, pt has had acute onset left-sided flaccid weakness, but improvement of right sided weakness. No other focal deficits. Pt has been having periods of mild confusion. Pt still continues to have fevers up to 103 at home, with night sweats and nocturnal hiccups. Pt has had reduced appetite. Per family, pt has had increasing abdominal distension, but had a large bowel movement this morning. Pt denies pain. Other ROS as below. Last hospitalization, pt had paracentesis, diagnostic us guided liver biopsy. Pt also received broad antibiotics from 10/17 until 10/21 empirically.    VS: Tm 98.8, P 82, Bp 122/79, R 18, 93% RA  In the ED, received NS 1L, pepcid, zofran. (2019 21:52)    PERTINENT PM/SXH:   Pancreatic cancer    No significant past surgical history    FAMILY HISTORY:  No pertinent family history in first degree relatives    ITEMS NOT CHECKED ARE NOT PRESENT    SOCIAL HISTORY:   Significant other/partner:  [ ]  Children:  [ ]  Buddhism/Spirituality:  Substance hx:  [ ]   Tobacco hx:  [ ]   Alcohol hx: [ ]   Home Opioid hx:  [ ] I-Stop Reference No:  Living Situation: [ ]Home  [ ]Long term care  [ ]Rehab [ ]Other    ADVANCE DIRECTIVES:    DNR  MOLST  [ ]  Living Will  [ ]   DECISION MAKER(s):  [ ] Health Care Proxy(s)  [ ] Surrogate(s)  [ ] Guardian           Name(s): Phone Number(s):    BASELINE (I)ADL(s) (prior to admission):  [place]: [ ]Total  [ ] Moderate [ ]Dependent    Allergies    No Known Allergies    Intolerances    MEDICATIONS  (STANDING):  albumin human 25% IVPB 100 milliLiter(s) IV Intermittent every 4 hours  aspirin  chewable 81 milliGRAM(s) Oral daily  cefepime   IVPB      cefepime   IVPB 1000 milliGRAM(s) IV Intermittent every 12 hours  chlorhexidine 0.12% Liquid 15 milliLiter(s) Oral Mucosa every 12 hours  chlorhexidine 4% Liquid 1 Application(s) Topical <User Schedule>  dexMEDEtomidine Infusion 0.4 MICROgram(s)/kG/Hr (8.79 mL/Hr) IV Continuous <Continuous>  dextrose 5%. 1000 milliLiter(s) (50 mL/Hr) IV Continuous <Continuous>  dextrose 50% Injectable 12.5 Gram(s) IV Push once  dextrose 50% Injectable 25 Gram(s) IV Push once  dextrose 50% Injectable 25 Gram(s) IV Push once  fentaNYL   Infusion. 0.5 MICROgram(s)/kG/Hr (2.198 mL/Hr) IV Continuous <Continuous>  insulin lispro (HumaLOG) corrective regimen sliding scale   SubCutaneous every 6 hours  norepinephrine Infusion 0.05 MICROgram(s)/kG/Min (8.241 mL/Hr) IV Continuous <Continuous>  pantoprazole  Injectable 40 milliGRAM(s) IV Push daily    MEDICATIONS  (PRN):  dextrose 40% Gel 15 Gram(s) Oral once PRN Blood Glucose LESS THAN 70 milliGRAM(s)/deciliter  glucagon  Injectable 1 milliGRAM(s) IntraMuscular once PRN Glucose LESS THAN 70 milligrams/deciliter    PRESENT SYMPTOMS: [ ]Unable to obtain due to poor mentation   Source if other than patient:  [ ]Family   [ ]Team     Pain (Impact on QOL):    Location -         Minimal acceptable level (0-10 scale):                    Aggrevating factors -  Quality -  Radiation -  Severity (0-10 scale) -    Timing -    PAIN AD Score:     [email or web address] (press ctrl +  left click to view)    Dyspnea:                           [ ]Mild [ ]Moderate [ ]Severe  Anxiety:                             [ ]Mild [ ]Moderate [ ]Severe  Fatigue:                             [ ]Mild [ ]Moderate [ ]Severe  Nausea:                             [ ]Mild [ ]Moderate [ ]Severe  Loss of appetite:              [ ]Mild [ ]Moderate [ ]Severe  Constipation:                    [ ]Mild [ ]Moderate [ ]Severe    Other Symptoms:  [ ]All other review of systems negative     Karnofsky Performance Score/Palliative Performance Status Version 2:         %  PHYSICAL EXAM:  Vital Signs Last 24 Hrs  T(C): 37.8 (2019 10:00), Max: 37.8 (2019 20:00)  T(F): 100 (2019 10:00), Max: 100 (2019 20:00)  HR: 87 (2019 10:) (70 - 89)  BP: 100/60 (2019 10:) (91/54 - 121/66)  BP(mean): 74 (2019 10:) (65 - 89)  RR: 14 (2019 10:) (4 - 32)  SpO2: 100% (2019 10:) (100% - 100%) I&O's Summary    2019 07:01  -  2019 07:00  --------------------------------------------------------  IN: 1594.5 mL / OUT: 2975 mL / NET: -1380.5 mL    2019 07:01  -  2019 11:16  --------------------------------------------------------  IN: 76.2 mL / OUT: 85 mL / NET: -8.8 mL    GENERAL:  [ ]Alert  [ ]Oriented x   [ ]Lethargic  [ ]Cachexia  [ ]Unarousable  [ ]Verbal  [ ]Non-Verbal  Behavioral:   [ ] Anxiety  [ ] Delirium [ ] Agitation [ ] Other  HEENT:  [ ]Normal   [ ]Dry mouth   [ ]ET Tube/Trach  [ ]Oral lesions  PULMONARY:   [ ]Clear [ ]Tachypnea  [ ]Audible excessive secretions   [ ]Rhonchi        [ ]Right [ ]Left [ ]Bilateral  [ ]Crackles        [ ]Right [ ]Left [ ]Bilateral  [ ]Wheezing     [ ]Right [ ]Left [ ]Bilateral  CARDIOVASCULAR:    [ ]Regular [ ]Irregular [ ]Tachy  [ ]Deon [ ]Murmur [ ]Other  GASTROINTESTINAL:  [ ]Soft  [ ]Distended   [ ]+BS  [ ]Non tender [ ]Tender  [ ]PEG [ ]OGT/ NGT  Last BM:   GENITOURINARY:  [ ]Normal [ ] Incontinent   [ ]Oliguria/Anuria   [ ][place]  MUSCULOSKELETAL:   [ ]Normal   [ ]Weakness  [ ]Bed/Wheelchair bound [ ]Edema  NEUROLOGIC:   [ ]No focal deficits  [ ] Cognitive impairment  [ ] Dysphagia [ ]Dysarthria [ ] Paresis [ ]Other   SKIN:   [ ]Normal   [ ]Pressure ulcer(s)  [ ]Rash    CRITICAL CARE:  [ ] Shock Present  [ ]Septic [ ]Cardiogenic [ ]Neurologic [ ]Hypovolemic  [ ]  Vasopressors [ ]  Inotropes   [ ] Respiratory failure present  [ ] Acute  [ ] Chronic [ ] Hypoxic  [ ] Hypercarbic [ ] Other  [ ] Other organ failure     LABS:                        7.5    26.21 )-----------( 312      ( 2019 00:57 )             23.6   11    137  |  98  |  71<H>  ----------------------------<  145<H>  4.5   |  23  |  2.49<H>    Ca    7.7<L>      2019 00:58  Phos  5.5       Mg     2.9         TPro  5.8<L>  /  Alb  2.3<L>  /  TBili  0.5  /  DBili  x   /  AST  44<H>  /  ALT  18  /  AlkPhos  329<H>  11-04  PT/INR - ( 2019 00:58 )   PT: 27.4 sec;   INR: 2.34 ratio         PTT - ( 2019 00:58 )  PTT:27.3 sec    Urinalysis Basic - ( 2019 19:05 )    Color: Yellow / Appearance: Clear / S.019 / pH: x  Gluc: x / Ketone: Negative  / Bili: Negative / Urobili: Negative   Blood: x / Protein: Trace / Nitrite: Negative   Leuk Esterase: Negative / RBC: x / WBC x   Sq Epi: x / Non Sq Epi: x / Bacteria: x      RADIOLOGY & ADDITIONAL STUDIES:    PROTEIN CALORIE MALNUTRITION:   [ ] PPSV2 < or = to 30% [ ] significant weight loss  [ ] poor nutritional intake [ ] catabolic state [ ] anasarca     Albumin, Serum: 2.3 g/dL (19 @ 00:58)  Artificial Nutrition [ ]     REFERRALS:   [ ]Chaplaincy  [ ] Hospice  [ ]Child Life  [ ]Social Work  [ ]Case management [ ]Holistic Therapy   Goals of Care Discussion Document: HPI:  54M c hx HTN, DM2, HLD, CVA 6 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    History mostly obtained from pt's sister in law at bedside. Other family members including mother, wife, and brother also at bedside corroborating. Pt is not a good historian, A&Ox2-3, at this time.  At baseline, pt ambulates, drives, and has mild LUE weakness only. During last hospitalization, pt was found to have right sided focal weakness, for which pt was seen by neuro and had MRI of spine performed that did not elucidate etiology. Starting yesterday, pt has had acute onset left-sided flaccid weakness, but improvement of right sided weakness. No other focal deficits. Pt has been having periods of mild confusion. Pt still continues to have fevers up to 103 at home, with night sweats and nocturnal hiccups. Pt has had reduced appetite. Per family, pt has had increasing abdominal distension, but had a large bowel movement this morning. Pt denies pain. Other ROS as below. Last hospitalization, pt had paracentesis, diagnostic us guided liver biopsy. Pt also received broad antibiotics from 10/17 until 10/21 empirically.    VS: Tm 98.8, P 82, Bp 122/79, R 18, 93% RA  In the ED, received NS 1L, pepcid, zofran. (2019 21:52)    Palliative care consulted to assist with goals of care in a patient with metastatic pancreatic cancer now with acute CVA, intubated in ICU. medical chart reviewed and no longer candidate for DMT. Neurology on board. Met with family at bedside, patient intubated/sedated and unable to participate in conversation. Brother Cleveland identifies as HCP, copy in chart (filled out on previous admission) naming him as primary and patients adult son as secondary. Patient mother and sister in law also present along with patients nurse and MICU SW. Introduced self and role of palliative care. Brothshannan Guthrie able to provide a detailed understanding of patients current medical situation. Discussed role of HCP in current situation. Family states that patient is a fighter, but was also a free spirit and always moving. They understand that where his is at right now is critical and "could go either way." They also understand that even if patient is able to participate and come off life support that he still has metastatic disease. Their goals at this time are to continue life prolonging measures and take it day by day. They leave it in "Gods hands." Contact information provided and assured ongoing availability and presence especially in the event of family meeting. emotional support provided.     PERTINENT PM/SXH:   Pancreatic cancer    No significant past surgical history    FAMILY HISTORY:  No pertinent family history in first degree relatives    ITEMS NOT CHECKED ARE NOT PRESENT    SOCIAL HISTORY:   Significant other/partner:  [x ]  Children:  [x ]  Anglican/Spirituality:  Substance hx:  [ ]   Tobacco hx:  [ ]   Alcohol hx: [ ]   Home Opioid hx:  [ ] I-Stop Reference No:  Living Situation: [ x]Home  [ ]Long term care  [ ]Rehab [ ]Other    ADVANCE DIRECTIVES:    DNR  MOLST  [ ]  Living Will  [ ]   DECISION MAKER(s):  [ x] Health Care Proxy(s)  [ ] Surrogate(s)  [ ] Guardian           Name(s): Phone Number(s):  Cleveland Prasad, [phone] (brother)    BASELINE (I)ADL(s) (prior to admission):  [place]: [x ]Total  [ ] Moderate [ ]Dependent    Allergies    No Known Allergies    Intolerances    MEDICATIONS  (STANDING):  albumin human 25% IVPB 100 milliLiter(s) IV Intermittent every 4 hours  aspirin  chewable 81 milliGRAM(s) Oral daily  cefepime   IVPB      cefepime   IVPB 1000 milliGRAM(s) IV Intermittent every 12 hours  chlorhexidine 0.12% Liquid 15 milliLiter(s) Oral Mucosa every 12 hours  chlorhexidine 4% Liquid 1 Application(s) Topical <User Schedule>  dexMEDEtomidine Infusion 0.4 MICROgram(s)/kG/Hr (8.79 mL/Hr) IV Continuous <Continuous>  dextrose 5%. 1000 milliLiter(s) (50 mL/Hr) IV Continuous <Continuous>  dextrose 50% Injectable 12.5 Gram(s) IV Push once  dextrose 50% Injectable 25 Gram(s) IV Push once  dextrose 50% Injectable 25 Gram(s) IV Push once  fentaNYL   Infusion. 0.5 MICROgram(s)/kG/Hr (2.198 mL/Hr) IV Continuous <Continuous>  insulin lispro (HumaLOG) corrective regimen sliding scale   SubCutaneous every 6 hours  norepinephrine Infusion 0.05 MICROgram(s)/kG/Min (8.241 mL/Hr) IV Continuous <Continuous>  pantoprazole  Injectable 40 milliGRAM(s) IV Push daily    MEDICATIONS  (PRN):  dextrose 40% Gel 15 Gram(s) Oral once PRN Blood Glucose LESS THAN 70 milliGRAM(s)/deciliter  glucagon  Injectable 1 milliGRAM(s) IntraMuscular once PRN Glucose LESS THAN 70 milligrams/deciliter    PRESENT SYMPTOMS: [ x]Unable to obtain due to poor mentation   Source if other than patient:  [ ]Family   [ ]Team     Pain (Impact on QOL):    Location -         Minimal acceptable level (0-10 scale):                    Aggrevating factors -  Quality -  Radiation -  Severity (0-10 scale) -    Timing -    PAIN AD Score: 0    [email or web address] (press ctrl +  left click to view)    Dyspnea:                           [ ]Mild [ ]Moderate [ ]Severe  Anxiety:                             [ ]Mild [ ]Moderate [ ]Severe  Fatigue:                             [ ]Mild [ ]Moderate [ ]Severe  Nausea:                             [ ]Mild [ ]Moderate [ ]Severe  Loss of appetite:              [ ]Mild [ ]Moderate [ ]Severe  Constipation:                    [ ]Mild [ ]Moderate [ ]Severe    Other Symptoms:  [ ]All other review of systems negative     Karnofsky Performance Score/Palliative Performance Status Version 2:        10 %  PHYSICAL EXAM:  Vital Signs Last 24 Hrs  T(C): 37.8 (2019 10:00), Max: 37.8 (2019 20:00)  T(F): 100 (2019 10:00), Max: 100 (2019 20:00)  HR: 87 (2019 10:00) (70 - 89)  BP: 100/60 (2019 10:00) (91/54 - 121/66)  BP(mean): 74 (2019 10:00) (65 - 89)  RR: 14 (2019 10:00) (4 - 32)  SpO2: 100% (2019 10:00) (100% - 100%) I&O's Summary    2019 07: 07:00  --------------------------------------------------------  IN: 1594.5 mL / OUT: 2975 mL / NET: -1380.5 mL    2019 07: 11:16  --------------------------------------------------------  IN: 76.2 mL / OUT: 85 mL / NET: -8.8 mL    GENERAL:  [ ]Alert  [ ]Oriented x   [ ]Lethargic  [ ]Cachexia  [x ]Unarousable  [ ]Verbal  [ ]Non-Verbal  Behavioral:   [ ] Anxiety  [ ] Delirium [ ] Agitation [ ] Other  HEENT:  [ ]Normal   [ ]Dry mouth   [ x]ET Tube/Trach  [ ]Oral lesions  PULMONARY:   [x ]Clear [ ]Tachypnea  [ ]Audible excessive secretions   [ ]Rhonchi        [ ]Right [ ]Left [ ]Bilateral  [ ]Crackles        [ ]Right [ ]Left [ ]Bilateral  [ ]Wheezing     [ ]Right [ ]Left [ ]Bilateral  CARDIOVASCULAR:    [ x]Regular [ ]Irregular [ ]Tachy  [ ]Deon [ ]Murmur [ ]Other  GASTROINTESTINAL:  [x ]Soft  [ ]Distended   [ ]+BS  [x ]Non tender [ ]Tender  [ ]PEG [x ]OGT/ NGT  Last BM:   GENITOURINARY:  [ ]Normal [ ] Incontinent   [ ]Oliguria/Anuria   [ x][place]  MUSCULOSKELETAL:   [ ]Normal   [ ]Weakness  [x ]Bed/Wheelchair bound [ ]Edema  NEUROLOGIC: sedated at time of eval, not following commands  [ ]No focal deficits  [ ] Cognitive impairment  [ ] Dysphagia [ ]Dysarthria [ ] Paresis [ ]Other   SKIN:   [x ]Normal   [ ]Pressure ulcer(s)  [ ]Rash    CRITICAL CARE:  [ ] Shock Present  [ ]Septic [ ]Cardiogenic [ ]Neurologic [ ]Hypovolemic  [ ]  Vasopressors [ ]  Inotropes   [ ] Respiratory failure present  [ ] Acute  [ ] Chronic [ ] Hypoxic  [ ] Hypercarbic [ ] Other  [ ] Other organ failure     LABS:                        7.5    26.21 )-----------( 312      ( 2019 00:57 )             23.6       137  |  98  |  71<H>  ----------------------------<  145<H>  4.5   |  23  |  2.49<H>    Ca    7.7<L>      2019 00:58  Phos  5.5       Mg     2.9         TPro  5.8<L>  /  Alb  2.3<L>  /  TBili  0.5  /  DBili  x   /  AST  44<H>  /  ALT  18  /  AlkPhos  329<H>    PT/INR - ( 2019 00:58 )   PT: 27.4 sec;   INR: 2.34 ratio         PTT - ( 2019 00:58 )  PTT:27.3 sec    Urinalysis Basic - ( 2019 19:05 )    Color: Yellow / Appearance: Clear / S.019 / pH: x  Gluc: x / Ketone: Negative  / Bili: Negative / Urobili: Negative   Blood: x / Protein: Trace / Nitrite: Negative   Leuk Esterase: Negative / RBC: x / WBC x   Sq Epi: x / Non Sq Epi: x / Bacteria: x      RADIOLOGY & ADDITIONAL STUDIES:  < from: CT Head No Cont (19 @ 01:32) >    EXAM:  CT BRAIN                            PROCEDURE DATE:  2019            INTERPRETATION:  HISTORY: Acute altered mental status. Code stroke. Prior   recent acute stroke.    COMPARISON: CT head 10/23/2019 and MRI brain 10/12/2019.    TECHNIQUE: Axial noncontrast CT images from the skull base to the vertex   were obtained and submitted for interpretation. Coronal and sagittal   reformatted images were performed. Bone and soft tissue windows were   evaluated.    FINDINGS:   There is loss of gray matter-white matter differentiation within the   right precentral gyrus (2:25-27) and posterior medial left frontal lobe   (2:26-27), corresponding to acute infarcts seen on MRI from 2019.    Additional patchy punctate acute infarcts seen in in both cerebral   hemispheres and the left cerebellar hemisphere on previous MRI are too   small to visualize by CT scan.    There is redemonstration of a large chronic infarct centered in the right   parietal lobe.    There is no evidence of acute intracranial hemorrhage, mass effect,   midline shift or hydrocephalus.    Mild cerebral volume loss and mild periventricular white matter   hypoattenuation compatible chronic microvascular ischemic disease are   stable.    There is a small mucousretention cyst the right maxillary sinus.    No mastoid or middle ear effusion is visualized.  The calvarium and skull   base appear within normal limits    IMPRESSION:  Redemonstration of small acute infarcts in the right precentral gyrus and   medial left frontal region, unchanged from MRI of 2019.    Additional punctate acute infarcts seen in both cerebral hemispheres and   in the left cerebellar hemisphere on MRI of 2019 are too small to   visualize by CT technique.    Redemonstration of large chronic right parietal lobe infarct.    No CT evidence of acute intracranial hemorrhage, mass effect or new large   territorial infarct.  Please note that CT scan cannot exclude acute   ischemia at the margins of a prior infarct.  Follow-up MRI may be   obtained for further evaluation.    Dr. Brandon  discussed these findings with Dr. Lopez on 11/3/2019 at   1:37 AM.  Read back verification was obtained.    YOUSUF BRANDON M.D., RADIOLGY RESIDENT  This document has been electronically signed.  SURESH SANCHEZ M.D.,ATTENDING RADIOLOGIST  This document has been electronically signed. Nov  3 2019  1:58AM        < end of copied text >    PROTEIN CALORIE MALNUTRITION:   [x ] PPSV2 < or = to 30% [ ] significant weight loss  [ ] poor nutritional intake [x ] catabolic state [ ] anasarca     Albumin, Serum: 2.3 g/dL (19 @ 00:58)  Artificial Nutrition [ x]     REFERRALS:   [ ]Chaplaincy  [ ] Hospice  [ ]Child Life  [ x]Social Work  [ ]Case management [ ]Holistic Therapy   Goals of Care Discussion Document:

## 2019-11-04 NOTE — PROGRESS NOTE ADULT - SUBJECTIVE AND OBJECTIVE BOX
Pt seen, sedated and intubated    MEDICATIONS  (STANDING):  albumin human 25% IVPB 100 milliLiter(s) IV Intermittent every 4 hours  aspirin  chewable 81 milliGRAM(s) Oral daily  cefTRIAXone   IVPB      cefTRIAXone   IVPB 2000 milliGRAM(s) IV Intermittent every 24 hours  chlorhexidine 0.12% Liquid 15 milliLiter(s) Oral Mucosa every 12 hours  chlorhexidine 4% Liquid 1 Application(s) Topical <User Schedule>  dextrose 5%. 1000 milliLiter(s) (50 mL/Hr) IV Continuous <Continuous>  dextrose 50% Injectable 12.5 Gram(s) IV Push once  dextrose 50% Injectable 25 Gram(s) IV Push once  dextrose 50% Injectable 25 Gram(s) IV Push once  fentaNYL   Infusion. 0.5 MICROgram(s)/kG/Hr (2.198 mL/Hr) IV Continuous <Continuous>  insulin lispro (HumaLOG) corrective regimen sliding scale   SubCutaneous every 6 hours  norepinephrine Infusion 0.05 MICROgram(s)/kG/Min (8.241 mL/Hr) IV Continuous <Continuous>  pantoprazole  Injectable 40 milliGRAM(s) IV Push daily  vancomycin  IVPB 1000 milliGRAM(s) IV Intermittent every 12 hours    MEDICATIONS  (PRN):  dextrose 40% Gel 15 Gram(s) Oral once PRN Blood Glucose LESS THAN 70 milliGRAM(s)/deciliter  glucagon  Injectable 1 milliGRAM(s) IntraMuscular once PRN Glucose LESS THAN 70 milligrams/deciliter      ROS  UTO    Vital Signs Last 24 Hrs  T(C): 37.1 (04 Nov 2019 04:00), Max: 37.8 (03 Nov 2019 20:00)  T(F): 98.8 (04 Nov 2019 04:00), Max: 100 (03 Nov 2019 20:00)  HR: 78 (04 Nov 2019 07:30) (70 - 85)  BP: 121/66 (04 Nov 2019 07:15) (89/59 - 133/66)  BP(mean): 88 (04 Nov 2019 07:15) (65 - 89)  RR: 14 (04 Nov 2019 07:30) (0 - 32)  SpO2: 100% (04 Nov 2019 07:30) (99% - 100%)    PE  NAD  sedated, intubated  RRR  CTAB ant chest  abd benign  no edema  remainder of exam limited 2/2 participation                          7.5    26.21 )-----------( 312      ( 04 Nov 2019 00:57 )             23.6       11-04    137  |  98  |  71<H>  ----------------------------<  145<H>  4.5   |  23  |  2.49<H>    Ca    7.7<L>      04 Nov 2019 00:58  Phos  5.5     11-04  Mg     2.9     11-04    TPro  5.8<L>  /  Alb  2.3<L>  /  TBili  0.5  /  DBili  x   /  AST  44<H>  /  ALT  18  /  AlkPhos  329<H>  11-04

## 2019-11-04 NOTE — PROGRESS NOTE ADULT - PROBLEM SELECTOR PLAN 1
S/P intubation  MICU care   all BP meds on hold   on pressors for BP control   ABx and acute care as per ICU team  sedation vacation   frntanyl

## 2019-11-04 NOTE — PROGRESS NOTE ADULT - PROBLEM SELECTOR PLAN 3
Onc eval appreciated   ABd US noted  S/P  paracentesis, Cx negative to date   hold eliquis for now and started on heparin for AC   poor prognosis   Onc eval appreciated  Abx as per ID

## 2019-11-04 NOTE — PROGRESS NOTE ADULT - ASSESSMENT
1. 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 Metastatic Pancreatic CA    -- plan was for outpt palliative chemo. in light of acute events, I dont think he will recover to allow for chemo  -- family aware of gravity of situation  -- Palliative Care/Hospice is very reasonable    3. Leukocytosis    -- consider paracentesis to r/o peritonitis and for comfort  -- Cultures have been negative  -- may be leukemoid rxn from malignancy    4. Anemia     -- sec to malignancy  -- transfuse prn Hgb < 7       Prognosis guarded, will follow, [phone]

## 2019-11-04 NOTE — PROGRESS NOTE ADULT - SUBJECTIVE AND OBJECTIVE BOX
CC: f/u for fever    Patient reports: he is sedated on vent    REVIEW OF SYSTEMS:  All other review of systems negative (Comprehensive ROS): poorly responsive, not interactive,EEG in progress    Antimicrobials Day #  :day 3 antibiotics  cefepime   IVPB      cefepime   IVPB 1000 milliGRAM(s) IV Intermittent every 12 hours    Other Medications Reviewed    T(F): 100 (19 @ 10:00), Max: 100 (19 @ 20:00)  HR: 72 (19 @ 12:14)  BP: 96/55 (19 @ 12:00)  RR: 14 (19 @ 12:00)  SpO2: 100% (19 @ 12:14)  Wt(kg): --    PHYSICAL EXAM:  General: lethargic, no acute distress  Eyes:  icteric, no conjunctival injection, no discharge  Oropharynx: no lesions or injection 	  Neck: supple, without adenopathy  Lungs: clear to auscultation  Heart: regular rate and rhythm; no murmur, rubs or gallops  Abdomen: soft, distended, nontender, without mass or organomegaly  Skin: no lesions  Extremities: no clubbing, cyanosis, trace edema  Neurologic: poorly interactive    LAB RESULTS:                        7.5    26.21 )-----------( 312      ( 2019 00:57 )             23.6         137  |  98  |  71<H>  ----------------------------<  145<H>  4.5   |  23  |  2.49<H>    Ca    7.7<L>      2019 00:58  Phos  5.5       Mg     2.9         TPro  5.8<L>  /  Alb  2.3<L>  /  TBili  0.5  /  DBili  x   /  AST  44<H>  /  ALT  18  /  AlkPhos  329<H>      LIVER FUNCTIONS - ( 2019 00:58 )  Alb: 2.3 g/dL / Pro: 5.8 g/dL / ALK PHOS: 329 U/L / ALT: 18 U/L / AST: 44 U/L / GGT: x           Urinalysis Basic - ( 2019 19:05 )    Color: Yellow / Appearance: Clear / S.019 / pH: x  Gluc: x / Ketone: Negative  / Bili: Negative / Urobili: Negative   Blood: x / Protein: Trace / Nitrite: Negative   Leuk Esterase: Negative / RBC: x / WBC x   Sq Epi: x / Non Sq Epi: x / Bacteria: x      MICROBIOLOGY:  RECENT CULTURES:   @ 16:44 .Body Fluid Peritoneal Fluid       polymorphonuclear leukocytes seen  No organisms seen  by cytocentrifuge     @ 09:02 .Blood Blood     No growth to date.       @ 04:11 .Blood Blood-Venous     No growth to date.       @ 03:11 .Blood Blood-Catheter     No growth to date.       @ 01:52 .Blood Blood-Peripheral     No growth to date.          RADIOLOGY REVIEWED:

## 2019-11-04 NOTE — PROGRESS NOTE ADULT - ASSESSMENT
55 yo male with history of DM,CVA, and HTN now with failure to thrive in setting of recently diagnosed metastatic pancreatic cancer.  The fever may be a neoplastic one given liver involvement.  No evidence of biliary obstruction or cholangitis.  His leukocytosis is not knew, felt to be reactive to malignancy.  The CT scan and MRI have confirmed multiple cerebral infarcts as cause of weakness.  ? hypercoagulable due to malignancy.Antibiotics are empiric.  Consider ECHO to assess for NBTE although will not   Suggest:  1.Zosyn/Vanco initially, now cefepime, can probably stop all antibiotics.  2.Await additional incubation of blood cultures  3.Appreciate  neurology f/u  4.It is possible that we will not confirm any infection, will try to limit antibiotics  5Family hoping he can be extubated at some point

## 2019-11-04 NOTE — CONSULT NOTE ADULT - ASSESSMENT
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 Patient now with new acute CVA, intubated in MICU. Palliative care consulted for GOC

## 2019-11-04 NOTE — PROGRESS NOTE ADULT - SUBJECTIVE AND OBJECTIVE BOX
OVERNIGHT EVENTS / SUBJECTIVE: Patient seen and examined at bedside.     OBJECTIVE:    VITAL SIGNS:  ICU Vital Signs Last 24 Hrs  T(C): 37.1 (2019 04:00), Max: 37.8 (2019 20:00)  T(F): 98.8 (2019 04:00), Max: 100 (2019 20:00)  HR: 78 (2019 07:30) (70 - 85)  BP: 121/66 (2019 07:15) (89/59 - 133/66)  BP(mean): 88 (2019 07:15) (65 - 89)  ABP: --  ABP(mean): --  RR: 14 (2019 07:30) (0 - 32)  SpO2: 100% (2019 07:30) (99% - 100%)    Mode: AC/ CMV (Assist Control/ Continuous Mandatory Ventilation), RR (machine): 14, TV (machine): 550, FiO2: 40, PEEP: 5, ITime: 1, MAP: 9, PIP: 21     @ 07:01  -  -04 @ 07:00  --------------------------------------------------------  IN: 1594.5 mL / OUT: 2975 mL / NET: -1380.5 mL      CAPILLARY BLOOD GLUCOSE      POCT Blood Glucose.: 141 mg/dL (2019 05:37)      PHYSICAL EXAM:    General: NAD  HEENT: NC/AT; PERRL, clear conjunctiva  Neck: supple  Respiratory: CTA b/l  Cardiovascular: +S1/S2; RRR  Abdomen: soft, NT/ND; +BS x4  Extremities: WWP, 2+ peripheral pulses b/l; no LE edema  Skin: normal color and turgor; no rash  Neurological:    MEDICATIONS:  MEDICATIONS  (STANDING):  albumin human 25% IVPB 100 milliLiter(s) IV Intermittent every 4 hours  aspirin  chewable 81 milliGRAM(s) Oral daily  cefTRIAXone   IVPB      cefTRIAXone   IVPB 2000 milliGRAM(s) IV Intermittent every 24 hours  chlorhexidine 0.12% Liquid 15 milliLiter(s) Oral Mucosa every 12 hours  chlorhexidine 4% Liquid 1 Application(s) Topical <User Schedule>  dextrose 5%. 1000 milliLiter(s) (50 mL/Hr) IV Continuous <Continuous>  dextrose 50% Injectable 12.5 Gram(s) IV Push once  dextrose 50% Injectable 25 Gram(s) IV Push once  dextrose 50% Injectable 25 Gram(s) IV Push once  fentaNYL   Infusion. 0.5 MICROgram(s)/kG/Hr (2.198 mL/Hr) IV Continuous <Continuous>  insulin lispro (HumaLOG) corrective regimen sliding scale   SubCutaneous every 6 hours  norepinephrine Infusion 0.05 MICROgram(s)/kG/Min (8.241 mL/Hr) IV Continuous <Continuous>  pantoprazole  Injectable 40 milliGRAM(s) IV Push daily  vancomycin  IVPB 1000 milliGRAM(s) IV Intermittent every 12 hours    MEDICATIONS  (PRN):  dextrose 40% Gel 15 Gram(s) Oral once PRN Blood Glucose LESS THAN 70 milliGRAM(s)/deciliter  glucagon  Injectable 1 milliGRAM(s) IntraMuscular once PRN Glucose LESS THAN 70 milligrams/deciliter      ALLERGIES:  Allergies    No Known Allergies    Intolerances        LABS:                        7.5    26.21 )-----------( 312      ( 2019 00:57 )             23.6     Hemoglobin: 7.5 g/dL ( @ 00:57)  Hemoglobin: 8.3 g/dL ( @ 04:52)  Hemoglobin: 7.9 g/dL ( @ 02:17)  Hemoglobin: 8.5 g/dL ( @ 10:14)  Hemoglobin: 8.9 g/dL ( @ 19:29)    CBC Full  -  ( 2019 00:57 )  WBC Count : 26.21 K/uL  RBC Count : 3.13 M/uL  Hemoglobin : 7.5 g/dL  Hematocrit : 23.6 %  Platelet Count - Automated : 312 K/uL  Mean Cell Volume : 75.4 fl  Mean Cell Hemoglobin : 24.0 pg  Mean Cell Hemoglobin Concentration : 31.8 gm/dL  Auto Neutrophil # : x  Auto Lymphocyte # : x  Auto Monocyte # : x  Auto Eosinophil # : x  Auto Basophil # : x  Auto Neutrophil % : x  Auto Lymphocyte % : x  Auto Monocyte % : x  Auto Eosinophil % : x  Auto Basophil % : x        137  |  98  |  71<H>  ----------------------------<  145<H>  4.5   |  23  |  2.49<H>    Ca    7.7<L>      2019 00:58  Phos  5.5       Mg     2.9         TPro  5.8<L>  /  Alb  2.3<L>  /  TBili  0.5  /  DBili  x   /  AST  44<H>  /  ALT  18  /  AlkPhos  329<H>      Creatinine Trend: 2.49<--, 1.47<--, 1.34<--, 1.19<--, 1.26<--, 0.73<--  LIVER FUNCTIONS - ( 2019 00:58 )  Alb: 2.3 g/dL / Pro: 5.8 g/dL / ALK PHOS: 329 U/L / ALT: 18 U/L / AST: 44 U/L / GGT: x           PT/INR - ( 2019 00:58 )   PT: 27.4 sec;   INR: 2.34 ratio         PTT - ( 2019 00:58 )  PTT:27.3 sec    hs Troponin:    ABG - ( 2019 04:52 )  pH, Arterial: 7.36  pH, Blood: x     /  pCO2: 43    /  pO2: 98    / HCO3: 24    / Base Excess: -.7   /  SaO2: 97                      Urinalysis Basic - ( 2019 19:05 )    Color: Yellow / Appearance: Clear / S.019 / pH: x  Gluc: x / Ketone: Negative  / Bili: Negative / Urobili: Negative   Blood: x / Protein: Trace / Nitrite: Negative   Leuk Esterase: Negative / RBC: x / WBC x   Sq Epi: x / Non Sq Epi: x / Bacteria: x      CSF:                      EKG:   MICROBIOLOGY:    Culture - Body Fluid with Gram Stain (collected 2019 16:44)  Source: .Body Fluid Peritoneal Fluid  Gram Stain (2019 23:24):    polymorphonuclear leukocytes seen    No organisms seen    by cytocentrifuge    Culture - Blood (collected 2019 04:11)  Source: .Blood Blood-Peripheral  Preliminary Report (2019 05:01):    No growth to date.    Culture - Blood (collected 2019 04:11)  Source: .Blood Blood-Peripheral  Preliminary Report (2019 05:01):    No growth to date.    Culture - Blood (collected 2019 04:11)  Source: .Blood Blood-Venous  Preliminary Report (2019 05:00):    No growth to date.    Culture - Blood (collected 2019 03:11)  Source: .Blood Blood-Catheter  Preliminary Report (2019 03:01):    No growth to date.    Culture - Blood (collected 2019 01:52)  Source: .Blood Blood-Peripheral  Preliminary Report (2019 01:19):    No growth to date.      IMAGING:      Labs, imaging, EKG personally reviewed    RADIOLOGY & ADDITIONAL TESTS: Reviewed. OVERNIGHT EVENTS / SUBJECTIVE: Patient seen and examined at bedside.     OBJECTIVE: NAD, unable to follow commands and unable to assess ROS due to patient being intubated and sedated    VITAL SIGNS:  ICU Vital Signs Last 24 Hrs  T(C): 37.1 (2019 04:00), Max: 37.8 (2019 20:00)  T(F): 98.8 (2019 04:00), Max: 100 (2019 20:00)  HR: 78 (2019 07:30) (70 - 85)  BP: 121/66 (2019 07:15) (89/59 - 133/66)  BP(mean): 88 (2019 07:15) (65 - 89)  ABP: --  ABP(mean): --  RR: 14 (2019 07:30) (0 - 32)  SpO2: 100% (2019 07:30) (99% - 100%)    Mode: AC/ CMV (Assist Control/ Continuous Mandatory Ventilation), RR (machine): 14, TV (machine): 550, FiO2: 40, PEEP: 5, ITime: 1, MAP: 9, PIP: 21     @ 07:01  -  -04 @ 07:00  --------------------------------------------------------  IN: 1594.5 mL / OUT: 2975 mL / NET: -1380.5 mL      CAPILLARY BLOOD GLUCOSE      POCT Blood Glucose.: 141 mg/dL (2019 05:37)      PHYSICAL EXAM:    General: NAD  HEENT: NC/AT; clear conjunctiva  Neck: supple  Respiratory: CTA b/l  Cardiovascular: +S1/S2; RRR  Abdomen: soft, NT/ND; +BS x4  Extremities: WWP, 2+ peripheral pulses b/l; no LE edema  Skin: normal color and turgor; no rash  Neurological: sedated    MEDICATIONS:  MEDICATIONS  (STANDING):  albumin human 25% IVPB 100 milliLiter(s) IV Intermittent every 4 hours  aspirin  chewable 81 milliGRAM(s) Oral daily  cefTRIAXone   IVPB      cefTRIAXone   IVPB 2000 milliGRAM(s) IV Intermittent every 24 hours  chlorhexidine 0.12% Liquid 15 milliLiter(s) Oral Mucosa every 12 hours  chlorhexidine 4% Liquid 1 Application(s) Topical <User Schedule>  dextrose 5%. 1000 milliLiter(s) (50 mL/Hr) IV Continuous <Continuous>  dextrose 50% Injectable 12.5 Gram(s) IV Push once  dextrose 50% Injectable 25 Gram(s) IV Push once  dextrose 50% Injectable 25 Gram(s) IV Push once  fentaNYL   Infusion. 0.5 MICROgram(s)/kG/Hr (2.198 mL/Hr) IV Continuous <Continuous>  insulin lispro (HumaLOG) corrective regimen sliding scale   SubCutaneous every 6 hours  norepinephrine Infusion 0.05 MICROgram(s)/kG/Min (8.241 mL/Hr) IV Continuous <Continuous>  pantoprazole  Injectable 40 milliGRAM(s) IV Push daily  vancomycin  IVPB 1000 milliGRAM(s) IV Intermittent every 12 hours    MEDICATIONS  (PRN):  dextrose 40% Gel 15 Gram(s) Oral once PRN Blood Glucose LESS THAN 70 milliGRAM(s)/deciliter  glucagon  Injectable 1 milliGRAM(s) IntraMuscular once PRN Glucose LESS THAN 70 milligrams/deciliter      ALLERGIES:  Allergies    No Known Allergies    Intolerances        LABS:                        7.5    26.21 )-----------( 312      ( 2019 00:57 )             23.6     Hemoglobin: 7.5 g/dL ( @ 00:57)  Hemoglobin: 8.3 g/dL ( @ 04:52)  Hemoglobin: 7.9 g/dL ( @ 02:17)  Hemoglobin: 8.5 g/dL ( @ 10:14)  Hemoglobin: 8.9 g/dL ( @ 19:29)    CBC Full  -  ( 2019 00:57 )  WBC Count : 26.21 K/uL  RBC Count : 3.13 M/uL  Hemoglobin : 7.5 g/dL  Hematocrit : 23.6 %  Platelet Count - Automated : 312 K/uL  Mean Cell Volume : 75.4 fl  Mean Cell Hemoglobin : 24.0 pg  Mean Cell Hemoglobin Concentration : 31.8 gm/dL  Auto Neutrophil # : x  Auto Lymphocyte # : x  Auto Monocyte # : x  Auto Eosinophil # : x  Auto Basophil # : x  Auto Neutrophil % : x  Auto Lymphocyte % : x  Auto Monocyte % : x  Auto Eosinophil % : x  Auto Basophil % : x        137  |  98  |  71<H>  ----------------------------<  145<H>  4.5   |  23  |  2.49<H>    Ca    7.7<L>      2019 00:58  Phos  5.5       Mg     2.9         TPro  5.8<L>  /  Alb  2.3<L>  /  TBili  0.5  /  DBili  x   /  AST  44<H>  /  ALT  18  /  AlkPhos  329<H>      Creatinine Trend: 2.49<--, 1.47<--, 1.34<--, 1.19<--, 1.26<--, 0.73<--  LIVER FUNCTIONS - ( 2019 00:58 )  Alb: 2.3 g/dL / Pro: 5.8 g/dL / ALK PHOS: 329 U/L / ALT: 18 U/L / AST: 44 U/L / GGT: x           PT/INR - ( 2019 00:58 )   PT: 27.4 sec;   INR: 2.34 ratio         PTT - ( 2019 00:58 )  PTT:27.3 sec    hs Troponin:    ABG - ( 2019 04:52 )  pH, Arterial: 7.36  pH, Blood: x     /  pCO2: 43    /  pO2: 98    / HCO3: 24    / Base Excess: -.7   /  SaO2: 97                      Urinalysis Basic - ( 2019 19:05 )    Color: Yellow / Appearance: Clear / S.019 / pH: x  Gluc: x / Ketone: Negative  / Bili: Negative / Urobili: Negative   Blood: x / Protein: Trace / Nitrite: Negative   Leuk Esterase: Negative / RBC: x / WBC x   Sq Epi: x / Non Sq Epi: x / Bacteria: x      CSF:                      EKG:   MICROBIOLOGY:    Culture - Body Fluid with Gram Stain (collected 2019 16:44)  Source: .Body Fluid Peritoneal Fluid  Gram Stain (2019 23:24):    polymorphonuclear leukocytes seen    No organisms seen    by cytocentrifuge    Culture - Blood (collected 2019 04:11)  Source: .Blood Blood-Peripheral  Preliminary Report (2019 05:01):    No growth to date.    Culture - Blood (collected 2019 04:11)  Source: .Blood Blood-Peripheral  Preliminary Report (2019 05:01):    No growth to date.    Culture - Blood (collected 2019 04:11)  Source: .Blood Blood-Venous  Preliminary Report (2019 05:00):    No growth to date.    Culture - Blood (collected 2019 03:11)  Source: .Blood Blood-Catheter  Preliminary Report (2019 03:01):    No growth to date.    Culture - Blood (collected 2019 01:52)  Source: .Blood Blood-Peripheral  Preliminary Report (2019 01:19):    No growth to date.      IMAGING:      Labs, imaging, EKG personally reviewed    RADIOLOGY & ADDITIONAL TESTS: Reviewed. OVERNIGHT EVENTS / SUBJECTIVE: Patient seen and examined at bedside.     OBJECTIVE: NAD, unable to follow commands and unable to assess ROS due to patient being intubated and sedated    VITAL SIGNS:  ICU Vital Signs Last 24 Hrs  T(C): 37.1 (2019 04:00), Max: 37.8 (2019 20:00)  T(F): 98.8 (2019 04:00), Max: 100 (2019 20:00)  HR: 78 (2019 07:30) (70 - 85)  BP: 121/66 (2019 07:15) (89/59 - 133/66)  BP(mean): 88 (2019 07:15) (65 - 89)  ABP: --  ABP(mean): --  RR: 14 (2019 07:30) (0 - 32)  SpO2: 100% (2019 07:30) (99% - 100%)    Mode: AC/ CMV (Assist Control/ Continuous Mandatory Ventilation), RR (machine): 14, TV (machine): 550, FiO2: 40, PEEP: 5, ITime: 1, MAP: 9, PIP: 21     @ 07:01  -  -04 @ 07:00  --------------------------------------------------------  IN: 1594.5 mL / OUT: 2975 mL / NET: -1380.5 mL      CAPILLARY BLOOD GLUCOSE      POCT Blood Glucose.: 141 mg/dL (2019 05:37)      PHYSICAL EXAM:    General: NAD  HEENT: NC/AT; clear conjunctiva  Neck: supple  Respiratory: CTA b/l  Cardiovascular: +S1/S2; RRR  Abdomen: soft, NT; +BS x4, distended  Extremities: WWP, 2+ peripheral pulses b/l; no LE edema  Skin: normal color and turgor; no rash  Neurological: sedated    MEDICATIONS:  MEDICATIONS  (STANDING):  albumin human 25% IVPB 100 milliLiter(s) IV Intermittent every 4 hours  aspirin  chewable 81 milliGRAM(s) Oral daily  cefTRIAXone   IVPB      cefTRIAXone   IVPB 2000 milliGRAM(s) IV Intermittent every 24 hours  chlorhexidine 0.12% Liquid 15 milliLiter(s) Oral Mucosa every 12 hours  chlorhexidine 4% Liquid 1 Application(s) Topical <User Schedule>  dextrose 5%. 1000 milliLiter(s) (50 mL/Hr) IV Continuous <Continuous>  dextrose 50% Injectable 12.5 Gram(s) IV Push once  dextrose 50% Injectable 25 Gram(s) IV Push once  dextrose 50% Injectable 25 Gram(s) IV Push once  fentaNYL   Infusion. 0.5 MICROgram(s)/kG/Hr (2.198 mL/Hr) IV Continuous <Continuous>  insulin lispro (HumaLOG) corrective regimen sliding scale   SubCutaneous every 6 hours  norepinephrine Infusion 0.05 MICROgram(s)/kG/Min (8.241 mL/Hr) IV Continuous <Continuous>  pantoprazole  Injectable 40 milliGRAM(s) IV Push daily  vancomycin  IVPB 1000 milliGRAM(s) IV Intermittent every 12 hours    MEDICATIONS  (PRN):  dextrose 40% Gel 15 Gram(s) Oral once PRN Blood Glucose LESS THAN 70 milliGRAM(s)/deciliter  glucagon  Injectable 1 milliGRAM(s) IntraMuscular once PRN Glucose LESS THAN 70 milligrams/deciliter      ALLERGIES:  Allergies    No Known Allergies    Intolerances        LABS:                        7.5    26.21 )-----------( 312      ( 2019 00:57 )             23.6     Hemoglobin: 7.5 g/dL ( @ 00:57)  Hemoglobin: 8.3 g/dL ( @ 04:52)  Hemoglobin: 7.9 g/dL ( @ 02:17)  Hemoglobin: 8.5 g/dL ( @ 10:14)  Hemoglobin: 8.9 g/dL ( @ 19:29)    CBC Full  -  ( 2019 00:57 )  WBC Count : 26.21 K/uL  RBC Count : 3.13 M/uL  Hemoglobin : 7.5 g/dL  Hematocrit : 23.6 %  Platelet Count - Automated : 312 K/uL  Mean Cell Volume : 75.4 fl  Mean Cell Hemoglobin : 24.0 pg  Mean Cell Hemoglobin Concentration : 31.8 gm/dL  Auto Neutrophil # : x  Auto Lymphocyte # : x  Auto Monocyte # : x  Auto Eosinophil # : x  Auto Basophil # : x  Auto Neutrophil % : x  Auto Lymphocyte % : x  Auto Monocyte % : x  Auto Eosinophil % : x  Auto Basophil % : x        137  |  98  |  71<H>  ----------------------------<  145<H>  4.5   |  23  |  2.49<H>    Ca    7.7<L>      2019 00:58  Phos  5.5       Mg     2.9         TPro  5.8<L>  /  Alb  2.3<L>  /  TBili  0.5  /  DBili  x   /  AST  44<H>  /  ALT  18  /  AlkPhos  329<H>      Creatinine Trend: 2.49<--, 1.47<--, 1.34<--, 1.19<--, 1.26<--, 0.73<--  LIVER FUNCTIONS - ( 2019 00:58 )  Alb: 2.3 g/dL / Pro: 5.8 g/dL / ALK PHOS: 329 U/L / ALT: 18 U/L / AST: 44 U/L / GGT: x           PT/INR - ( 2019 00:58 )   PT: 27.4 sec;   INR: 2.34 ratio         PTT - ( 2019 00:58 )  PTT:27.3 sec    hs Troponin:    ABG - ( 2019 04:52 )  pH, Arterial: 7.36  pH, Blood: x     /  pCO2: 43    /  pO2: 98    / HCO3: 24    / Base Excess: -.7   /  SaO2: 97                      Urinalysis Basic - ( 2019 19:05 )    Color: Yellow / Appearance: Clear / S.019 / pH: x  Gluc: x / Ketone: Negative  / Bili: Negative / Urobili: Negative   Blood: x / Protein: Trace / Nitrite: Negative   Leuk Esterase: Negative / RBC: x / WBC x   Sq Epi: x / Non Sq Epi: x / Bacteria: x      CSF:                      EKG:   MICROBIOLOGY:    Culture - Body Fluid with Gram Stain (collected 2019 16:44)  Source: .Body Fluid Peritoneal Fluid  Gram Stain (2019 23:24):    polymorphonuclear leukocytes seen    No organisms seen    by cytocentrifuge    Culture - Blood (collected 2019 04:11)  Source: .Blood Blood-Peripheral  Preliminary Report (2019 05:01):    No growth to date.    Culture - Blood (collected 2019 04:11)  Source: .Blood Blood-Peripheral  Preliminary Report (2019 05:01):    No growth to date.    Culture - Blood (collected 2019 04:11)  Source: .Blood Blood-Venous  Preliminary Report (2019 05:00):    No growth to date.    Culture - Blood (collected 2019 03:11)  Source: .Blood Blood-Catheter  Preliminary Report (2019 03:01):    No growth to date.    Culture - Blood (collected 2019 01:52)  Source: .Blood Blood-Peripheral  Preliminary Report (2019 01:19):    No growth to date.      IMAGING:      Labs, imaging, EKG personally reviewed    RADIOLOGY & ADDITIONAL TESTS: Reviewed.

## 2019-11-04 NOTE — CONSULT NOTE ADULT - PROBLEM SELECTOR RECOMMENDATION 4
HCP copy in chart  brother Cleveland is primary, patients adult son is secondary  goals at this time are for life prolonging strategies but family aware of critical nature of disease and overall guarded prognosis

## 2019-11-04 NOTE — EEG REPORT - NS EEG TEXT BOX
Health system   COMPREHENSIVE EPILEPSY CENTER   REPORT OF Continuous VIDEO EEG     [facility]: [address] [facility] , [address], [place] [address], Ph#: [phone]  LI: [phone] [place] [address], Ph#: [phone]  [facility]: [address] [place] [address], Ph#: [phone]    Patient Name: ASHLEY AVILEZ  Age and : 54y (63)  MRN #: [id number]  Location: [address]  Referring Physician: Pallavi Lantigua    Study Date: 19 15:02  End Date: 19 08:00    _____________________________________________________________  HISTORY    Patient is a 54y old  Male who presents with a chief complaint of weakness (2019 11:03)      PERTINENT MEDICATION:  MEDICATIONS  (STANDING):  albumin human 25% IVPB 100 milliLiter(s) IV Intermittent every 4 hours  aspirin  chewable 81 milliGRAM(s) Oral daily  cefTRIAXone   IVPB      cefTRIAXone   IVPB 2000 milliGRAM(s) IV Intermittent every 24 hours  chlorhexidine 0.12% Liquid 15 milliLiter(s) Oral Mucosa every 12 hours  chlorhexidine 4% Liquid 1 Application(s) Topical <User Schedule>  dexMEDEtomidine Infusion 0.4 MICROgram(s)/kG/Hr (8.79 mL/Hr) IV Continuous <Continuous>  dextrose 5%. 1000 milliLiter(s) (50 mL/Hr) IV Continuous <Continuous>  dextrose 50% Injectable 12.5 Gram(s) IV Push once  dextrose 50% Injectable 25 Gram(s) IV Push once  dextrose 50% Injectable 25 Gram(s) IV Push once  fentaNYL   Infusion. 0.5 MICROgram(s)/kG/Hr (2.198 mL/Hr) IV Continuous <Continuous>  insulin lispro (HumaLOG) corrective regimen sliding scale   SubCutaneous every 6 hours  norepinephrine Infusion 0.05 MICROgram(s)/kG/Min (8.241 mL/Hr) IV Continuous <Continuous>  pantoprazole  Injectable 40 milliGRAM(s) IV Push daily  vancomycin  IVPB 1000 milliGRAM(s) IV Intermittent every 12 hours      _____________________________________________________________  STUDY INTERPRETATION    Findings: The background was continuous, spontaneously variable.  No posterior dominant rhythm seen.    Background Slowing:  Diffuse polymorphic delta slowing.    Focal Slowing:   None were present.    Sleep Background:  Stage II sleep transients were not recorded.    Other Non-Epileptiform Findings:  None were present.    Interictal Epileptiform Activity:   None were present.    Events:  Clinical events: None recorded.  Seizures: None recorded.    Activation Procedures:   Hyperventilation was not performed.    Photic stimulation was not performed.     Artifacts:  Intermittent myogenic and movement artifacts were noted.    ECG:  The heart rate on single channel ECG was predominantly between 60-70 BPM.    _____________________________________________________________  EEG SUMMARY/CLASSIFICATION    Abnormal EEG in an altered patient.   - Moderate to severe generalized slowing.    _____________________________________________________________  EEG IMPRESSION/CLINICAL CORRELATE    Abnormal EEG study.  1. Moderate to severe nonspecific diffuse or multifocal cerebral dysfunction.   2. No epileptiform pattern or seizure seen.  _____________________________________________________________    Kelli Tejada DO  Epilepsy Fellow, [facility] [place]    Yasmany Garay MD  Attending Physician, [facility] [place] [facility]   COMPREHENSIVE EPILEPSY CENTER   REPORT OF Continuous VIDEO EEG     [facility]: [address] [place] [address] , [address], [place] [address], Ph#: [phone]  [place]: [phone] [place] [address], Ph#: [phone]  [facility]: [address] [place] [address], Ph#: [phone]    Patient Name: ASHLEY AVILEZ  Age and : 54y (63)  MRN #: [id number]  Location: [address]  Referring Physician: Pallavi Lantigua    Study Date: 19 15:02  End Date: 19 08:00    _____________________________________________________________  HISTORY    Patient is a 54y old  Male who presents with a chief complaint of weakness (2019 11:03)      PERTINENT MEDICATION:  MEDICATIONS  (STANDING):  albumin human 25% IVPB 100 milliLiter(s) IV Intermittent every 4 hours  aspirin  chewable 81 milliGRAM(s) Oral daily  cefTRIAXone   IVPB      cefTRIAXone   IVPB 2000 milliGRAM(s) IV Intermittent every 24 hours  chlorhexidine 0.12% Liquid 15 milliLiter(s) Oral Mucosa every 12 hours  chlorhexidine 4% Liquid 1 Application(s) Topical <User Schedule>  dexMEDEtomidine Infusion 0.4 MICROgram(s)/kG/Hr (8.79 mL/Hr) IV Continuous <Continuous>  fentaNYL   Infusion. 0.5 MICROgram(s)/kG/Hr (2.198 mL/Hr) IV Continuous <Continuous>  insulin lispro (HumaLOG) corrective regimen sliding scale   SubCutaneous every 6 hours  norepinephrine Infusion 0.05 MICROgram(s)/kG/Min (8.241 mL/Hr) IV Continuous <Continuous>  pantoprazole  Injectable 40 milliGRAM(s) IV Push daily  vancomycin  IVPB 1000 milliGRAM(s) IV Intermittent every 12 hours      _____________________________________________________________  STUDY INTERPRETATION    Findings: The background was continuous, spontaneously variable.  No posterior dominant rhythm seen.    Background Slowing:  Diffuse polymorphic delta slowing.    Focal Slowing:   None were present.    Sleep Background:  Stage II sleep transients were not recorded.    Other Non-Epileptiform Findings:  None were present.    Interictal Epileptiform Activity:   None were present.    Events:  Clinical events: None recorded.  Seizures: None recorded.    Activation Procedures:   Hyperventilation was not performed.    Photic stimulation was not performed.     Artifacts:  Intermittent myogenic and movement artifacts were noted.    ECG:  The heart rate on single channel ECG was predominantly between 60-70 BPM.    _____________________________________________________________  EEG SUMMARY/CLASSIFICATION    Abnormal EEG in an altered patient.   - Moderate to severe generalized slowing.    _____________________________________________________________  EEG IMPRESSION/CLINICAL CORRELATE    Abnormal EEG study.  1. Moderate to severe nonspecific diffuse or multifocal cerebral dysfunction.   2. No epileptiform pattern or seizure seen.  _____________________________________________________________    Kelli Tejada DO  Epilepsy Fellow, [facility]    Yasmany Garay MD  Attending Physician, [facility] [place]

## 2019-11-04 NOTE — PROGRESS NOTE ADULT - PROBLEM SELECTOR PLAN 3
Onc eval appreciated   ABd US noted  plan for paracentesis   hold eliquis for now and started on heparin for AC   poor prognosis   Onc eval appreciated

## 2019-11-04 NOTE — PROGRESS NOTE ADULT - ASSESSMENT
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    #Neuro  - CVA w/residual R-sided weakness - c/w home ASA 81mg  - on fentanyl (3.5mcg/kg) will try to wean off  - started precedex 0.4  - neuro c/s about restarting hep gtt    #Pulm  - intubated and sedated  - RR 14  PEEP 5 FiO2 40%    #Cards  - holding home meds in the s/o sepsis  - c/w home ASA 81mg  - f/u bubble study    #GI  - pancreatic adenocarcinoma w/METS to liver s/p large volume para  - c/w 1.5mg/kg albumin, stop after 6 doses  - will reassess after 6 doses to see if we need to give more albumin at 1mg/kg  - f/u Abd U/S    #Renal  - [place]: getting albumin as above    #Endo  - DM2: ISS    #ID  - dc'd vanc/CTX  - started cefepime on 11/4    #GOC  - full code 54M c hx HTN, DM2, HLD, CVA 6 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    #Neuro  - CVA w/residual R-sided weakness - c/w home ASA 81mg  - on fentanyl (3.5mcg/kg in AM) will try to wean off  - started precedex 0.4 mcg/kg/hr  - neuro c/s about restarting hep gtt    #Pulm  - intubated and sedated  - RR 14  PEEP 5 FiO2 40%  - will wean as tolerated    #Cards  - holding home meds in the s/o sepsis  - c/w home ASA 81mg  - f/u bubble study    #GI  - pancreatic adenocarcinoma w/METS to liver s/p large volume para  - c/w 1.5mg/kg albumin, stop after 6 doses  - will reassess after 6 doses to see if we need to give more albumin at 1mg/kg  - f/u Abd U/S    #Renal  - [place]: getting albumin as above    #Endo  - DM2: ISS    #ID  - dc'd vanc/CTX  - started cefepime on 11/4    #GOC  - full code 54M c hx HTN, DM2, HLD, CVA 6 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    #Neuro  - CVA w/residual R-sided weakness - c/w home ASA 81mg  - on fentanyl (3.5mcg/kg in AM) will try to wean off  - started precedex 0.4 mcg/kg/hr  - neuro c/s about restarting hep gtt    #Pulm  - intubated and sedated  - RR 14  PEEP 5 FiO2 40%  - will wean as tolerated    #Cards  - holding home meds in the s/o sepsis  - c/w home ASA 81mg  - f/u bubble study    #GI  - pancreatic adenocarcinoma w/METS to liver s/p large volume para  - c/w 1.5mg/kg albumin, stop after 6 doses  - will reassess after 6 doses to see if we need to give more albumin at 1mg/kg  - f/u Abd U/S    #Renal  - [place]: getting albumin as above    #Endo  - DM2: ISS    #ID  - dc'd vanc/CTX  - SBP (1730 PMNs) started cefepime on 11/4    #GOC  - full code

## 2019-11-04 NOTE — PROGRESS NOTE ADULT - SUBJECTIVE AND OBJECTIVE BOX
[facility]    Subjective: Patient seen and examined. No new events except as noted.   intubated  sedation vacation  fentanyl     REVIEW OF SYSTEMS:  sedated and intubated     MEDICATIONS:  MEDICATIONS  (STANDING):  aspirin  chewable 81 milliGRAM(s) Oral daily  cefepime   IVPB      cefepime   IVPB 1000 milliGRAM(s) IV Intermittent every 12 hours  chlorhexidine 0.12% Liquid 15 milliLiter(s) Oral Mucosa every 12 hours  chlorhexidine 4% Liquid 1 Application(s) Topical <User Schedule>  dexMEDEtomidine Infusion 0.4 MICROgram(s)/kG/Hr (8.79 mL/Hr) IV Continuous <Continuous>  dextrose 5%. 1000 milliLiter(s) (50 mL/Hr) IV Continuous <Continuous>  dextrose 50% Injectable 12.5 Gram(s) IV Push once  dextrose 50% Injectable 25 Gram(s) IV Push once  dextrose 50% Injectable 25 Gram(s) IV Push once  fentaNYL   Infusion. 0.5 MICROgram(s)/kG/Hr (2.198 mL/Hr) IV Continuous <Continuous>  heparin  Infusion. 1600 Unit(s)/Hr (16 mL/Hr) IV Continuous <Continuous>  insulin lispro (HumaLOG) corrective regimen sliding scale   SubCutaneous every 6 hours  pantoprazole  Injectable 40 milliGRAM(s) IV Push daily      PHYSICAL EXAM:  T(C): 38.1 (19 @ 14:00), Max: 38.1 (19 @ 14:00)  HR: 71 (19 @ 15:30) (70 - 89)  BP: 106/60 (19 @ 15:30) (91/54 - 121/66)  RR: 14 (19 @ 15:00) (4 - 32)  SpO2: 100% (19 @ 15:30) (100% - 100%)  Wt(kg): --  I&O's Summary    2019 07:01  -  2019 07:00  --------------------------------------------------------  IN: 1594.5 mL / OUT: 2975 mL / NET: -1380.5 mL    2019 07:01  -  2019 16:14  --------------------------------------------------------  IN: 397 mL / OUT: 435 mL / NET: -38 mL          Appearance: sedated and intubated  HEENT:   tubed	  Lymphatic: No lymphadenopathy   Cardiovascular: Normal S1 S2  Respiratory: B/L Air entry 	  Gastrointestinal:  Soft, distedned  Skin: No rashes,  Musculoskeletal: layign flat   Psychiatry:  sedated   Ext: No edema      All labs, Imaging and EKGs personally reviewed                             6.6    .69 )-----------( 260      ( 2019 15:17 )             20.0               11-    137  |  100  |  72<H>  ----------------------------<  147<H>  4.1   |  24  |  2.26<H>    Ca    7.6<L>      2019 15:16  Phos  4.8       Mg     3.1         TPro  6.1  /  Alb  3.1<L>  /  TBili  0.5  /  DBili  x   /  AST  40  /  ALT  19  /  AlkPhos  278<H>  11-    PT/INR - ( 2019 15:16 )   PT: 34.4 sec;   INR: 2.89 ratio         PTT - ( 2019 00:58 )  PTT:27.3 sec                 ABG - ( 2019 04:52 )  pH, Arterial: 7.36  pH, Blood: x     /  pCO2: 43    /  pO2: 98    / HCO3: 24    / Base Excess: -.7   /  SaO2: 97                Urinalysis Basic - ( 2019 15:22 )    Color: Yellow / Appearance: Slightly Turbid / S.020 / pH: x  Gluc: x / Ketone: Negative  / Bili: Negative / Urobili: Negative   Blood: x / Protein: 30 mg/dL / Nitrite: Negative   Leuk Esterase: Large / RBC: x / WBC x   Sq Epi: x / Non Sq Epi: x / Bacteria: x    < from: Limited Transthoracic Echo (19 @ 14:10) >  CONCLUSIONS:  1. Agitated saline injection demonstrates no evidence of a  patent foramen ovale.    Culture - Body Fluid with Gram Stain (19 @ 16:44)    Gram Stain:   polymorphonuclear leukocytes seen  No organisms seen  by cytocentrifuge    Specimen Source: .Body Fluid Peritoneal Fluid    Culture Results:   No growth

## 2019-11-04 NOTE — PROGRESS NOTE ADULT - SUBJECTIVE AND OBJECTIVE BOX
[facility]      Seen earlier today, and examined.  - Today, patient is without complaints.           *****MEDICATIONS: Current medication reviewed and documented.    MEDICATIONS  (STANDING):  albumin human 25% IVPB 100 milliLiter(s) IV Intermittent every 4 hours  aspirin  chewable 81 milliGRAM(s) Oral daily  cefepime   IVPB      cefepime   IVPB 1000 milliGRAM(s) IV Intermittent every 12 hours  chlorhexidine 0.12% Liquid 15 milliLiter(s) Oral Mucosa every 12 hours  chlorhexidine 4% Liquid 1 Application(s) Topical <User Schedule>  dexMEDEtomidine Infusion 0.4 MICROgram(s)/kG/Hr (8.79 mL/Hr) IV Continuous <Continuous>  dextrose 5%. 1000 milliLiter(s) (50 mL/Hr) IV Continuous <Continuous>  dextrose 50% Injectable 12.5 Gram(s) IV Push once  dextrose 50% Injectable 25 Gram(s) IV Push once  dextrose 50% Injectable 25 Gram(s) IV Push once  fentaNYL   Infusion. 0.5 MICROgram(s)/kG/Hr (2.198 mL/Hr) IV Continuous <Continuous>  insulin lispro (HumaLOG) corrective regimen sliding scale   SubCutaneous every 6 hours  norepinephrine Infusion 0.05 MICROgram(s)/kG/Min (8.241 mL/Hr) IV Continuous <Continuous>  pantoprazole  Injectable 40 milliGRAM(s) IV Push daily    MEDICATIONS  (PRN):  dextrose 40% Gel 15 Gram(s) Oral once PRN Blood Glucose LESS THAN 70 milliGRAM(s)/deciliter  glucagon  Injectable 1 milliGRAM(s) IntraMuscular once PRN Glucose LESS THAN 70 milligrams/deciliter          ***** VITAL SIGNS:  T(F): 100 (19 @ 10:00), Max: 100 (19 @ 20:00)  HR: 87 (19 @ 10:00) (70 - 89)  BP: 100/60 (19 @ 10:00) (91/54 - 121/66)  RR: 14 (19 @ 10:00) (4 - 32)  SpO2: 100% (19 @ 10:00) (99% - 100%)  Wt(kg): --  ,   I&O's Summary    2019 07:01  -  2019 07:00  --------------------------------------------------------  IN: 1594.5 mL / OUT: 2975 mL / NET: -1380.5 mL    2019 07:01  -  2019 10:45  --------------------------------------------------------  IN: 76.2 mL / OUT: 85 mL / NET: -8.8 mL             *****PHYSICAL EXAM:   limited exam as pt is intubated sedated  blinks to  voice,   bells phenomenon when attempting to open eyes.   not following any commands.       does not move both le to painful stimuli   Gait not assessed.            *****LAB AND IMAGIN.5    26.21 )-----------( 312      ( 2019 00:57 )             23.6               11-04    137  |  98  |  71<H>  ----------------------------<  145<H>  4.5   |  23  |  2.49<H>    Ca    7.7<L>      2019 00:58  Phos  5.5     11  Mg     2.9     11    TPro  5.8<L>  /  Alb  2.3<L>  /  TBili  0.5  /  DBili  x   /  AST  44<H>  /  ALT  18  /  AlkPhos  329<H>  11-04    PT/INR - ( 2019 00:58 )   PT: 27.4 sec;   INR: 2.34 ratio         PTT - ( 2019 00:58 )  PTT:27.3 sec                 ABG - ( 2019 04:52 )  pH, Arterial: 7.36  pH, Blood: x     /  pCO2: 43    /  pO2: 98    / HCO3: 24    / Base Excess: -.7   /  SaO2: 97                Urinalysis Basic - ( 2019 19:05 )    Color: Yellow / Appearance: Clear / S.019 / pH: x  Gluc: x / Ketone: Negative  / Bili: Negative / Urobili: Negative   Blood: x / Protein: Trace / Nitrite: Negative   Leuk Esterase: Negative / RBC: x / WBC x   Sq Epi: x / Non Sq Epi: x / Bacteria: x      [All pertinent recent Imaging/Reports reviewed]           *****A S S E S S M E N T   A N D   P L A N :    Excerpt from H&P, 54M c hx HTN, DM2, HLD, CVA 6 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        Problem/Recommendations 1: CVA likely due to hypercoagulable state  Numerous cerebral infarcts noted on MRI Brain while on full dose anticoagulation.   Pt previously known to me from prior admission when pt developed transient weakness while on full dose heparin was not a candidate for acute therapies at that time  Etiology of infarcts likely hypercoagulable state related his underlying metastatic pancreatic adenocarcinoma other etiologies include. marantic endocarditis or infectious endcarditis vs. cns vasculitis   cta recent did not reveal any mycotic aneurysm  tte, or ideally ange may be helpful.   Can repeat cta with contrast to r/o mycotic anuerysm.   pt has had infarcts while on full dose AC, portends a poor outcome   overall guarded prognosis    neuro checks q4, low threshold for ct scan if any change in neuro exam.      intubated/sedated  veeg no seizure.     Thank you for allowing me to participate in the care of this patient. Please do not hesitate to call me if you have any  questions.        ________________  Korin Hendrickson MD  [facility] ([facility]  [phone]      33 minutes spent on total encounter; more than 50 % of the visit was  spent counseling about plan of care, compliance to diet/exercise and medication regimen and or  coordinating care by the attending physician.      It is advised that stroke patients follow up with NP Mimi Eugene @ [phone] in 1- 2 weeks.   Others please follow up with Dr. Michael Nissenbaum [phone]

## 2019-11-05 LAB
ALBUMIN SERPL ELPH-MCNC: 2.8 G/DL — LOW (ref 3.3–5)
ALBUMIN SERPL ELPH-MCNC: 3.1 G/DL — LOW (ref 3.3–5)
ALP SERPL-CCNC: 320 U/L — HIGH (ref 40–120)
ALP SERPL-CCNC: 453 U/L — HIGH (ref 40–120)
ALT FLD-CCNC: 22 U/L — SIGNIFICANT CHANGE UP (ref 10–45)
ALT FLD-CCNC: 22 U/L — SIGNIFICANT CHANGE UP (ref 10–45)
AMMONIA BLD-MCNC: 53 UMOL/L — SIGNIFICANT CHANGE UP (ref 11–55)
ANION GAP SERPL CALC-SCNC: 14 MMOL/L — SIGNIFICANT CHANGE UP (ref 5–17)
ANION GAP SERPL CALC-SCNC: 16 MMOL/L — SIGNIFICANT CHANGE UP (ref 5–17)
APTT BLD: 30.9 SEC — SIGNIFICANT CHANGE UP (ref 27.5–36.3)
AST SERPL-CCNC: 40 U/L — SIGNIFICANT CHANGE UP (ref 10–40)
AST SERPL-CCNC: 49 U/L — HIGH (ref 10–40)
BILIRUB SERPL-MCNC: 0.5 MG/DL — SIGNIFICANT CHANGE UP (ref 0.2–1.2)
BILIRUB SERPL-MCNC: 0.7 MG/DL — SIGNIFICANT CHANGE UP (ref 0.2–1.2)
BUN SERPL-MCNC: 75 MG/DL — HIGH (ref 7–23)
BUN SERPL-MCNC: 86 MG/DL — HIGH (ref 7–23)
CALCIUM SERPL-MCNC: 7.4 MG/DL — LOW (ref 8.4–10.5)
CALCIUM SERPL-MCNC: 8.3 MG/DL — LOW (ref 8.4–10.5)
CHLORIDE SERPL-SCNC: 102 MMOL/L — SIGNIFICANT CHANGE UP (ref 96–108)
CHLORIDE SERPL-SCNC: 102 MMOL/L — SIGNIFICANT CHANGE UP (ref 96–108)
CO2 SERPL-SCNC: 22 MMOL/L — SIGNIFICANT CHANGE UP (ref 22–31)
CO2 SERPL-SCNC: 24 MMOL/L — SIGNIFICANT CHANGE UP (ref 22–31)
CREAT SERPL-MCNC: 2.01 MG/DL — HIGH (ref 0.5–1.3)
CREAT SERPL-MCNC: 2.66 MG/DL — HIGH (ref 0.5–1.3)
GLUCOSE BLDC GLUCOMTR-MCNC: 155 MG/DL — HIGH (ref 70–99)
GLUCOSE BLDC GLUCOMTR-MCNC: 160 MG/DL — HIGH (ref 70–99)
GLUCOSE BLDC GLUCOMTR-MCNC: 216 MG/DL — HIGH (ref 70–99)
GLUCOSE SERPL-MCNC: 187 MG/DL — HIGH (ref 70–99)
GLUCOSE SERPL-MCNC: 187 MG/DL — HIGH (ref 70–99)
GRAM STN FLD: SIGNIFICANT CHANGE UP
HCT VFR BLD CALC: 23.8 % — LOW (ref 39–50)
HGB BLD-MCNC: 7.6 G/DL — LOW (ref 13–17)
INR BLD: 2.8 RATIO — HIGH (ref 0.88–1.16)
MAGNESIUM SERPL-MCNC: 3 MG/DL — HIGH (ref 1.6–2.6)
MAGNESIUM SERPL-MCNC: 3.2 MG/DL — HIGH (ref 1.6–2.6)
MCHC RBC-ENTMCNC: 24.6 PG — LOW (ref 27–34)
MCHC RBC-ENTMCNC: 31.9 GM/DL — LOW (ref 32–36)
MCV RBC AUTO: 77 FL — LOW (ref 80–100)
NON-GYNECOLOGICAL CYTOLOGY STUDY: SIGNIFICANT CHANGE UP
NRBC # BLD: 0 /100 WBCS — SIGNIFICANT CHANGE UP (ref 0–0)
PHOSPHATE SERPL-MCNC: 4.7 MG/DL — HIGH (ref 2.5–4.5)
PHOSPHATE SERPL-MCNC: 5.1 MG/DL — HIGH (ref 2.5–4.5)
PLATELET # BLD AUTO: 255 K/UL — SIGNIFICANT CHANGE UP (ref 150–400)
POTASSIUM SERPL-MCNC: 4.3 MMOL/L — SIGNIFICANT CHANGE UP (ref 3.5–5.3)
POTASSIUM SERPL-MCNC: 4.3 MMOL/L — SIGNIFICANT CHANGE UP (ref 3.5–5.3)
POTASSIUM SERPL-SCNC: 4.3 MMOL/L — SIGNIFICANT CHANGE UP (ref 3.5–5.3)
POTASSIUM SERPL-SCNC: 4.3 MMOL/L — SIGNIFICANT CHANGE UP (ref 3.5–5.3)
PROT SERPL-MCNC: 6.1 G/DL — SIGNIFICANT CHANGE UP (ref 6–8.3)
PROT SERPL-MCNC: 6.5 G/DL — SIGNIFICANT CHANGE UP (ref 6–8.3)
PROTHROM AB SERPL-ACNC: 33.3 SEC — HIGH (ref 10–12.9)
RBC # BLD: 3.09 M/UL — LOW (ref 4.2–5.8)
RBC # FLD: 15.1 % — HIGH (ref 10.3–14.5)
SODIUM SERPL-SCNC: 140 MMOL/L — SIGNIFICANT CHANGE UP (ref 135–145)
SODIUM SERPL-SCNC: 140 MMOL/L — SIGNIFICANT CHANGE UP (ref 135–145)
SPECIMEN SOURCE: SIGNIFICANT CHANGE UP
WBC # BLD: 23.91 K/UL — HIGH (ref 3.8–10.5)
WBC # FLD AUTO: 23.91 K/UL — HIGH (ref 3.8–10.5)

## 2019-11-05 PROCEDURE — 99291 CRITICAL CARE FIRST HOUR: CPT

## 2019-11-05 PROCEDURE — 95951: CPT | Mod: 26

## 2019-11-05 RX ORDER — VANCOMYCIN HCL 1 G
1000 VIAL (EA) INTRAVENOUS ONCE
Refills: 0 | Status: COMPLETED | OUTPATIENT
Start: 2019-11-05 | End: 2019-11-05

## 2019-11-05 RX ORDER — OCTREOTIDE ACETATE 200 UG/ML
100 INJECTION, SOLUTION INTRAVENOUS; SUBCUTANEOUS THREE TIMES A DAY
Refills: 0 | Status: DISCONTINUED | OUTPATIENT
Start: 2019-11-05 | End: 2019-11-07

## 2019-11-05 RX ORDER — METRONIDAZOLE 500 MG
500 TABLET ORAL EVERY 8 HOURS
Refills: 0 | Status: DISCONTINUED | OUTPATIENT
Start: 2019-11-05 | End: 2019-11-06

## 2019-11-05 RX ORDER — VASOPRESSIN 20 [USP'U]/ML
0.04 INJECTION INTRAVENOUS
Qty: 50 | Refills: 0 | Status: DISCONTINUED | OUTPATIENT
Start: 2019-11-05 | End: 2019-11-06

## 2019-11-05 RX ORDER — ACETAMINOPHEN 500 MG
650 TABLET ORAL EVERY 6 HOURS
Refills: 0 | Status: DISCONTINUED | OUTPATIENT
Start: 2019-11-05 | End: 2019-11-05

## 2019-11-05 RX ORDER — VASOPRESSIN 20 [USP'U]/ML
0.04 INJECTION INTRAVENOUS
Qty: 50 | Refills: 0 | Status: DISCONTINUED | OUTPATIENT
Start: 2019-11-05 | End: 2019-11-05

## 2019-11-05 RX ORDER — CEFEPIME 1 G/1
1000 INJECTION, POWDER, FOR SOLUTION INTRAMUSCULAR; INTRAVENOUS EVERY 8 HOURS
Refills: 0 | Status: DISCONTINUED | OUTPATIENT
Start: 2019-11-05 | End: 2019-11-06

## 2019-11-05 RX ORDER — METRONIDAZOLE 500 MG
TABLET ORAL
Refills: 0 | Status: DISCONTINUED | OUTPATIENT
Start: 2019-11-05 | End: 2019-11-06

## 2019-11-05 RX ORDER — METRONIDAZOLE 500 MG
500 TABLET ORAL ONCE
Refills: 0 | Status: COMPLETED | OUTPATIENT
Start: 2019-11-05 | End: 2019-11-05

## 2019-11-05 RX ORDER — MIDODRINE HYDROCHLORIDE 2.5 MG/1
10 TABLET ORAL EVERY 8 HOURS
Refills: 0 | Status: DISCONTINUED | OUTPATIENT
Start: 2019-11-05 | End: 2019-11-05

## 2019-11-05 RX ORDER — NOREPINEPHRINE BITARTRATE/D5W 8 MG/250ML
0.05 PLASTIC BAG, INJECTION (ML) INTRAVENOUS
Qty: 8 | Refills: 0 | Status: DISCONTINUED | OUTPATIENT
Start: 2019-11-05 | End: 2019-11-05

## 2019-11-05 RX ORDER — ACETAMINOPHEN 500 MG
650 TABLET ORAL ONCE
Refills: 0 | Status: COMPLETED | OUTPATIENT
Start: 2019-11-05 | End: 2019-11-05

## 2019-11-05 RX ORDER — ACETAMINOPHEN 500 MG
650 TABLET ORAL ONCE
Refills: 0 | Status: COMPLETED | OUTPATIENT
Start: 2019-11-05 | End: 2019-11-06

## 2019-11-05 RX ORDER — ALBUMIN HUMAN 25 %
50 VIAL (ML) INTRAVENOUS EVERY 6 HOURS
Refills: 0 | Status: COMPLETED | OUTPATIENT
Start: 2019-11-05 | End: 2019-11-06

## 2019-11-05 RX ORDER — MIDODRINE HYDROCHLORIDE 2.5 MG/1
10 TABLET ORAL EVERY 8 HOURS
Refills: 0 | Status: DISCONTINUED | OUTPATIENT
Start: 2019-11-05 | End: 2019-11-09

## 2019-11-05 RX ADMIN — PANTOPRAZOLE SODIUM 40 MILLIGRAM(S): 20 TABLET, DELAYED RELEASE ORAL at 12:48

## 2019-11-05 RX ADMIN — Medication 1: at 05:14

## 2019-11-05 RX ADMIN — CHLORHEXIDINE GLUCONATE 15 MILLILITER(S): 213 SOLUTION TOPICAL at 17:04

## 2019-11-05 RX ADMIN — MIDODRINE HYDROCHLORIDE 10 MILLIGRAM(S): 2.5 TABLET ORAL at 22:22

## 2019-11-05 RX ADMIN — Medication 1: at 12:50

## 2019-11-05 RX ADMIN — CHLORHEXIDINE GLUCONATE 1 APPLICATION(S): 213 SOLUTION TOPICAL at 05:13

## 2019-11-05 RX ADMIN — CEFEPIME 100 MILLIGRAM(S): 1 INJECTION, POWDER, FOR SOLUTION INTRAMUSCULAR; INTRAVENOUS at 09:02

## 2019-11-05 RX ADMIN — Medication 650 MILLIGRAM(S): at 10:11

## 2019-11-05 RX ADMIN — Medication 100 MILLIGRAM(S): at 10:49

## 2019-11-05 RX ADMIN — Medication 2: at 17:54

## 2019-11-05 RX ADMIN — Medication 100 MILLIGRAM(S): at 18:03

## 2019-11-05 RX ADMIN — CHLORHEXIDINE GLUCONATE 15 MILLILITER(S): 213 SOLUTION TOPICAL at 05:13

## 2019-11-05 RX ADMIN — Medication 81 MILLIGRAM(S): at 12:48

## 2019-11-05 RX ADMIN — CEFEPIME 100 MILLIGRAM(S): 1 INJECTION, POWDER, FOR SOLUTION INTRAMUSCULAR; INTRAVENOUS at 16:54

## 2019-11-05 RX ADMIN — Medication 250 MILLIGRAM(S): at 08:20

## 2019-11-05 RX ADMIN — Medication 50 MILLILITER(S): at 10:32

## 2019-11-05 RX ADMIN — Medication 1: at 01:30

## 2019-11-05 RX ADMIN — Medication 50 MILLILITER(S): at 17:03

## 2019-11-05 RX ADMIN — Medication 650 MILLIGRAM(S): at 08:15

## 2019-11-05 RX ADMIN — MIDODRINE HYDROCHLORIDE 10 MILLIGRAM(S): 2.5 TABLET ORAL at 15:45

## 2019-11-05 RX ADMIN — OCTREOTIDE ACETATE 100 MICROGRAM(S): 200 INJECTION, SOLUTION INTRAVENOUS; SUBCUTANEOUS at 22:22

## 2019-11-05 RX ADMIN — DEXMEDETOMIDINE HYDROCHLORIDE IN 0.9% SODIUM CHLORIDE 8.79 MICROGRAM(S)/KG/HR: 4 INJECTION INTRAVENOUS at 05:14

## 2019-11-05 RX ADMIN — Medication 50 MILLILITER(S): at 22:21

## 2019-11-05 NOTE — PROGRESS NOTE ADULT - SUBJECTIVE AND OBJECTIVE BOX
OVERNIGHT EVENTS / SUBJECTIVE: Patient seen and examined at bedside.     OBJECTIVE:    VITAL SIGNS:  ICU Vital Signs Last 24 Hrs  T(C): 37.5 (2019 12:00), Max: 39.3 (2019 08:00)  T(F): 99.5 (2019 12:00), Max: 102.7 (2019 08:00)  HR: 99 (2019 13:) (67 - 99)  BP: 102/65 (:00) (92/51 - 117/69)  BP(mean): 79 (2019 13:) (63 - 88)  ABP: --  ABP(mean): --  RR: 14 (:00) (14 - 14)  SpO2: 100% (:) (100% - 100%)    Mode: AC/ CMV (Assist Control/ Continuous Mandatory Ventilation), RR (machine): 14, TV (machine): 550, FiO2: 30, PEEP: 5, ITime: 1, MAP: 11, PIP: 25     @ 07: @ 07:00  --------------------------------------------------------  IN: 1285.8 mL / OUT: 1285 mL / NET: 0.8 mL     @ 07:  -   @ 13:59  --------------------------------------------------------  IN: 670 mL / OUT: 115 mL / NET: 555 mL      CAPILLARY BLOOD GLUCOSE      POCT Blood Glucose.: 155 mg/dL (2019 05:12)      PHYSICAL EXAM:    General: NAD  HEENT: NC/AT; PERRL, clear conjunctiva  Neck: supple  Respiratory: CTA b/l  Cardiovascular: +S1/S2; RRR  Abdomen: soft, NT/ND; +BS x4  Extremities: WWP, 2+ peripheral pulses b/l; no LE edema  Skin: normal color and turgor; no rash  Neurological:    MEDICATIONS:  MEDICATIONS  (STANDING):  albumin human 25% IVPB 50 milliLiter(s) IV Intermittent every 6 hours  aspirin  chewable 81 milliGRAM(s) Oral daily  cefepime   IVPB 1000 milliGRAM(s) IV Intermittent every 8 hours  chlorhexidine 0.12% Liquid 15 milliLiter(s) Oral Mucosa every 12 hours  chlorhexidine 4% Liquid 1 Application(s) Topical <User Schedule>  dexMEDEtomidine Infusion 0.4 MICROgram(s)/kG/Hr (8.79 mL/Hr) IV Continuous <Continuous>  dextrose 5%. 1000 milliLiter(s) (50 mL/Hr) IV Continuous <Continuous>  dextrose 50% Injectable 12.5 Gram(s) IV Push once  dextrose 50% Injectable 25 Gram(s) IV Push once  dextrose 50% Injectable 25 Gram(s) IV Push once  fentaNYL   Infusion. 0.5 MICROgram(s)/kG/Hr (2.198 mL/Hr) IV Continuous <Continuous>  insulin lispro (HumaLOG) corrective regimen sliding scale   SubCutaneous every 6 hours  metroNIDAZOLE  IVPB      metroNIDAZOLE  IVPB 500 milliGRAM(s) IV Intermittent every 8 hours  midodrine 10 milliGRAM(s) Oral every 8 hours  pantoprazole  Injectable 40 milliGRAM(s) IV Push daily    MEDICATIONS  (PRN):  dextrose 40% Gel 15 Gram(s) Oral once PRN Blood Glucose LESS THAN 70 milliGRAM(s)/deciliter  glucagon  Injectable 1 milliGRAM(s) IntraMuscular once PRN Glucose LESS THAN 70 milligrams/deciliter      ALLERGIES:  Allergies    No Known Allergies    Intolerances        LABS:                        7.6    23.91 )-----------( 255      ( 2019 23:50 )             23.8     Hemoglobin: 7.6 g/dL ( @ 23:50)  Hemoglobin: 6.6 g/dL ( @ 15:17)  Hemoglobin: 7.5 g/dL ( @ 00:57)  Hemoglobin: 8.3 g/dL ( @ 04:52)  Hemoglobin: 7.9 g/dL ( @ 02:17)    CBC Full  -  ( 2019 23:50 )  WBC Count : 23.91 K/uL  RBC Count : 3.09 M/uL  Hemoglobin : 7.6 g/dL  Hematocrit : 23.8 %  Platelet Count - Automated : 255 K/uL  Mean Cell Volume : 77.0 fl  Mean Cell Hemoglobin : 24.6 pg  Mean Cell Hemoglobin Concentration : 31.9 gm/dL  Auto Neutrophil # : x  Auto Lymphocyte # : x  Auto Monocyte # : x  Auto Eosinophil # : x  Auto Basophil # : x  Auto Neutrophil % : x  Auto Lymphocyte % : x  Auto Monocyte % : x  Auto Eosinophil % : x  Auto Basophil % : x        140  |  102  |  86<H>  ----------------------------<  187<H>  4.3   |  22  |  2.66<H>    Ca    8.3<L>      2019 11:25  Phos  5.1       Mg     3.2         TPro  6.5  /  Alb  3.1<L>  /  TBili  0.7  /  DBili  x   /  AST  49<H>  /  ALT  22  /  AlkPhos  453<H>      Creatinine Trend: 2.66<--, 2.01<--, 2.26<--, 2.49<--, 1.47<--, 1.34<--  LIVER FUNCTIONS - ( 2019 11:25 )  Alb: 3.1 g/dL / Pro: 6.5 g/dL / ALK PHOS: 453 U/L / ALT: 22 U/L / AST: 49 U/L / GGT: x           PT/INR - ( 2019 23:50 )   PT: 33.3 sec;   INR: 2.80 ratio         PTT - ( 2019 23:50 )  PTT:30.9 sec    hs Troponin:            Urinalysis Basic - ( 2019 15:22 )    Color: Yellow / Appearance: Slightly Turbid / S.020 / pH: x  Gluc: x / Ketone: Negative  / Bili: Negative / Urobili: Negative   Blood: x / Protein: 30 mg/dL / Nitrite: Negative   Leuk Esterase: Large / RBC: 3 /hpf / WBC 36 /HPF   Sq Epi: x / Non Sq Epi: 4 /hpf / Bacteria: 0.0      CSF:                      EKG:   MICROBIOLOGY:    Culture - Body Fluid with Gram Stain (collected 2019 16:44)  Source: .Body Fluid Peritoneal Fluid  Gram Stain (2019 23:24):    polymorphonuclear leukocytes seen    No organisms seen    by cytocentrifuge  Preliminary Report (2019 13:06):    No growth    Culture - Blood (collected 2019 09:02)  Source: .Blood Blood  Preliminary Report (2019 10:01):    No growth to date.    Culture - Blood (collected 2019 04:11)  Source: .Blood Blood-Peripheral  Preliminary Report (2019 05:01):    No growth to date.    Culture - Blood (collected 2019 04:11)  Source: .Blood Blood-Peripheral  Preliminary Report (2019 05:01):    No growth to date.    Culture - Blood (collected 2019 04:11)  Source: .Blood Blood-Venous  Preliminary Report (2019 05:00):    No growth to date.      IMAGING:      Labs, imaging, EKG personally reviewed    RADIOLOGY & ADDITIONAL TESTS: Reviewed. OVERNIGHT EVENTS / SUBJECTIVE: Patient seen and examined at bedside.     OBJECTIVE: Patient originally not responding to pain off of precedex and fentanyl. Was considering CTH but patient began to wake up later in the day. Still intubated and not quite ready for extubation. Febrile to 102.7. Unable to assess ROS because patient not responding to questions nor following commands.    VITAL SIGNS:  ICU Vital Signs Last 24 Hrs  T(C): 37.5 (2019 12:00), Max: 39.3 (2019 08:00)  T(F): 99.5 (2019 12:00), Max: 102.7 (2019 08:00)  HR: 99 (2019 13:) (67 - 99)  BP: 102/65 (2019 13:00) (92/51 - 117/69)  BP(mean): 79 (2019 13:) (63 - 88)  ABP: --  ABP(mean): --  RR: 14 (:00) (14 - 14)  SpO2: 100% (:) (100% - 100%)    Mode: AC/ CMV (Assist Control/ Continuous Mandatory Ventilation), RR (machine): 14, TV (machine): 550, FiO2: 30, PEEP: 5, ITime: 1, MAP: 11, PIP: 25     @ 07:  -   @ 07:00  --------------------------------------------------------  IN: 1285.8 mL / OUT: 1285 mL / NET: 0.8 mL     @ 07:  -   @ 13:59  --------------------------------------------------------  IN: 670 mL / OUT: 115 mL / NET: 555 mL      CAPILLARY BLOOD GLUCOSE      POCT Blood Glucose.: 155 mg/dL (2019 05:12)      PHYSICAL EXAM:    General: NAD, diaphoretic   HEENT: NC/AT; PERRL, clear conjunctiva  Neck: supple  Respiratory: CTA b/l  Cardiovascular: +S1/S2; RRR  Abdomen: soft, NT; decreased bowel sounds, distended  Extremities: WWP, 2+ peripheral pulses b/l; no LE edema  Skin: normal color and turgor; no rash  Neurological: not responding to commands, not responding to pain    MEDICATIONS:  MEDICATIONS  (STANDING):  albumin human 25% IVPB 50 milliLiter(s) IV Intermittent every 6 hours  aspirin  chewable 81 milliGRAM(s) Oral daily  cefepime   IVPB 1000 milliGRAM(s) IV Intermittent every 8 hours  chlorhexidine 0.12% Liquid 15 milliLiter(s) Oral Mucosa every 12 hours  chlorhexidine 4% Liquid 1 Application(s) Topical <User Schedule>  dexMEDEtomidine Infusion 0.4 MICROgram(s)/kG/Hr (8.79 mL/Hr) IV Continuous <Continuous>  dextrose 5%. 1000 milliLiter(s) (50 mL/Hr) IV Continuous <Continuous>  dextrose 50% Injectable 12.5 Gram(s) IV Push once  dextrose 50% Injectable 25 Gram(s) IV Push once  dextrose 50% Injectable 25 Gram(s) IV Push once  fentaNYL   Infusion. 0.5 MICROgram(s)/kG/Hr (2.198 mL/Hr) IV Continuous <Continuous>  insulin lispro (HumaLOG) corrective regimen sliding scale   SubCutaneous every 6 hours  metroNIDAZOLE  IVPB      metroNIDAZOLE  IVPB 500 milliGRAM(s) IV Intermittent every 8 hours  midodrine 10 milliGRAM(s) Oral every 8 hours  pantoprazole  Injectable 40 milliGRAM(s) IV Push daily    MEDICATIONS  (PRN):  dextrose 40% Gel 15 Gram(s) Oral once PRN Blood Glucose LESS THAN 70 milliGRAM(s)/deciliter  glucagon  Injectable 1 milliGRAM(s) IntraMuscular once PRN Glucose LESS THAN 70 milligrams/deciliter      ALLERGIES:  Allergies    No Known Allergies    Intolerances        LABS:                        7.6    23.91 )-----------( 255      ( 2019 23:50 )             23.8     Hemoglobin: 7.6 g/dL ( @ 23:50)  Hemoglobin: 6.6 g/dL ( @ 15:17)  Hemoglobin: 7.5 g/dL ( @ 00:57)  Hemoglobin: 8.3 g/dL ( @ 04:52)  Hemoglobin: 7.9 g/dL ( @ 02:17)    CBC Full  -  ( 2019 23:50 )  WBC Count : 23.91 K/uL  RBC Count : 3.09 M/uL  Hemoglobin : 7.6 g/dL  Hematocrit : 23.8 %  Platelet Count - Automated : 255 K/uL  Mean Cell Volume : 77.0 fl  Mean Cell Hemoglobin : 24.6 pg  Mean Cell Hemoglobin Concentration : 31.9 gm/dL  Auto Neutrophil # : x  Auto Lymphocyte # : x  Auto Monocyte # : x  Auto Eosinophil # : x  Auto Basophil # : x  Auto Neutrophil % : x  Auto Lymphocyte % : x  Auto Monocyte % : x  Auto Eosinophil % : x  Auto Basophil % : x        140  |  102  |  86<H>  ----------------------------<  187<H>  4.3   |  22  |  2.66<H>    Ca    8.3<L>      2019 11:25  Phos  5.1       Mg     3.2         TPro  6.5  /  Alb  3.1<L>  /  TBili  0.7  /  DBili  x   /  AST  49<H>  /  ALT  22  /  AlkPhos  453<H>      Creatinine Trend: 2.66<--, 2.01<--, 2.26<--, 2.49<--, 1.47<--, 1.34<--  LIVER FUNCTIONS - ( 2019 11:25 )  Alb: 3.1 g/dL / Pro: 6.5 g/dL / ALK PHOS: 453 U/L / ALT: 22 U/L / AST: 49 U/L / GGT: x           PT/INR - ( 2019 23:50 )   PT: 33.3 sec;   INR: 2.80 ratio         PTT - ( 2019 23:50 )  PTT:30.9 sec    hs Troponin:            Urinalysis Basic - ( 2019 15:22 )    Color: Yellow / Appearance: Slightly Turbid / S.020 / pH: x  Gluc: x / Ketone: Negative  / Bili: Negative / Urobili: Negative   Blood: x / Protein: 30 mg/dL / Nitrite: Negative   Leuk Esterase: Large / RBC: 3 /hpf / WBC 36 /HPF   Sq Epi: x / Non Sq Epi: 4 /hpf / Bacteria: 0.0      CSF:                      EKG:   MICROBIOLOGY:    Culture - Body Fluid with Gram Stain (collected 2019 16:44)  Source: .Body Fluid Peritoneal Fluid  Gram Stain (2019 23:24):    polymorphonuclear leukocytes seen    No organisms seen    by cytocentrifuge  Preliminary Report (2019 13:06):    No growth    Culture - Blood (collected 2019 09:02)  Source: .Blood Blood  Preliminary Report (2019 10:01):    No growth to date.    Culture - Blood (collected 2019 04:11)  Source: .Blood Blood-Peripheral  Preliminary Report (2019 05:01):    No growth to date.    Culture - Blood (collected 2019 04:11)  Source: .Blood Blood-Peripheral  Preliminary Report (2019 05:01):    No growth to date.    Culture - Blood (collected 2019 04:11)  Source: .Blood Blood-Venous  Preliminary Report (2019 05:00):    No growth to date.      IMAGING:      Labs, imaging, EKG personally reviewed    RADIOLOGY & ADDITIONAL TESTS: Reviewed.    < from: CT Abdomen and Pelvis No Cont (19 @ 20:10) >  IMPRESSION:    Interval increase in the pancreatic tail mass and the bilobar liver   lesions/metastases.    New small volume abdominopelvic ascites with questionable peritoneal   nodularity. No hyperdense component to suggest hemorrhage.    Small bilateral pleural effusions.      < end of copied text >

## 2019-11-05 NOTE — PROGRESS NOTE ADULT - PROBLEM SELECTOR PLAN 3
Onc eval appreciated   ABd US noted  S/P  paracentesis, Cx negative to date   hold eliquis for now and started on heparin for AC   poor prognosis   Onc mahsa appreciated  Abx as per ID  will benefit from JOCELYNN to evaluate for recurrent CVAs W/U

## 2019-11-05 NOTE — DIETITIAN INITIAL EVALUATION ADULT. - PROBLEM SELECTOR PLAN 1
- attempted to call Dr. Korin Hendrickson's answering service over night, but kept getting disconnected. will need neuro consult in AM  - house neuro defers to Dr. Hendrickson  - ddx includes cva, brain met, paraneoplastic/autoimmune, conversion do  - check ck  - will order MRI head/cspine

## 2019-11-05 NOTE — CHART NOTE - NSCHARTNOTEFT_GEN_A_CORE
Upon Nutritional Assessment by the Registered Dietitian your patient was determined to meet criteria / has evidence of the following diagnosis/diagnoses:          [ ]  Mild Protein Calorie Malnutrition        [ ]  Moderate Protein Calorie Malnutrition        [x ] Severe Protein Calorie Malnutrition        [ ] Unspecified Protein Calorie Malnutrition        [ ] Underweight / BMI <19        [ ] Morbid Obesity / BMI > 40      Findings as based on:  [x ] Comprehensive nutrition assessment   [ ] Nutrition Focused Physical Exam  [ ] Other:       Nutrition Plan/Recommendations:  pt intubated, recommend Nepro 60cc/hr x 18 hrs        PROVIDER Section:     By signing this assessment you are acknowledging and agree with the diagnosis/diagnoses assigned by the Registered Dietitian    Comments:

## 2019-11-05 NOTE — PROGRESS NOTE ADULT - SUBJECTIVE AND OBJECTIVE BOX
[facility]    Subjective: Patient seen and examined. No new events except as noted.   off sedation since midnight   no improvement in mental stat   repeat Head CT     REVIEW OF SYSTEMS:  intubated, unresponsive     MEDICATIONS:  MEDICATIONS  (STANDING):  albumin human 25% IVPB 50 milliLiter(s) IV Intermittent every 6 hours  aspirin  chewable 81 milliGRAM(s) Oral daily  cefepime   IVPB      cefepime   IVPB 1000 milliGRAM(s) IV Intermittent every 12 hours  chlorhexidine 0.12% Liquid 15 milliLiter(s) Oral Mucosa every 12 hours  chlorhexidine 4% Liquid 1 Application(s) Topical <User Schedule>  dexMEDEtomidine Infusion 0.4 MICROgram(s)/kG/Hr (8.79 mL/Hr) IV Continuous <Continuous>  dextrose 5%. 1000 milliLiter(s) (50 mL/Hr) IV Continuous <Continuous>  dextrose 50% Injectable 12.5 Gram(s) IV Push once  dextrose 50% Injectable 25 Gram(s) IV Push once  dextrose 50% Injectable 25 Gram(s) IV Push once  fentaNYL   Infusion. 0.5 MICROgram(s)/kG/Hr (2.198 mL/Hr) IV Continuous <Continuous>  insulin lispro (HumaLOG) corrective regimen sliding scale   SubCutaneous every 6 hours  metroNIDAZOLE  IVPB      metroNIDAZOLE  IVPB 500 milliGRAM(s) IV Intermittent every 8 hours  midodrine 10 milliGRAM(s) Oral every 8 hours  pantoprazole  Injectable 40 milliGRAM(s) IV Push daily      PHYSICAL EXAM:  T(C): 37.6 (19 @ 10:00), Max: 39.3 (19 @ 08:00)  HR: 73 (19 @ 11:00) (67 - 83)  BP: 92/62 (19 @ 11:00) (92/51 - 117/69)  RR: 14 (19 @ 07:00) (14 - 14)  SpO2: 100% (19 @ 11:00) (100% - 100%)  Wt(kg): --  I&O's Summary    2019 07:01  -  2019 07:00  --------------------------------------------------------  IN: 1285.8 mL / OUT: 1285 mL / NET: 0.8 mL    2019 07:01  -  2019 11:25  --------------------------------------------------------  IN: 0 mL / OUT: 0 mL / NET: 0 mL          Appearance: unresponsive, intubated   HEENT: rolled back  Lymphatic: No lymphadenopathy   Cardiovascular: Normal S1 S2  Respiratory: B/L air entry   Gastrointestinal:  Soft, distedned  Skin: sweating  Musculoskeletal: muscle spasm   Psychiatry:  comatose   Ext: No edema                          7.6    23.91 )-----------( 255      ( 2019 23:50 )             23.8               11-04    140  |  102  |  75<H>  ----------------------------<  187<H>  4.3   |  24  |  2.01<H>    Ca    7.4<L>      2019 23:50  Phos  4.7     11-  Mg     3.0     11-    TPro  6.1  /  Alb  2.8<L>  /  TBili  0.5  /  DBili  x   /  AST  40  /  ALT  22  /  AlkPhos  320<H>  11-04    PT/INR - ( 2019 23:50 )   PT: 33.3 sec;   INR: 2.80 ratio         PTT - ( 2019 23:50 )  PTT:30.9 sec                   Urinalysis Basic - ( 2019 15:22 )    Color: Yellow / Appearance: Slightly Turbid / S.020 / pH: x  Gluc: x / Ketone: Negative  / Bili: Negative / Urobili: Negative   Blood: x / Protein: 30 mg/dL / Nitrite: Negative   Leuk Esterase: Large / RBC: 3 /hpf / WBC 36 /HPF   Sq Epi: x / Non Sq Epi: 4 /hpf / Bacteria: 0.0      < from: Limited Transthoracic Echo (19 @ 14:10) >  CONCLUSIONS:  1. Agitated saline injection demonstrates no evidence of a  patent foramen ovale.        All labs, Imaging and EKGs personally reviewed

## 2019-11-05 NOTE — DIETITIAN INITIAL EVALUATION ADULT. - PERTINENT MEDS FT
MEDICATIONS  (STANDING):  acetaminophen    Suspension .. 650 milliGRAM(s) Oral once  albumin human 25% IVPB 50 milliLiter(s) IV Intermittent every 6 hours  aspirin  chewable 81 milliGRAM(s) Oral daily  cefepime   IVPB      cefepime   IVPB 1000 milliGRAM(s) IV Intermittent every 12 hours  chlorhexidine 0.12% Liquid 15 milliLiter(s) Oral Mucosa every 12 hours  chlorhexidine 4% Liquid 1 Application(s) Topical <User Schedule>  dexMEDEtomidine Infusion 0.4 MICROgram(s)/kG/Hr (8.79 mL/Hr) IV Continuous <Continuous>  dextrose 5%. 1000 milliLiter(s) (50 mL/Hr) IV Continuous <Continuous>  dextrose 50% Injectable 12.5 Gram(s) IV Push once  dextrose 50% Injectable 25 Gram(s) IV Push once  dextrose 50% Injectable 25 Gram(s) IV Push once  fentaNYL   Infusion. 0.5 MICROgram(s)/kG/Hr (2.198 mL/Hr) IV Continuous <Continuous>  insulin lispro (HumaLOG) corrective regimen sliding scale   SubCutaneous every 6 hours  metroNIDAZOLE  IVPB      metroNIDAZOLE  IVPB 500 milliGRAM(s) IV Intermittent once  metroNIDAZOLE  IVPB 500 milliGRAM(s) IV Intermittent every 8 hours  midodrine 10 milliGRAM(s) Oral every 8 hours  pantoprazole  Injectable 40 milliGRAM(s) IV Push daily    MEDICATIONS  (PRN):  dextrose 40% Gel 15 Gram(s) Oral once PRN Blood Glucose LESS THAN 70 milliGRAM(s)/deciliter  glucagon  Injectable 1 milliGRAM(s) IntraMuscular once PRN Glucose LESS THAN 70 milligrams/deciliter

## 2019-11-05 NOTE — DIETITIAN INITIAL EVALUATION ADULT. - OTHER INFO
Pt seen for: MICU Length Of Stay   Adm dx: sepsis, [place], CVA, new dx pancreatic cancer w/ liver mets, FTT. S/P paracentesis 11/3 1800cc removed    GI issues: no vomiting, + abd distention, has large volume ascites   Last BM: 11/3    Food allergies/Intolerances: NKFA   Vit/supplement PTA: none noted    Diet PTA: per previous RD note 10/23 "Reports taking minerals and a shake with Vegan protein PTA. Pt reports not following any type of diet or restriction at home. Reports monitoring BG 2xday with ranges between 88 - 97 mg/dl"   A1c 10/29 7.8. 10/17 7.2     Subjective/Objective information: pt intubated, no visitors at bedside. Wt 10/23 200 lb, pt had reported usual wt 204 lb, dosing wt 11/1 194 lb, 11/4 188 lb, actually wt loss may be masked by ascites. Per H+P pt had decreased appetite PTA.    Education: Not indicated at this time

## 2019-11-05 NOTE — DIETITIAN INITIAL EVALUATION ADULT. - ETIOLOGY
inability to meet estimated nutrition needs in setting of increased needs w/ catabolic illness (mets cancer)

## 2019-11-05 NOTE — PROGRESS NOTE ADULT - PROBLEM SELECTOR PLAN 1
S/P intubation  MICU care   all BP meds on hold   on pressors for BP control   ABx and acute care as per ICU team  off marisol sedation

## 2019-11-05 NOTE — PROGRESS NOTE ADULT - ASSESSMENT
1. 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 Metastatic Pancreatic CA    -- plan was for outpt palliative chemo. in light of acute events, I dont think he will recover to allow for chemo  -- family aware of gravity of situation  -- Palliative Care/Hospice is very reasonable    3. Leukocytosis    -- consider paracentesis to r/o peritonitis and for comfort  -- Cultures have been negative  -- may be leukemoid rxn from malignancy    4. Anemia     -- sec to malignancy  -- transfuse prn Hgb < 7    guarded prognosis    Fer Morelos MD  Hematology/Oncology  Cell:  [phone]  Office Phone: [phone]  Office Fax:  [phone] [address] [id number]

## 2019-11-05 NOTE — PROGRESS NOTE ADULT - SUBJECTIVE AND OBJECTIVE BOX
pt intubated at bedside.    acetaminophen    Suspension .. 650 milliGRAM(s) Oral every 6 hours  aspirin  chewable 81 milliGRAM(s) Oral daily  cefepime   IVPB      cefepime   IVPB 1000 milliGRAM(s) IV Intermittent every 12 hours  chlorhexidine 0.12% Liquid 15 milliLiter(s) Oral Mucosa every 12 hours  chlorhexidine 4% Liquid 1 Application(s) Topical <User Schedule>  dexMEDEtomidine Infusion 0.4 MICROgram(s)/kG/Hr IV Continuous <Continuous>  dextrose 40% Gel 15 Gram(s) Oral once PRN  dextrose 5%. 1000 milliLiter(s) IV Continuous <Continuous>  dextrose 50% Injectable 12.5 Gram(s) IV Push once  dextrose 50% Injectable 25 Gram(s) IV Push once  dextrose 50% Injectable 25 Gram(s) IV Push once  fentaNYL   Infusion. 0.5 MICROgram(s)/kG/Hr IV Continuous <Continuous>  glucagon  Injectable 1 milliGRAM(s) IntraMuscular once PRN  insulin lispro (HumaLOG) corrective regimen sliding scale   SubCutaneous every 6 hours  pantoprazole  Injectable 40 milliGRAM(s) IV Push daily  vancomycin  IVPB 1000 milliGRAM(s) IV Intermittent once      No Known Allergies      ROS otherwise unobtainable    T(C): 37.4 (11-05-19 @ 04:00), Max: 38.1 (11-04-19 @ 14:00)  HR: 77 (11-05-19 @ 08:00) (67 - 89)  BP: 97/62 (11-05-19 @ 08:00) (92/51 - 117/69)  RR: 14 (11-05-19 @ 07:00) (14 - 14)  SpO2: 100% (11-05-19 @ 08:00) (100% - 100%)  PHYSICAL EXAM  Gen:  laying in bed, intubated, sedateed  CV:  RRR, S1, S2  Lungs:  CTA b/l  Ab soft/nt/nd  Ext:  no edema                          7.6    23.91 )-----------( 255      ( 04 Nov 2019 23:50 )             23.8                         6.6    25.69 )-----------( 260      ( 04 Nov 2019 15:17 )             20.0                         7.5    26.21 )-----------( 312      ( 04 Nov 2019 00:57 )             23.6   11-04    140  |  102  |  75<H>  ----------------------------<  187<H>  4.3   |  24  |  2.01<H>    Ca    7.4<L>      04 Nov 2019 23:50  Phos  4.7     11-04  Mg     3.0     11-04    TPro  6.1  /  Alb  2.8<L>  /  TBili  0.5  /  DBili  x   /  AST  40  /  ALT  22  /  AlkPhos  320<H>  11-04

## 2019-11-05 NOTE — PROGRESS NOTE ADULT - ASSESSMENT
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    #Neuro  - CVA w/residual R-sided weakness - c/w home ASA 81mg  - on fentanyl (3.5mcg/kg in AM) will try to wean off  - started precedex 0.4 mcg/kg/hr  - neuro c/s about restarting hep gtt    #Pulm  - intubated and sedated  - RR 14  PEEP 5 FiO2 40%  - will wean as tolerated    #Cards  - holding home meds in the s/o sepsis  - c/w home ASA 81mg  - f/u bubble study    #GI  - pancreatic adenocarcinoma w/METS to liver s/p large volume para  - c/w 1.5mg/kg albumin, stop after 6 doses  - will reassess after 6 doses to see if we need to give more albumin at 1mg/kg  - f/u Abd U/S    #Renal  - [place]: getting albumin as above    #Endo  - DM2: ISS    #ID  - dc'd vanc/CTX  - SBP (1730 PMNs) started cefepime on 11/4    #GOC  - full code 54M c hx HTN, DM2, HLD, CVA 6 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    #Neuro  - CVA w/residual R-sided weakness   - c/w home ASA 81mg  - off of fentanyl and precedex  - ok to restart hep gtt but INR currently bt 2-3, holding for now    #Pulm  - still intubated but not sedated  - RR 14  PEEP 5 FiO2 30%  - will attempt CPAP trials as tolerated  - small b/l pleural effusions seen on CTAP (11/4)    #Cards  - holding home meds in the s/o sepsis  - c/w home ASA 81mg  - bubble study neg for PFO  - started midodrine 10mg TID on 11/5 d/t HoTN    #GI  - pancreatic adenocarcinoma w/METS to liver s/p large volume para  - c/w 1.5mg/kg albumin, stopped after 6 doses, now giving albumin at 1mg/kg  - CTAP (11/4): interval inc in pancreatic tail mass & liver METS, small vol ascites & questionable peritoneal nodularity, small b/l pleural effusions  - c/w protonix  - c/w Tube feedings, rate/type per nutrition    #Renal  - [place]: getting albumin as above  - FeNa consistent w/pre-renal condition  - added vasopressin for hepatorenal syndrome    #Endo  - DM2: ISS    #ID  - dc'd vanc/CTX on 11/4  - SBP (1730 PMNs) per para: started cefepime on 11/4  - added flagyl on 11/5  - BCx and para fluid Cx NGTD    #GOC  - full code

## 2019-11-05 NOTE — EEG REPORT - NS EEG TEXT BOX
[facility]   COMPREHENSIVE EPILEPSY CENTER   REPORT OF Continuous VIDEO EEG     [facility]: [address] [place] [address] , [address], [place] [address], Ph#: [phone]  [facility]: [id number] [address] [place] [address], Ph#: [phone]  [facility]: [address] [place] [address], Ph#: [phone]    Patient Name: ASHLEY AVILEZ  Age and : 54y (63)  MRN #: [id number]  Location: [address]  Referring Physician: Pallavi Lantigua    Study Date: 19 08:00  End Date: 19 08:00    _____________________________________________________________  HISTORY    Patient is a 54y old  Male who presents with a chief complaint of weakness (2019 11:03)      PERTINENT MEDICATION:  acetaminophen    Suspension .. 650 milliGRAM(s) Oral once  aspirin  chewable 81 milliGRAM(s) Oral daily  cefepime   IVPB      cefepime   IVPB 1000 milliGRAM(s) IV Intermittent every 12 hours  chlorhexidine 0.12% Liquid 15 milliLiter(s) Oral Mucosa every 12 hours  chlorhexidine 4% Liquid 1 Application(s) Topical <User Schedule>  dexMEDEtomidine Infusion 0.4 MICROgram(s)/kG/Hr (8.79 mL/Hr) IV Continuous <Continuous>  dextrose 5%. 1000 milliLiter(s) (50 mL/Hr) IV Continuous <Continuous>  dextrose 50% Injectable 12.5 Gram(s) IV Push once  dextrose 50% Injectable 25 Gram(s) IV Push once  dextrose 50% Injectable 25 Gram(s) IV Push once  fentaNYL   Infusion. 0.5 MICROgram(s)/kG/Hr (2.198 mL/Hr) IV Continuous <Continuous>  insulin lispro (HumaLOG) corrective regimen sliding scale   SubCutaneous every 6 hours  pantoprazole  Injectable 40 milliGRAM(s) IV Push daily        _____________________________________________________________  STUDY INTERPRETATION    Findings: The background was continuous, spontaneously variable.  No posterior dominant rhythm seen.    Background Slowing:  Diffuse polymorphic delta slowing at times periodic polymorphic delta, bifrontally predominant.    Focal Slowing:   None were present.    Sleep Background:  Stage II sleep transients were not recorded.    Other Non-Epileptiform Findings:  None were present.    Interictal Epileptiform Activity:   None were present.    Events:  Clinical events: None recorded.  Seizures: None recorded.    Activation Procedures:   Hyperventilation was not performed.    Photic stimulation was not performed.     Artifacts:  Intermittent myogenic and movement artifacts were noted.    ECG:  The heart rate on single channel ECG was predominantly between 60-70 BPM.    _____________________________________________________________  EEG SUMMARY/CLASSIFICATION    Abnormal EEG in an altered patient.   - Moderate to severe generalized slowing.    _____________________________________________________________  EEG IMPRESSION/CLINICAL CORRELATE    Abnormal EEG study.  1. Moderate to severe nonspecific diffuse or multifocal cerebral dysfunction.   2. No epileptiform pattern or seizure seen.  _____________________________________________________________    Kelli Tejada DO  Epilepsy Fellow, [facility] [place]    Yasmany Garay MD  Attending Physician, [facility] [place] [facility]   COMPREHENSIVE EPILEPSY CENTER   REPORT OF Continuous VIDEO EEG     [facility]: [address] [place] [address] , [address], [place] [address], Ph#: [phone]  [facility]: [id number] [facility] [place] [address], Ph#: [phone]  [facility]: [address] [place] [address], Ph#: [phone]    Patient Name: ASHLEY AVILEZ  Age and : 54y (63)  MRN #: [id number]  Location: [address]  Referring Physician: Pallavi Lantigua    Study Date: 19 08:00  End Date: 19 08:00    _____________________________________________________________  HISTORY    Patient is a 54y old  Male who presents with a chief complaint of weakness (2019 11:03)    PERTINENT MEDICATION:  acetaminophen    Suspension .. 650 milliGRAM(s) Oral once  aspirin  chewable 81 milliGRAM(s) Oral daily  cefepime   IVPB      cefepime   IVPB 1000 milliGRAM(s) IV Intermittent every 12 hours  chlorhexidine 0.12% Liquid 15 milliLiter(s) Oral Mucosa every 12 hours  chlorhexidine 4% Liquid 1 Application(s) Topical <User Schedule>  dexMEDEtomidine Infusion 0.4 MICROgram(s)/kG/Hr (8.79 mL/Hr) IV Continuous <Continuous>  fentaNYL   Infusion. 0.5 MICROgram(s)/kG/Hr (2.198 mL/Hr) IV Continuous <Continuous>  insulin lispro (HumaLOG) corrective regimen sliding scale   SubCutaneous every 6 hours  pantoprazole  Injectable 40 milliGRAM(s) IV Push daily    _____________________________________________________________  STUDY INTERPRETATION    Findings: The background was continuous, spontaneously variable.  No posterior dominant rhythm seen.    Background Slowing:  Diffuse polymorphic delta slowing at times periodic polymorphic delta, bifrontally predominant.    Focal Slowing:   None were present.    Sleep Background:  Stage II sleep transients were not recorded.    Other Non-Epileptiform Findings:  None were present.    Interictal Epileptiform Activity:   None were present.    Events:  Clinical events: None recorded.  Seizures: None recorded.    Activation Procedures:   Hyperventilation was not performed.    Photic stimulation was not performed.     Artifacts:  Intermittent myogenic and movement artifacts were noted.    ECG:  The heart rate on single channel ECG was predominantly between 60-70 BPM.    _____________________________________________________________  EEG SUMMARY/CLASSIFICATION    Abnormal EEG in an altered patient.   - Moderate to severe generalized slowing.    _____________________________________________________________  EEG IMPRESSION/CLINICAL CORRELATE    Abnormal EEG study.  1. Moderate to severe nonspecific diffuse or multifocal cerebral dysfunction.   2. No epileptiform pattern or seizure seen.  _____________________________________________________________    Kelli Tejada DO  Epilepsy Fellow, [facility] [place]    Yasmany Garay MD  Attending Physician, [facility] [place] [facility]   COMPREHENSIVE EPILEPSY CENTER   REPORT OF Continuous VIDEO EEG     [facility]: [address] [place] [address] , [address], [place] [address], Ph#: [phone]  [facility]: [id number] [facility] [place] [address], Ph#: [phone]  [facility]: [address] [place] [address], Ph#: [phone]    Patient Name: ASHLEY AVILEZ  Age and : 54y (63)  MRN #: [id number]  Location: [address]  Referring Physician: Pallavi Lantigua    Study Date: 19 08:00  End Date: 19 08:00    _____________________________________________________________  HISTORY    Patient is a 54y old  Male who presents with a chief complaint of weakness (2019 11:03)    PERTINENT MEDICATION:  acetaminophen    Suspension .. 650 milliGRAM(s) Oral once  aspirin  chewable 81 milliGRAM(s) Oral daily  cefepime   IVPB      cefepime   IVPB 1000 milliGRAM(s) IV Intermittent every 12 hours  chlorhexidine 0.12% Liquid 15 milliLiter(s) Oral Mucosa every 12 hours  chlorhexidine 4% Liquid 1 Application(s) Topical <User Schedule>  dexMEDEtomidine Infusion 0.4 MICROgram(s)/kG/Hr (8.79 mL/Hr) IV Continuous <Continuous>  fentaNYL   Infusion. 0.5 MICROgram(s)/kG/Hr (2.198 mL/Hr) IV Continuous <Continuous>  insulin lispro (HumaLOG) corrective regimen sliding scale   SubCutaneous every 6 hours  pantoprazole  Injectable 40 milliGRAM(s) IV Push daily    _____________________________________________________________  STUDY INTERPRETATION    Findings: The background was continuous, spontaneously variable.  No posterior dominant rhythm seen.    Background Slowing:  Diffuse shifting polymorphic delta slowing.  At times semiperiodic delta near 1hz, bifrontally predominant.    Focal Slowing:   None were present.    Sleep Background:  Stage II sleep transients were not recorded.    Other Non-Epileptiform Findings:  None were present.    Interictal Epileptiform Activity:   None were present.    Events:  Clinical events: None recorded.  Seizures: None recorded.    Activation Procedures:   Hyperventilation was not performed.    Photic stimulation was not performed.     Artifacts:  Intermittent myogenic and movement artifacts were noted.    ECG:  The heart rate on single channel ECG was predominantly between 60-70 BPM.    _____________________________________________________________  EEG SUMMARY/CLASSIFICATION    Abnormal EEG in an altered patient.   - Moderate to severe shifting generalized slowing, at times semiperiodic with triphasic morphology.    _____________________________________________________________  EEG IMPRESSION/CLINICAL CORRELATE    Abnormal EEG study  1. Moderate to severe nonspecific diffuse or multifocal cerebral dysfunction.   2. No epileptiform pattern or seizure seen.  _____________________________________________________________    Kelli Tejada DO  Epilepsy Fellow, [facility]    Yasmany Garay MD  Attending Physician, [facility] [place]

## 2019-11-05 NOTE — DIETITIAN INITIAL EVALUATION ADULT. - ENERGY NEEDS
Ht: 71"  Wt: 188  BMI: 26.2 kg/m2   IBW: 172 (+/-10%)     within IBW  Edema: none       Skin: no pressure injuries documented

## 2019-11-05 NOTE — PROGRESS NOTE ADULT - SUBJECTIVE AND OBJECTIVE BOX
[facility]      Seen earlier today, and examined.  - Today, patient is without complaints.           *****MEDICATIONS: Current medication reviewed and documented.    MEDICATIONS  (STANDING):  albumin human 25% IVPB 50 milliLiter(s) IV Intermittent every 6 hours  aspirin  chewable 81 milliGRAM(s) Oral daily  cefepime   IVPB 1000 milliGRAM(s) IV Intermittent every 8 hours  chlorhexidine 0.12% Liquid 15 milliLiter(s) Oral Mucosa every 12 hours  chlorhexidine 4% Liquid 1 Application(s) Topical <User Schedule>  dexMEDEtomidine Infusion 0.4 MICROgram(s)/kG/Hr (8.79 mL/Hr) IV Continuous <Continuous>  dextrose 5%. 1000 milliLiter(s) (50 mL/Hr) IV Continuous <Continuous>  dextrose 50% Injectable 12.5 Gram(s) IV Push once  dextrose 50% Injectable 25 Gram(s) IV Push once  dextrose 50% Injectable 25 Gram(s) IV Push once  fentaNYL   Infusion. 0.5 MICROgram(s)/kG/Hr (2.198 mL/Hr) IV Continuous <Continuous>  insulin lispro (HumaLOG) corrective regimen sliding scale   SubCutaneous every 6 hours  metroNIDAZOLE  IVPB      metroNIDAZOLE  IVPB 500 milliGRAM(s) IV Intermittent every 8 hours  midodrine 10 milliGRAM(s) Oral every 8 hours  pantoprazole  Injectable 40 milliGRAM(s) IV Push daily  vasopressin Infusion 0.04 Unit(s)/Min (2.4 mL/Hr) IV Continuous <Continuous>    MEDICATIONS  (PRN):  dextrose 40% Gel 15 Gram(s) Oral once PRN Blood Glucose LESS THAN 70 milliGRAM(s)/deciliter  glucagon  Injectable 1 milliGRAM(s) IntraMuscular once PRN Glucose LESS THAN 70 milligrams/deciliter          ***** VITAL SIGNS:  T(F): 99.1 (19 @ 16:00), Max: 102.7 (19 @ 08:00)  HR: 85 (19 @ 16:00) (67 - 99)  BP: 102/65 (19 @ 13:00) (92/51 - 117/69)  RR: 14 (19 @ 07:00) (14 - 14)  SpO2: 100% (19 @ 16:00) (100% - 100%)  Wt(kg): --  ,   I&O's Summary    2019 07:  -  2019 07:00  --------------------------------------------------------  IN: 1285.8 mL / OUT: 1285 mL / NET: 0.8 mL    2019 07:  -  2019 16:32  --------------------------------------------------------  IN: 670 mL / OUT: 115 mL / NET: 555 mL             *****PHYSICAL EXAM:      limited exam as pt is intubated sedated   more lethargic today   fever 102.7  can follow some simple commands   shows 2 fingers on the right, makes a fist and able to squeeze my fingers to command.          does not move both le to painful stimuli   Gait not assessed.                *****LAB AND IMAGIN.6    23.91 )-----------( 255      ( 2019 23:50 )             23.8                   140  |  102  |  86<H>  ----------------------------<  187<H>  4.3   |  22  |  2.66<H>    Ca    8.3<L>      2019 11:25  Phos  5.1       Mg     3.2         TPro  6.5  /  Alb  3.1<L>  /  TBili  0.7  /  DBili  x   /  AST  49<H>  /  ALT  22  /  AlkPhos  453<H>      PT/INR - ( 2019 23:50 )   PT: 33.3 sec;   INR: 2.80 ratio         PTT - ( 2019 23:50 )  PTT:30.9 sec                   Urinalysis Basic - ( 2019 15:22 )    Color: Yellow / Appearance: Slightly Turbid / S.020 / pH: x  Gluc: x / Ketone: Negative  / Bili: Negative / Urobili: Negative   Blood: x / Protein: 30 mg/dL / Nitrite: Negative   Leuk Esterase: Large / RBC: 3 /hpf / WBC 36 /HPF   Sq Epi: x / Non Sq Epi: 4 /hpf / Bacteria: 0.0        Abnormal EEG in an altered patient.   - Moderate to severe generalized slowing.    _____________________________________________________________  EEG IMPRESSION/CLINICAL CORRELATE    Abnormal EEG study.  1. Moderate to severe nonspecific diffuse or multifocal cerebral dysfunction.   2. No epileptiform pattern or seizure seen.[All pertinent recent Imaging/Reports reviewed]           *****A S S E S S M E N T   A N D   P L A N :  Excerpt from H&P, 54M c hx HTN, DM2, HLD, CVA 6 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        Problem/Recommendations 1: CVA likely due to hypercoagulable state  Numerous cerebral infarcts noted on MRI Brain while on full dose anticoagulation.   Pt previously known to me from prior admission when pt developed transient weakness while on full dose heparin was not a candidate for acute therapies at that time  Etiology of infarcts likely hypercoagulable state related his underlying metastatic pancreatic adenocarcinoma other etiologies include. marantic endocarditis or infectious endcarditis vs. cns vasculitis   cta recent did not reveal any mycotic aneurysm  tte, or ideally ange may be helpful.   Can repeat cta with contrast to r/o mycotic anuerysm.   pt has had infarcts while on full dose AC, portends a poor outcome   overall guarded prognosis    neuro checks q4, low threshold for ct scan if any change in neuro exam.      intubated/sedated  veeg no seizure. moderate to severe slowing.    mental status depressed likely related to fever +/- sedation in a pt with liver dysfunction.   will continue to monitor closely    discussed with brother at bedside.     Thank you for allowing me to participate in the care of this patient. Please do not hesitate to call me if you have any  questions.        ________________  Korin Hendrickson MD  [facility] [place] [facility] ([facility]  [phone]      33 minutes spent on total encounter; more than 50 % of the visit was  spent counseling about plan of care, compliance to diet/exercise and medication regimen and or  coordinating care by the attending physician.      It is advised that stroke patients follow up with DONALD Eugene @ [phone] in 1- 2 weeks.   Others please follow up with Dr. Michael Nissenbaum [phone]

## 2019-11-05 NOTE — DIETITIAN INITIAL EVALUATION ADULT. - ENTERAL
recommend continue w/ Nepro (volume restricted formula, phos slightly elevated) goal Nepro 60cc/hr x 18 hrs provides 1944 kcals, 87 gm protein, 785cc free water  meets 25 Kcal/Kg, 1.1Gm/kg IBW 78.2 kg

## 2019-11-05 NOTE — PROGRESS NOTE ADULT - ASSESSMENT
55 yo male with history of DM,CVA, and HTN now with failure to thrive in setting of recently diagnosed metastatic pancreatic cancer.  The fever may be a neoplastic one given liver involvement.  No evidence of biliary obstruction or cholangitis.  His leukocytosis is not knew, felt to be reactive to malignancy.  The CT scan and MRI have confirmed multiple cerebral infarcts as cause of weakness.  ? hypercoagulable due to malignancy.Antibiotics are empiric.  Consider ECHO to assess for NBTE although will not   All micro has been negative  Fevers are felt to be neoplastic, this is a diagnosis of exclusion  Repeat CT of A/P with progression of disease  Suggest:  1.consider d/c of all antibiotics, not clear what we are treating  2.will follow micro  3.Vent per critical  care  4.[place] guarded

## 2019-11-05 NOTE — PROGRESS NOTE ADULT - SUBJECTIVE AND OBJECTIVE BOX
CC: f/u for fever    Patient reports: he is more alert, follows simple commands, moves rt hand to command    REVIEW OF SYSTEMS:  All other review of systems negative (Comprehensive ROS): limited, on vent, sedated    Antimicrobials Day #  :day 4  cefepime   IVPB 1000 milliGRAM(s) IV Intermittent every 8 hours  metroNIDAZOLE  IVPB      metroNIDAZOLE  IVPB 500 milliGRAM(s) IV Intermittent every 8 hours    Other Medications Reviewed    T(F): 99.5 (19 @ 12:00), Max: 102.7 (19 @ 08:00)  HR: 86 (19 @ 12:40)  BP: 92/94 (19 @ 12:00)  RR: 14 (19 @ 07:00)  SpO2: 100% (19 @ 12:40)  Wt(kg): --    PHYSICAL EXAM:  General: alert, no acute distress, on vent with EEG dressing in place  Eyes:  anicteric, no conjunctival injection, no discharge  Oropharynx: ETT 	  Neck: supple, without adenopathy  Lungs: scattered ronchi  Heart: regular rate and rhythm; no murmur, rubs or gallops  Abdomen: soft, distended, nontender,hypoactive BS  Skin: no lesions  Extremities: no clubbing, cyanosis,+ edema  Neurologic: alert, oriented, moves rt arm    LAB RESULTS:                        7.6    23.91 )-----------( 255      ( 2019 23:50 )             23.8     -    140  |  102  |  86<H>  ----------------------------<  187<H>  4.3   |  22  |  2.66<H>    Ca    8.3<L>      2019 11:25  Phos  5.1       Mg     3.2         TPro  6.5  /  Alb  3.1<L>  /  TBili  0.7  /  DBili  x   /  AST  49<H>  /  ALT  22  /  AlkPhos  453<H>      LIVER FUNCTIONS - ( 2019 11:25 )  Alb: 3.1 g/dL / Pro: 6.5 g/dL / ALK PHOS: 453 U/L / ALT: 22 U/L / AST: 49 U/L / GGT: x           Urinalysis Basic - ( 2019 15:22 )    Color: Yellow / Appearance: Slightly Turbid / S.020 / pH: x  Gluc: x / Ketone: Negative  / Bili: Negative / Urobili: Negative   Blood: x / Protein: 30 mg/dL / Nitrite: Negative   Leuk Esterase: Large / RBC: 3 /hpf / WBC 36 /HPF   Sq Epi: x / Non Sq Epi: 4 /hpf / Bacteria: 0.0      MICROBIOLOGY:  RECENT CULTURES:   @ 16:44 .Body Fluid Peritoneal Fluid     No growth    polymorphonuclear leukocytes seen  No organisms seen  by cytocentrifuge     @ 09:02 .Blood Blood     No growth to date.       @ 04:11 .Blood Blood-Venous     No growth to date.       @ 03:11 .Blood Blood-Catheter     No growth to date.       @ 01:52 .Blood Blood-Peripheral     No growth to date.          RADIOLOGY REVIEWED:  < from: CT Abdomen and Pelvis No Cont (19 @ 20:10) >  IMPRESSION:    Interval increase in the pancreatic tail mass and the bilobar liver   lesions/metastases.    New small volume abdominopelvic ascites with questionable peritoneal   nodularity. No hyperdense component to suggest hemorrhage.    Small bilateral pleural effusions.    < end of copied text >

## 2019-11-06 LAB
ALBUMIN SERPL ELPH-MCNC: 3 G/DL — LOW (ref 3.3–5)
ALBUMIN SERPL ELPH-MCNC: 3.1 G/DL — LOW (ref 3.3–5)
ALP SERPL-CCNC: 532 U/L — HIGH (ref 40–120)
ALP SERPL-CCNC: 549 U/L — HIGH (ref 40–120)
ALT FLD-CCNC: 22 U/L — SIGNIFICANT CHANGE UP (ref 10–45)
ALT FLD-CCNC: 23 U/L — SIGNIFICANT CHANGE UP (ref 10–45)
AMMONIA BLD-MCNC: 64 UMOL/L — HIGH (ref 11–55)
ANION GAP SERPL CALC-SCNC: 15 MMOL/L — SIGNIFICANT CHANGE UP (ref 5–17)
ANION GAP SERPL CALC-SCNC: 19 MMOL/L — HIGH (ref 5–17)
APTT BLD: 28.5 SEC — SIGNIFICANT CHANGE UP (ref 27.5–36.3)
AST SERPL-CCNC: 40 U/L — SIGNIFICANT CHANGE UP (ref 10–40)
AST SERPL-CCNC: 51 U/L — HIGH (ref 10–40)
BILIRUB SERPL-MCNC: 0.7 MG/DL — SIGNIFICANT CHANGE UP (ref 0.2–1.2)
BILIRUB SERPL-MCNC: 1 MG/DL — SIGNIFICANT CHANGE UP (ref 0.2–1.2)
BUN SERPL-MCNC: 90 MG/DL — HIGH (ref 7–23)
BUN SERPL-MCNC: 93 MG/DL — HIGH (ref 7–23)
CALCIUM SERPL-MCNC: 8.6 MG/DL — SIGNIFICANT CHANGE UP (ref 8.4–10.5)
CALCIUM SERPL-MCNC: 8.8 MG/DL — SIGNIFICANT CHANGE UP (ref 8.4–10.5)
CHLORIDE SERPL-SCNC: 105 MMOL/L — SIGNIFICANT CHANGE UP (ref 96–108)
CHLORIDE SERPL-SCNC: 106 MMOL/L — SIGNIFICANT CHANGE UP (ref 96–108)
CO2 SERPL-SCNC: 19 MMOL/L — LOW (ref 22–31)
CO2 SERPL-SCNC: 21 MMOL/L — LOW (ref 22–31)
CREAT SERPL-MCNC: 2.41 MG/DL — HIGH (ref 0.5–1.3)
CREAT SERPL-MCNC: 3 MG/DL — HIGH (ref 0.5–1.3)
FIBRINOGEN PPP-MCNC: 563 MG/DL — HIGH (ref 350–510)
GAS PNL BLDA: SIGNIFICANT CHANGE UP
GLUCOSE BLDC GLUCOMTR-MCNC: 247 MG/DL — HIGH (ref 70–99)
GLUCOSE BLDC GLUCOMTR-MCNC: 278 MG/DL — HIGH (ref 70–99)
GLUCOSE BLDC GLUCOMTR-MCNC: 315 MG/DL — HIGH (ref 70–99)
GLUCOSE BLDC GLUCOMTR-MCNC: 442 MG/DL — HIGH (ref 70–99)
GLUCOSE SERPL-MCNC: 237 MG/DL — HIGH (ref 70–99)
GLUCOSE SERPL-MCNC: 255 MG/DL — HIGH (ref 70–99)
HCT VFR BLD CALC: 27.3 % — LOW (ref 39–50)
HGB BLD-MCNC: 8.7 G/DL — LOW (ref 13–17)
INR BLD: 2.33 RATIO — HIGH (ref 0.88–1.16)
MAGNESIUM SERPL-MCNC: 3.3 MG/DL — HIGH (ref 1.6–2.6)
MAGNESIUM SERPL-MCNC: 3.3 MG/DL — HIGH (ref 1.6–2.6)
MCHC RBC-ENTMCNC: 24.4 PG — LOW (ref 27–34)
MCHC RBC-ENTMCNC: 31.9 GM/DL — LOW (ref 32–36)
MCV RBC AUTO: 76.5 FL — LOW (ref 80–100)
NRBC # BLD: 0 /100 WBCS — SIGNIFICANT CHANGE UP (ref 0–0)
PHOSPHATE SERPL-MCNC: 5 MG/DL — HIGH (ref 2.5–4.5)
PHOSPHATE SERPL-MCNC: 5.3 MG/DL — HIGH (ref 2.5–4.5)
PLATELET # BLD AUTO: 262 K/UL — SIGNIFICANT CHANGE UP (ref 150–400)
POTASSIUM SERPL-MCNC: 4.4 MMOL/L — SIGNIFICANT CHANGE UP (ref 3.5–5.3)
POTASSIUM SERPL-MCNC: 4.4 MMOL/L — SIGNIFICANT CHANGE UP (ref 3.5–5.3)
POTASSIUM SERPL-SCNC: 4.4 MMOL/L — SIGNIFICANT CHANGE UP (ref 3.5–5.3)
POTASSIUM SERPL-SCNC: 4.4 MMOL/L — SIGNIFICANT CHANGE UP (ref 3.5–5.3)
PROT SERPL-MCNC: 6.6 G/DL — SIGNIFICANT CHANGE UP (ref 6–8.3)
PROT SERPL-MCNC: 6.9 G/DL — SIGNIFICANT CHANGE UP (ref 6–8.3)
PROTHROM AB SERPL-ACNC: 27.3 SEC — HIGH (ref 10–12.9)
RBC # BLD: 3.57 M/UL — LOW (ref 4.2–5.8)
RBC # FLD: 15.3 % — HIGH (ref 10.3–14.5)
SODIUM SERPL-SCNC: 141 MMOL/L — SIGNIFICANT CHANGE UP (ref 135–145)
SODIUM SERPL-SCNC: 144 MMOL/L — SIGNIFICANT CHANGE UP (ref 135–145)
VANCOMYCIN TROUGH SERPL-MCNC: 16.4 UG/ML — SIGNIFICANT CHANGE UP (ref 10–20)
WBC # BLD: 32.66 K/UL — HIGH (ref 3.8–10.5)
WBC # FLD AUTO: 32.66 K/UL — HIGH (ref 3.8–10.5)

## 2019-11-06 PROCEDURE — 99291 CRITICAL CARE FIRST HOUR: CPT

## 2019-11-06 RX ORDER — FENTANYL CITRATE 50 UG/ML
25 INJECTION INTRAVENOUS ONCE
Refills: 0 | Status: DISCONTINUED | OUTPATIENT
Start: 2019-11-06 | End: 2019-11-06

## 2019-11-06 RX ORDER — MEROPENEM 1 G/30ML
500 INJECTION INTRAVENOUS ONCE
Refills: 0 | Status: COMPLETED | OUTPATIENT
Start: 2019-11-06 | End: 2019-11-06

## 2019-11-06 RX ORDER — MEROPENEM 1 G/30ML
INJECTION INTRAVENOUS
Refills: 0 | Status: DISCONTINUED | OUTPATIENT
Start: 2019-11-06 | End: 2019-11-07

## 2019-11-06 RX ORDER — ATORVASTATIN CALCIUM 80 MG/1
40 TABLET, FILM COATED ORAL AT BEDTIME
Refills: 0 | Status: DISCONTINUED | OUTPATIENT
Start: 2019-11-06 | End: 2019-11-07

## 2019-11-06 RX ORDER — LACTULOSE 10 G/15ML
10 SOLUTION ORAL EVERY 12 HOURS
Refills: 0 | Status: DISCONTINUED | OUTPATIENT
Start: 2019-11-06 | End: 2019-11-07

## 2019-11-06 RX ORDER — MEROPENEM 1 G/30ML
500 INJECTION INTRAVENOUS EVERY 12 HOURS
Refills: 0 | Status: DISCONTINUED | OUTPATIENT
Start: 2019-11-06 | End: 2019-11-07

## 2019-11-06 RX ORDER — ALBUMIN HUMAN 25 %
50 VIAL (ML) INTRAVENOUS EVERY 6 HOURS
Refills: 0 | Status: COMPLETED | OUTPATIENT
Start: 2019-11-06 | End: 2019-11-07

## 2019-11-06 RX ORDER — INSULIN LISPRO 100/ML
VIAL (ML) SUBCUTANEOUS EVERY 6 HOURS
Refills: 0 | Status: DISCONTINUED | OUTPATIENT
Start: 2019-11-06 | End: 2019-11-07

## 2019-11-06 RX ORDER — ACETAMINOPHEN 500 MG
650 TABLET ORAL EVERY 6 HOURS
Refills: 0 | Status: DISCONTINUED | OUTPATIENT
Start: 2019-11-06 | End: 2019-11-07

## 2019-11-06 RX ORDER — HUMAN INSULIN 100 [IU]/ML
3 INJECTION, SUSPENSION SUBCUTANEOUS EVERY 6 HOURS
Refills: 0 | Status: DISCONTINUED | OUTPATIENT
Start: 2019-11-06 | End: 2019-11-07

## 2019-11-06 RX ADMIN — OCTREOTIDE ACETATE 100 MICROGRAM(S): 200 INJECTION, SOLUTION INTRAVENOUS; SUBCUTANEOUS at 21:26

## 2019-11-06 RX ADMIN — Medication 50 MILLILITER(S): at 05:02

## 2019-11-06 RX ADMIN — CHLORHEXIDINE GLUCONATE 15 MILLILITER(S): 213 SOLUTION TOPICAL at 17:34

## 2019-11-06 RX ADMIN — Medication 650 MILLIGRAM(S): at 10:48

## 2019-11-06 RX ADMIN — Medication 650 MILLIGRAM(S): at 09:26

## 2019-11-06 RX ADMIN — Medication 50 MILLILITER(S): at 17:35

## 2019-11-06 RX ADMIN — Medication 81 MILLIGRAM(S): at 12:03

## 2019-11-06 RX ADMIN — Medication 100 MILLIGRAM(S): at 01:52

## 2019-11-06 RX ADMIN — LACTULOSE 10 GRAM(S): 10 SOLUTION ORAL at 05:01

## 2019-11-06 RX ADMIN — CHLORHEXIDINE GLUCONATE 15 MILLILITER(S): 213 SOLUTION TOPICAL at 05:01

## 2019-11-06 RX ADMIN — Medication 650 MILLIGRAM(S): at 00:55

## 2019-11-06 RX ADMIN — ATORVASTATIN CALCIUM 40 MILLIGRAM(S): 80 TABLET, FILM COATED ORAL at 22:54

## 2019-11-06 RX ADMIN — Medication 650 MILLIGRAM(S): at 00:16

## 2019-11-06 RX ADMIN — CHLORHEXIDINE GLUCONATE 1 APPLICATION(S): 213 SOLUTION TOPICAL at 06:00

## 2019-11-06 RX ADMIN — CEFEPIME 100 MILLIGRAM(S): 1 INJECTION, POWDER, FOR SOLUTION INTRAMUSCULAR; INTRAVENOUS at 00:40

## 2019-11-06 RX ADMIN — MEROPENEM 100 MILLIGRAM(S): 1 INJECTION INTRAVENOUS at 21:26

## 2019-11-06 RX ADMIN — FENTANYL CITRATE 25 MICROGRAM(S): 50 INJECTION INTRAVENOUS at 05:23

## 2019-11-06 RX ADMIN — MEROPENEM 100 MILLIGRAM(S): 1 INJECTION INTRAVENOUS at 09:33

## 2019-11-06 RX ADMIN — OCTREOTIDE ACETATE 100 MICROGRAM(S): 200 INJECTION, SOLUTION INTRAVENOUS; SUBCUTANEOUS at 05:03

## 2019-11-06 RX ADMIN — Medication 8: at 17:34

## 2019-11-06 RX ADMIN — MIDODRINE HYDROCHLORIDE 10 MILLIGRAM(S): 2.5 TABLET ORAL at 05:01

## 2019-11-06 RX ADMIN — FENTANYL CITRATE 25 MICROGRAM(S): 50 INJECTION INTRAVENOUS at 04:48

## 2019-11-06 RX ADMIN — Medication 3: at 05:23

## 2019-11-06 RX ADMIN — Medication 2: at 00:38

## 2019-11-06 RX ADMIN — MIDODRINE HYDROCHLORIDE 10 MILLIGRAM(S): 2.5 TABLET ORAL at 13:45

## 2019-11-06 RX ADMIN — Medication 4: at 12:02

## 2019-11-06 RX ADMIN — Medication 50 MILLILITER(S): at 12:04

## 2019-11-06 RX ADMIN — OCTREOTIDE ACETATE 100 MICROGRAM(S): 200 INJECTION, SOLUTION INTRAVENOUS; SUBCUTANEOUS at 12:03

## 2019-11-06 RX ADMIN — HUMAN INSULIN 3 UNIT(S): 100 INJECTION, SUSPENSION SUBCUTANEOUS at 17:35

## 2019-11-06 RX ADMIN — PANTOPRAZOLE SODIUM 40 MILLIGRAM(S): 20 TABLET, DELAYED RELEASE ORAL at 12:04

## 2019-11-06 NOTE — PROGRESS NOTE ADULT - SUBJECTIVE AND OBJECTIVE BOX
CC: f/u for fever    Patient reports: he is intermittently awake on vent, not weaned yet, antibiotics escalated to meropenem    REVIEW OF SYSTEMS:  All other review of systems negative (Comprehensive ROS): limited    Antimicrobials Day #  :  meropenem  IVPB      meropenem  IVPB 500 milliGRAM(s) IV Intermittent every 12 hours    Other Medications Reviewed    T(F): 97.9 (11-06-19 @ 15:00), Max: 101.8 (11-06-19 @ 10:00)  HR: 106 (11-06-19 @ 15:00)  BP: 129/90 (11-06-19 @ 15:00)  RR: 20 (11-06-19 @ 15:00)  SpO2: 100% (11-06-19 @ 15:00)  Wt(kg): --    PHYSICAL EXAM:  General:  lethargic, no acute distress  Eyes:  anicteric, no conjunctival injection, no discharge  Oropharynx: ETT tube	  Neck: supple, without adenopathy  Lungs: scattered ronchi  Heart: regular rate and rhythm; no murmur, rubs or gallops  Abdomen: soft, nondistended, nontender, without mass or organomegaly  Skin: no lesions  Extremities: no clubbing, cyanosis, trace, edema  Neurologic: alert intermittently on vent    LAB RESULTS:                        8.7    32.66 )-----------( 262      ( 06 Nov 2019 00:35 )             27.3     11-06    144  |  106  |  93<H>  ----------------------------<  255<H>  4.4   |  19<L>  |  3.00<H>    Ca    8.8      06 Nov 2019 12:20  Phos  5.0     11-06  Mg     3.3     11-06    TPro  6.9  /  Alb  3.1<L>  /  TBili  1.0  /  DBili  x   /  AST  40  /  ALT  23  /  AlkPhos  532<H>  11-06    LIVER FUNCTIONS - ( 06 Nov 2019 12:20 )  Alb: 3.1 g/dL / Pro: 6.9 g/dL / ALK PHOS: 532 U/L / ALT: 23 U/L / AST: 40 U/L / GGT: x             MICROBIOLOGY:  RECENT CULTURES:  11-05 @ 17:49 Bronch Wash Combicath       No polymorphonuclear cells seen  No Squamous epithelial cells  No organisms seen    11-05 @ 12:26 .Blood Blood     No growth to date.      11-03 @ 16:44 .Body Fluid Peritoneal Fluid     No growth    polymorphonuclear leukocytes seen  No organisms seen  by cytocentrifuge    11-03 @ 09:02 .Blood Blood     No growth to date.      11-03 @ 04:11 .Blood Blood-Venous     No growth to date.      11-02 @ 03:11 .Blood Blood-Catheter     No growth to date.      11-02 @ 01:52 .Blood Blood-Peripheral     No growth to date.          RADIOLOGY REVIEWED:  < from: CT Abdomen and Pelvis No Cont (11.04.19 @ 20:10) >  IMPRESSION:    Interval increase in the pancreatic tail mass and the bilobar liver   lesions/metastases.    New small volume abdominopelvic ascites with questionable peritoneal   nodularity. No hyperdense component to suggest hemorrhage.    Small bilateral pleural effusions.

## 2019-11-06 NOTE — DISCHARGE NOTE NURSING/CASE MANAGEMENT/SOCIAL WORK - NSDCPEPTSTRK_GEN_ALL_CORE
Call 911 for stroke/Need for follow up after discharge/Stroke education booklet/Stroke warning signs and symptoms/Signs and symptoms of stroke/Risk factors for stroke/Stroke support groups for patients, families, and friends/Prescribed medications

## 2019-11-06 NOTE — PROGRESS NOTE ADULT - SUBJECTIVE AND OBJECTIVE BOX
[facility]      Seen earlier today, and examined.  - Today, patient is without complaints.           *****MEDICATIONS: Current medication reviewed and documented.    MEDICATIONS  (STANDING):  aspirin  chewable 81 milliGRAM(s) Oral daily  atorvastatin 40 milliGRAM(s) Oral at bedtime  chlorhexidine 0.12% Liquid 15 milliLiter(s) Oral Mucosa every 12 hours  chlorhexidine 4% Liquid 1 Application(s) Topical <User Schedule>  dextrose 5%. 1000 milliLiter(s) (50 mL/Hr) IV Continuous <Continuous>  dextrose 50% Injectable 12.5 Gram(s) IV Push once  dextrose 50% Injectable 25 Gram(s) IV Push once  dextrose 50% Injectable 25 Gram(s) IV Push once  insulin lispro (HumaLOG) corrective regimen sliding scale   SubCutaneous every 4 hours  insulin NPH human recombinant 8 Unit(s) SubCutaneous every 6 hours  lactulose Syrup 10 Gram(s) Oral every 12 hours  meropenem  IVPB      meropenem  IVPB 500 milliGRAM(s) IV Intermittent every 12 hours  midodrine 10 milliGRAM(s) Oral every 8 hours  octreotide  Injectable 100 MICROGram(s) SubCutaneous three times a day  pantoprazole  Injectable 40 milliGRAM(s) IV Push daily    MEDICATIONS  (PRN):  acetaminophen    Suspension .. 650 milliGRAM(s) Oral every 6 hours PRN Temp greater or equal to 38C (100.4F)  dextrose 40% Gel 15 Gram(s) Oral once PRN Blood Glucose LESS THAN 70 milliGRAM(s)/deciliter  glucagon  Injectable 1 milliGRAM(s) IntraMuscular once PRN Glucose LESS THAN 70 milligrams/deciliter          ***** VITAL SIGNS:  T(F): 97.5 (19 @ 04:00), Max: 101.8 (19 @ 10:00)  HR: 108 (19 @ 04:53) (97 - 126)  BP: 129/71 (19 @ 04:00) (107/75 - 158/111)  RR: 26 (19 @ 04:00) (20 - 28)  SpO2: 97% (19 @ 04:53) (90% - 100%)  Wt(kg): --  ,   I&O's Summary    2019 07:01  -  2019 07:00  --------------------------------------------------------  IN: 2040 mL / OUT: 475 mL / NET: 1565 mL    2019 07:01  -  2019 05:46  --------------------------------------------------------  IN: 1470 mL / OUT: 290 mL / NET: 1180 mL             *****PHYSICAL EXAM:     limited exam as pt is intubated sedated   more lethargic today   fever    diaphoretic   not responding to painful stimuli    Gait not assessed.            *****LAB AND IMAGIN.4    32.71 )-----------( 186      ( 2019 01:20 )             29.3                   141  |  101  |  115<H>  ----------------------------<  516<HH>  4.4   |  18<L>  |  3.50<H>    Ca    8.6      2019 01:20  Phos  6.2       Mg     3.5         TPro  6.5  /  Alb  3.3  /  TBili  0.9  /  DBili  x   /  AST  28  /  ALT  19  /  AlkPhos  420<H>      PT/INR - ( 2019 01:20 )   PT: 35.1 sec;   INR: 2.95 ratio         PTT - ( 2019 01:20 )  PTT:30.7 sec                 ABG - ( 2019 00:26 )  pH, Arterial: 7.36  pH, Blood: x     /  pCO2: 40    /  pO2: 108   / HCO3: 22    / Base Excess: -2.1  /  SaO2: 98                  [All pertinent recent Imaging/Reports reviewed]           *****A S S E S S M E N T   A N D   P L A N :    Excerpt from H&P, 54M c hx HTN, DM2, HLD, CVA 6 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        Problem/Recommendations 1: CVA likely due to hypercoagulable state  Numerous cerebral infarcts noted on MRI Brain while on full dose anticoagulation.   Pt previously known to me from prior admission when pt developed transient weakness while on full dose heparin was not a candidate for acute therapies at that time  Etiology of infarcts likely hypercoagulable state related his underlying metastatic pancreatic adenocarcinoma other etiologies include. marantic endocarditis or infectious endcarditis vs. cns vasculitis   cta recent did not reveal any mycotic aneurysm  tte, or ideally ange may be helpful. no pfo   Can repeat cta with contrast to r/o mycotic anuerysm.  pt has had infarcts while on full dose AC, portends a poor outcome   overall guarded prognosis    neuro checks q4, low threshold for ct scan if any change in neuro exam.      intubated/sedated  veeg no seizure. moderate to severe slowing.    mental status depressed likely related to fever  +/- ammonia   lactulose given   will continue to monitor closely    discussed with brother/sister in law ar bedside about goc       Thank you for allowing me to participate in the care of this patient. Please do not hesitate to call me if you have any  questions.        ________________  Korin Hendrickson MD  [facility]  [phone]      33 minutes cc time  spent on total encounter; more than 50 % of the visit was  spent counseling about plan of care, compliance to diet/exercise and medication regimen and or  coordinating care by the attending physician.      It is advised that stroke patients follow up with NP Mimi Eugene @ [phone] in 1- 2 weeks.   Others please follow up with Dr. Michael Nissenbaum [phone]

## 2019-11-06 NOTE — PROGRESS NOTE ADULT - ASSESSMENT
1. LE Weakness/AMS, Bilat Thromboembolic Infarcts    -- likely sec to Thrombophilia of Malignancy  -- Neuro f/u  -- echo not showing embolic source  -- off AC for now given prolonged PT. PTT nml. I would suspect that PT prolonged due to liver disease and not from coagulopathy. Would consider restarting heparin gtt with nml PTT but would need to weigh risks/benefits    2. Metastatic Pancreatic CA    -- plan was for outpt palliative chemo. in light of acute events, I dont think he will recover to allow for chemo  -- family aware of gravity of situation  -- Palliative Care/Hospice is very reasonable    3. Leukocytosis    -- consider paracentesis to r/o peritonitis and for comfort  -- Cultures have been negative  -- may be leukemoid rxn from malignancy  -- ID eval appreciated    4. Anemia     -- sec to malignancy  -- transfuse prn Hgb < 7    5. hepatorenal syndrome -- per MICU     Prognosis poor, concerned about meaningful recovery for pt. D/w MICU team, would recommend hospice when family ready. [phone]

## 2019-11-06 NOTE — EEG REPORT - NS EEG TEXT BOX
[facility]   COMPREHENSIVE EPILEPSY CENTER   REPORT OF Continuous VIDEO EEG     [facility]: [address] [place] [address] , [address], [place] [address], Ph#: [phone]  [facility]: [id number] [facility] [place] [address], Ph#: [phone]  [facility]: [address] [place] [address], Ph#: [phone]    Patient Name: ASHLEY AVILEZ  Age and : 54y (63)  MRN #: [id number]  Location: [address]  Referring Physician: Pallavi Lantigua    Study Date: 19 08:00  End Date: 19 17:49    _____________________________________________________________  HISTORY    Patient is a 54y old  Male who presents with a chief complaint of weakness (2019 11:03)    PERTINENT MEDICATION:  acetaminophen    Suspension .. 650 milliGRAM(s) Oral once  aspirin  chewable 81 milliGRAM(s) Oral daily  cefepime   IVPB      cefepime   IVPB 1000 milliGRAM(s) IV Intermittent every 12 hours  chlorhexidine 0.12% Liquid 15 milliLiter(s) Oral Mucosa every 12 hours  chlorhexidine 4% Liquid 1 Application(s) Topical <User Schedule>  dexMEDEtomidine Infusion 0.4 MICROgram(s)/kG/Hr (8.79 mL/Hr) IV Continuous <Continuous>  fentaNYL   Infusion. 0.5 MICROgram(s)/kG/Hr (2.198 mL/Hr) IV Continuous <Continuous>  insulin lispro (HumaLOG) corrective regimen sliding scale   SubCutaneous every 6 hours  pantoprazole  Injectable 40 milliGRAM(s) IV Push daily    _____________________________________________________________  STUDY INTERPRETATION    Findings: The background was continuous, spontaneously variable.  No posterior dominant rhythm seen.    Background Slowing:  Diffuse polymorphic delta slowing at times periodic polymorphic delta, bifrontally predominant.    Focal Slowing:   None were present.    Sleep Background:  Stage II sleep transients were not recorded.    Other Non-Epileptiform Findings:  None were present.    Interictal Epileptiform Activity:   None were present.    Events:  Clinical events: None recorded.  Seizures: None recorded.    Activation Procedures:   Hyperventilation was not performed.    Photic stimulation was not performed.     Artifacts:  Intermittent myogenic and movement artifacts were noted.    ECG:  The heart rate on single channel ECG was predominantly between 60-70 BPM.    _____________________________________________________________  EEG SUMMARY/CLASSIFICATION    Abnormal EEG in an altered patient.   - Moderate to severe generalized slowing.    _____________________________________________________________  EEG IMPRESSION/CLINICAL CORRELATE    Abnormal EEG study.  1. Moderate to severe nonspecific diffuse or multifocal cerebral dysfunction.   2. No epileptiform pattern or seizure seen.  _____________________________________________________________    Kelli Tejada DO  Epilepsy Fellow, [facility]   COMPREHENSIVE EPILEPSY CENTER   REPORT OF Continuous VIDEO EEG     [facility]: [address] [place] [address] , [address], [place] [address], Ph#: [phone]  [facility]: [id number] [facility] [place] [address], Ph#: [phone]  [facility]: [address] [place] [address], Ph#: [phone]    Patient Name: ASHLEY AVILEZ  Age and : 54y (63)  MRN #: [id number]  Location: [address]  Referring Physician: Pallavi Lantigua    Study Date: 19 08:00  End Date: 19 17:49    _____________________________________________________________  HISTORY    Patient is a 54y old  Male who presents with a chief complaint of weakness (2019 11:03)    PERTINENT MEDICATION:  acetaminophen    Suspension .. 650 milliGRAM(s) Oral once  aspirin  chewable 81 milliGRAM(s) Oral daily  cefepime   IVPB      cefepime   IVPB 1000 milliGRAM(s) IV Intermittent every 12 hours  chlorhexidine 0.12% Liquid 15 milliLiter(s) Oral Mucosa every 12 hours  chlorhexidine 4% Liquid 1 Application(s) Topical <User Schedule>  dexMEDEtomidine Infusion 0.4 MICROgram(s)/kG/Hr (8.79 mL/Hr) IV Continuous <Continuous>  fentaNYL   Infusion. 0.5 MICROgram(s)/kG/Hr (2.198 mL/Hr) IV Continuous <Continuous>  insulin lispro (HumaLOG) corrective regimen sliding scale   SubCutaneous every 6 hours  pantoprazole  Injectable 40 milliGRAM(s) IV Push daily    _____________________________________________________________  STUDY INTERPRETATION    Findings: The background was continuous, spontaneously variable.  No posterior dominant rhythm seen.    Background Slowing:  Diffuse shifting polymorphic delta slowing.  At times semiperiodic delta near 1hz, bifrontally predominant.    Focal Slowing:   None were present.    Sleep Background:  Stage II sleep transients were not recorded.    Other Non-Epileptiform Findings:  None were present.    Interictal Epileptiform Activity:   None were present.    Events:  Clinical events: None recorded.  Seizures: None recorded.    Activation Procedures:   Hyperventilation was not performed.    Photic stimulation was not performed.     Artifacts:  Intermittent myogenic and movement artifacts were noted.    ECG:  The heart rate on single channel ECG was predominantly between 60-70 BPM.    _____________________________________________________________  EEG SUMMARY/CLASSIFICATION    Abnormal EEG in an altered patient.   Diffuse shifting polymorphic delta slowing.  At times, slowing semiperiodic delta near 1hz, bifrontally predominant.    _____________________________________________________________  EEG IMPRESSION/CLINICAL CORRELATE    Abnormal EEG study.  Moderate to severe nonspecific diffuse or multifocal cerebral dysfunction.   _____________________________________________________________    Kelli Tejada DO  Epilepsy Fellow, [facility]

## 2019-11-06 NOTE — PROGRESS NOTE ADULT - SUBJECTIVE AND OBJECTIVE BOX
Pseen, intubated, unresponsive    MEDICATIONS  (STANDING):  albumin human 25% IVPB 50 milliLiter(s) IV Intermittent every 6 hours  aspirin  chewable 81 milliGRAM(s) Oral daily  atorvastatin 40 milliGRAM(s) Oral at bedtime  chlorhexidine 0.12% Liquid 15 milliLiter(s) Oral Mucosa every 12 hours  chlorhexidine 4% Liquid 1 Application(s) Topical <User Schedule>  dextrose 5%. 1000 milliLiter(s) (50 mL/Hr) IV Continuous <Continuous>  dextrose 50% Injectable 12.5 Gram(s) IV Push once  dextrose 50% Injectable 25 Gram(s) IV Push once  dextrose 50% Injectable 25 Gram(s) IV Push once  insulin lispro (HumaLOG) corrective regimen sliding scale   SubCutaneous every 6 hours  insulin NPH human recombinant 3 Unit(s) SubCutaneous every 6 hours  lactulose Syrup 10 Gram(s) Oral every 12 hours  meropenem  IVPB      meropenem  IVPB 500 milliGRAM(s) IV Intermittent every 12 hours  midodrine 10 milliGRAM(s) Oral every 8 hours  octreotide  Injectable 100 MICROGram(s) SubCutaneous three times a day  pantoprazole  Injectable 40 milliGRAM(s) IV Push daily    MEDICATIONS  (PRN):  acetaminophen    Suspension .. 650 milliGRAM(s) Oral every 6 hours PRN Temp greater or equal to 38C (100.4F)  dextrose 40% Gel 15 Gram(s) Oral once PRN Blood Glucose LESS THAN 70 milliGRAM(s)/deciliter  glucagon  Injectable 1 milliGRAM(s) IntraMuscular once PRN Glucose LESS THAN 70 milligrams/deciliter      ROS  UTO    Vital Signs Last 24 Hrs  T(C): 36.6 (06 Nov 2019 15:00), Max: 38.8 (06 Nov 2019 10:00)  T(F): 97.9 (06 Nov 2019 15:00), Max: 101.8 (06 Nov 2019 10:00)  HR: 116 (06 Nov 2019 18:00) (88 - 126)  BP: 128/69 (06 Nov 2019 18:00) (107/75 - 158/111)  BP(mean): 84 (06 Nov 2019 18:00) (82 - 125)  RR: 21 (06 Nov 2019 18:00) (20 - 22)  SpO2: 96% (06 Nov 2019 18:00) (90% - 100%)    PE  NAD  unresponsive  intubated  RRR  CTAB ant chest  abd distended, firm  no edema  remainder of exam limited 2/2 participation                          8.7    32.66 )-----------( 262      ( 06 Nov 2019 00:35 )             27.3       11-06    144  |  106  |  93<H>  ----------------------------<  255<H>  4.4   |  19<L>  |  3.00<H>    Ca    8.8      06 Nov 2019 12:20  Phos  5.0     11-06  Mg     3.3     11-06    TPro  6.9  /  Alb  3.1<L>  /  TBili  1.0  /  DBili  x   /  AST  40  /  ALT  23  /  AlkPhos  532<H>  11-06

## 2019-11-06 NOTE — PROGRESS NOTE ADULT - PROBLEM SELECTOR PLAN 3
Onc eval appreciated   ABd US noted  S/P  paracentesis, Cx negative to date   hold eliquis for now and started on heparin for AC   poor prognosis   Onc eval appreciated  Abx as per ID  worsening lesion on CT   will benefit from JOCELYNN to evaluate for recurrent CVAs W/U

## 2019-11-06 NOTE — PROGRESS NOTE ADULT - PROBLEM SELECTOR PLAN 1
S/P intubation  MICU care   midodrine for BP control   ABx and acute care as per ICU team  off marisol sedation  worsening mental stat   GOC discussed with the family   possible plan for DNR

## 2019-11-06 NOTE — DISCHARGE NOTE NURSING/CASE MANAGEMENT/SOCIAL WORK - PATIENT PORTAL LINK FT
You can access the FollowMyHealth Patient Portal offered by [facility] by registering at the following website: [email or web address]. By joining [facility]’s FollowMyHealth portal, you will also be able to view your health information using other applications (apps) compatible with our system.

## 2019-11-06 NOTE — PROGRESS NOTE ADULT - NSHPATTENDINGPLANDISCUSS_GEN_ALL_CORE
House staff
House staff, cardiology, Oncology and neuro team.

## 2019-11-06 NOTE — PROGRESS NOTE ADULT - SUBJECTIVE AND OBJECTIVE BOX
[facility]    Subjective: Patient seen and examined. No new events except as noted.   worsening mental stat  off sedation since two days ago   family at the bedside     REVIEW OF SYSTEMS:    AMS, unable to provide       MEDICATIONS:  MEDICATIONS  (STANDING):  albumin human 25% IVPB 50 milliLiter(s) IV Intermittent every 6 hours  aspirin  chewable 81 milliGRAM(s) Oral daily  chlorhexidine 0.12% Liquid 15 milliLiter(s) Oral Mucosa every 12 hours  chlorhexidine 4% Liquid 1 Application(s) Topical <User Schedule>  dextrose 5%. 1000 milliLiter(s) (50 mL/Hr) IV Continuous <Continuous>  dextrose 50% Injectable 12.5 Gram(s) IV Push once  dextrose 50% Injectable 25 Gram(s) IV Push once  dextrose 50% Injectable 25 Gram(s) IV Push once  insulin lispro (HumaLOG) corrective regimen sliding scale   SubCutaneous every 6 hours  lactulose Syrup 10 Gram(s) Oral every 12 hours  meropenem  IVPB      meropenem  IVPB 500 milliGRAM(s) IV Intermittent every 12 hours  midodrine 10 milliGRAM(s) Oral every 8 hours  octreotide  Injectable 100 MICROGram(s) SubCutaneous three times a day  pantoprazole  Injectable 40 milliGRAM(s) IV Push daily      PHYSICAL EXAM:  T(C): 38.3 (19 @ 08:00), Max: 38.3 (19 @ 08:00)  HR: 126 (19 @ 11:00) (85 - 126)  BP: 125/83 (19 @ 11:00) (102/65 - 153/76)  RR: 21 (19 @ 08:00) (14 - 21)  SpO2: 100% (19 @ 11:00) (99% - 100%)  Wt(kg): --  I&O's Summary    2019 07:  -  2019 07:00  --------------------------------------------------------  IN: 2040 mL / OUT: 475 mL / NET: 1565 mL    2019 07:  -  2019 12:03  --------------------------------------------------------  IN: 50 mL / OUT: 80 mL / NET: -30 mL          Appearance: opens eyes, not responds to simple commands  HEENT: open eyes  Lymphatic: No lymphadenopathy  Cardiovascular: S1S2, tachy   Respiratory: B/L air entry , intubated 	  Gastrointestinal:  distended 	  Skin: sweating   Musculoskeleta laying flat, no movement   Psychiatry:  comatose   Ext: No edema      All labs, Imaging and EKGs personally reviewed                           8.7    32.66 )-----------( 262      ( 2019 00:35 )             27.3               11-    141  |  105  |  90<H>  ----------------------------<  237<H>  4.4   |  21<L>  |  2.41<H>    Ca    8.6      2019 00:35  Phos  5.3     11  Mg     3.3         TPro  6.6  /  Alb  3.0<L>  /  TBili  0.7  /  DBili  x   /  AST  51<H>  /  ALT  22  /  AlkPhos  549<H>  11-    PT/INR - ( 2019 07:15 )   PT: 27.3 sec;   INR: 2.33 ratio         PTT - ( 2019 07:15 )  PTT:28.5 sec                 ABG - ( 2019 00:26 )  pH, Arterial: 7.36  pH, Blood: x     /  pCO2: 40    /  pO2: 108   / HCO3: 22    / Base Excess: -2.1  /  SaO2: 98                Urinalysis Basic - ( 2019 15:22 )    Color: Yellow / Appearance: Slightly Turbid / S.020 / pH: x  Gluc: x / Ketone: Negative  / Bili: Negative / Urobili: Negative   Blood: x / Protein: 30 mg/dL / Nitrite: Negative   Leuk Esterase: Large / RBC: 3 /hpf / WBC 36 /HPF   Sq Epi: x / Non Sq Epi: 4 /hpf / Bacteria: 0.0    < from: CT Abdomen and Pelvis No Cont (11.04.19 @ 20:10) >  IMPRESSION:    Interval increase in the pancreatic tail mass and the bilobar liver   lesions/metastases.    New small volume abdominopelvic ascites with questionable peritoneal   nodularity. No hyperdense component to suggest hemorrhage.    Small bilateral pleural effusions.

## 2019-11-06 NOTE — PROGRESS NOTE ADULT - SUBJECTIVE AND OBJECTIVE BOX
OVERNIGHT EVENTS / SUBJECTIVE: Patient seen and examined at bedside.     OBJECTIVE:    VITAL SIGNS:  ICU Vital Signs Last 24 Hrs  T(C): 37.5 (2019 04:00), Max: 39.3 (2019 08:00)  T(F): 99.5 (2019 04:00), Max: 102.7 (2019 08:00)  HR: 97 (2019 07:00) (71 - 106)  BP: 116/74 (2019 07:00) (92/62 - 153/76)  BP(mean): 91 (2019 07:00) (68 - 101)  ABP: --  ABP(mean): --  RR: 14 (2019 18:00) (14 - 16)  SpO2: 100% (2019 07:00) (100% - 100%)    Mode: AC/ CMV (Assist Control/ Continuous Mandatory Ventilation), RR (machine): 14, TV (machine): 550, FiO2: 30, PEEP: 5, ITime: 1, MAP: 13, PIP: 28     @ 07:01  -   @ 07:00  --------------------------------------------------------  IN: 2040 mL / OUT: 475 mL / NET: 1565 mL      CAPILLARY BLOOD GLUCOSE      POCT Blood Glucose.: 278 mg/dL (2019 05:20)      PHYSICAL EXAM:    General: NAD  HEENT: NC/AT; PERRL, clear conjunctiva  Neck: supple  Respiratory: CTA b/l  Cardiovascular: +S1/S2; RRR  Abdomen: soft, NT/ND; +BS x4  Extremities: WWP, 2+ peripheral pulses b/l; no LE edema  Skin: normal color and turgor; no rash  Neurological:    MEDICATIONS:  MEDICATIONS  (STANDING):  aspirin  chewable 81 milliGRAM(s) Oral daily  cefepime   IVPB 1000 milliGRAM(s) IV Intermittent every 8 hours  chlorhexidine 0.12% Liquid 15 milliLiter(s) Oral Mucosa every 12 hours  chlorhexidine 4% Liquid 1 Application(s) Topical <User Schedule>  dexMEDEtomidine Infusion 0.4 MICROgram(s)/kG/Hr (8.79 mL/Hr) IV Continuous <Continuous>  dextrose 5%. 1000 milliLiter(s) (50 mL/Hr) IV Continuous <Continuous>  dextrose 50% Injectable 12.5 Gram(s) IV Push once  dextrose 50% Injectable 25 Gram(s) IV Push once  dextrose 50% Injectable 25 Gram(s) IV Push once  fentaNYL   Infusion. 0.5 MICROgram(s)/kG/Hr (2.198 mL/Hr) IV Continuous <Continuous>  insulin lispro (HumaLOG) corrective regimen sliding scale   SubCutaneous every 6 hours  lactulose Syrup 10 Gram(s) Oral every 12 hours  metroNIDAZOLE  IVPB      metroNIDAZOLE  IVPB 500 milliGRAM(s) IV Intermittent every 8 hours  midodrine 10 milliGRAM(s) Oral every 8 hours  octreotide  Injectable 100 MICROGram(s) SubCutaneous three times a day  pantoprazole  Injectable 40 milliGRAM(s) IV Push daily  vasopressin Infusion 0.04 Unit(s)/Min (2.4 mL/Hr) IV Continuous <Continuous>    MEDICATIONS  (PRN):  dextrose 40% Gel 15 Gram(s) Oral once PRN Blood Glucose LESS THAN 70 milliGRAM(s)/deciliter  glucagon  Injectable 1 milliGRAM(s) IntraMuscular once PRN Glucose LESS THAN 70 milligrams/deciliter      ALLERGIES:  Allergies    No Known Allergies    Intolerances        LABS:                        8.7    32.66 )-----------( 262      ( 2019 00:35 )             27.3     Hemoglobin: 8.7 g/dL ( @ 00:35)  Hemoglobin: 7.6 g/dL ( @ 23:50)  Hemoglobin: 6.6 g/dL ( @ 15:17)  Hemoglobin: 7.5 g/dL ( @ 00:57)  Hemoglobin: 8.3 g/dL ( @ 04:52)    CBC Full  -  ( 2019 00:35 )  WBC Count : 32.66 K/uL  RBC Count : 3.57 M/uL  Hemoglobin : 8.7 g/dL  Hematocrit : 27.3 %  Platelet Count - Automated : 262 K/uL  Mean Cell Volume : 76.5 fl  Mean Cell Hemoglobin : 24.4 pg  Mean Cell Hemoglobin Concentration : 31.9 gm/dL  Auto Neutrophil # : x  Auto Lymphocyte # : x  Auto Monocyte # : x  Auto Eosinophil # : x  Auto Basophil # : x  Auto Neutrophil % : x  Auto Lymphocyte % : x  Auto Monocyte % : x  Auto Eosinophil % : x  Auto Basophil % : x        141  |  105  |  90<H>  ----------------------------<  237<H>  4.4   |  21<L>  |  2.41<H>    Ca    8.6      2019 00:35  Phos  5.3       Mg     3.3         TPro  6.6  /  Alb  3.0<L>  /  TBili  0.7  /  DBili  x   /  AST  51<H>  /  ALT  22  /  AlkPhos  549<H>      Creatinine Trend: 2.41<--, 2.66<--, 2.01<--, 2.26<--, 2.49<--, 1.47<--  LIVER FUNCTIONS - ( 2019 00:35 )  Alb: 3.0 g/dL / Pro: 6.6 g/dL / ALK PHOS: 549 U/L / ALT: 22 U/L / AST: 51 U/L / GGT: x           PT/INR - ( 2019 23:50 )   PT: 33.3 sec;   INR: 2.80 ratio         PTT - ( 2019 23:50 )  PTT:30.9 sec    hs Troponin:    ABG - ( 2019 00:26 )  pH, Arterial: 7.36  pH, Blood: x     /  pCO2: 40    /  pO2: 108   / HCO3: 22    / Base Excess: -2.1  /  SaO2: 98                  00:26 - ABG - pH: 7.36  |  pCO2: 40    |  pO2: 108   | Lactate:       | BE: -2.1       Urinalysis Basic - ( 2019 15:22 )    Color: Yellow / Appearance: Slightly Turbid / S.020 / pH: x  Gluc: x / Ketone: Negative  / Bili: Negative / Urobili: Negative   Blood: x / Protein: 30 mg/dL / Nitrite: Negative   Leuk Esterase: Large / RBC: 3 /hpf / WBC 36 /HPF   Sq Epi: x / Non Sq Epi: 4 /hpf / Bacteria: 0.0      CSF:                      EKG:   MICROBIOLOGY:    Culture - Bronchial (collected 2019 17:49)  Source: Bronch Wash Combicath  Gram Stain (2019 21:42):    No polymorphonuclear cells seen    No Squamous epithelial cells    No organisms seen    Culture - Body Fluid with Gram Stain (collected 2019 16:44)  Source: .Body Fluid Peritoneal Fluid  Gram Stain (2019 23:24):    polymorphonuclear leukocytes seen    No organisms seen    by cytocentrifuge  Preliminary Report (2019 13:06):    No growth    Culture - Blood (collected 2019 09:02)  Source: .Blood Blood  Preliminary Report (2019 10:01):    No growth to date.      IMAGING:      Labs, imaging, EKG personally reviewed    RADIOLOGY & ADDITIONAL TESTS: Reviewed. OVERNIGHT EVENTS / SUBJECTIVE: Patient seen and examined at bedside.     OBJECTIVE: Patient opened his eyes when his name was called which is improvement from yesterday. Did not answer questions answered so unable to assess ROS.      VITAL SIGNS:  ICU Vital Signs Last 24 Hrs  T(C): 37.5 (2019 04:00), Max: 39.3 (2019 08:00)  T(F): 99.5 (2019 04:00), Max: 102.7 (2019 08:00)  HR: 97 (2019 07:00) (71 - 106)  BP: 116/74 (2019 07:00) (92/62 - 153/76)  BP(mean): 91 (2019 07:00) (68 - 101)  ABP: --  ABP(mean): --  RR: 14 (2019 18:00) (14 - 16)  SpO2: 100% (2019 07:00) (100% - 100%)    Mode: AC/ CMV (Assist Control/ Continuous Mandatory Ventilation), RR (machine): 14, TV (machine): 550, FiO2: 30, PEEP: 5, ITime: 1, MAP: 13, PIP: 28     @ 07:01  -  11-06 @ 07:00  --------------------------------------------------------  IN: 2040 mL / OUT: 475 mL / NET: 1565 mL      CAPILLARY BLOOD GLUCOSE      POCT Blood Glucose.: 278 mg/dL (2019 05:20)      PHYSICAL EXAM:    General: NAD  HEENT: NC/AT; PERRL, clear conjunctiva  Neck: supple  Respiratory: Crackles at bases of L lung & crackles from mid R lung to bases  Cardiovascular: +S1/S2; RRR  Abdomen: soft, NT; decreased BS, distended  Extremities: WWP, 2+ peripheral pulses b/l; no LE edema  Skin: normal color and turgor; no rash  Neurological:    MEDICATIONS:  MEDICATIONS  (STANDING):  aspirin  chewable 81 milliGRAM(s) Oral daily  cefepime   IVPB 1000 milliGRAM(s) IV Intermittent every 8 hours  chlorhexidine 0.12% Liquid 15 milliLiter(s) Oral Mucosa every 12 hours  chlorhexidine 4% Liquid 1 Application(s) Topical <User Schedule>  dexMEDEtomidine Infusion 0.4 MICROgram(s)/kG/Hr (8.79 mL/Hr) IV Continuous <Continuous>  dextrose 5%. 1000 milliLiter(s) (50 mL/Hr) IV Continuous <Continuous>  dextrose 50% Injectable 12.5 Gram(s) IV Push once  dextrose 50% Injectable 25 Gram(s) IV Push once  dextrose 50% Injectable 25 Gram(s) IV Push once  fentaNYL   Infusion. 0.5 MICROgram(s)/kG/Hr (2.198 mL/Hr) IV Continuous <Continuous>  insulin lispro (HumaLOG) corrective regimen sliding scale   SubCutaneous every 6 hours  lactulose Syrup 10 Gram(s) Oral every 12 hours  metroNIDAZOLE  IVPB      metroNIDAZOLE  IVPB 500 milliGRAM(s) IV Intermittent every 8 hours  midodrine 10 milliGRAM(s) Oral every 8 hours  octreotide  Injectable 100 MICROGram(s) SubCutaneous three times a day  pantoprazole  Injectable 40 milliGRAM(s) IV Push daily  vasopressin Infusion 0.04 Unit(s)/Min (2.4 mL/Hr) IV Continuous <Continuous>    MEDICATIONS  (PRN):  dextrose 40% Gel 15 Gram(s) Oral once PRN Blood Glucose LESS THAN 70 milliGRAM(s)/deciliter  glucagon  Injectable 1 milliGRAM(s) IntraMuscular once PRN Glucose LESS THAN 70 milligrams/deciliter      ALLERGIES:  Allergies    No Known Allergies    Intolerances        LABS:                        8.7    32.66 )-----------( 262      ( 2019 00:35 )             27.3     Hemoglobin: 8.7 g/dL ( @ 00:35)  Hemoglobin: 7.6 g/dL ( @ 23:50)  Hemoglobin: 6.6 g/dL ( @ 15:17)  Hemoglobin: 7.5 g/dL ( @ 00:57)  Hemoglobin: 8.3 g/dL ( @ 04:52)    CBC Full  -  ( 2019 00:35 )  WBC Count : 32.66 K/uL  RBC Count : 3.57 M/uL  Hemoglobin : 8.7 g/dL  Hematocrit : 27.3 %  Platelet Count - Automated : 262 K/uL  Mean Cell Volume : 76.5 fl  Mean Cell Hemoglobin : 24.4 pg  Mean Cell Hemoglobin Concentration : 31.9 gm/dL  Auto Neutrophil # : x  Auto Lymphocyte # : x  Auto Monocyte # : x  Auto Eosinophil # : x  Auto Basophil # : x  Auto Neutrophil % : x  Auto Lymphocyte % : x  Auto Monocyte % : x  Auto Eosinophil % : x  Auto Basophil % : x        141  |  105  |  90<H>  ----------------------------<  237<H>  4.4   |  21<L>  |  2.41<H>    Ca    8.6      2019 00:35  Phos  5.3       Mg     3.3         TPro  6.6  /  Alb  3.0<L>  /  TBili  0.7  /  DBili  x   /  AST  51<H>  /  ALT  22  /  AlkPhos  549<H>      Creatinine Trend: 2.41<--, 2.66<--, 2.01<--, 2.26<--, 2.49<--, 1.47<--  LIVER FUNCTIONS - ( 2019 00:35 )  Alb: 3.0 g/dL / Pro: 6.6 g/dL / ALK PHOS: 549 U/L / ALT: 22 U/L / AST: 51 U/L / GGT: x           PT/INR - ( 2019 23:50 )   PT: 33.3 sec;   INR: 2.80 ratio         PTT - ( 2019 23:50 )  PTT:30.9 sec    hs Troponin:    ABG - ( 2019 00:26 )  pH, Arterial: 7.36  pH, Blood: x     /  pCO2: 40    /  pO2: 108   / HCO3: 22    / Base Excess: -2.1  /  SaO2: 98                  00:26 - ABG - pH: 7.36  |  pCO2: 40    |  pO2: 108   | Lactate:       | BE: -2.1       Urinalysis Basic - ( 2019 15:22 )    Color: Yellow / Appearance: Slightly Turbid / S.020 / pH: x  Gluc: x / Ketone: Negative  / Bili: Negative / Urobili: Negative   Blood: x / Protein: 30 mg/dL / Nitrite: Negative   Leuk Esterase: Large / RBC: 3 /hpf / WBC 36 /HPF   Sq Epi: x / Non Sq Epi: 4 /hpf / Bacteria: 0.0      CSF:                      EKG:   MICROBIOLOGY:    Culture - Bronchial (collected 2019 17:49)  Source: Bronch Wash Combicath  Gram Stain (2019 21:42):    No polymorphonuclear cells seen    No Squamous epithelial cells    No organisms seen    Culture - Body Fluid with Gram Stain (collected 2019 16:44)  Source: .Body Fluid Peritoneal Fluid  Gram Stain (2019 23:24):    polymorphonuclear leukocytes seen    No organisms seen    by cytocentrifuge  Preliminary Report (2019 13:06):    No growth    Culture - Blood (collected 2019 09:02)  Source: .Blood Blood  Preliminary Report (2019 10:01):    No growth to date.      IMAGING:      Labs, imaging, EKG personally reviewed    RADIOLOGY & ADDITIONAL TESTS: Reviewed.    < from: CT Abdomen and Pelvis No Cont (19 @ 20:10) >  IMPRESSION:    Interval increase in the pancreatic tail mass and the bilobar liver   lesions/metastases.    New small volume abdominopelvic ascites with questionable peritoneal   nodularity. No hyperdense component to suggest hemorrhage.    Small bilateral pleural effusions.    < end of copied text > OVERNIGHT EVENTS / SUBJECTIVE: Patient seen and examined at bedside.     OBJECTIVE: Patient opened his eyes when his name was called which is improvement from yesterday. Did not answer questions answered so unable to assess ROS.    Still febrile and tachy, per nursing patient only making 20cc urine per hour    VITAL SIGNS:  ICU Vital Signs Last 24 Hrs  T(C): 37.5 (2019 04:00), Max: 39.3 (2019 08:00)  T(F): 99.5 (2019 04:00), Max: 102.7 (2019 08:00)  HR: 97 (2019 07:00) (71 - 106)  BP: 116/74 (2019 07:00) (92/62 - 153/76)  BP(mean): 91 (2019 07:00) (68 - 101)  ABP: --  ABP(mean): --  RR: 14 (2019 18:00) (14 - 16)  SpO2: 100% (2019 07:00) (100% - 100%)    Mode: AC/ CMV (Assist Control/ Continuous Mandatory Ventilation), RR (machine): 14, TV (machine): 550, FiO2: 30, PEEP: 5, ITime: 1, MAP: 13, PIP: 28     @ 07:01  -   @ 07:00  --------------------------------------------------------  IN: 2040 mL / OUT: 475 mL / NET: 1565 mL      CAPILLARY BLOOD GLUCOSE      POCT Blood Glucose.: 278 mg/dL (2019 05:20)      PHYSICAL EXAM:    General: NAD  HEENT: NC/AT; PERRL, clear conjunctiva  Neck: supple  Respiratory: Crackles at bases of L lung & crackles from mid R lung to bases  Cardiovascular: +S1/S2; RRR  Abdomen: soft, NT; decreased BS, distended  Extremities: WWP, 2+ peripheral pulses b/l; no LE edema  Skin: normal color and turgor; no rash  Neurological:    MEDICATIONS:  MEDICATIONS  (STANDING):  aspirin  chewable 81 milliGRAM(s) Oral daily  cefepime   IVPB 1000 milliGRAM(s) IV Intermittent every 8 hours  chlorhexidine 0.12% Liquid 15 milliLiter(s) Oral Mucosa every 12 hours  chlorhexidine 4% Liquid 1 Application(s) Topical <User Schedule>  dexMEDEtomidine Infusion 0.4 MICROgram(s)/kG/Hr (8.79 mL/Hr) IV Continuous <Continuous>  dextrose 5%. 1000 milliLiter(s) (50 mL/Hr) IV Continuous <Continuous>  dextrose 50% Injectable 12.5 Gram(s) IV Push once  dextrose 50% Injectable 25 Gram(s) IV Push once  dextrose 50% Injectable 25 Gram(s) IV Push once  fentaNYL   Infusion. 0.5 MICROgram(s)/kG/Hr (2.198 mL/Hr) IV Continuous <Continuous>  insulin lispro (HumaLOG) corrective regimen sliding scale   SubCutaneous every 6 hours  lactulose Syrup 10 Gram(s) Oral every 12 hours  metroNIDAZOLE  IVPB      metroNIDAZOLE  IVPB 500 milliGRAM(s) IV Intermittent every 8 hours  midodrine 10 milliGRAM(s) Oral every 8 hours  octreotide  Injectable 100 MICROGram(s) SubCutaneous three times a day  pantoprazole  Injectable 40 milliGRAM(s) IV Push daily  vasopressin Infusion 0.04 Unit(s)/Min (2.4 mL/Hr) IV Continuous <Continuous>    MEDICATIONS  (PRN):  dextrose 40% Gel 15 Gram(s) Oral once PRN Blood Glucose LESS THAN 70 milliGRAM(s)/deciliter  glucagon  Injectable 1 milliGRAM(s) IntraMuscular once PRN Glucose LESS THAN 70 milligrams/deciliter      ALLERGIES:  Allergies    No Known Allergies    Intolerances        LABS:                        8.7    32.66 )-----------( 262      ( 2019 00:35 )             27.3     Hemoglobin: 8.7 g/dL ( @ 00:35)  Hemoglobin: 7.6 g/dL ( @ 23:50)  Hemoglobin: 6.6 g/dL ( @ 15:17)  Hemoglobin: 7.5 g/dL ( @ 00:57)  Hemoglobin: 8.3 g/dL ( @ 04:52)    CBC Full  -  ( 2019 00:35 )  WBC Count : 32.66 K/uL  RBC Count : 3.57 M/uL  Hemoglobin : 8.7 g/dL  Hematocrit : 27.3 %  Platelet Count - Automated : 262 K/uL  Mean Cell Volume : 76.5 fl  Mean Cell Hemoglobin : 24.4 pg  Mean Cell Hemoglobin Concentration : 31.9 gm/dL  Auto Neutrophil # : x  Auto Lymphocyte # : x  Auto Monocyte # : x  Auto Eosinophil # : x  Auto Basophil # : x  Auto Neutrophil % : x  Auto Lymphocyte % : x  Auto Monocyte % : x  Auto Eosinophil % : x  Auto Basophil % : x        141  |  105  |  90<H>  ----------------------------<  237<H>  4.4   |  21<L>  |  2.41<H>    Ca    8.6      2019 00:35  Phos  5.3       Mg     3.3         TPro  6.6  /  Alb  3.0<L>  /  TBili  0.7  /  DBili  x   /  AST  51<H>  /  ALT  22  /  AlkPhos  549<H>      Creatinine Trend: 2.41<--, 2.66<--, 2.01<--, 2.26<--, 2.49<--, 1.47<--  LIVER FUNCTIONS - ( 2019 00:35 )  Alb: 3.0 g/dL / Pro: 6.6 g/dL / ALK PHOS: 549 U/L / ALT: 22 U/L / AST: 51 U/L / GGT: x           PT/INR - ( 2019 23:50 )   PT: 33.3 sec;   INR: 2.80 ratio         PTT - ( 2019 23:50 )  PTT:30.9 sec    hs Troponin:    ABG - ( 2019 00:26 )  pH, Arterial: 7.36  pH, Blood: x     /  pCO2: 40    /  pO2: 108   / HCO3: 22    / Base Excess: -2.1  /  SaO2: 98                  00:26 - ABG - pH: 7.36  |  pCO2: 40    |  pO2: 108   | Lactate:       | BE: -2.1       Urinalysis Basic - ( 2019 15:22 )    Color: Yellow / Appearance: Slightly Turbid / S.020 / pH: x  Gluc: x / Ketone: Negative  / Bili: Negative / Urobili: Negative   Blood: x / Protein: 30 mg/dL / Nitrite: Negative   Leuk Esterase: Large / RBC: 3 /hpf / WBC 36 /HPF   Sq Epi: x / Non Sq Epi: 4 /hpf / Bacteria: 0.0      CSF:                      EKG:   MICROBIOLOGY:    Culture - Bronchial (collected 2019 17:49)  Source: Bronch Wash Combicath  Gram Stain (2019 21:42):    No polymorphonuclear cells seen    No Squamous epithelial cells    No organisms seen    Culture - Body Fluid with Gram Stain (collected 2019 16:44)  Source: .Body Fluid Peritoneal Fluid  Gram Stain (2019 23:24):    polymorphonuclear leukocytes seen    No organisms seen    by cytocentrifuge  Preliminary Report (2019 13:06):    No growth    Culture - Blood (collected 2019 09:02)  Source: .Blood Blood  Preliminary Report (2019 10:01):    No growth to date.      IMAGING:      Labs, imaging, EKG personally reviewed    RADIOLOGY & ADDITIONAL TESTS: Reviewed.    < from: CT Abdomen and Pelvis No Cont (19 @ 20:10) >  IMPRESSION:    Interval increase in the pancreatic tail mass and the bilobar liver   lesions/metastases.    New small volume abdominopelvic ascites with questionable peritoneal   nodularity. No hyperdense component to suggest hemorrhage.    Small bilateral pleural effusions.    < end of copied text >

## 2019-11-06 NOTE — PROGRESS NOTE ADULT - ASSESSMENT
55 yo male with history of DM,CVA, and HTN now with failure to thrive in setting of recently diagnosed metastatic pancreatic cancer.  The fever may be a neoplastic one given liver involvement.  No evidence of biliary obstruction or cholangitis.  His leukocytosis is not knew, felt to be reactive to malignancy.  The CT scan and MRI have confirmed multiple cerebral infarcts as cause of weakness.  ? hypercoagulable due to malignancy.Antibiotics are empiric.  Consider ECHO to assess for NBTE although will not   All micro has been negative  Fevers are felt to be neoplastic, this is a diagnosis of exclusion  Repeat CT of A/P with progression of disease  He has received zosyn,vanco, cefepime and now meropenem  Neoplastic fever is often a diagnosis of exclusion  Suggest:  1.consider d/c of all antibiotics, not clear what we are treating  2.will follow micro  3.Vent per critical  care  4.[place] guarded

## 2019-11-06 NOTE — PROGRESS NOTE ADULT - ASSESSMENT
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    #Neuro  - CVA w/residual R-sided weakness   - c/w home ASA 81mg  - off of fentanyl and precedex  - ok to restart hep gtt but INR currently bt 2-3, holding for now    #Pulm  - still intubated but not sedated  - RR 14  PEEP 5 FiO2 30%  - will attempt CPAP trials as tolerated  - small b/l pleural effusions seen on CTAP (11/4)    #Cards  - holding home meds in the s/o sepsis  - c/w home ASA 81mg  - bubble study neg for PFO  - started midodrine 10mg TID on 11/5 d/t HoTN    #GI  - pancreatic adenocarcinoma w/METS to liver s/p large volume para  - c/w 1.5mg/kg albumin, stopped after 6 doses, now giving albumin at 1mg/kg  - CTAP (11/4): interval inc in pancreatic tail mass & liver METS, small vol ascites & questionable peritoneal nodularity, small b/l pleural effusions  - c/w protonix  - c/w Tube feedings, rate/type per nutrition    #Renal  - [place]: getting albumin as above  - FeNa consistent w/pre-renal condition  - added vasopressin for hepatorenal syndrome    #Endo  - DM2: ISS    #ID  - dc'd vanc/CTX on 11/4  - SBP (1730 PMNs) per para: started cefepime on 11/4  - added flagyl on 11/5  - BCx and para fluid Cx NGTD    #GOC  - full code 54M c hx HTN, DM2, HLD, CVA 6 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    #Neuro  - CVA w/residual R-sided weakness   - c/w home ASA 81mg  - ok to restart hep gtt but INR currently bt 2-3, holding for now  - will do CTH tomorrow if not improving     #Pulm  - still intubated but not sedated  - RR 14  PEEP 5 FiO2 21%  - will attempt CPAP trials as tolerated  - small b/l pleural effusions seen on CTAP (11/4)    #Cards  - holding home meds in the s/o sepsis  - c/w home ASA 81mg  - bubble study neg for PFO  - c/w midodrine 10mg TID on 11/5    #GI  - pancreatic adenocarcinoma w/METS to liver s/p large volume para  - c/w 1.5mg/kg albumin, stopped after 6 doses, now giving albumin at 1mg/kg for hepatorenal syndrome  - CTAP (11/4): interval inc in pancreatic tail mass & liver METS, small vol ascites & questionable peritoneal nodularity, small b/l pleural effusions  - c/w protonix  - c/w Tube feedings, rate/type per nutrition  - c/w octreotide for hepatorenal syndrome    #Renal  - [place]: getting albumin as above  - FeNa consistent w/pre-renal condition  - c/w vasopressin for hepatorenal syndrome    #Endo  - DM2: ISS    #ID  - dc'd vanc/CTX on 11/4  - SBP (1730 PMNs) per para: dc'd cefepime & flagyl  - started meropenem on 11/6, gave tylenol for fever  - Bronchial Cx, BCx and para fluid Cx NGTD    #GOC  - full code 54M c hx HTN, DM2, HLD, CVA 6 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    #Neuro  - CVA w/residual R-sided weakness   - c/w home ASA 81mg  - ok to restart hep gtt but INR currently bt 2-3, holding for now  - will do CTH tomorrow if not improving     #Pulm  - still intubated but not sedated  - RR 14  PEEP 5 FiO2 21%  - will attempt CPAP trials as tolerated  - small b/l pleural effusions seen on CTAP (11/4)    #Cards  - holding home meds in the s/o sepsis  - c/w home ASA 81mg  - bubble study neg for PFO  - c/w midodrine 10mg TID on 11/5    #GI  - pancreatic adenocarcinoma w/METS to liver s/p large volume para  - c/w 1.5mg/kg albumin, stopped after 6 doses, now giving albumin at 1mg/kg for hepatorenal syndrome  - CTAP (11/4): interval inc in pancreatic tail mass & liver METS, small vol ascites & questionable peritoneal nodularity, small b/l pleural effusions  - c/w protonix  - c/w Tube feedings, rate/type per nutrition  - c/w octreotide for hepatorenal syndrome    #Renal  - [place]: getting albumin as above  - FeNa consistent w/pre-renal condition  - c/w vasopressin for hepatorenal syndrome    #Endo  - DM2: ISS    #ID  - dc'd vanc/CTX on 11/4  - SBP (1730 PMNs) per para: dc'd cefepime & flagyl  - Febrile & tachy: started meropenem on 11/6, gave tylenol for fever  - Bronchial Cx, BCx and para fluid Cx NGTD    #GOC  - full code

## 2019-11-07 LAB
ALBUMIN SERPL ELPH-MCNC: 3.3 G/DL — SIGNIFICANT CHANGE UP (ref 3.3–5)
ALBUMIN SERPL ELPH-MCNC: 3.4 G/DL — SIGNIFICANT CHANGE UP (ref 3.3–5)
ALP SERPL-CCNC: 420 U/L — HIGH (ref 40–120)
ALP SERPL-CCNC: 475 U/L — HIGH (ref 40–120)
ALT FLD-CCNC: 17 U/L — SIGNIFICANT CHANGE UP (ref 10–45)
ALT FLD-CCNC: 19 U/L — SIGNIFICANT CHANGE UP (ref 10–45)
ANION GAP SERPL CALC-SCNC: 22 MMOL/L — HIGH (ref 5–17)
ANION GAP SERPL CALC-SCNC: 22 MMOL/L — HIGH (ref 5–17)
APTT BLD: 30.7 SEC — SIGNIFICANT CHANGE UP (ref 27.5–36.3)
AST SERPL-CCNC: 28 U/L — SIGNIFICANT CHANGE UP (ref 10–40)
AST SERPL-CCNC: 30 U/L — SIGNIFICANT CHANGE UP (ref 10–40)
B-OH-BUTYR SERPL-SCNC: 0.1 MMOL/L — SIGNIFICANT CHANGE UP
BILIRUB SERPL-MCNC: 0.8 MG/DL — SIGNIFICANT CHANGE UP (ref 0.2–1.2)
BILIRUB SERPL-MCNC: 0.9 MG/DL — SIGNIFICANT CHANGE UP (ref 0.2–1.2)
BUN SERPL-MCNC: 115 MG/DL — HIGH (ref 7–23)
BUN SERPL-MCNC: 133 MG/DL — HIGH (ref 7–23)
CALCIUM SERPL-MCNC: 8.6 MG/DL — SIGNIFICANT CHANGE UP (ref 8.4–10.5)
CALCIUM SERPL-MCNC: 8.7 MG/DL — SIGNIFICANT CHANGE UP (ref 8.4–10.5)
CHLORIDE SERPL-SCNC: 101 MMOL/L — SIGNIFICANT CHANGE UP (ref 96–108)
CHLORIDE SERPL-SCNC: 103 MMOL/L — SIGNIFICANT CHANGE UP (ref 96–108)
CO2 SERPL-SCNC: 17 MMOL/L — LOW (ref 22–31)
CO2 SERPL-SCNC: 18 MMOL/L — LOW (ref 22–31)
CREAT SERPL-MCNC: 3.5 MG/DL — HIGH (ref 0.5–1.3)
CREAT SERPL-MCNC: 3.73 MG/DL — HIGH (ref 0.5–1.3)
CULTURE RESULTS: SIGNIFICANT CHANGE UP
GAS PNL BLDA: SIGNIFICANT CHANGE UP
GLUCOSE BLDC GLUCOMTR-MCNC: 394 MG/DL — HIGH (ref 70–99)
GLUCOSE BLDC GLUCOMTR-MCNC: 507 MG/DL — CRITICAL HIGH (ref 70–99)
GLUCOSE BLDC GLUCOMTR-MCNC: 513 MG/DL — CRITICAL HIGH (ref 70–99)
GLUCOSE BLDC GLUCOMTR-MCNC: 539 MG/DL — CRITICAL HIGH (ref 70–99)
GLUCOSE SERPL-MCNC: 516 MG/DL — CRITICAL HIGH (ref 70–99)
GLUCOSE SERPL-MCNC: 536 MG/DL — CRITICAL HIGH (ref 70–99)
HCT VFR BLD CALC: 29.3 % — LOW (ref 39–50)
HGB BLD-MCNC: 9.4 G/DL — LOW (ref 13–17)
INR BLD: 2.95 RATIO — HIGH (ref 0.88–1.16)
MAGNESIUM SERPL-MCNC: 3.5 MG/DL — HIGH (ref 1.6–2.6)
MAGNESIUM SERPL-MCNC: 3.7 MG/DL — HIGH (ref 1.6–2.6)
MCHC RBC-ENTMCNC: 24.8 PG — LOW (ref 27–34)
MCHC RBC-ENTMCNC: 32.1 GM/DL — SIGNIFICANT CHANGE UP (ref 32–36)
MCV RBC AUTO: 77.3 FL — LOW (ref 80–100)
NRBC # BLD: 0 /100 WBCS — SIGNIFICANT CHANGE UP (ref 0–0)
PHOSPHATE SERPL-MCNC: 5.2 MG/DL — HIGH (ref 2.5–4.5)
PHOSPHATE SERPL-MCNC: 6.2 MG/DL — HIGH (ref 2.5–4.5)
PLATELET # BLD AUTO: 186 K/UL — SIGNIFICANT CHANGE UP (ref 150–400)
POTASSIUM SERPL-MCNC: 4 MMOL/L — SIGNIFICANT CHANGE UP (ref 3.5–5.3)
POTASSIUM SERPL-MCNC: 4.4 MMOL/L — SIGNIFICANT CHANGE UP (ref 3.5–5.3)
POTASSIUM SERPL-SCNC: 4 MMOL/L — SIGNIFICANT CHANGE UP (ref 3.5–5.3)
POTASSIUM SERPL-SCNC: 4.4 MMOL/L — SIGNIFICANT CHANGE UP (ref 3.5–5.3)
PROT SERPL-MCNC: 6.5 G/DL — SIGNIFICANT CHANGE UP (ref 6–8.3)
PROT SERPL-MCNC: 6.5 G/DL — SIGNIFICANT CHANGE UP (ref 6–8.3)
PROTHROM AB SERPL-ACNC: 35.1 SEC — HIGH (ref 10–12.9)
RBC # BLD: 3.79 M/UL — LOW (ref 4.2–5.8)
RBC # FLD: 15.9 % — HIGH (ref 10.3–14.5)
SODIUM SERPL-SCNC: 141 MMOL/L — SIGNIFICANT CHANGE UP (ref 135–145)
SODIUM SERPL-SCNC: 142 MMOL/L — SIGNIFICANT CHANGE UP (ref 135–145)
SPECIMEN SOURCE: SIGNIFICANT CHANGE UP
VANCOMYCIN TROUGH SERPL-MCNC: 12.7 UG/ML — SIGNIFICANT CHANGE UP (ref 10–20)
WBC # BLD: 32.71 K/UL — HIGH (ref 3.8–10.5)
WBC # FLD AUTO: 32.71 K/UL — HIGH (ref 3.8–10.5)

## 2019-11-07 PROCEDURE — 99232 SBSQ HOSP IP/OBS MODERATE 35: CPT

## 2019-11-07 PROCEDURE — 99497 ADVNCD CARE PLAN 30 MIN: CPT | Mod: 25

## 2019-11-07 PROCEDURE — 70450 CT HEAD/BRAIN W/O DYE: CPT | Mod: 26

## 2019-11-07 PROCEDURE — 99233 SBSQ HOSP IP/OBS HIGH 50: CPT | Mod: GC

## 2019-11-07 RX ORDER — HYDROMORPHONE HYDROCHLORIDE 2 MG/ML
0.5 INJECTION INTRAMUSCULAR; INTRAVENOUS; SUBCUTANEOUS
Refills: 0 | Status: DISCONTINUED | OUTPATIENT
Start: 2019-11-07 | End: 2019-11-09

## 2019-11-07 RX ORDER — HUMAN INSULIN 100 [IU]/ML
5 INJECTION, SUSPENSION SUBCUTANEOUS EVERY 6 HOURS
Refills: 0 | Status: DISCONTINUED | OUTPATIENT
Start: 2019-11-07 | End: 2019-11-07

## 2019-11-07 RX ORDER — INSULIN LISPRO 100/ML
VIAL (ML) SUBCUTANEOUS EVERY 4 HOURS
Refills: 0 | Status: DISCONTINUED | OUTPATIENT
Start: 2019-11-07 | End: 2019-11-07

## 2019-11-07 RX ORDER — INSULIN HUMAN 100 [IU]/ML
5 INJECTION, SOLUTION SUBCUTANEOUS ONCE
Refills: 0 | Status: COMPLETED | OUTPATIENT
Start: 2019-11-07 | End: 2019-11-07

## 2019-11-07 RX ORDER — HUMAN INSULIN 100 [IU]/ML
8 INJECTION, SUSPENSION SUBCUTANEOUS EVERY 6 HOURS
Refills: 0 | Status: DISCONTINUED | OUTPATIENT
Start: 2019-11-07 | End: 2019-11-07

## 2019-11-07 RX ADMIN — OCTREOTIDE ACETATE 100 MICROGRAM(S): 200 INJECTION, SOLUTION INTRAVENOUS; SUBCUTANEOUS at 11:22

## 2019-11-07 RX ADMIN — HUMAN INSULIN 8 UNIT(S): 100 INJECTION, SUSPENSION SUBCUTANEOUS at 05:43

## 2019-11-07 RX ADMIN — MEROPENEM 100 MILLIGRAM(S): 1 INJECTION INTRAVENOUS at 08:49

## 2019-11-07 RX ADMIN — Medication 81 MILLIGRAM(S): at 11:23

## 2019-11-07 RX ADMIN — CHLORHEXIDINE GLUCONATE 1 APPLICATION(S): 213 SOLUTION TOPICAL at 07:57

## 2019-11-07 RX ADMIN — MIDODRINE HYDROCHLORIDE 10 MILLIGRAM(S): 2.5 TABLET ORAL at 06:48

## 2019-11-07 RX ADMIN — OCTREOTIDE ACETATE 100 MICROGRAM(S): 200 INJECTION, SOLUTION INTRAVENOUS; SUBCUTANEOUS at 04:49

## 2019-11-07 RX ADMIN — INSULIN HUMAN 5 UNIT(S): 100 INJECTION, SOLUTION SUBCUTANEOUS at 00:09

## 2019-11-07 RX ADMIN — Medication 12: at 00:09

## 2019-11-07 RX ADMIN — CHLORHEXIDINE GLUCONATE 15 MILLILITER(S): 213 SOLUTION TOPICAL at 05:20

## 2019-11-07 RX ADMIN — Medication 20: at 13:22

## 2019-11-07 RX ADMIN — LACTULOSE 10 GRAM(S): 10 SOLUTION ORAL at 05:43

## 2019-11-07 RX ADMIN — Medication 50 MILLILITER(S): at 01:24

## 2019-11-07 RX ADMIN — Medication 24: at 05:42

## 2019-11-07 RX ADMIN — PANTOPRAZOLE SODIUM 40 MILLIGRAM(S): 20 TABLET, DELAYED RELEASE ORAL at 11:22

## 2019-11-07 RX ADMIN — CHLORHEXIDINE GLUCONATE 15 MILLILITER(S): 213 SOLUTION TOPICAL at 17:51

## 2019-11-07 RX ADMIN — Medication 24: at 09:50

## 2019-11-07 RX ADMIN — HUMAN INSULIN 8 UNIT(S): 100 INJECTION, SUSPENSION SUBCUTANEOUS at 11:22

## 2019-11-07 RX ADMIN — MIDODRINE HYDROCHLORIDE 10 MILLIGRAM(S): 2.5 TABLET ORAL at 21:32

## 2019-11-07 RX ADMIN — MIDODRINE HYDROCHLORIDE 10 MILLIGRAM(S): 2.5 TABLET ORAL at 13:23

## 2019-11-07 NOTE — CHART NOTE - NSCHARTNOTEFT_GEN_A_CORE
Nutrition Follow Up Note  Patient seen for: malnutrition f/u    Chart reviewed, events noted. Adm dx: sepsis, [place], CVA, new dx pancreatic cancer w/ liver mets, FTT. Hepatorenal syndrome. Remains intubated. -500s, NPH increased today.    Source: chart, team    Diet :  Nepro 60cc/hr x 18 hrs via OGT     Enteral /Parenteral Nutrition: goal 1080cc/day  flow sheets reviewed 61% goal met over past 3 days (rate was titrating up, now at goal)    GI: no vomiting, abd distended, had large loose BM  (on lactulose)      Daily Weight in k.5 (), Weight in k.3 (), Weight in k.2 (), Weight in k.5 (), Weight in k.8 ()  wt range 187-193 lb, no edema  s/p paracentesis 11/3    Pertinent Medications: MEDICATIONS  (STANDING):  aspirin  chewable 81 milliGRAM(s) Oral daily  atorvastatin 40 milliGRAM(s) Oral at bedtime  chlorhexidine 0.12% Liquid 15 milliLiter(s) Oral Mucosa every 12 hours  chlorhexidine 4% Liquid 1 Application(s) Topical <User Schedule>  dextrose 5%. 1000 milliLiter(s) (50 mL/Hr) IV Continuous <Continuous>  dextrose 50% Injectable 12.5 Gram(s) IV Push once  dextrose 50% Injectable 25 Gram(s) IV Push once  dextrose 50% Injectable 25 Gram(s) IV Push once  insulin lispro (HumaLOG) corrective regimen sliding scale   SubCutaneous every 4 hours  insulin NPH human recombinant 8 Unit(s) SubCutaneous every 6 hours  lactulose Syrup 10 Gram(s) Oral every 12 hours  meropenem  IVPB      meropenem  IVPB 500 milliGRAM(s) IV Intermittent every 12 hours  midodrine 10 milliGRAM(s) Oral every 8 hours  octreotide  Injectable 100 MICROGram(s) SubCutaneous three times a day  pantoprazole  Injectable 40 milliGRAM(s) IV Push daily    MEDICATIONS  (PRN):  acetaminophen    Suspension .. 650 milliGRAM(s) Oral every 6 hours PRN Temp greater or equal to 38C (100.4F)  dextrose 40% Gel 15 Gram(s) Oral once PRN Blood Glucose LESS THAN 70 milliGRAM(s)/deciliter  glucagon  Injectable 1 milliGRAM(s) IntraMuscular once PRN Glucose LESS THAN 70 milligrams/deciliter     @ 01:20: Na 141, <H>, Cr 3.50<H>, <HH>, K+ 4.4, Phos 6.2<H>, Mg 3.5<H>, Alk Phos 420<H>, ALT/SGPT 19, AST/SGOT 28, HbA1c --   @ 12:20: Na 144, BUN 93<H>, Cr 3.00<H>, <H>, K+ 4.4, Phos 5.0<H>, Mg 3.3<H>, Alk Phos 532<H>, ALT/SGPT 23, AST/SGOT 40, HbA1c --    Finger Sticks:  POCT Blood Glucose.: 513 mg/dL ( @ 09:46)  POCT Blood Glucose.: 507 mg/dL ( @ 05:35)  POCT Blood Glucose.: 539 mg/dL ( @ 05:33)  POCT Blood Glucose.: 442 mg/dL ( @ 23:56)  POCT Blood Glucose.: 315 mg/dL ( @ 17:22)  POCT Blood Glucose.: 247 mg/dL ( @ 11:54)      Skin per nursing documentation: no pressure injuries documented       Estimated Needs:   [x ] no change since previous assessment  [ ] recalculated:     Previous Nutrition Diagnosis: severe malnutrition  Nutrition Diagnosis is: care plan ongoing, EN now at goal rate    New Nutrition Diagnosis: none  Related to:    As evidenced by:      Interventions:     Recommend  1) recommend continue w/ Nepro (volume restricted formula, phos elevated) goal Nepro 60cc/hr x 18 hrs provides 1944 kcals, 87 gm protein, 785cc free water  meets 25 Kcal/Kg, 1.1Gm/kg IBW 78.2 kg  2) reassess BG control for BG > 180    Monitoring and Evaluation:     Continue to monitor Nutritional intake, Tolerance to diet prescription, weights, labs, skin integrity    RD remains available upon request and will follow up per protocol

## 2019-11-07 NOTE — PROGRESS NOTE ADULT - SUBJECTIVE AND OBJECTIVE BOX
Patient is a 54y old  Male who presents with a chief complaint of weakness (2019 13:24)                                                               INTERVAL HPI/OVERNIGHT EVENTS:    REVIEW OF SYSTEMS:     CONSTITUTIONAL: No weakness, fevers or chills  EYES/ENT: No visual changes , no ear ache   NECK: No pain or stiffness  RESPIRATORY: No cough, wheezing,  No shortness of breath  CARDIOVASCULAR: No chest pain or palpitations  GASTROINTESTINAL: No abdominal pain  . No nausea, vomiting, or hematemesis; No diarrhea or constipation. No melena or hematochezia.  GENITOURINARY: No dysuria, frequency or hematuria  NEUROLOGICAL: No numbness or weakness  SKIN: No itching, burning, rashes, or lesions                                                                                                                                                                                                                                                                                 Medications:  MEDICATIONS  (STANDING):  chlorhexidine 0.12% Liquid 15 milliLiter(s) Oral Mucosa every 12 hours  chlorhexidine 4% Liquid 1 Application(s) Topical <User Schedule>  midodrine 10 milliGRAM(s) Oral every 8 hours    MEDICATIONS  (PRN):  HYDROmorphone  Injectable 0.5 milliGRAM(s) IV Push every 1 hour PRN dyspnea  HYDROmorphone  Injectable 0.5 milliGRAM(s) IV Push every 1 hour PRN pain  LORazepam   Injectable 0.5 milliGRAM(s) IV Push every 1 hour PRN anxiety/agitation       Allergies    No Known Allergies    Intolerances      Vital Signs Last 24 Hrs  T(C): 38.3 (2019 20:00), Max: 38.3 (2019 20:00)  T(F): 100.9 (2019 20:00), Max: 100.9 (2019 20:00)  HR: 107 (2019 21:00) (100 - 118)  BP: 96/55 (2019 21:00) (82/59 - 150/89)  BP(mean): 72 (2019 21:00) (66 - 111)  RR: 24 (2019 18:00) (24 - 32)  SpO2: 98% (2019 21:00) (95% - 100%)  CAPILLARY BLOOD GLUCOSE      POCT Blood Glucose.: 394 mg/dL (2019 13:19)  POCT Blood Glucose.: 513 mg/dL (2019 09:46)  POCT Blood Glucose.: 507 mg/dL (2019 05:35)  POCT Blood Glucose.: 539 mg/dL (2019 05:33)  POCT Blood Glucose.: 442 mg/dL (2019 23:56)       @ 07:  -   @ 07:00  --------------------------------------------------------  IN: 1510 mL / OUT: 300 mL / NET: 1210 mL     @ 07:  -   @ 21:55  --------------------------------------------------------  IN: 50 mL / OUT: 45 mL / NET: 5 mL      Physical Exam:    Daily     Daily Weight in k.5 (2019 04:00)  General:  Well appearing, NAD, not cachetic  HEENT:  Nonicteric, PERRLA  CV:  RRR, S1S2   Lungs:  CTA B/L, no wheezes, rales, rhonchi  Abdomen:  Soft, non-tender, no distended, positive BS  Extremities:  2+ pulses, no c/c, no edema  Skin:  Warm and dry, no rashes  :  No eddy  Neuro:  AAOx3, non-focal, grossly intact                                                                                                                                                                                                                                                                                                LABS:                               9.4    32.71 )-----------( 186      ( 2019 01:20 )             29.3                          142  |  103  |  133<H>  ----------------------------<  536<HH>  4.0   |  17<L>  |  3.73<H>    Ca    8.7      2019 11:04  Phos  5.2       Mg     3.7         TPro  6.5  /  Alb  3.4  /  TBili  0.8  /  DBili  x   /  AST  30  /  ALT  17  /  AlkPhos  475<H>                         RADIOLOGY & ADDITIONAL TESTS         I personally reviewed: [  ]EKG   [  ]CXR    [  ] CT      A/P:         Discussed with :     Barbie consultants' Notes   Time spent : Patient is a 54y old  Male who presents with a chief complaint of weakness (2019 13:24)                                                               INTERVAL HPI/OVERNIGHT EVENTS:    REVIEW OF SYSTEMS:    intubated                                                                                                                                                                                                                                                                                Medications:  MEDICATIONS  (STANDING):  chlorhexidine 0.12% Liquid 15 milliLiter(s) Oral Mucosa every 12 hours  chlorhexidine 4% Liquid 1 Application(s) Topical <User Schedule>  midodrine 10 milliGRAM(s) Oral every 8 hours    MEDICATIONS  (PRN):  HYDROmorphone  Injectable 0.5 milliGRAM(s) IV Push every 1 hour PRN dyspnea  HYDROmorphone  Injectable 0.5 milliGRAM(s) IV Push every 1 hour PRN pain  LORazepam   Injectable 0.5 milliGRAM(s) IV Push every 1 hour PRN anxiety/agitation       Allergies    No Known Allergies    Intolerances      Vital Signs Last 24 Hrs  T(C): 38.3 (2019 20:00), Max: 38.3 (2019 20:00)  T(F): 100.9 (2019 20:00), Max: 100.9 (2019 20:00)  HR: 107 (2019 21:00) (100 - 118)  BP: 96/55 (2019 21:00) (82/59 - 150/89)  BP(mean): 72 (2019 21:00) (66 - 111)  RR: 24 (2019 18:00) (24 - 32)  SpO2: 98% (2019 21:00) (95% - 100%)  CAPILLARY BLOOD GLUCOSE      POCT Blood Glucose.: 394 mg/dL (2019 13:19)  POCT Blood Glucose.: 513 mg/dL (2019 09:46)  POCT Blood Glucose.: 507 mg/dL (2019 05:35)  POCT Blood Glucose.: 539 mg/dL (2019 05:33)  POCT Blood Glucose.: 442 mg/dL (2019 23:56)       @ 07:  -   @ 07:00  --------------------------------------------------------  IN: 1510 mL / OUT: 300 mL / NET: 1210 mL     @ 07:01  -   @ 21:55  --------------------------------------------------------  IN: 50 mL / OUT: 45 mL / NET: 5 mL      Physical Exam:    Daily     Daily Weight in k.5 (2019 04:00)  General: intubated .. off sedation but unresponsive   HEENT: no JVD   CV:  RRR, S1S2   Lungs:  clear   Abdomen: distended firm   Extremities:edema                                                                                                                                                                                                                                                                                               LABS:                               9.4    32.71 )-----------( 186      ( 2019 01:20 )             29.3                          142  |  103  |  133<H>  ----------------------------<  536<HH>  4.0   |  17<L>  |  3.73<H>    Ca    8.7      2019 11:04  Phos  5.2       Mg     3.7         TPro  6.5  /  Alb  3.4  /  TBili  0.8  /  DBili  x   /  AST  30  /  ALT  17  /  AlkPhos  475<H>

## 2019-11-07 NOTE — PROGRESS NOTE ADULT - SUBJECTIVE AND OBJECTIVE BOX
OVERNIGHT EVENTS / SUBJECTIVE: Patient seen and examined at bedside.     OBJECTIVE:    VITAL SIGNS:  ICU Vital Signs Last 24 Hrs  T(C): 37.6 (07 Nov 2019 06:00), Max: 38.8 (06 Nov 2019 10:00)  T(F): 99.7 (07 Nov 2019 06:00), Max: 101.8 (06 Nov 2019 10:00)  HR: 115 (07 Nov 2019 06:34) (99 - 126)  BP: 122/82 (07 Nov 2019 06:00) (106/81 - 158/111)  BP(mean): 97 (07 Nov 2019 06:00) (84 - 125)  ABP: --  ABP(mean): --  RR: 32 (07 Nov 2019 06:00) (20 - 32)  SpO2: 97% (07 Nov 2019 06:34) (90% - 100%)    Mode: AC/ CMV (Assist Control/ Continuous Mandatory Ventilation), RR (machine): 14, TV (machine): 550, FiO2: 21, PEEP: 5, ITime: 1, MAP: 15, PIP: 37    11-06 @ 07:01  -  11-07 @ 07:00  --------------------------------------------------------  IN: 1510 mL / OUT: 300 mL / NET: 1210 mL      CAPILLARY BLOOD GLUCOSE      POCT Blood Glucose.: 507 mg/dL (07 Nov 2019 05:35)      PHYSICAL EXAM:    General: NAD  HEENT: NC/AT; PERRL, clear conjunctiva  Neck: supple  Respiratory: CTA b/l  Cardiovascular: +S1/S2; RRR  Abdomen: soft, NT/ND; +BS x4  Extremities: WWP, 2+ peripheral pulses b/l; no LE edema  Skin: normal color and turgor; no rash  Neurological:    MEDICATIONS:  MEDICATIONS  (STANDING):  aspirin  chewable 81 milliGRAM(s) Oral daily  atorvastatin 40 milliGRAM(s) Oral at bedtime  chlorhexidine 0.12% Liquid 15 milliLiter(s) Oral Mucosa every 12 hours  chlorhexidine 4% Liquid 1 Application(s) Topical <User Schedule>  dextrose 5%. 1000 milliLiter(s) (50 mL/Hr) IV Continuous <Continuous>  dextrose 50% Injectable 12.5 Gram(s) IV Push once  dextrose 50% Injectable 25 Gram(s) IV Push once  dextrose 50% Injectable 25 Gram(s) IV Push once  insulin lispro (HumaLOG) corrective regimen sliding scale   SubCutaneous every 4 hours  insulin NPH human recombinant 8 Unit(s) SubCutaneous every 6 hours  lactulose Syrup 10 Gram(s) Oral every 12 hours  meropenem  IVPB      meropenem  IVPB 500 milliGRAM(s) IV Intermittent every 12 hours  midodrine 10 milliGRAM(s) Oral every 8 hours  octreotide  Injectable 100 MICROGram(s) SubCutaneous three times a day  pantoprazole  Injectable 40 milliGRAM(s) IV Push daily    MEDICATIONS  (PRN):  acetaminophen    Suspension .. 650 milliGRAM(s) Oral every 6 hours PRN Temp greater or equal to 38C (100.4F)  dextrose 40% Gel 15 Gram(s) Oral once PRN Blood Glucose LESS THAN 70 milliGRAM(s)/deciliter  glucagon  Injectable 1 milliGRAM(s) IntraMuscular once PRN Glucose LESS THAN 70 milligrams/deciliter      ALLERGIES:  Allergies    No Known Allergies    Intolerances        LABS:                        9.4    32.71 )-----------( 186      ( 07 Nov 2019 01:20 )             29.3     Hemoglobin: 9.4 g/dL (11-07 @ 01:20)  Hemoglobin: 8.7 g/dL (11-06 @ 00:35)  Hemoglobin: 7.6 g/dL (11-04 @ 23:50)  Hemoglobin: 6.6 g/dL (11-04 @ 15:17)  Hemoglobin: 7.5 g/dL (11-04 @ 00:57)    CBC Full  -  ( 07 Nov 2019 01:20 )  WBC Count : 32.71 K/uL  RBC Count : 3.79 M/uL  Hemoglobin : 9.4 g/dL  Hematocrit : 29.3 %  Platelet Count - Automated : 186 K/uL  Mean Cell Volume : 77.3 fl  Mean Cell Hemoglobin : 24.8 pg  Mean Cell Hemoglobin Concentration : 32.1 gm/dL  Auto Neutrophil # : x  Auto Lymphocyte # : x  Auto Monocyte # : x  Auto Eosinophil # : x  Auto Basophil # : x  Auto Neutrophil % : x  Auto Lymphocyte % : x  Auto Monocyte % : x  Auto Eosinophil % : x  Auto Basophil % : x    11-07    141  |  101  |  115<H>  ----------------------------<  516<HH>  4.4   |  18<L>  |  3.50<H>    Ca    8.6      07 Nov 2019 01:20  Phos  6.2     11-07  Mg     3.5     11-07    TPro  6.5  /  Alb  3.3  /  TBili  0.9  /  DBili  x   /  AST  28  /  ALT  19  /  AlkPhos  420<H>  11-07    Creatinine Trend: 3.50<--, 3.00<--, 2.41<--, 2.66<--, 2.01<--, 2.26<--  LIVER FUNCTIONS - ( 07 Nov 2019 01:20 )  Alb: 3.3 g/dL / Pro: 6.5 g/dL / ALK PHOS: 420 U/L / ALT: 19 U/L / AST: 28 U/L / GGT: x           PT/INR - ( 07 Nov 2019 01:20 )   PT: 35.1 sec;   INR: 2.95 ratio         PTT - ( 07 Nov 2019 01:20 )  PTT:30.7 sec    hs Troponin:    ABG - ( 06 Nov 2019 00:26 )  pH, Arterial: 7.36  pH, Blood: x     /  pCO2: 40    /  pO2: 108   / HCO3: 22    / Base Excess: -2.1  /  SaO2: 98                        CSF:                      EKG:   MICROBIOLOGY:    Culture - Bronchial (collected 05 Nov 2019 17:49)  Source: Bronch Wash Combicath  Gram Stain (05 Nov 2019 21:42):    No polymorphonuclear cells seen    No Squamous epithelial cells    No organisms seen  Preliminary Report (06 Nov 2019 20:14):    Normal Respiratory Manjula present    Culture - Blood (collected 05 Nov 2019 12:26)  Source: .Blood Blood  Preliminary Report (06 Nov 2019 13:01):    No growth to date.    Culture - Blood (collected 05 Nov 2019 12:26)  Source: .Blood Blood  Preliminary Report (06 Nov 2019 13:01):    No growth to date.      IMAGING:      Labs, imaging, EKG personally reviewed    RADIOLOGY & ADDITIONAL TESTS: Reviewed. OVERNIGHT EVENTS / SUBJECTIVE: Patient seen and examined at bedside.     OBJECTIVE: Only responding to pain, not opening eyes to voice, unable to assess ROM d/t patient not responding to questions, Brother agree to DNR    VITAL SIGNS:  ICU Vital Signs Last 24 Hrs  T(C): 37.6 (07 Nov 2019 06:00), Max: 38.8 (06 Nov 2019 10:00)  T(F): 99.7 (07 Nov 2019 06:00), Max: 101.8 (06 Nov 2019 10:00)  HR: 115 (07 Nov 2019 06:34) (99 - 126)  BP: 122/82 (07 Nov 2019 06:00) (106/81 - 158/111)  BP(mean): 97 (07 Nov 2019 06:00) (84 - 125)  ABP: --  ABP(mean): --  RR: 32 (07 Nov 2019 06:00) (20 - 32)  SpO2: 97% (07 Nov 2019 06:34) (90% - 100%)    Mode: AC/ CMV (Assist Control/ Continuous Mandatory Ventilation), RR (machine): 14, TV (machine): 550, FiO2: 21, PEEP: 5, ITime: 1, MAP: 15, PIP: 37    11-06 @ 07:01  -  11-07 @ 07:00  --------------------------------------------------------  IN: 1510 mL / OUT: 300 mL / NET: 1210 mL      CAPILLARY BLOOD GLUCOSE      POCT Blood Glucose.: 507 mg/dL (07 Nov 2019 05:35)      PHYSICAL EXAM:    General: NAD  HEENT: NC/AT; PERRL, clear conjunctiva  Neck: supple  Respiratory: CTA b/l  Cardiovascular: +S1/S2; RRR  Abdomen: soft, NT/ND; decreased bowel sounds, distended  Extremities: WWP, 2+ peripheral pulses b/l; no LE edema  Skin: normal color and turgor; no rash  Neurological: not following commands, only responding to pain.    MEDICATIONS:  MEDICATIONS  (STANDING):  aspirin  chewable 81 milliGRAM(s) Oral daily  atorvastatin 40 milliGRAM(s) Oral at bedtime  chlorhexidine 0.12% Liquid 15 milliLiter(s) Oral Mucosa every 12 hours  chlorhexidine 4% Liquid 1 Application(s) Topical <User Schedule>  dextrose 5%. 1000 milliLiter(s) (50 mL/Hr) IV Continuous <Continuous>  dextrose 50% Injectable 12.5 Gram(s) IV Push once  dextrose 50% Injectable 25 Gram(s) IV Push once  dextrose 50% Injectable 25 Gram(s) IV Push once  insulin lispro (HumaLOG) corrective regimen sliding scale   SubCutaneous every 4 hours  insulin NPH human recombinant 8 Unit(s) SubCutaneous every 6 hours  lactulose Syrup 10 Gram(s) Oral every 12 hours  meropenem  IVPB      meropenem  IVPB 500 milliGRAM(s) IV Intermittent every 12 hours  midodrine 10 milliGRAM(s) Oral every 8 hours  octreotide  Injectable 100 MICROGram(s) SubCutaneous three times a day  pantoprazole  Injectable 40 milliGRAM(s) IV Push daily    MEDICATIONS  (PRN):  acetaminophen    Suspension .. 650 milliGRAM(s) Oral every 6 hours PRN Temp greater or equal to 38C (100.4F)  dextrose 40% Gel 15 Gram(s) Oral once PRN Blood Glucose LESS THAN 70 milliGRAM(s)/deciliter  glucagon  Injectable 1 milliGRAM(s) IntraMuscular once PRN Glucose LESS THAN 70 milligrams/deciliter      ALLERGIES:  Allergies    No Known Allergies    Intolerances        LABS:                        9.4    32.71 )-----------( 186      ( 07 Nov 2019 01:20 )             29.3     Hemoglobin: 9.4 g/dL (11-07 @ 01:20)  Hemoglobin: 8.7 g/dL (11-06 @ 00:35)  Hemoglobin: 7.6 g/dL (11-04 @ 23:50)  Hemoglobin: 6.6 g/dL (11-04 @ 15:17)  Hemoglobin: 7.5 g/dL (11-04 @ 00:57)    CBC Full  -  ( 07 Nov 2019 01:20 )  WBC Count : 32.71 K/uL  RBC Count : 3.79 M/uL  Hemoglobin : 9.4 g/dL  Hematocrit : 29.3 %  Platelet Count - Automated : 186 K/uL  Mean Cell Volume : 77.3 fl  Mean Cell Hemoglobin : 24.8 pg  Mean Cell Hemoglobin Concentration : 32.1 gm/dL  Auto Neutrophil # : x  Auto Lymphocyte # : x  Auto Monocyte # : x  Auto Eosinophil # : x  Auto Basophil # : x  Auto Neutrophil % : x  Auto Lymphocyte % : x  Auto Monocyte % : x  Auto Eosinophil % : x  Auto Basophil % : x    11-07    141  |  101  |  115<H>  ----------------------------<  516<HH>  4.4   |  18<L>  |  3.50<H>    Ca    8.6      07 Nov 2019 01:20  Phos  6.2     11-07  Mg     3.5     11-07    TPro  6.5  /  Alb  3.3  /  TBili  0.9  /  DBili  x   /  AST  28  /  ALT  19  /  AlkPhos  420<H>  11-07    Creatinine Trend: 3.50<--, 3.00<--, 2.41<--, 2.66<--, 2.01<--, 2.26<--  LIVER FUNCTIONS - ( 07 Nov 2019 01:20 )  Alb: 3.3 g/dL / Pro: 6.5 g/dL / ALK PHOS: 420 U/L / ALT: 19 U/L / AST: 28 U/L / GGT: x           PT/INR - ( 07 Nov 2019 01:20 )   PT: 35.1 sec;   INR: 2.95 ratio         PTT - ( 07 Nov 2019 01:20 )  PTT:30.7 sec    hs Troponin:    ABG - ( 06 Nov 2019 00:26 )  pH, Arterial: 7.36  pH, Blood: x     /  pCO2: 40    /  pO2: 108   / HCO3: 22    / Base Excess: -2.1  /  SaO2: 98                        CSF:                      EKG:   MICROBIOLOGY:    Culture - Bronchial (collected 05 Nov 2019 17:49)  Source: Bronch Wash Combicath  Gram Stain (05 Nov 2019 21:42):    No polymorphonuclear cells seen    No Squamous epithelial cells    No organisms seen  Preliminary Report (06 Nov 2019 20:14):    Normal Respiratory Manjula present    Culture - Blood (collected 05 Nov 2019 12:26)  Source: .Blood Blood  Preliminary Report (06 Nov 2019 13:01):    No growth to date.    Culture - Blood (collected 05 Nov 2019 12:26)  Source: .Blood Blood  Preliminary Report (06 Nov 2019 13:01):    No growth to date.      IMAGING:      Labs, imaging, EKG personally reviewed    RADIOLOGY & ADDITIONAL TESTS: Reviewed.

## 2019-11-07 NOTE — PROGRESS NOTE ADULT - ASSESSMENT
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    Problem/Plan - 1:  ·  Problem: Acute respiratory failure.  Plan: S/P intubation  MICU care   midodrine for BP control   ABx and acute care as per ICU team  off marisol sedation  worsening mental stat   GOC discussed with the family   possible plan for DNR.     Problem/Plan - 2:  ·  Problem: Left-sided weakness.  Plan: MRI brain noted   Neuro eval apprciated   SPine MRI done on previous admission   Head CT noted  multiple ischemic strokes, shower   AC as per MICU  repeat CT head noted.     Problem/Plan - 3:  ·  Problem: Malignant neoplasm of tail of pancreas.  Plan: Onc eval appreciated   ABd US noted  S/P  paracentesis, Cx negative to date   hold eliquis for now and started on heparin for AC   poor prognosis   Onc eval appreciated  Abx as per ID  worsening lesion on CT   will benefit from JOCELYNN to evaluate for recurrent CVAs W/U.     Problem/Plan - 4:  ·  Problem: [place] (acute kidney injury).  Plan: - unclear etiology   - trending down   - Renal eval appreciated.     Problem/Plan - 5:  ·  Problem: SIRS (systemic inflammatory response syndrome).  Plan: - worsening leukocytosis, persistent fevers  - no obvious s/s active infection  - send blood and urine cultures  - ID eval appreciated   - cefepime  paracentesis for fluid study.     Problem/Plan - 6:  Problem: Type 2 diabetes mellitus without complication, without long-term current use of insulin. Plan: - ISS.    Problem/Plan - 7:  ·  Problem: Essential hypertension.  Plan: on hold due to sepsis and hypotension   on pressors   Echo noted.     Problem/Plan - 8:  ·  Problem: Prophylactic measure.  Plan: DVT and Gi PPX. 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    Problem/Plan - 1:  ·  Problem: Acute respiratory failure.  Plan:  intubated   Problem/Plan - 2:  ·  Problem: Left-sided weakness.  Plan: MRI brain noted   Neuro eval apprciated   SPine MRI done on previous admission   Head CT noted  multiple ischemic strokes, shower   AC as per MICU  repeat CT head noted.     Problem/Plan - 3:  ·  Problem: Malignant neoplasm of tail of pancreas.  Plan: Onc eval appreciated   ABd US noted  S/P  paracentesis, Cx negative to date   hold eliquis for now and started on heparin for AC   poor prognosis   Onc eval appreciated  Abx as per ID  worsening lesion on CT       Problem/Plan - 4:  ·  Problem: [place] (acute kidney injury).  Plan: - unclear etiology       Problem/Plan - 5:  ·  Problem: SIRS (systemic inflammatory response syndrome).  Plan: - worsening leukocytosis, persistent fevers  abx per MICU     Problem/Plan - 6:  Problem: Type 2 diabetes mellitus without complication, without long-term current use of insulin. Plan: - ISS.      Problem/Plan - 7:  ·  Problem: Essential hypertension.  Plan: on hold due to sepsis and hypotension   on pressors   Echo noted.     Problem/Plan - 8:  ·  Problem: Prophylactic measure.  Plan: DVT and Gi PPX.     Familly now want comfort care .. prognosis is poor

## 2019-11-07 NOTE — PROGRESS NOTE ADULT - ASSESSMENT
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    #Neuro  - CVA w/residual R-sided weakness   - c/w home ASA 81mg  - ok to restart hep gtt but INR currently bt 2-3, holding for now  - will do CTH tomorrow if not improving     #Pulm  - still intubated but not sedated  - RR 14  PEEP 5 FiO2 21%  - will attempt CPAP trials as tolerated  - small b/l pleural effusions seen on CTAP (11/4)    #Cards  - holding home meds in the s/o sepsis  - c/w home ASA 81mg  - bubble study neg for PFO  - c/w midodrine 10mg TID on 11/5    #GI  - pancreatic adenocarcinoma w/METS to liver s/p large volume para  - c/w 1.5mg/kg albumin, stopped after 6 doses, now giving albumin at 1mg/kg for hepatorenal syndrome  - CTAP (11/4): interval inc in pancreatic tail mass & liver METS, small vol ascites & questionable peritoneal nodularity, small b/l pleural effusions  - c/w protonix  - c/w Tube feedings, rate/type per nutrition  - c/w octreotide for hepatorenal syndrome    #Renal  - [place]: getting albumin as above  - FeNa consistent w/pre-renal condition  - c/w vasopressin for hepatorenal syndrome    #Endo  - DM2: ISS    #ID  - dc'd vanc/CTX on 11/4  - SBP (1730 PMNs) per para: dc'd cefepime & flagyl  - Febrile & tachy: started meropenem on 11/6, gave tylenol for fever  - Bronchial Cx, BCx and para fluid Cx NGTD    #GOC  - full code 54M c hx HTN, DM2, HLD, CVA 6 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    #Neuro  - CVA w/residual R-sided weakness   - c/w home ASA 81mg  - ok to restart hep gtt but INR currently bt 2-3, holding for now  - f/u CTH     #Pulm  - still intubated but not sedated  - RR 14  PEEP 5 FiO2 21%  - will attempt CPAP trials as tolerated  - small b/l pleural effusions seen on CTAP (11/4)    #Cards  - holding home meds in the s/o sepsis  - c/w midodrine 10mg TID started on 11/5  - c/w home ASA 81mg  - bubble study neg for PFO      #GI  - pancreatic adenocarcinoma w/METS to liver s/p large volume para  - c/w 1.5mg/kg albumin, stopped after 6 doses, now giving albumin at 1mg/kg for hepatorenal syndrome  - CTAP (11/4): interval inc in pancreatic tail mass & liver METS, small vol ascites & questionable peritoneal nodularity, small b/l pleural effusions  - c/w protonix  - c/w Tube feedings, rate/type per nutrition  - c/w octreotide for hepatorenal syndrome    #Renal  - [place]: getting albumin as above  - FeNa consistent w/pre-renal condition  - c/w vasopressin for hepatorenal syndrome  - significant uremia but not a candidate for HD  - added lactulose on 11/6    #Endo  - BGs increasing significantly likely d/t failing pancreas 2/2 pancreatic adenocarcinoma w/METS  - DM2: started high sliding scale on 11/7 d/t elev BGs  - added NPH 8U q6h on 11/6    #ID  - dc'd vanc/CTX on 11/4  - SBP (1730 PMNs) per para: c/w ac started on 11/6  - Bronchial Cx, BCx and para fluid Cx NGTD    #GOC  - DNR/DNI (11/7) 54M c hx HTN, DM2, HLD, CVA 6 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    #Neuro  - CVA w/residual R-sided weakness   - c/w home ASA 81mg  - ok to restart hep gtt but INR currently bt 2-3, holding for now  - ABG: pH WNL, hypocarbic (11/7), elev lactate  - f/u CTH     #Pulm  - still intubated but not sedated  - RR 14  PEEP 5 FiO2 21%  - will attempt CPAP trials as tolerated  - small b/l pleural effusions seen on CTAP (11/4)    #Cards  - holding home meds in the s/o sepsis  - c/w midodrine 10mg TID started on 11/5  - c/w home ASA 81mg  - bubble study neg for PFO      #GI  - pancreatic adenocarcinoma w/METS to liver s/p large volume para  - c/w 1.5mg/kg albumin, stopped after 6 doses, now giving albumin at 1mg/kg for hepatorenal syndrome  - CTAP (11/4): interval inc in pancreatic tail mass & liver METS, small vol ascites & questionable peritoneal nodularity, small b/l pleural effusions  - c/w protonix  - c/w Tube feedings, rate/type per nutrition  - c/w octreotide for hepatorenal syndrome    #Renal  - [place]: getting albumin as above  - FeNa consistent w/pre-renal condition  - c/w vasopressin for hepatorenal syndrome  - significant uremia but not a candidate for HD  - added lactulose on 11/6  - will check bladder pressure  - lactate 3.5 (on 11/7)    #Endo  - BGs increasing significantly likely d/t failing pancreas 2/2 pancreatic adenocarcinoma w/METS  - DM2: started high sliding scale on 11/7 d/t elev BGs  - added NPH 8U q6h on 11/6  - f/u BHB    #ID  - dc'd vanc/CTX on 11/4  - SBP (1730 PMNs) per para: c/w ac started on 11/6  - Bronchial Cx, BCx and para fluid Cx NGTD    #GOC  - DNR/DNI (11/7) - MOLST in chart 54M c hx HTN, DM2, HLD, CVA 6 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    #Neuro  - CVA w/residual R-sided weakness   - c/w home ASA 81mg  - ok to restart hep gtt but INR currently bt 2-3, holding for now  - ABG: pH WNL, hypocarbic (11/7), elev lactate  - f/u CTH     #Pulm  - still intubated but not sedated  - RR 14  PEEP 5 FiO2 21%  - will attempt CPAP trials as tolerated  - small b/l pleural effusions seen on CTAP (11/4)    #Cards  - holding home meds in the s/o sepsis  - c/w midodrine 10mg TID started on 11/5  - c/w home ASA 81mg  - bubble study neg for PFO      #GI  - pancreatic adenocarcinoma w/METS to liver s/p large volume para  - c/w 1.5mg/kg albumin, stopped after 6 doses, now giving albumin at 1mg/kg for hepatorenal syndrome  - CTAP (11/4): interval inc in pancreatic tail mass & liver METS, small vol ascites & questionable peritoneal nodularity, small b/l pleural effusions  - c/w protonix  - c/w Tube feedings, rate/type per nutrition  - c/w octreotide for hepatorenal syndrome    #Renal  - [place]: getting albumin as above  - FeNa consistent w/pre-renal condition  - c/w vasopressin for hepatorenal syndrome  - significant uremia but not a candidate for HD  - added lactulose on 11/6  - will check bladder pressure  - lactate 3.5 (on 11/7)    #Endo  - BGs increasing significantly likely d/t failing pancreas 2/2 pancreatic adenocarcinoma w/METS  - DM2: started high sliding scale on 11/7 d/t elev BGs  - added NPH 8U q6h on 11/6  - BHB WNL    #ID  - dc'd vanc/CTX on 11/4  - SBP (1730 PMNs) per para: c/w ac started on 11/6  - Bronchial Cx, BCx and para fluid Cx NGTD    #GOC  - DNR/DNI (11/7) - MOLST in chart

## 2019-11-07 NOTE — PROGRESS NOTE ADULT - SUBJECTIVE AND OBJECTIVE BOX
SUBJECTIVE AND OBJECTIVE:  INTERVAL HPI/OVERNIGHT EVENTS:    DNR on chart: Yes    Allergies    No Known Allergies    Intolerances    MEDICATIONS  (STANDING):  aspirin  chewable 81 milliGRAM(s) Oral daily  atorvastatin 40 milliGRAM(s) Oral at bedtime  chlorhexidine 0.12% Liquid 15 milliLiter(s) Oral Mucosa every 12 hours  chlorhexidine 4% Liquid 1 Application(s) Topical <User Schedule>  dextrose 5%. 1000 milliLiter(s) (50 mL/Hr) IV Continuous <Continuous>  dextrose 50% Injectable 12.5 Gram(s) IV Push once  dextrose 50% Injectable 25 Gram(s) IV Push once  dextrose 50% Injectable 25 Gram(s) IV Push once  insulin lispro (HumaLOG) corrective regimen sliding scale   SubCutaneous every 4 hours  insulin NPH human recombinant 8 Unit(s) SubCutaneous every 6 hours  lactulose Syrup 10 Gram(s) Oral every 12 hours  meropenem  IVPB      meropenem  IVPB 500 milliGRAM(s) IV Intermittent every 12 hours  midodrine 10 milliGRAM(s) Oral every 8 hours  octreotide  Injectable 100 MICROGram(s) SubCutaneous three times a day  pantoprazole  Injectable 40 milliGRAM(s) IV Push daily    MEDICATIONS  (PRN):  acetaminophen    Suspension .. 650 milliGRAM(s) Oral every 6 hours PRN Temp greater or equal to 38C (100.4F)  dextrose 40% Gel 15 Gram(s) Oral once PRN Blood Glucose LESS THAN 70 milliGRAM(s)/deciliter  glucagon  Injectable 1 milliGRAM(s) IntraMuscular once PRN Glucose LESS THAN 70 milligrams/deciliter      ITEMS UNCHECKED ARE NOT PRESENT    PRESENT SYMPTOMS: [ ]Unable to obtain due to poor mentation   Source if other than patient:  [ ]Family   [ ]Team     Pain (Impact on QOL):    Location:  Minimal acceptable level (0-10 scale):                   Aggravating factors:  Quality:  Radiation:  Severity (0-10 scale):    Timing:    Dyspnea:                           [ ]Mild [ ]Moderate [ ]Severe  Anxiety:                             [ ]Mild [ ]Moderate [ ]Severe  Fatigue:                             [ ]Mild [ ]Moderate [ ]Severe  Nausea:                             [ ]Mild [ ]Moderate [ ]Severe  Loss of appetite:              [ ]Mild [ ]Moderate [ ]Severe  Constipation:                    [ ]Mild [ ]Moderate [ ]Severe    PAIN AD Score:	  [email or web address]/painad.pdf (Ctrl + left click to view)    Other Symptoms:  [ ]All other review of systems negative     Karnofsky Performance Score/Palliative Performance Status Version 2:         %    http://palliative.info/resource_material/PPSv2.pdf  PHYSICAL EXAM:  Vital Signs Last 24 Hrs  T(C): 37.9 (07 Nov 2019 08:00), Max: 37.9 (07 Nov 2019 08:00)  T(F): 100.2 (07 Nov 2019 08:00), Max: 100.2 (07 Nov 2019 08:00)  HR: 104 (07 Nov 2019 13:19) (100 - 118)  BP: 120/86 (07 Nov 2019 10:00) (106/81 - 158/111)  BP(mean): 96 (07 Nov 2019 10:00) (84 - 125)  RR: 26 (07 Nov 2019 08:00) (20 - 32)  SpO2: 97% (07 Nov 2019 13:19) (90% - 100%) I&O's Summary    06 Nov 2019 07:01  -  07 Nov 2019 07:00  --------------------------------------------------------  IN: 1510 mL / OUT: 300 mL / NET: 1210 mL    07 Nov 2019 07:01  -  07 Nov 2019 13:25  --------------------------------------------------------  IN: 50 mL / OUT: 10 mL / NET: 40 mL     GENERAL:  [ ]Alert  [ ]Oriented x   [ ]Lethargic  [ ]Cachexia  [ ]Unarousable  [ ]Verbal  [ ]Non-Verbal  Behavioral:   [ ] Anxiety  [ ] Delirium [ ] Agitation [ ] Other  HEENT:  [ ]Normal   [ ]Dry mouth   [ ]ET Tube/Trach  [ ]Oral lesions  PULMONARY:   [ ]Clear [ ]Tachypnea  [ ]Audible excessive secretions   [ ]Rhonchi        [ ]Right [ ]Left [ ]Bilateral  [ ]Crackles        [ ]Right [ ]Left [ ]Bilateral  [ ]Wheezing     [ ]Right [ ]Left [ ]Bilateral  CARDIOVASCULAR:    [ ]Regular [ ]Irregular [ ]Tachy  [ ]Deon [ ]Murmur [ ]Other  GASTROINTESTINAL:  [ ]Soft  [ ]Distended   [ ]+BS  [ ]Non tender [ ]Tender  [ ]PEG [ ]OGT/ NGT   Last BM:    GENITOURINARY:  [ ]Normal [ ] Incontinent   [ ]Oliguria/Anuria   [ ]Braswell  MUSCULOSKELETAL:   [ ]Normal   [ ]Weakness  [ ]Bed/Wheelchair bound [ ]Edema  NEUROLOGIC:   [ ]No focal deficits  [ ] Cognitive impairment  [ ] Dysphagia [ ]Dysarthria [ ] Paresis [ ]Other   SKIN:   [ ]Normal   [ ]Pressure ulcer(s)  [ ]Rash    CRITICAL CARE:  [ ] Shock Present  [ ]Septic [ ]Cardiogenic [ ]Neurologic [ ]Hypovolemic  [ ]  Vasopressors [ ]  Inotropes   [ ] Respiratory failure present  [ ] Acute  [ ] Chronic [ ] Hypoxic  [ ] Hypercarbic [ ] Other  [ ] Other organ failure     LABS:                        9.4    32.71 )-----------( 186      ( 07 Nov 2019 01:20 )             29.3   11-07    142  |  103  |  133<H>  ----------------------------<  536<HH>  4.0   |  17<L>  |  3.73<H>    Ca    8.7      07 Nov 2019 11:04  Phos  5.2     11-07  Mg     3.7     11-07    TPro  6.5  /  Alb  3.4  /  TBili  0.8  /  DBili  x   /  AST  30  /  ALT  17  /  AlkPhos  475<H>  11-07  PT/INR - ( 07 Nov 2019 01:20 )   PT: 35.1 sec;   INR: 2.95 ratio         PTT - ( 07 Nov 2019 01:20 )  PTT:30.7 sec      RADIOLOGY & ADDITIONAL STUDIES:    Protein Calorie Malnutrition Present: [ ] yes [ ] no  [ ] PPSV2 < or = 30%  [ ] significant weight loss [ ] poor nutritional intake [ ] anasarca [ ] catabolic state Albumin, Serum: 3.4 g/dL (11-07-19 @ 11:04)  Artificial Nutrition [ ]     REFERRALS:   [ ]Chaplaincy  [ ] Hospice  [ ]Child Life  [ ]Social Work  [ ]Case management [ ]Holistic Therapy   Goals of Care Document: SUBJECTIVE AND OBJECTIVE: Patient seen and examined, remains intubated. repeat CT scan today shows new acute infarct. Palliative care followed up today with patients brother Cleveland (HCP). He states that he feels as though this is no longer a quality of life the patient would find acceptable and given his underlying illness, he does not want to prolong his brothers suffering. he has been in touch with family to discuss eventual ventilator liberation and has started to notify them to come in to visit. We discussed transition to PCU for ongoing care and eventual elective extubation with focus of care on symptoms and comfort. All questions answered and patient placed on list to move to PCU when bed available.    INTERVAL HPI/OVERNIGHT EVENTS: no acute overnight events. repeat CT scan today.     DNR on chart: Yes    Allergies    No Known Allergies    Intolerances    MEDICATIONS  (STANDING):  aspirin  chewable 81 milliGRAM(s) Oral daily  atorvastatin 40 milliGRAM(s) Oral at bedtime  chlorhexidine 0.12% Liquid 15 milliLiter(s) Oral Mucosa every 12 hours  chlorhexidine 4% Liquid 1 Application(s) Topical <User Schedule>  dextrose 5%. 1000 milliLiter(s) (50 mL/Hr) IV Continuous <Continuous>  dextrose 50% Injectable 12.5 Gram(s) IV Push once  dextrose 50% Injectable 25 Gram(s) IV Push once  dextrose 50% Injectable 25 Gram(s) IV Push once  insulin lispro (HumaLOG) corrective regimen sliding scale   SubCutaneous every 4 hours  insulin NPH human recombinant 8 Unit(s) SubCutaneous every 6 hours  lactulose Syrup 10 Gram(s) Oral every 12 hours  meropenem  IVPB      meropenem  IVPB 500 milliGRAM(s) IV Intermittent every 12 hours  midodrine 10 milliGRAM(s) Oral every 8 hours  octreotide  Injectable 100 MICROGram(s) SubCutaneous three times a day  pantoprazole  Injectable 40 milliGRAM(s) IV Push daily    MEDICATIONS  (PRN):  acetaminophen    Suspension .. 650 milliGRAM(s) Oral every 6 hours PRN Temp greater or equal to 38C (100.4F)  dextrose 40% Gel 15 Gram(s) Oral once PRN Blood Glucose LESS THAN 70 milliGRAM(s)/deciliter  glucagon  Injectable 1 milliGRAM(s) IntraMuscular once PRN Glucose LESS THAN 70 milligrams/deciliter      ITEMS UNCHECKED ARE NOT PRESENT    PRESENT SYMPTOMS: [ x]Unable to obtain due to poor mentation   Source if other than patient:  [ ]Family   [ ]Team     Pain (Impact on QOL):    Location:  Minimal acceptable level (0-10 scale):                   Aggravating factors:  Quality:  Radiation:  Severity (0-10 scale):    Timing:    Dyspnea:                           [ ]Mild [ ]Moderate [ ]Severe  Anxiety:                             [ ]Mild [ ]Moderate [ ]Severe  Fatigue:                             [ ]Mild [ ]Moderate [ ]Severe  Nausea:                             [ ]Mild [ ]Moderate [ ]Severe  Loss of appetite:              [ ]Mild [ ]Moderate [ ]Severe  Constipation:                    [ ]Mild [ ]Moderate [ ]Severe    PAIN AD Score:	0  [email or web address] (Ctrl + left click to view)    Other Symptoms:  [ ]All other review of systems negative     Karnofsky Performance Score/Palliative Performance Status Version 2:       10  %    http://palliative.info/resource_material/PPSv2.pdf  PHYSICAL EXAM:  Vital Signs Last 24 Hrs  T(C): 37.9 (07 Nov 2019 08:00), Max: 37.9 (07 Nov 2019 08:00)  T(F): 100.2 (07 Nov 2019 08:00), Max: 100.2 (07 Nov 2019 08:00)  HR: 104 (07 Nov 2019 13:19) (100 - 118)  BP: 120/86 (07 Nov 2019 10:00) (106/81 - 158/111)  BP(mean): 96 (07 Nov 2019 10:00) (84 - 125)  RR: 26 (07 Nov 2019 08:00) (20 - 32)  SpO2: 97% (07 Nov 2019 13:19) (90% - 100%) I&O's Summary    06 Nov 2019 07:01  -  07 Nov 2019 07:00  --------------------------------------------------------  IN: 1510 mL / OUT: 300 mL / NET: 1210 mL    07 Nov 2019 07:01  -  07 Nov 2019 13:25  --------------------------------------------------------  IN: 50 mL / OUT: 10 mL / NET: 40 mL     GENERAL:  [ ]Alert  [ ]Oriented x   [ ]Lethargic  [ ]Cachexia  [x ]Unarousable  [ ]Verbal  [ ]Non-Verbal  Behavioral:   [ ] Anxiety  [ ] Delirium [ ] Agitation [ ] Other  HEENT:  [ ]Normal   [x ]Dry mouth   [ x]ET Tube/Trach  [ ]Oral lesions  PULMONARY:   [ x]Clear [ ]Tachypnea  [ ]Audible excessive secretions   [ ]Rhonchi        [ ]Right [ ]Left [ ]Bilateral  [ ]Crackles        [ ]Right [ ]Left [ ]Bilateral  [ ]Wheezing     [ ]Right [ ]Left [ ]Bilateral  CARDIOVASCULAR:    [x ]Regular [ ]Irregular [ ]Tachy  [ ]Deon [ ]Murmur [ ]Other  GASTROINTESTINAL:  [x ]Soft  [ ]Distended   [x ]+BS  [ ]Non tender [ ]Tender  [ ]PEG [x ]OGT/ NGT   Last BM:    GENITOURINARY:  [ ]Normal [ ] Incontinent   [ ]Oliguria/Anuria   [x ]Braswell  MUSCULOSKELETAL:   [ ]Normal   [ ]Weakness  [ x]Bed/Wheelchair bound [ ]Edema  NEUROLOGIC: not following commands  [ ]No focal deficits  [ ] Cognitive impairment  [ ] Dysphagia [ ]Dysarthria [ ] Paresis [ ]Other   SKIN: xerosis  [ ]Normal   [ ]Pressure ulcer(s)  [ ]Rash    CRITICAL CARE:  [ ] Shock Present  [ ]Septic [ ]Cardiogenic [ ]Neurologic [ ]Hypovolemic  [ ]  Vasopressors [ ]  Inotropes   [x ] Respiratory failure present  [ x] Acute  [ ] Chronic [x ] Hypoxic  [ ] Hypercarbic [ ] Other  [ ] Other organ failure     LABS:                        9.4    32.71 )-----------( 186      ( 07 Nov 2019 01:20 )             29.3   11-07    142  |  103  |  133<H>  ----------------------------<  536<HH>  4.0   |  17<L>  |  3.73<H>    Ca    8.7      07 Nov 2019 11:04  Phos  5.2     11-07  Mg     3.7     11-07    TPro  6.5  /  Alb  3.4  /  TBili  0.8  /  DBili  x   /  AST  30  /  ALT  17  /  AlkPhos  475<H>  11-07  PT/INR - ( 07 Nov 2019 01:20 )   PT: 35.1 sec;   INR: 2.95 ratio         PTT - ( 07 Nov 2019 01:20 )  PTT:30.7 sec      RADIOLOGY & ADDITIONAL STUDIES:  < from: CT Head No Cont (11.07.19 @ 12:47) >    EXAM:  CT BRAIN                            PROCEDURE DATE:  11/07/2019            INTERPRETATION:  Noncontrast CT of the brain.    CLINICAL INDICATION:  55 y/o m w/ metastatic pancreatic cancer with ams    TECHNIQUE : Axial CT scanning of the brainwas obtained from the skull   base to the vertex without the administration of intravenous contrast.      COMPARISON: CT brain 11/3/2019    FINDINGS:      New edema in the high right parasagittal frontal lobe and right aspect of   the anterior corpuscallosum likely represents an acute right LOUIE   territory infarct. No CT evidence of hemorrhagic transformation.    Chronic right MCA territory and bilateral mesial cerebellar hemisphere   infarcts.    No hydrocephalus, midline shift, or effacement of the basal cisterns. No   vasogenic edema. No acute intracranial hemorrhage.    No lytic or destructive osseous lesion. Small polyp/retention cyst in the   right maxillary sinus. Remaining visualized paranasal sinuses and mastoid   air cells clear.    IMPRESSION:    Acute right LOUIE territory infarct. No CT evidence of hemorrhagic   transformation.    Dr. Lakhani discussed these findings with Dr. Yung on 11/7/2019 1:20 PM   with read back.    BRENDA LAKHANI M.D., ATTENDING RADIOLOGIST  This document has been electronically signed. Nov 7 2019  1:21PM    < end of copied text >    Protein Calorie Malnutrition Present: [ ] yes [x ] no  [ x] PPSV2 < or = 30%  [ ] significant weight loss [ ] poor nutritional intake [ ] anasarca [ x] catabolic state Albumin, Serum: 3.4 g/dL (11-07-19 @ 11:04)  Artificial Nutrition [ ]     REFERRALS:   [ ]Chaplaincy  [ ] Hospice  [ ]Child Life  [x ]Social Work  [ ]Case management [ ]Holistic Therapy   Goals of Care Document:

## 2019-11-07 NOTE — PROVIDER CONTACT NOTE (OTHER) - SITUATION
patient's glucose 516 in bmp, previous finger stick 442, NPH increased from 3 to 5units Q6 when finger stick was checked

## 2019-11-08 LAB
CULTURE RESULTS: SIGNIFICANT CHANGE UP
SPECIMEN SOURCE: SIGNIFICANT CHANGE UP

## 2019-11-08 PROCEDURE — 99232 SBSQ HOSP IP/OBS MODERATE 35: CPT

## 2019-11-08 RX ORDER — ROBINUL 0.2 MG/ML
0.4 INJECTION INTRAMUSCULAR; INTRAVENOUS EVERY 6 HOURS
Refills: 0 | Status: DISCONTINUED | OUTPATIENT
Start: 2019-11-08 | End: 2019-11-09

## 2019-11-08 RX ORDER — ACETAMINOPHEN 500 MG
650 TABLET ORAL EVERY 6 HOURS
Refills: 0 | Status: DISCONTINUED | OUTPATIENT
Start: 2019-11-08 | End: 2019-11-09

## 2019-11-08 RX ADMIN — CHLORHEXIDINE GLUCONATE 15 MILLILITER(S): 213 SOLUTION TOPICAL at 18:16

## 2019-11-08 RX ADMIN — MIDODRINE HYDROCHLORIDE 10 MILLIGRAM(S): 2.5 TABLET ORAL at 21:31

## 2019-11-08 RX ADMIN — MIDODRINE HYDROCHLORIDE 10 MILLIGRAM(S): 2.5 TABLET ORAL at 05:34

## 2019-11-08 RX ADMIN — CHLORHEXIDINE GLUCONATE 1 APPLICATION(S): 213 SOLUTION TOPICAL at 05:34

## 2019-11-08 RX ADMIN — CHLORHEXIDINE GLUCONATE 15 MILLILITER(S): 213 SOLUTION TOPICAL at 05:34

## 2019-11-08 RX ADMIN — MIDODRINE HYDROCHLORIDE 10 MILLIGRAM(S): 2.5 TABLET ORAL at 14:06

## 2019-11-08 NOTE — PROGRESS NOTE ADULT - SUBJECTIVE AND OBJECTIVE BOX
[facility]      Seen earlier today, and examined.  - Today, patient is without complaints.           *****MEDICATIONS: Current medication reviewed and documented.    MEDICATIONS  (STANDING):    MEDICATIONS  (PRN):          ***** VITAL SIGNS:  T(F): 98.1 (11-09-19 @ 08:39), Max: 98.1 (11-09-19 @ 08:39)  HR: 78 (11-09-19 @ 16:35) (72 - 92)  BP: 85/51 (11-09-19 @ 08:39) (85/51 - 85/51)  RR: 22 (11-09-19 @ 16:35) (20 - 22)  SpO2: 98% (11-09-19 @ 16:35) (98% - 99%)  Wt(kg): --  ,   I&O's Summary    09 Nov 2019 07:01  -  10 Nov 2019 07:00  --------------------------------------------------------  IN: 0 mL / OUT: 100 mL / NET: -100 mL             *****PHYSICAL EXAM:             *****LAB AND IMAGING:                                         [All pertinent recent Imaging/Reports reviewed]           *****A S S E S S M E N T   A N D   P L A N :  Excerpt from H&P, 54M c hx HTN, DM2, HLD, CVA 6 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        Problem/Recommendations 1: CVA likely due to hypercoagulable state  Numerous cerebral infarcts noted on MRI Brain while on full dose anticoagulation.   Pt previously known to me from prior admission when pt developed transient weakness while on full dose heparin was not a candidate for acute therapies at that time  Etiology of infarcts likely hypercoagulable state related his underlying metastatic pancreatic adenocarcinoma other etiologies include. marantic endocarditis or infectious endcarditis vs. cns vasculitis   cta recent did not reveal any mycotic aneurysm  tte, or ideally ange may be helpful. no pfo   Can repeat cta with contrast to r/o mycotic anuerysm.  pt has had infarcts while on full dose AC, portends a poor outcome   overall guarded prognosis    neuro checks q4, low threshold for ct scan if any change in neuro exam.     11/4 intubated/sedated  veeg no seizure. moderate to severe slowing.    mental status depressed likely related to fever  +/- ammonia   lactulose given   will continue to monitor closely    discussed with brother/sister in law ar bedside about the next steps  active dying process.          Thank you for allowing me to participate in the care of this patient. Please do not hesitate to call me if you have any  questions.        ________________  Korin Hendrickson MD  [facility] ([facility]  [phone]      33 minutes spent on total encounter; more than 50 % of the visit was  spent counseling about plan of care, compliance to diet/exercise and medication regimen and or  coordinating care by the attending physician.      It is advised that stroke patients follow up with DONALD Eugene @ [phone] in 1- 2 weeks.   Others please follow up with Dr. Michael Nissenbaum [phone]

## 2019-11-08 NOTE — PROGRESS NOTE ADULT - SUBJECTIVE AND OBJECTIVE BOX
OVERNIGHT EVENTS / SUBJECTIVE: Patient seen and examined at bedside.     OBJECTIVE:    VITAL SIGNS:  ICU Vital Signs Last 24 Hrs  T(C): 37.9 (08 Nov 2019 04:00), Max: 38.3 (07 Nov 2019 20:00)  T(F): 100.2 (08 Nov 2019 04:00), Max: 100.9 (07 Nov 2019 20:00)  HR: 82 (08 Nov 2019 07:00) (82 - 115)  BP: 113/80 (08 Nov 2019 07:00) (80/62 - 137/86)  BP(mean): 91 (08 Nov 2019 07:00) (66 - 106)  ABP: --  ABP(mean): --  RR: 22 (08 Nov 2019 07:00) (22 - 26)  SpO2: 100% (08 Nov 2019 07:00) (95% - 100%)    Mode: AC/ CMV (Assist Control/ Continuous Mandatory Ventilation), RR (machine): 14, TV (machine): 550, FiO2: 21, PEEP: 5, ITime: 1, MAP: 16, PIP: 35    11-07 @ 07:01  -  11-08 @ 07:00  --------------------------------------------------------  IN: 50 mL / OUT: 50 mL / NET: 0 mL      CAPILLARY BLOOD GLUCOSE      POCT Blood Glucose.: 394 mg/dL (07 Nov 2019 13:19)      PHYSICAL EXAM:    General: NAD  HEENT: NC/AT; PERRL, clear conjunctiva  Neck: supple  Respiratory: CTA b/l  Cardiovascular: +S1/S2; RRR  Abdomen: soft, NT/ND; +BS x4  Extremities: WWP, 2+ peripheral pulses b/l; no LE edema  Skin: normal color and turgor; no rash  Neurological:    MEDICATIONS:  MEDICATIONS  (STANDING):  chlorhexidine 0.12% Liquid 15 milliLiter(s) Oral Mucosa every 12 hours  chlorhexidine 4% Liquid 1 Application(s) Topical <User Schedule>  midodrine 10 milliGRAM(s) Oral every 8 hours    MEDICATIONS  (PRN):  HYDROmorphone  Injectable 0.5 milliGRAM(s) IV Push every 1 hour PRN dyspnea  HYDROmorphone  Injectable 0.5 milliGRAM(s) IV Push every 1 hour PRN pain  LORazepam   Injectable 0.5 milliGRAM(s) IV Push every 1 hour PRN anxiety/agitation      ALLERGIES:  Allergies    No Known Allergies    Intolerances        LABS:                        9.4    32.71 )-----------( 186      ( 07 Nov 2019 01:20 )             29.3     Hemoglobin: 9.4 g/dL (11-07 @ 01:20)  Hemoglobin: 8.7 g/dL (11-06 @ 00:35)  Hemoglobin: 7.6 g/dL (11-04 @ 23:50)  Hemoglobin: 6.6 g/dL (11-04 @ 15:17)  Hemoglobin: 7.5 g/dL (11-04 @ 00:57)    CBC Full  -  ( 07 Nov 2019 01:20 )  WBC Count : 32.71 K/uL  RBC Count : 3.79 M/uL  Hemoglobin : 9.4 g/dL  Hematocrit : 29.3 %  Platelet Count - Automated : 186 K/uL  Mean Cell Volume : 77.3 fl  Mean Cell Hemoglobin : 24.8 pg  Mean Cell Hemoglobin Concentration : 32.1 gm/dL  Auto Neutrophil # : x  Auto Lymphocyte # : x  Auto Monocyte # : x  Auto Eosinophil # : x  Auto Basophil # : x  Auto Neutrophil % : x  Auto Lymphocyte % : x  Auto Monocyte % : x  Auto Eosinophil % : x  Auto Basophil % : x    11-07    142  |  103  |  133<H>  ----------------------------<  536<HH>  4.0   |  17<L>  |  3.73<H>    Ca    8.7      07 Nov 2019 11:04  Phos  5.2     11-07  Mg     3.7     11-07    TPro  6.5  /  Alb  3.4  /  TBili  0.8  /  DBili  x   /  AST  30  /  ALT  17  /  AlkPhos  475<H>  11-07    Creatinine Trend: 3.73<--, 3.50<--, 3.00<--, 2.41<--, 2.66<--, 2.01<--  LIVER FUNCTIONS - ( 07 Nov 2019 11:04 )  Alb: 3.4 g/dL / Pro: 6.5 g/dL / ALK PHOS: 475 U/L / ALT: 17 U/L / AST: 30 U/L / GGT: x           PT/INR - ( 07 Nov 2019 01:20 )   PT: 35.1 sec;   INR: 2.95 ratio         PTT - ( 07 Nov 2019 01:20 )  PTT:30.7 sec    hs Troponin:    ABG - ( 07 Nov 2019 11:03 )  pH, Arterial: 7.37  pH, Blood: x     /  pCO2: 30    /  pO2: 98    / HCO3: 17    / Base Excess: -6.9  /  SaO2: 97                  11:03 - ABG - pH: 7.37  |  pCO2: 30    |  pO2: 98    | Lactate:       | BE: -6.9         CSF:                      EKG:   MICROBIOLOGY:    Culture - Bronchial (collected 05 Nov 2019 17:49)  Source: Bronch Wash Combicath  Gram Stain (05 Nov 2019 21:42):    No polymorphonuclear cells seen    No Squamous epithelial cells    No organisms seen  Final Report (07 Nov 2019 20:50):    Normal Respiratory Manjula present    Culture - Blood (collected 05 Nov 2019 12:26)  Source: .Blood Blood  Preliminary Report (06 Nov 2019 13:01):    No growth to date.    Culture - Blood (collected 05 Nov 2019 12:26)  Source: .Blood Blood  Preliminary Report (06 Nov 2019 13:01):    No growth to date.      IMAGING:      Labs, imaging, EKG personally reviewed    RADIOLOGY & ADDITIONAL TESTS: Reviewed. OVERNIGHT EVENTS / SUBJECTIVE: Patient seen and examined at bedside.     OBJECTIVE: Not following commands, not answering questions (unable to assess ROM). Family decided on comfort measures only yesterday and want to wait until all the family gets here before terminal extubation.    VITAL SIGNS:  ICU Vital Signs Last 24 Hrs  T(C): 37.9 (08 Nov 2019 04:00), Max: 38.3 (07 Nov 2019 20:00)  T(F): 100.2 (08 Nov 2019 04:00), Max: 100.9 (07 Nov 2019 20:00)  HR: 82 (08 Nov 2019 07:00) (82 - 115)  BP: 113/80 (08 Nov 2019 07:00) (80/62 - 137/86)  BP(mean): 91 (08 Nov 2019 07:00) (66 - 106)  ABP: --  ABP(mean): --  RR: 22 (08 Nov 2019 07:00) (22 - 26)  SpO2: 100% (08 Nov 2019 07:00) (95% - 100%)    Mode: AC/ CMV (Assist Control/ Continuous Mandatory Ventilation), RR (machine): 14, TV (machine): 550, FiO2: 21, PEEP: 5, ITime: 1, MAP: 16, PIP: 35    11-07 @ 07:01  -  11-08 @ 07:00  --------------------------------------------------------  IN: 50 mL / OUT: 50 mL / NET: 0 mL      CAPILLARY BLOOD GLUCOSE      POCT Blood Glucose.: 394 mg/dL (07 Nov 2019 13:19)      PHYSICAL EXAM:    General: NAD  HEENT: PERRL, clear conjunctiva  Neck: supple  Respiratory: CTA b/l  Cardiovascular: +S1/S2; RRR  Abdomen: soft, NT/ND; decreased BS, abdomen less distended, liver edge palpable  Extremities: WWP, 2+ peripheral pulses b/l; no LE edema  Skin: normal color and turgor; no rash  Neurological: not following commands, no purposeful movements    MEDICATIONS:  MEDICATIONS  (STANDING):  chlorhexidine 0.12% Liquid 15 milliLiter(s) Oral Mucosa every 12 hours  chlorhexidine 4% Liquid 1 Application(s) Topical <User Schedule>  midodrine 10 milliGRAM(s) Oral every 8 hours    MEDICATIONS  (PRN):  HYDROmorphone  Injectable 0.5 milliGRAM(s) IV Push every 1 hour PRN dyspnea  HYDROmorphone  Injectable 0.5 milliGRAM(s) IV Push every 1 hour PRN pain  LORazepam   Injectable 0.5 milliGRAM(s) IV Push every 1 hour PRN anxiety/agitation      ALLERGIES:  Allergies    No Known Allergies    Intolerances        LABS:                        9.4    32.71 )-----------( 186      ( 07 Nov 2019 01:20 )             29.3     Hemoglobin: 9.4 g/dL (11-07 @ 01:20)  Hemoglobin: 8.7 g/dL (11-06 @ 00:35)  Hemoglobin: 7.6 g/dL (11-04 @ 23:50)  Hemoglobin: 6.6 g/dL (11-04 @ 15:17)  Hemoglobin: 7.5 g/dL (11-04 @ 00:57)    CBC Full  -  ( 07 Nov 2019 01:20 )  WBC Count : 32.71 K/uL  RBC Count : 3.79 M/uL  Hemoglobin : 9.4 g/dL  Hematocrit : 29.3 %  Platelet Count - Automated : 186 K/uL  Mean Cell Volume : 77.3 fl  Mean Cell Hemoglobin : 24.8 pg  Mean Cell Hemoglobin Concentration : 32.1 gm/dL  Auto Neutrophil # : x  Auto Lymphocyte # : x  Auto Monocyte # : x  Auto Eosinophil # : x  Auto Basophil # : x  Auto Neutrophil % : x  Auto Lymphocyte % : x  Auto Monocyte % : x  Auto Eosinophil % : x  Auto Basophil % : x    11-07    142  |  103  |  133<H>  ----------------------------<  536<HH>  4.0   |  17<L>  |  3.73<H>    Ca    8.7      07 Nov 2019 11:04  Phos  5.2     11-07  Mg     3.7     11-07    TPro  6.5  /  Alb  3.4  /  TBili  0.8  /  DBili  x   /  AST  30  /  ALT  17  /  AlkPhos  475<H>  11-07    Creatinine Trend: 3.73<--, 3.50<--, 3.00<--, 2.41<--, 2.66<--, 2.01<--  LIVER FUNCTIONS - ( 07 Nov 2019 11:04 )  Alb: 3.4 g/dL / Pro: 6.5 g/dL / ALK PHOS: 475 U/L / ALT: 17 U/L / AST: 30 U/L / GGT: x           PT/INR - ( 07 Nov 2019 01:20 )   PT: 35.1 sec;   INR: 2.95 ratio         PTT - ( 07 Nov 2019 01:20 )  PTT:30.7 sec    hs Troponin:    ABG - ( 07 Nov 2019 11:03 )  pH, Arterial: 7.37  pH, Blood: x     /  pCO2: 30    /  pO2: 98    / HCO3: 17    / Base Excess: -6.9  /  SaO2: 97                  11:03 - ABG - pH: 7.37  |  pCO2: 30    |  pO2: 98    | Lactate:       | BE: -6.9         CSF:                      EKG:   MICROBIOLOGY:    Culture - Bronchial (collected 05 Nov 2019 17:49)  Source: Bronch Wash Combicath  Gram Stain (05 Nov 2019 21:42):    No polymorphonuclear cells seen    No Squamous epithelial cells    No organisms seen  Final Report (07 Nov 2019 20:50):    Normal Respiratory Manjula present    Culture - Blood (collected 05 Nov 2019 12:26)  Source: .Blood Blood  Preliminary Report (06 Nov 2019 13:01):    No growth to date.    Culture - Blood (collected 05 Nov 2019 12:26)  Source: .Blood Blood  Preliminary Report (06 Nov 2019 13:01):    No growth to date.      IMAGING:      Labs, imaging, EKG personally reviewed    RADIOLOGY & ADDITIONAL TESTS: Reviewed.

## 2019-11-08 NOTE — PROVIDER CONTACT NOTE (EICU) - SITUATION
Patient with GCS of 3,on mechanical vent support. Patient referred to [facility] [place]. Case discussed with Bedside RN.

## 2019-11-08 NOTE — PROGRESS NOTE ADULT - SUBJECTIVE AND OBJECTIVE BOX
Patient is a 54y old  Male who presents with a chief complaint of weakness (2019 07:38)                                                               INTERVAL HPI/OVERNIGHT EVENTS:    REVIEW OF SYSTEMS:     intuabated unresponisive                                                                                                                                                                                                                                                                    Medications:  MEDICATIONS  (STANDING):  chlorhexidine 0.12% Liquid 15 milliLiter(s) Oral Mucosa every 12 hours  chlorhexidine 4% Liquid 1 Application(s) Topical <User Schedule>  midodrine 10 milliGRAM(s) Oral every 8 hours    MEDICATIONS  (PRN):  HYDROmorphone  Injectable 0.5 milliGRAM(s) IV Push every 1 hour PRN dyspnea  HYDROmorphone  Injectable 0.5 milliGRAM(s) IV Push every 1 hour PRN pain  LORazepam   Injectable 0.5 milliGRAM(s) IV Push every 1 hour PRN anxiety/agitation       Allergies    No Known Allergies    Intolerances      Vital Signs Last 24 Hrs  T(C): 36.5 (2019 12:00), Max: 38.3 (2019 20:00)  T(F): 97.7 (2019 12:00), Max: 100.9 (2019 20:00)  HR: 88 (2019 11:00) (81 - 111)  BP: 99/74 (2019 11:00) (80/62 - 122/89)  BP(mean): 83 (2019 11:) (66 - 102)  RR: 20 (2019 11:00) (19 - 26)  SpO2: 98% (2019 11:00) (96% - 100%)  CAPILLARY BLOOD GLUCOSE      POCT Blood Glucose.: 394 mg/dL (2019 13:19)       @ : @ 07:00  --------------------------------------------------------  IN: 50 mL / OUT: 50 mL / NET: 0 mL     @ : @ 12:21  --------------------------------------------------------  IN: 60 mL / OUT: 10 mL / NET: 50 mL      Physical Exam:    Daily     Daily Weight in k.7 (2019 04:00)  General:  intubated   Lungs:  CTa  Abdomen:  distended   Extremities: edema   Neuro:  unresponsive                                                                                                                                                                                                                                                      LABS:                               9.4    32.71 )-----------( 186      ( 2019 01:20 )             29.3                          142  |  103  |  133<H>  ----------------------------<  536<HH>  4.0   |  17<L>  |  3.73<H>    Ca    8.7      2019 11:04  Phos  5.2       Mg     3.7         TPro  6.5  /  Alb  3.4  /  TBili  0.8  /  DBili  x   /  AST  30  /  ALT  17  /  AlkPhos  475<H>

## 2019-11-08 NOTE — PROGRESS NOTE ADULT - ASSESSMENT
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 now unresponsive and family would like comofot care .. discussed with brother .. family is aawaiting another family member to arrive in [place] before they terminally extubate pt..

## 2019-11-08 NOTE — PROGRESS NOTE ADULT - ASSESSMENT
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    #Neuro  - CVA w/residual R-sided weakness   - c/w home ASA 81mg  - ok to restart hep gtt but INR currently bt 2-3, holding for now  - ABG: pH WNL, hypocarbic (11/7), elev lactate  - CTH: acute LOUIE infarct     #Pulm  - still intubated but not sedated  - RR 14  PEEP 5 FiO2 21%  - will attempt CPAP trials as tolerated  - small b/l pleural effusions seen on CTAP (11/4)    #Cards  - holding home meds in the s/o sepsis  - c/w midodrine 10mg TID started on 11/5  - c/w home ASA 81mg  - bubble study neg for PFO      #GI  - pancreatic adenocarcinoma w/METS to liver s/p large volume para  - c/w 1.5mg/kg albumin, stopped after 6 doses, now giving albumin at 1mg/kg for hepatorenal syndrome  - CTAP (11/4): interval inc in pancreatic tail mass & liver METS, small vol ascites & questionable peritoneal nodularity, small b/l pleural effusions  - c/w protonix  - c/w Tube feedings, rate/type per nutrition  - c/w octreotide for hepatorenal syndrome    #Renal  - [place]: getting albumin as above  - FeNa consistent w/pre-renal condition  - c/w vasopressin for hepatorenal syndrome  - significant uremia but not a candidate for HD  - added lactulose on 11/6  - will check bladder pressure  - lactate 3.5 (on 11/7)    #Endo  - BGs increasing significantly likely d/t failing pancreas 2/2 pancreatic adenocarcinoma w/METS  - DM2: started high sliding scale on 11/7 d/t elev BGs  - added NPH 8U q6h on 11/6  - BHB WNL    #ID  - dc'd vanc/CTX on 11/4  - SBP (1730 PMNs) per para: c/w ac started on 11/6  - Bronchial Cx, BCx and para fluid Cx NGTD    #GOC  - DNR/DNI (11/7) - MOLST in chart 54M c hx HTN, DM2, HLD, CVA 6 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 Currently on comfort care measures only awaiting family to see him before transfer to palliative care unit.    #Neuro  - CVA w/residual R-sided weakness   - CTH: acute LOUIE infarct     #Pulm  - still intubated but not sedated  - RR 14  PEEP 5 FiO2 21%  - plan to terminally extubate  - small b/l pleural effusions seen on CTAP (11/4)    #Cards  - holding home meds in the s/o sepsis  - c/w midodrine 10mg TID started on 11/5  - will give pressors as needed  - bubble study neg for PFO      #GI  - pancreatic adenocarcinoma w/METS to liver s/p large volume para  - CTAP (11/4): interval inc in pancreatic tail mass & liver METS, small vol ascites & questionable peritoneal nodularity, small b/l pleural effusions  - c/w Tube feedings, rate/type per nutrition    #Renal  - [place]: getting albumin as above  - FeNa consistent w/pre-renal condition  - significant uremia but not a candidate for HD  - lactate 3.5 (on 11/7)    #Endo  - comfort measures only no longer checking FSG  - BHB WNL    #ID  - dc'd vanc/CTX on 11/4  - SBP (1730 PMNs) per para: dc'd ac started on 11/6  - Bronchial Cx, BCx and para fluid Cx NGTD    #GOC  - DNR/DNI (11/7) - MOLST in chart

## 2019-11-09 VITALS — HEART RATE: 78 BPM | OXYGEN SATURATION: 98 % | RESPIRATION RATE: 22 BRPM

## 2019-11-09 PROCEDURE — 82550 ASSAY OF CK (CPK): CPT

## 2019-11-09 PROCEDURE — 93308 TTE F-UP OR LMTD: CPT

## 2019-11-09 PROCEDURE — 70450 CT HEAD/BRAIN W/O DYE: CPT

## 2019-11-09 PROCEDURE — 84133 ASSAY OF URINE POTASSIUM: CPT

## 2019-11-09 PROCEDURE — 80202 ASSAY OF VANCOMYCIN: CPT

## 2019-11-09 PROCEDURE — 86923 COMPATIBILITY TEST ELECTRIC: CPT

## 2019-11-09 PROCEDURE — 80074 ACUTE HEPATITIS PANEL: CPT

## 2019-11-09 PROCEDURE — 94002 VENT MGMT INPAT INIT DAY: CPT

## 2019-11-09 PROCEDURE — 72156 MRI NECK SPINE W/O & W/DYE: CPT

## 2019-11-09 PROCEDURE — 82140 ASSAY OF AMMONIA: CPT

## 2019-11-09 PROCEDURE — 99233 SBSQ HOSP IP/OBS HIGH 50: CPT

## 2019-11-09 PROCEDURE — 89051 BODY FLUID CELL COUNT: CPT

## 2019-11-09 PROCEDURE — 88112 CYTOPATH CELL ENHANCE TECH: CPT

## 2019-11-09 PROCEDURE — 82570 ASSAY OF URINE CREATININE: CPT

## 2019-11-09 PROCEDURE — 99497 ADVNCD CARE PLAN 30 MIN: CPT

## 2019-11-09 PROCEDURE — 83615 LACTATE (LD) (LDH) ENZYME: CPT

## 2019-11-09 PROCEDURE — 36430 TRANSFUSION BLD/BLD COMPNT: CPT

## 2019-11-09 PROCEDURE — 82565 ASSAY OF CREATININE: CPT

## 2019-11-09 PROCEDURE — 74176 CT ABD & PELVIS W/O CONTRAST: CPT

## 2019-11-09 PROCEDURE — 84145 PROCALCITONIN (PCT): CPT

## 2019-11-09 PROCEDURE — 84156 ASSAY OF PROTEIN URINE: CPT

## 2019-11-09 PROCEDURE — 80053 COMPREHEN METABOLIC PANEL: CPT

## 2019-11-09 PROCEDURE — 84443 ASSAY THYROID STIM HORMONE: CPT

## 2019-11-09 PROCEDURE — 95816 EEG AWAKE AND DROWSY: CPT

## 2019-11-09 PROCEDURE — 82010 KETONE BODYS QUAN: CPT

## 2019-11-09 PROCEDURE — 84100 ASSAY OF PHOSPHORUS: CPT

## 2019-11-09 PROCEDURE — 87040 BLOOD CULTURE FOR BACTERIA: CPT

## 2019-11-09 PROCEDURE — 86900 BLOOD TYPING SEROLOGIC ABO: CPT

## 2019-11-09 PROCEDURE — 83605 ASSAY OF LACTIC ACID: CPT

## 2019-11-09 PROCEDURE — 81001 URINALYSIS AUTO W/SCOPE: CPT

## 2019-11-09 PROCEDURE — A9585: CPT

## 2019-11-09 PROCEDURE — 93321 DOPPLER ECHO F-UP/LMTD STD: CPT

## 2019-11-09 PROCEDURE — 96374 THER/PROPH/DIAG INJ IV PUSH: CPT

## 2019-11-09 PROCEDURE — 83735 ASSAY OF MAGNESIUM: CPT

## 2019-11-09 PROCEDURE — 85384 FIBRINOGEN ACTIVITY: CPT

## 2019-11-09 PROCEDURE — 85730 THROMBOPLASTIN TIME PARTIAL: CPT

## 2019-11-09 PROCEDURE — 82436 ASSAY OF URINE CHLORIDE: CPT

## 2019-11-09 PROCEDURE — 88305 TISSUE EXAM BY PATHOLOGIST: CPT

## 2019-11-09 PROCEDURE — 76770 US EXAM ABDO BACK WALL COMP: CPT

## 2019-11-09 PROCEDURE — 99285 EMERGENCY DEPT VISIT HI MDM: CPT | Mod: 25

## 2019-11-09 PROCEDURE — 76705 ECHO EXAM OF ABDOMEN: CPT

## 2019-11-09 PROCEDURE — 94003 VENT MGMT INPAT SUBQ DAY: CPT

## 2019-11-09 PROCEDURE — 87070 CULTURE OTHR SPECIMN AEROBIC: CPT

## 2019-11-09 PROCEDURE — 84295 ASSAY OF SERUM SODIUM: CPT

## 2019-11-09 PROCEDURE — 95951: CPT

## 2019-11-09 PROCEDURE — P9047: CPT

## 2019-11-09 PROCEDURE — 85379 FIBRIN DEGRADATION QUANT: CPT

## 2019-11-09 PROCEDURE — 87075 CULTR BACTERIA EXCEPT BLOOD: CPT

## 2019-11-09 PROCEDURE — 87205 SMEAR GRAM STAIN: CPT

## 2019-11-09 PROCEDURE — 82803 BLOOD GASES ANY COMBINATION: CPT

## 2019-11-09 PROCEDURE — 84300 ASSAY OF URINE SODIUM: CPT

## 2019-11-09 PROCEDURE — 82947 ASSAY GLUCOSE BLOOD QUANT: CPT

## 2019-11-09 PROCEDURE — 94799 UNLISTED PULMONARY SVC/PX: CPT

## 2019-11-09 PROCEDURE — 84157 ASSAY OF PROTEIN OTHER: CPT

## 2019-11-09 PROCEDURE — 82042 OTHER SOURCE ALBUMIN QUAN EA: CPT

## 2019-11-09 PROCEDURE — 71045 X-RAY EXAM CHEST 1 VIEW: CPT

## 2019-11-09 PROCEDURE — 70553 MRI BRAIN STEM W/O & W/DYE: CPT

## 2019-11-09 PROCEDURE — 83520 IMMUNOASSAY QUANT NOS NONAB: CPT

## 2019-11-09 PROCEDURE — 86850 RBC ANTIBODY SCREEN: CPT

## 2019-11-09 PROCEDURE — 85027 COMPLETE CBC AUTOMATED: CPT

## 2019-11-09 PROCEDURE — 82330 ASSAY OF CALCIUM: CPT

## 2019-11-09 PROCEDURE — 96375 TX/PRO/DX INJ NEW DRUG ADDON: CPT

## 2019-11-09 PROCEDURE — 82962 GLUCOSE BLOOD TEST: CPT

## 2019-11-09 PROCEDURE — 84132 ASSAY OF SERUM POTASSIUM: CPT

## 2019-11-09 PROCEDURE — 85610 PROTHROMBIN TIME: CPT

## 2019-11-09 PROCEDURE — 83935 ASSAY OF URINE OSMOLALITY: CPT

## 2019-11-09 PROCEDURE — 85014 HEMATOCRIT: CPT

## 2019-11-09 PROCEDURE — 84540 ASSAY OF URINE/UREA-N: CPT

## 2019-11-09 PROCEDURE — 86901 BLOOD TYPING SEROLOGIC RH(D): CPT

## 2019-11-09 PROCEDURE — 82435 ASSAY OF BLOOD CHLORIDE: CPT

## 2019-11-09 PROCEDURE — P9016: CPT

## 2019-11-09 RX ORDER — HYDROMORPHONE HYDROCHLORIDE 2 MG/ML
0.5 INJECTION INTRAMUSCULAR; INTRAVENOUS; SUBCUTANEOUS EVERY 4 HOURS
Refills: 0 | Status: DISCONTINUED | OUTPATIENT
Start: 2019-11-09 | End: 2019-11-09

## 2019-11-09 RX ORDER — HYDROMORPHONE HYDROCHLORIDE 2 MG/ML
0.8 INJECTION INTRAMUSCULAR; INTRAVENOUS; SUBCUTANEOUS
Refills: 0 | Status: DISCONTINUED | OUTPATIENT
Start: 2019-11-09 | End: 2019-11-09

## 2019-11-09 RX ORDER — HYDROMORPHONE HYDROCHLORIDE 2 MG/ML
0.5 INJECTION INTRAMUSCULAR; INTRAVENOUS; SUBCUTANEOUS EVERY 6 HOURS
Refills: 0 | Status: DISCONTINUED | OUTPATIENT
Start: 2019-11-09 | End: 2019-11-09

## 2019-11-09 RX ADMIN — HYDROMORPHONE HYDROCHLORIDE 0.8 MILLIGRAM(S): 2 INJECTION INTRAMUSCULAR; INTRAVENOUS; SUBCUTANEOUS at 14:41

## 2019-11-09 RX ADMIN — HYDROMORPHONE HYDROCHLORIDE 0.5 MILLIGRAM(S): 2 INJECTION INTRAMUSCULAR; INTRAVENOUS; SUBCUTANEOUS at 11:50

## 2019-11-09 RX ADMIN — Medication 0.5 MILLIGRAM(S): at 11:50

## 2019-11-09 RX ADMIN — HYDROMORPHONE HYDROCHLORIDE 0.5 MILLIGRAM(S): 2 INJECTION INTRAMUSCULAR; INTRAVENOUS; SUBCUTANEOUS at 07:48

## 2019-11-09 RX ADMIN — CHLORHEXIDINE GLUCONATE 1 APPLICATION(S): 213 SOLUTION TOPICAL at 06:17

## 2019-11-09 RX ADMIN — HYDROMORPHONE HYDROCHLORIDE 0.5 MILLIGRAM(S): 2 INJECTION INTRAMUSCULAR; INTRAVENOUS; SUBCUTANEOUS at 02:27

## 2019-11-09 RX ADMIN — HYDROMORPHONE HYDROCHLORIDE 0.5 MILLIGRAM(S): 2 INJECTION INTRAMUSCULAR; INTRAVENOUS; SUBCUTANEOUS at 08:53

## 2019-11-09 RX ADMIN — MIDODRINE HYDROCHLORIDE 10 MILLIGRAM(S): 2.5 TABLET ORAL at 06:18

## 2019-11-09 RX ADMIN — MIDODRINE HYDROCHLORIDE 10 MILLIGRAM(S): 2.5 TABLET ORAL at 14:40

## 2019-11-09 RX ADMIN — HYDROMORPHONE HYDROCHLORIDE 0.8 MILLIGRAM(S): 2 INJECTION INTRAMUSCULAR; INTRAVENOUS; SUBCUTANEOUS at 12:36

## 2019-11-09 RX ADMIN — CHLORHEXIDINE GLUCONATE 15 MILLILITER(S): 213 SOLUTION TOPICAL at 06:17

## 2019-11-09 RX ADMIN — Medication 0.5 MILLIGRAM(S): at 08:06

## 2019-11-09 NOTE — PROGRESS NOTE ADULT - PROBLEM SELECTOR PROBLEM 2
Functional quadriplegia
Left-sided weakness
Malignant neoplasm of tail of pancreas
Malignant neoplasm of tail of pancreas

## 2019-11-09 NOTE — PROGRESS NOTE ADULT - PROBLEM SELECTOR PROBLEM 5
Failure to thrive in adult
Palliative care encounter
SIRS (systemic inflammatory response syndrome)
Palliative care encounter

## 2019-11-09 NOTE — PROGRESS NOTE ADULT - ATTENDING COMMENTS
Patient is seen and examined.   55 yo with recently diagnosed metastatic pancreatic ca with mets to the liver and splenic and portal vein thrombosis with new embolic infarcts while on full dose AC. Now in MICU with respiratory failure in the setting of inability to protect airway, SBP and worsening renal failure. Remains febrile with worsening renal function.  -- will repeat cultures and continue with Abx for now - Vanco/Cefepime/Flagyl  -- worsening renal failure -will treat for HRS with albumin and vasopressin  -- ct a/p with progression of disease  -- overall prognosis very poor - discussed with family. He remains full code for now but family understands poor prognosis.   Critically ill requiring MICU care
Patient is seen and examined.   55 yo with recently diagnosed metastatic pancreatic ca with mets to the liver and splenic and portal vein thrombosis with multiple infarcts. Now in MICU with respiratory failure in the setting of inability to protect airway, SBP and worsening renal failure. Remains febrile with worsening renal function. Patient is now comfort care. Will transfer to PCU when bed is available
Patient is seen and examined.   53 yo with recently diagnosed metastatic pancreatic ca with mets to the liver and splenic and portal vein thrombosis with multiple infarcts. Now in MICU with respiratory failure in the setting of inability to protect airway, SBP and worsening renal failure. Remains febrile with worsening renal function.  -- remains encephalopathic - repeat CT head today  -- remains febrile - will change Abx to Meropenem +  Vanco  -- worsening oliguric renal failure - not a candidate for HD  -- holding AC in the setting of elevated INR  -- ct a/p with progression of disease  -- overall prognosis very poor - patient is DNR. Family is discussing palliative measures/withdrawal of care   Critically ill requiring MICU care
Patient is seen and examined.   55 yo with recently diagnosed metastatic pancreatic ca with mets to the liver and splenic and portal vein thrombosis with multiple infarcts. Now in MICU with respiratory failure in the setting of inability to protect airway, SBP and worsening renal failure. Remains febrile with worsening renal function.  -- remains febrile - will change Abx to Meropenem +  Vanco  -- worsening oliguric renal failure - ?HRS vs ATN-will continue with pressors and Albumin as treatment for HRS  -- holding AC in the setting of elevated INR  -- ct a/p with progression of disease  -- overall prognosis very poor - discussed with family. He remains full code for now but family understands poor prognosis.   Critically ill requiring MICU care
Advanced care planning was discussed with patient and family.  Advanced care planning forms were reviewed and discussed.  Differential diagnosis and plan of care discussed with patient after the evaluation.   Pain assessed and judicious use of narcotics when appropriate was discussed.  Importance of Fall prevention discussed.  Counseling on Smoking and Alcohol cessation was offered when appropriate.  Counseling on Diet, exercise, and medication compliance was done.
Advanced care planning was discussed with patient and family.  Advanced care planning forms were reviewed and discussed.    family at the bedside  discussed all questions and W/U
A&P was d/w the patient's mother and brother that were by his bedside. In also informed them about the patient's declining vital signs and likely approaching dying process.
Patient is seen and examined.   53 yo with recently diagnosed metastatic pancreatic ca with mets to the liver and splenic and portal vein thrombosis with new embolic infarcts while on full dose AC. Now in MICU with respiratory failure in the setting of inability to protect airway, SBP and worsening renal failure  -- Abx broadened to Cefepime and Vanco, f/u cultures  -- worsening renal failure - ?HRS vs ATN- check urine lytes and UA.  monitor UO, continue volume expansion with albumin  -- Hgb downtrending this afternoon with worsening Abd distension - concern for intra-abdominal bleed. Transfuse pRBC. Hold AC. CT a/p  -- prognosis very guarded - discussed with family. He remains full code for now but family understands poor prognosis.   Critically ill requiring MICU care

## 2019-11-09 NOTE — PROVIDER CONTACT NOTE (OTHER) - ASSESSMENT
see flowsheet
no response to external stimuli, no spontaneous respirations, no apical heart rate, negative papillary response to light
patient's glucose 516 in bmp, previous finger stick 442, NPH increased from 3 to 5units Q6 when finger stick was checked. 12 units humalog given

## 2019-11-09 NOTE — PROGRESS NOTE ADULT - REASON FOR ADMISSION
weakness

## 2019-11-09 NOTE — PROGRESS NOTE ADULT - PROBLEM SELECTOR PROBLEM 1
Acute respiratory failure
Acute respiratory failure with hypoxia
Left-sided weakness
Functional quadriplegia

## 2019-11-09 NOTE — DISCHARGE NOTE FOR THE EXPIRED PATIENT - HOSPITAL COURSE
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 Patient now with new acute CVA, intubated in MICU. Palliative care consulted for [facility]. Patient was transfered to the PCU for symptom focus approach only and for compassionate extubation. Patient was treated symptomatically with dilaudid for dyspnea. Discussions for compassionate extubation held prior to the arrival to the unit, family wishes were hoping to proceed with the compassionate extubation after family members who were traveling to come and see the patient gave there farewell. Patient  on 19.

## 2019-11-09 NOTE — PROGRESS NOTE ADULT - SUBJECTIVE AND OBJECTIVE BOX
GAP TEAM PALLIATIVE CARE UNIT PROGRESS NOTE:      [  ] Patient on hospice program.    INDICATION FOR PALLIATIVE CARE UNIT SERVICES:  Symptom management in the setting of acute CVA. Planning for Palliative Extubation.     INTERVAL HPI/OVERNIGHT EVENTS:  Patient with increase requirement for PRN for Dyspnea, used 3 PRN of IV Dilaudid.  Remains intubated and mechanically ventilated. Date for Palliative Extubation TBD.   Family at bedside.   FI02 incresed from 21 to 40%.    DNR on chart: Yes    Allergies    No Known Allergies    Intolerances    MEDICATIONS  (STANDING):  chlorhexidine 0.12% Liquid 15 milliLiter(s) Oral Mucosa every 12 hours  chlorhexidine 4% Liquid 1 Application(s) Topical <User Schedule>  HYDROmorphone  Injectable 0.5 milliGRAM(s) IV Push every 4 hours  midodrine 10 milliGRAM(s) Oral every 8 hours    MEDICATIONS  (PRN):  acetaminophen  Suppository .. 650 milliGRAM(s) Rectal every 6 hours PRN Temp greater or equal to 38C (100.4F), Mild Pain (1 - 3)  glycopyrrolate Injectable 0.4 milliGRAM(s) IV Push every 6 hours PRN Secretions  HYDROmorphone  Injectable 0.8 milliGRAM(s) IV Push every 1 hour PRN dyspnea  HYDROmorphone  Injectable 0.8 milliGRAM(s) IV Push every 1 hour PRN pain  LORazepam   Injectable 0.5 milliGRAM(s) IV Push every 1 hour PRN anxiety/agitation      ITEMS UNCHECKED ARE NOT PRESENT    PRESENT SYMPTOMS:       http://npcrc.org/files/news/palliative_performance_scale_[email or web address].pdf    PHYSICAL EXAM:  Vital Signs Last 24 Hrs  T(C): 36.7 (09 Nov 2019 08:39), Max: 38 (08 Nov 2019 20:00)  T(F): 98.1 (09 Nov 2019 08:39), Max: 100.4 (08 Nov 2019 20:00)  HR: 77 (09 Nov 2019 13:03) (72 - 93)  BP: 85/51 (09 Nov 2019 08:39) (85/51 - 107/69)  BP(mean): 71 (08 Nov 2019 21:00) (71 - 80)  RR: 21 (09 Nov 2019 13:02) (20 - 24)  SpO2: 98% (09 Nov 2019 13:03) (97% - 99%) I&O's Summary    08 Nov 2019 07:01  -  09 Nov 2019 07:00  --------------------------------------------------------  IN: 350 mL / OUT: 15 mL / NET: 335 mL    GENERAL:  [ ]Alert  [ ]Oriented x   [ ]Lethargic  [ ]Cachexia  [ x ]Unarousable  [ ]Verbal  [ x ]Non-Verbal  Behavioral:   [ ] Anxiety  [ ] Delirium [ ] Agitation [ ] Other  HEENT:  [ ]Normal   [ x ]Dry mouth   [ x]ET Tube/Trach  [ ]Oral lesions  PULMONARY: Diminished breath sounds on the left lung field.  [ x ]Clear [ ]Tachypnea  [ ]Audible excessive secretions   [ ]Rhonchi        [ ]Right [ ]Left [ ]Bilateral  [ ]Crackles        [ ]Right [ ]Left [ ]Bilateral  [ ]Wheezing     [ ]Right [ ]Left [ ]Bilateral  CARDIOVASCULAR:    [ x ]Regular [ ]Irregular [ ]Tachy  [ ]Deon [ ]Murmur [ ]Other  GASTROINTESTINAL:  [ x ]Soft  [ ]Distended   [ x ]+BS  [ x ]Non tender [ ]Tender  [ ]PEG [x ]OGT/ NGT   Last BM: 11/9   GENITOURINARY:  [ ]Normal [ ] Incontinent   [ ]Oliguria/Anuria   [ x ][place]  MUSCULOSKELETAL:   [ ]Normal   [ ]Weakness  [ x ]Bed/Wheelchair bound [ ]Edema  NEUROLOGIC: not following commands  [ ]No focal deficits  [ x ] Cognitive impairment  [ ] Dysphagia [ ]Dysarthria [ ] Paresis [ ]Other   SKIN: xerosis  [ ]Normal   [ ]Pressure ulcer(s)  [ ]Rash    CRITICAL CARE:  [ ] Shock Present  [ ]Septic [ ]Cardiogenic [ ]Neurologic [ ]Hypovolemic  [ ]  Vasopressors [ ]  Inotropes   [ x ] Respiratory failure present  [ x ] Acute  [ ] Chronic [x ] Hypoxic  [ ] Hypercarbic [ ] Other  [ ] Other organ failure     LABS:  None new    RADIOLOGY & ADDITIONAL STUDIES:  None new    PROTEIN CALORIE MALNUTRITION:   [ x ] PPSV2 < or = 30% [ ] significant weight loss [ ] poor nutritional intake [ ] anasarca [ ] catabolic state   Albumin, Serum: 3.4 g/dL (11-07-19 @ 11:04)   Artificial Nutrition [ ]     REFERRALS:   [ ]Chaplaincy  [ ] Hospice  [ ]Child Life  [ ]Social Work  [ ]Case management [ ]Holistic Therapy [ ] Physical Therapy [ ] Dietary     Goals of Care Document:   Progress Notes - Care Coordination [C. Provider] (11-07-19 @ 15:01)

## 2019-11-09 NOTE — PROVIDER CONTACT NOTE (OTHER) - ACTION/TREATMENT ORDERED:
NP made aware . IV Tylenol and IV Vanco  ordered. Will continue to monitor
NPH increased to 8units Q6hrs  as per MD--no insulin gtt to be started
see patient expiration note

## 2019-11-09 NOTE — PROGRESS NOTE ADULT - PROBLEM SELECTOR PLAN 2
MRI brain noted   Neuro eval apprciated   SPine MRI done on previous admission   Head CT noted  multiple ischemic strokes, shower   AC as per MICU
MRI brain noted   Neuro eval apprciated   SPine MRI done on previous admission   Head CT noted  multiple ischemic strokes, shower   AC as per MICU  repeat CT head noted
MRI brain noted   Neuro eval apprciated   SPine MRI done on previous admission   Head CT noted  multiple ischemic strokes, shower   AC as per MICU  repeat CT head today
MRI brain noted   Neuro eval apprciated   SPine MRI done on previous admission   Head CT noted  multiple ischemic strokes, shower   cont heparin drip for AC
MRI brain noted   Neuro eval apprciated   SPine MRI done on previous admission   Head CT noted  multiple ischemic strokes, shower   cont heparin drip for AC
Onc mahsa appreciated   ABd US  plan for paracentesis by IR   hold eliquis for now   LE weakness can be due to worsening ascites and nerve compression
PPS 10% requires total care by RN
no further DMT given current state

## 2019-11-09 NOTE — PROGRESS NOTE ADULT - PROBLEM SELECTOR PLAN 1
plan for compassionate extubation in PCU  date TBD pending on family arrival Plan for compassionate extubation in PCU. Date TBD pending on family arrival.  Management of Dyspnea with Dilaudid. Used 3 PRN of IV Dilaudid in the past 24 hours. FiO2 increased from 21 to 40%.  Start Dilaudid 0.5 mg IV q6h ATC.  C/w Dilaudid IV 0.8 mg IV q1h PRN Dyspnea/Pain. Plan for compassionate extubation in PCU. Date TBD pending on family arrival.   Management of Dyspnea with Dilaudid. Used 3 PRN of IV Dilaudid in the past 24 hours. FiO2 increased from 21 to 40%.  Start Dilaudid 0.5 mg IV q6h ATC.  C/w Dilaudid IV 0.8 mg IV q1h PRN Dyspnea/Pain.

## 2019-11-09 NOTE — PROGRESS NOTE ADULT - PROBLEM SELECTOR PLAN 5
- PT eval
- worsening leukocytosis, persistent fevers  - no obvious s/s active infection  - send blood and urine cultures  - ID eval appreciated   - cefepime  paracentesis for fluid study
- worsening leukocytosis, persistent fevers  - no obvious s/s active infection  - send blood and urine cultures  - ID eval appreciated   - cont vanc and zosyn  paracentesis for fluid study
- worsening leukocytosis, persistent fevers  - no obvious s/s active infection  - send blood and urine cultures  - ID eval appreciated   - cont vanc and zosyn  paracentesis for fluid study
plan for transition to PCU for comfort care and elective extubation, date TBD  emotional support provided to family. awaiting bed availability in PCU
plan for transition to PCU for comfort care and elective extubation, date TBD  emotional support provided to family. awaiting bed availability in PCU

## 2019-11-09 NOTE — PROGRESS NOTE ADULT - PROBLEM SELECTOR PLAN 4
no further DMT given current state Patient in the PCU for comfort care and elective extubation, date TBD. MOLST completed.   Emotional support provided to family.

## 2019-11-10 LAB
CULTURE RESULTS: SIGNIFICANT CHANGE UP
CULTURE RESULTS: SIGNIFICANT CHANGE UP
SPECIMEN SOURCE: SIGNIFICANT CHANGE UP
SPECIMEN SOURCE: SIGNIFICANT CHANGE UP

## 2019-11-12 LAB — PARANEOPLASTIC AB PNL SER: SIGNIFICANT CHANGE UP

## 2019-11-12 PROCEDURE — 70496 CT ANGIOGRAPHY HEAD: CPT

## 2019-11-12 PROCEDURE — 85610 PROTHROMBIN TIME: CPT

## 2019-11-12 PROCEDURE — 87633 RESP VIRUS 12-25 TARGETS: CPT

## 2019-11-12 PROCEDURE — C1788: CPT

## 2019-11-12 PROCEDURE — 99285 EMERGENCY DEPT VISIT HI MDM: CPT | Mod: 25

## 2019-11-12 PROCEDURE — 81001 URINALYSIS AUTO W/SCOPE: CPT

## 2019-11-12 PROCEDURE — 96374 THER/PROPH/DIAG INJ IV PUSH: CPT

## 2019-11-12 PROCEDURE — 72148 MRI LUMBAR SPINE W/O DYE: CPT

## 2019-11-12 PROCEDURE — 86316 IMMUNOASSAY TUMOR OTHER: CPT

## 2019-11-12 PROCEDURE — 80053 COMPREHEN METABOLIC PANEL: CPT

## 2019-11-12 PROCEDURE — C1769: CPT

## 2019-11-12 PROCEDURE — 86255 FLUORESCENT ANTIBODY SCREEN: CPT

## 2019-11-12 PROCEDURE — 72157 MRI CHEST SPINE W/O & W/DYE: CPT

## 2019-11-12 PROCEDURE — 72146 MRI CHEST SPINE W/O DYE: CPT

## 2019-11-12 PROCEDURE — 82962 GLUCOSE BLOOD TEST: CPT

## 2019-11-12 PROCEDURE — 74183 MRI ABD W/O CNTR FLWD CNTR: CPT

## 2019-11-12 PROCEDURE — 72158 MRI LUMBAR SPINE W/O & W/DYE: CPT

## 2019-11-12 PROCEDURE — C1894: CPT

## 2019-11-12 PROCEDURE — 70498 CT ANGIOGRAPHY NECK: CPT

## 2019-11-12 PROCEDURE — 83690 ASSAY OF LIPASE: CPT

## 2019-11-12 PROCEDURE — C1887: CPT

## 2019-11-12 PROCEDURE — 47000 NEEDLE BIOPSY OF LIVER PERQ: CPT

## 2019-11-12 PROCEDURE — 80048 BASIC METABOLIC PNL TOTAL CA: CPT

## 2019-11-12 PROCEDURE — 83605 ASSAY OF LACTIC ACID: CPT

## 2019-11-12 PROCEDURE — 83010 ASSAY OF HAPTOGLOBIN QUANT: CPT

## 2019-11-12 PROCEDURE — 97162 PT EVAL MOD COMPLEX 30 MIN: CPT

## 2019-11-12 PROCEDURE — 36561 INSERT TUNNELED CV CATH: CPT

## 2019-11-12 PROCEDURE — 76937 US GUIDE VASCULAR ACCESS: CPT

## 2019-11-12 PROCEDURE — 86850 RBC ANTIBODY SCREEN: CPT

## 2019-11-12 PROCEDURE — 86900 BLOOD TYPING SEROLOGIC ABO: CPT

## 2019-11-12 PROCEDURE — A9585: CPT

## 2019-11-12 PROCEDURE — 80061 LIPID PANEL: CPT

## 2019-11-12 PROCEDURE — 76942 ECHO GUIDE FOR BIOPSY: CPT

## 2019-11-12 PROCEDURE — 77001 FLUOROGUIDE FOR VEIN DEVICE: CPT

## 2019-11-12 PROCEDURE — 74176 CT ABD & PELVIS W/O CONTRAST: CPT

## 2019-11-12 PROCEDURE — 87798 DETECT AGENT NOS DNA AMP: CPT

## 2019-11-12 PROCEDURE — 88173 CYTOPATH EVAL FNA REPORT: CPT

## 2019-11-12 PROCEDURE — 87581 M.PNEUMON DNA AMP PROBE: CPT

## 2019-11-12 PROCEDURE — 71045 X-RAY EXAM CHEST 1 VIEW: CPT

## 2019-11-12 PROCEDURE — 82378 CARCINOEMBRYONIC ANTIGEN: CPT

## 2019-11-12 PROCEDURE — 88307 TISSUE EXAM BY PATHOLOGIST: CPT

## 2019-11-12 PROCEDURE — 87486 CHLMYD PNEUM DNA AMP PROBE: CPT

## 2019-11-12 PROCEDURE — 82607 VITAMIN B-12: CPT

## 2019-11-12 PROCEDURE — 88172 CYTP DX EVAL FNA 1ST EA SITE: CPT

## 2019-11-12 PROCEDURE — 85027 COMPLETE CBC AUTOMATED: CPT

## 2019-11-12 PROCEDURE — 86901 BLOOD TYPING SEROLOGIC RH(D): CPT

## 2019-11-12 PROCEDURE — 86803 HEPATITIS C AB TEST: CPT

## 2019-11-12 PROCEDURE — 93306 TTE W/DOPPLER COMPLETE: CPT

## 2019-11-12 PROCEDURE — 70450 CT HEAD/BRAIN W/O DYE: CPT

## 2019-11-12 PROCEDURE — 88342 IMHCHEM/IMCYTCHM 1ST ANTB: CPT

## 2019-11-12 PROCEDURE — 86301 IMMUNOASSAY TUMOR CA 19-9: CPT

## 2019-11-12 PROCEDURE — 87040 BLOOD CULTURE FOR BACTERIA: CPT

## 2019-11-12 PROCEDURE — 84443 ASSAY THYROID STIM HORMONE: CPT

## 2019-11-12 PROCEDURE — 88341 IMHCHEM/IMCYTCHM EA ADD ANTB: CPT

## 2019-11-12 PROCEDURE — 88305 TISSUE EXAM BY PATHOLOGIST: CPT

## 2019-11-12 PROCEDURE — 85730 THROMBOPLASTIN TIME PARTIAL: CPT

## 2019-11-12 PROCEDURE — 82746 ASSAY OF FOLIC ACID SERUM: CPT

## 2019-11-12 PROCEDURE — 82728 ASSAY OF FERRITIN: CPT

## 2019-11-12 PROCEDURE — 83036 HEMOGLOBIN GLYCOSYLATED A1C: CPT

## 2019-11-13 LAB
ACRM MODULATING ANTIBODY: 0.07 NMOL/L — HIGH
ACRM MODULATING ANTIBODY: 14 % — SIGNIFICANT CHANGE UP
P/Q TYPE ANTIBODY: 0 NMOL/L — SIGNIFICANT CHANGE UP
STRIA MUS AB TITR SER: NEGATIVE TITER — SIGNIFICANT CHANGE UP
VGCC-N BIND AB SER-SCNC: 0 NMOL/L — SIGNIFICANT CHANGE UP

## 2020-01-09 NOTE — H&P ADULT - HISTORY OF PRESENT ILLNESS
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    History mostly obtained from pt's sister in law at bedside. Other family members including mother, wife, and brother also at bedside corroborating. Pt is not a good historian, A&Ox2-3, at this time.  At baseline, pt ambulates, drives, and has mild LUE weakness only. During last hospitalization, pt was found to have right sided focal weakness, for which pt was seen by neuro and had MRI of spine performed that did not elucidate etiology. Starting yesterday, pt has had acute onset left-sided flaccid weakness, but improvement of right sided weakness. No other focal deficits. Pt has been having periods of mild confusion. Pt still continues to have fevers up to 103 at home, with night sweats and nocturnal hiccups. Pt has had reduced appetite. Per family, pt has had increasing abdominal distension, but had a large bowel movement this morning. Pt denies pain. Other ROS as below. Last hospitalization, pt had paracentesis, diagnostic us guided liver biopsy. Pt also received broad antibiotics from 10/17 until 10/21 empirically.    VS: Tm 98.8, P 82, Bp 122/79, R 18, 93% RA  In the ED, received NS 1L, pepcid, zofran. Opioid Pregnancy And Lactation Text: These medications can lead to premature delivery and should be avoided during pregnancy. These medications are also present in breast milk in small amounts.

## 2020-09-16 NOTE — H&P ADULT - NSHPSOCIALHISTORY_GEN_ALL_CORE
Order for Flu placed.  
Patient scheduled flu shot 09/20  
Social History:    Marital Status: ( x ) , (  ) Single, (  ) , (  ) , (  )   # of Children: 2 sons  Lives with: (  ) alone, ( x ) children, ( x ) spouse, (  ) parents, (  ) siblings, (  ) friends, (  ) other:   Occupation:     Substance Use/Illicit Drugs: (  ) never used vs other:   Tobacco Usage: ( x ) never smoked, (  ) former smoker, (  ) current smoker and Total Pack-Years:   Last Alcohol Usage/Frequency/Amount/Withdrawal/Hx of Abuse:  none  Foreign travel:   Animal exposure:

## 2021-11-10 NOTE — DIETITIAN INITIAL EVALUATION ADULT. - NAME AND PHONE
RX PROGRESS NOTE: Vancomycin Therapeutic Drug Monitoring      Indication for therapy: Complicated infection by MRSA Comment - Ventriculitis    ALLERGIES:  No Known Allergies    Most recent height and weight information:  Weight: 103.2 kg (11/09/21 0441)  Height: 6' 2\" (188 cm) (11/05/21 0000)    The Following are the calculated  Current Weights for Artis Gonzalez     Adjusted Ideal    90.6 kg 82.2 kg             Labs:  Serum Creatinine and Creatinine Clearance:  Serum creatinine: 1.06 mg/dL 11/09/21 0202  Estimated creatinine clearance: 108 mL/min    Maximum Temperature (last 24 hours)     Value Max    Temp 101.7 °F (38.7 °C)        WBC (K/mcL)   Date/Time Value   11/09/2021 0202 10.7   11/08/2021 0452 13.0 (H)   11/07/2021 0525 15.4 (H)     Microbiology Results  (Last 10 results in the past 7 days)    Specimen   Gram Smear   Culture Result   Status         11/09/21  2100          No organisms seen.  [P]             Few Polymorphonuclear cells.  [P]            11/08/21  1913          Present Polymorphonuclear cells.  [P]             No organisms seen.  [P]             Slide prepared by [facility].  [P]            11/06/21  0406          Adequate quality specimen. Acute inflammation with moderate/many neutrophils. Mixed bacteria with no predominant type.                  [P] - Preliminary Result               Assessment/Plan:  Briefly, this is a 48 year old male started on vancomycin for Complicated infection by MRSA Comment - Ventriculitis  , with a target serum trough concentration of 15-20 mcg/mL.  Initial dosing regimen will be 2000 mg loading dose once, followed by a maintenance dose of 1750 mg every 12 hours..    Pharmacy will monitor levels as appropriate.    Pharmacy will continue to monitor patient (renal function, microbiology data, risk factors for adverse events, appropriate duration of therapy), will order/monitor serum levels as appropriate, and will adjust dose if/when necessary.      Thank you,    Sola  [facility]  11/9/2021 10:50 PM     Lay Gomez, MS, RDN, #[phone]

## 2022-08-19 NOTE — PROGRESS NOTE ADULT - ASSESSMENT
s/p liver bx yesterday  having hiccoughs and fever persist  no apparent infectious etiologies, possible tumor fever vs due to thrombosis  try reglan for hiccuoughs  await path results Compression Fracture

## 2022-09-28 NOTE — DISCHARGE NOTE NURSING/CASE MANAGEMENT/SOCIAL WORK - NSDCPEPTSTROKESIGNS_GEN_ALL_CORE
Sudden numbness or weakness of the face, arm, or leg, especially on one side of the body. Confusion, trouble speaking or understanding. Trouble seeing in one or both eyes. Trouble walking, dizziness, loss of balance or coordination. Severe headache. 28-Sep-2022 06:58

## 2023-05-01 NOTE — PATIENT PROFILE ADULT - TOBACCO USE
Consent: Written consent obtained, risks reviewed including but not limited to crusting, scabbing, blistering, scarring, darker or lighter pigmentary change, incidental hair removal, bruising, and/or incomplete removal. Never smoker

## 2023-10-04 NOTE — PATIENT PROFILE ADULT - FUNCTIONAL SCREEN CURRENT LEVEL: BATHING, MLM
64 Detail Level: Detailed Quality 111:Pneumonia Vaccination Status For Older Adults: Patient received any pneumococcal conjugate or polysaccharide vaccine on or after their 60th birthday and before the end of the measurement period Quality 226: Preventive Care And Screening: Tobacco Use: Screening And Cessation Intervention: Patient screened for tobacco use and is an ex/non-smoker Quality 110: Preventive Care And Screening: Influenza Immunization: Influenza Immunization previously received during influenza season Quality 47: Advance Care Plan: Advance Care Planning discussed and documented; advance care plan or surrogate decision maker documented in the medical record. Quality 130: Documentation Of Current Medications In The Medical Record: Current Medications Documented Quality 431: Preventive Care And Screening: Unhealthy Alcohol Use - Screening: Patient not identified as an unhealthy alcohol user when screened for unhealthy alcohol use using a systematic screening method 0 = independent

## 2024-02-25 NOTE — PROGRESS NOTE ADULT - PROVIDER SPECIALTY LIST ADULT
Anesthesia
Heme/Onc
Infectious Disease
Internal Medicine
MICU
Neurology
Palliative Care
Palliative Care
BUE/BLE >3/5 seen during ADLs and OOB transfers/grossly assessed due to

## 2024-04-04 NOTE — H&P ADULT - NSICDXFAMHXPERTINENTNEGATIVE_GEN_A_CORE_FT
Patient: MARLON KRUEGER [id number] 85y Male                            Hospitalist Attending Note    No complaints.  Per respiratory therapy, able to tolerate being off high flow O2.      ____________________PHYSICAL EXAM:  GENERAL:  NAD, awake, confused.    HEENT: NCAT  CARDIOVASCULAR:  S1, S2  LUNGS: coarse BS b/l  ABDOMEN:  soft, (-) tenderness, (-) distension, (+) bowel sounds, (-) guarding, (-) rebound (-) rigidity  EXTREMITIES:  no cyanosis / clubbing / edema.   ____________________    VITALS:  Vital Signs Last 24 Hrs  T(C): 37.2 (2024 12:36), Max: 37.2 (2024 05:10)  T(F): 98.9 (2024 12:36), Max: 98.9 (2024 05:10)  HR: 98 (2024 12:36) (85 - 98)  BP: 126/79 (2024 12:36) (115/58 - 148/77)  BP(mean): --  RR: 19 (2024 12:36) (19 - 19)  SpO2: 95% (2024 12:36) (92% - 96%)    Parameters below as of 2024 12:36  Patient On (Oxygen Delivery Method): nasal cannula, high flow     Daily     Daily Weight in k.8 (2024 05:10)  CAPILLARY BLOOD GLUCOSE        I&O's Summary    2024 07:01  -  2024 07:00  --------------------------------------------------------  IN: 360 mL / OUT: 600 mL / NET: -240 mL        LABS:                        14.9   12.51 )-----------( 245      ( 2024 07:30 )             43.7         143  |  103  |  24<H>  ----------------------------<  141<H>  3.6   |  30  |  0.79    Ca    8.7      2024 07:30          Urinalysis Basic - ( 2024 07:30 )    Color: x / Appearance: x / SG: x / pH: x  Gluc: 141 mg/dL / Ketone: x  / Bili: x / Urobili: x   Blood: x / Protein: x / Nitrite: x   Leuk Esterase: x / RBC: x / WBC x   Sq Epi: x / Non Sq Epi: x / Bacteria: x              MEDICATIONS:  acetaminophen     Tablet .. 650 milliGRAM(s) Oral every 6 hours PRN  albuterol    90 MICROgram(s) HFA Inhaler 2 Puff(s) Inhalation every 6 hours PRN  albuterol/ipratropium for Nebulization 3 milliLiter(s) Nebulizer every 6 hours  aluminum hydroxide/magnesium hydroxide/simethicone Suspension 30 milliLiter(s) Oral every 4 hours PRN  aspirin enteric coated 81 milliGRAM(s) Oral daily  bisacodyl 5 milliGRAM(s) Oral at bedtime  budesonide  80 MICROgram(s)/formoterol 4.5 MICROgram(s) Inhaler 2 Puff(s) Inhalation two times a day  clopidogrel Tablet 75 milliGRAM(s) Oral daily  dextrose 5%. 1000 milliLiter(s) IV Continuous <Continuous>  finasteride 5 milliGRAM(s) Oral daily  heparin   Injectable 5000 Unit(s) SubCutaneous every 12 hours  latanoprost 0.005% Ophthalmic Solution 1 Drop(s) Both EYES at bedtime  melatonin 10 milliGRAM(s) Oral at bedtime  mirabegron ER 25 milliGRAM(s) Oral at bedtime  montelukast 10 milliGRAM(s) Oral daily  ondansetron Injectable 4 milliGRAM(s) IV Push every 8 hours PRN  pantoprazole  Injectable 40 milliGRAM(s) IV Push daily  polyethylene glycol 3350 17 Gram(s) Oral daily  senna 2 Tablet(s) Oral at bedtime  sodium chloride 3%  Inhalation 4 milliLiter(s) Inhalation every 12 hours  tiotropium 2.5 MICROgram(s) Inhaler 2 Puff(s) Inhalation daily     pancreatic ca

## 2024-10-22 NOTE — ED ADULT NURSE NOTE - NSFALLRSKASSESSTYPE_ED_ALL_ED
Initial (On Arrival) Detail Level: Simple Instructions: This plan will send the code FBSE to the PM system.  DO NOT or CHANGE the price. Price (Do Not Change): 0.00

## 2025-05-19 NOTE — ED ADULT TRIAGE NOTE - NS ED TRIAGE AVPU SCALE
Status Update    Who is Calling: patient    Update: patient called stating that she had a bone density done on 05/08-- able to give result. But is concern about her prep to bone density. She a took antiacids/toms the week and night before the scan. She did not realize these contained calcium.      Does caller want a call/response back: Yes     Okay to leave a detailed message?: Yes at Home number on file [phone] (home)     Alert-The patient is alert, awake and responds to voice. The patient is oriented to time, place, and person. The triage nurse is able to obtain subjective information.

## 2025-06-17 NOTE — DIETITIAN INITIAL EVALUATION ADULT. - PERTINENT LABORATORY DATA
Blood pressure above goal at time of visit and being closely followed per Dr. Turner.  Continue on ARB per ADA guidelines.   (10/17) HbA1c 7.2%; Finger sticks: (10/23) 169 - 241 (10/22) 160 - 232; (10/23)